# Patient Record
Sex: MALE | Race: WHITE | NOT HISPANIC OR LATINO | Employment: OTHER | URBAN - METROPOLITAN AREA
[De-identification: names, ages, dates, MRNs, and addresses within clinical notes are randomized per-mention and may not be internally consistent; named-entity substitution may affect disease eponyms.]

---

## 2017-01-04 ENCOUNTER — LAB CONVERSION - ENCOUNTER (OUTPATIENT)
Dept: OTHER | Facility: OTHER | Age: 57
End: 2017-01-04

## 2017-01-04 LAB
INR PPP: 1.7
PROTHROMBIN TIME: 17.3 SEC (ref 9–11.5)

## 2017-01-05 ENCOUNTER — GENERIC CONVERSION - ENCOUNTER (OUTPATIENT)
Dept: OTHER | Facility: OTHER | Age: 57
End: 2017-01-05

## 2017-01-10 ENCOUNTER — GENERIC CONVERSION - ENCOUNTER (OUTPATIENT)
Dept: OTHER | Facility: OTHER | Age: 57
End: 2017-01-10

## 2017-01-12 ENCOUNTER — LAB CONVERSION - ENCOUNTER (OUTPATIENT)
Dept: OTHER | Facility: OTHER | Age: 57
End: 2017-01-12

## 2017-01-12 LAB
INR PPP: 1.7
PROTHROMBIN TIME: 16.8 SEC (ref 9–11.5)

## 2017-01-25 ENCOUNTER — GENERIC CONVERSION - ENCOUNTER (OUTPATIENT)
Dept: OTHER | Facility: OTHER | Age: 57
End: 2017-01-25

## 2017-01-26 ENCOUNTER — LAB CONVERSION - ENCOUNTER (OUTPATIENT)
Dept: OTHER | Facility: OTHER | Age: 57
End: 2017-01-26

## 2017-01-26 LAB
INR PPP: 2.2
PROTHROMBIN TIME: 22.2 SEC (ref 9–11.5)

## 2017-01-27 ENCOUNTER — GENERIC CONVERSION - ENCOUNTER (OUTPATIENT)
Dept: OTHER | Facility: OTHER | Age: 57
End: 2017-01-27

## 2017-02-13 ENCOUNTER — ALLSCRIPTS OFFICE VISIT (OUTPATIENT)
Dept: OTHER | Facility: OTHER | Age: 57
End: 2017-02-13

## 2017-02-13 DIAGNOSIS — I48.91 ATRIAL FIBRILLATION (HCC): ICD-10-CM

## 2017-02-13 DIAGNOSIS — R60.0 LOCALIZED EDEMA: ICD-10-CM

## 2017-02-15 ENCOUNTER — LAB CONVERSION - ENCOUNTER (OUTPATIENT)
Dept: OTHER | Facility: OTHER | Age: 57
End: 2017-02-15

## 2017-02-15 LAB
BUN SERPL-MCNC: 17 MG/DL (ref 7–25)
BUN/CREA RATIO (HISTORICAL): NORMAL (CALC) (ref 6–22)
CALCIUM SERPL-MCNC: 8.8 MG/DL (ref 8.6–10.3)
CHLORIDE SERPL-SCNC: 107 MMOL/L (ref 98–110)
CO2 SERPL-SCNC: 23 MMOL/L (ref 20–31)
CREAT SERPL-MCNC: 0.72 MG/DL (ref 0.7–1.33)
EGFR AFRICAN AMERICAN (HISTORICAL): 123 ML/MIN/1.73M2
EGFR-AMERICAN CALC (HISTORICAL): 106 ML/MIN/1.73M2
GLUCOSE (HISTORICAL): 79 MG/DL (ref 65–99)
POTASSIUM SERPL-SCNC: 4.6 MMOL/L (ref 3.5–5.3)
SODIUM SERPL-SCNC: 138 MMOL/L (ref 135–146)

## 2017-02-16 ENCOUNTER — GENERIC CONVERSION - ENCOUNTER (OUTPATIENT)
Dept: OTHER | Facility: OTHER | Age: 57
End: 2017-02-16

## 2017-02-16 ENCOUNTER — LAB CONVERSION - ENCOUNTER (OUTPATIENT)
Dept: OTHER | Facility: OTHER | Age: 57
End: 2017-02-16

## 2017-02-16 LAB
INR PPP: 1.4
PROTHROMBIN TIME: 14.7 SEC (ref 9–11.5)

## 2017-02-22 ENCOUNTER — GENERIC CONVERSION - ENCOUNTER (OUTPATIENT)
Dept: OTHER | Facility: OTHER | Age: 57
End: 2017-02-22

## 2017-03-30 ENCOUNTER — APPOINTMENT (EMERGENCY)
Dept: CT IMAGING | Facility: HOSPITAL | Age: 57
DRG: 201 | End: 2017-03-30
Payer: COMMERCIAL

## 2017-03-30 ENCOUNTER — HOSPITAL ENCOUNTER (INPATIENT)
Facility: HOSPITAL | Age: 57
LOS: 4 days | Discharge: HOME | DRG: 201 | End: 2017-04-03
Attending: EMERGENCY MEDICINE | Admitting: INTERNAL MEDICINE
Payer: COMMERCIAL

## 2017-03-30 DIAGNOSIS — I48.91 ATRIAL FIBRILLATION (HCC): ICD-10-CM

## 2017-03-30 DIAGNOSIS — R19.7 ACUTE DIARRHEA: ICD-10-CM

## 2017-03-30 DIAGNOSIS — Z91.14 H/O MEDICATION NONCOMPLIANCE: ICD-10-CM

## 2017-03-30 DIAGNOSIS — E05.90 HYPERTHYROIDISM: ICD-10-CM

## 2017-03-30 DIAGNOSIS — I26.99 ACUTE PULMONARY EMBOLISM (HCC): Primary | ICD-10-CM

## 2017-03-30 LAB
ANION GAP SERPL CALCULATED.3IONS-SCNC: 8 MMOL/L (ref 4–13)
APTT PPP: 32 SECONDS (ref 24–36)
BASOPHILS # BLD AUTO: 0.03 THOUSANDS/ΜL (ref 0–0.1)
BASOPHILS NFR BLD AUTO: 0 % (ref 0–1)
BUN SERPL-MCNC: 29 MG/DL (ref 5–25)
CALCIUM SERPL-MCNC: 8.9 MG/DL (ref 8.3–10.1)
CHLORIDE SERPL-SCNC: 103 MMOL/L (ref 100–108)
CO2 SERPL-SCNC: 27 MMOL/L (ref 21–32)
CREAT SERPL-MCNC: 0.71 MG/DL (ref 0.6–1.3)
EOSINOPHIL # BLD AUTO: 0.13 THOUSAND/ΜL (ref 0–0.61)
EOSINOPHIL NFR BLD AUTO: 2 % (ref 0–6)
ERYTHROCYTE [DISTWIDTH] IN BLOOD BY AUTOMATED COUNT: 12.7 % (ref 11.6–15.1)
GFR SERPL CREATININE-BSD FRML MDRD: >60 ML/MIN/1.73SQ M
GLUCOSE SERPL-MCNC: 99 MG/DL (ref 65–140)
HCT VFR BLD AUTO: 44.1 % (ref 36.5–49.3)
HGB BLD-MCNC: 14.9 G/DL (ref 12–17)
INR PPP: 1.32 (ref 0.86–1.16)
LYMPHOCYTES # BLD AUTO: 1.4 THOUSANDS/ΜL (ref 0.6–4.47)
LYMPHOCYTES NFR BLD AUTO: 17 % (ref 14–44)
MAGNESIUM SERPL-MCNC: 1.6 MG/DL (ref 1.6–2.6)
MCH RBC QN AUTO: 32.5 PG (ref 26.8–34.3)
MCHC RBC AUTO-ENTMCNC: 33.8 G/DL (ref 31.4–37.4)
MCV RBC AUTO: 96 FL (ref 82–98)
MONOCYTES # BLD AUTO: 1.15 THOUSAND/ΜL (ref 0.17–1.22)
MONOCYTES NFR BLD AUTO: 14 % (ref 4–12)
NEUTROPHILS # BLD AUTO: 5.46 THOUSANDS/ΜL (ref 1.85–7.62)
NEUTS SEG NFR BLD AUTO: 67 % (ref 43–75)
NT-PROBNP SERPL-MCNC: 1225 PG/ML
PLATELET # BLD AUTO: 238 THOUSANDS/UL (ref 149–390)
PMV BLD AUTO: 10.3 FL (ref 8.9–12.7)
POTASSIUM SERPL-SCNC: 3.9 MMOL/L (ref 3.5–5.3)
PROTHROMBIN TIME: 16.2 SECONDS (ref 12–14.3)
RBC # BLD AUTO: 4.59 MILLION/UL (ref 3.88–5.62)
SODIUM SERPL-SCNC: 138 MMOL/L (ref 136–145)
TROPONIN I SERPL-MCNC: <0.02 NG/ML
TSH SERPL DL<=0.05 MIU/L-ACNC: <0.007 UIU/ML (ref 0.36–3.74)
WBC # BLD AUTO: 8.17 THOUSAND/UL (ref 4.31–10.16)

## 2017-03-30 PROCEDURE — 84443 ASSAY THYROID STIM HORMONE: CPT | Performed by: PHYSICIAN ASSISTANT

## 2017-03-30 PROCEDURE — 74177 CT ABD & PELVIS W/CONTRAST: CPT

## 2017-03-30 PROCEDURE — 85610 PROTHROMBIN TIME: CPT | Performed by: PHYSICIAN ASSISTANT

## 2017-03-30 PROCEDURE — 85730 THROMBOPLASTIN TIME PARTIAL: CPT | Performed by: PHYSICIAN ASSISTANT

## 2017-03-30 PROCEDURE — 85025 COMPLETE CBC W/AUTO DIFF WBC: CPT | Performed by: PHYSICIAN ASSISTANT

## 2017-03-30 PROCEDURE — 93005 ELECTROCARDIOGRAM TRACING: CPT | Performed by: PHYSICIAN ASSISTANT

## 2017-03-30 PROCEDURE — 83880 ASSAY OF NATRIURETIC PEPTIDE: CPT | Performed by: PHYSICIAN ASSISTANT

## 2017-03-30 PROCEDURE — 83735 ASSAY OF MAGNESIUM: CPT | Performed by: PHYSICIAN ASSISTANT

## 2017-03-30 PROCEDURE — 71275 CT ANGIOGRAPHY CHEST: CPT

## 2017-03-30 PROCEDURE — 80048 BASIC METABOLIC PNL TOTAL CA: CPT | Performed by: PHYSICIAN ASSISTANT

## 2017-03-30 PROCEDURE — 84484 ASSAY OF TROPONIN QUANT: CPT | Performed by: PHYSICIAN ASSISTANT

## 2017-03-30 PROCEDURE — 99285 EMERGENCY DEPT VISIT HI MDM: CPT

## 2017-03-30 PROCEDURE — 36415 COLL VENOUS BLD VENIPUNCTURE: CPT | Performed by: PHYSICIAN ASSISTANT

## 2017-03-30 PROCEDURE — 96360 HYDRATION IV INFUSION INIT: CPT

## 2017-03-30 RX ORDER — ONDANSETRON 2 MG/ML
4 INJECTION INTRAMUSCULAR; INTRAVENOUS EVERY 6 HOURS PRN
Status: DISCONTINUED | OUTPATIENT
Start: 2017-03-30 | End: 2017-04-03 | Stop reason: HOSPADM

## 2017-03-30 RX ORDER — HEPARIN SODIUM 10000 [USP'U]/100ML
3-30 INJECTION, SOLUTION INTRAVENOUS
Status: DISCONTINUED | OUTPATIENT
Start: 2017-03-30 | End: 2017-03-31

## 2017-03-30 RX ORDER — METOPROLOL TARTRATE 50 MG/1
100 TABLET, FILM COATED ORAL 2 TIMES DAILY
Status: DISCONTINUED | OUTPATIENT
Start: 2017-03-31 | End: 2017-04-02

## 2017-03-30 RX ORDER — HEPARIN SODIUM 1000 [USP'U]/ML
10000 INJECTION, SOLUTION INTRAVENOUS; SUBCUTANEOUS ONCE
Status: COMPLETED | OUTPATIENT
Start: 2017-03-30 | End: 2017-03-30

## 2017-03-30 RX ORDER — TORSEMIDE 20 MG/1
40 TABLET ORAL 2 TIMES DAILY
Status: DISCONTINUED | OUTPATIENT
Start: 2017-03-31 | End: 2017-04-01

## 2017-03-30 RX ORDER — ATORVASTATIN CALCIUM 40 MG/1
40 TABLET, FILM COATED ORAL DAILY
Status: DISCONTINUED | OUTPATIENT
Start: 2017-03-31 | End: 2017-04-01

## 2017-03-30 RX ORDER — DILTIAZEM HYDROCHLORIDE 5 MG/ML
10 INJECTION INTRAVENOUS ONCE
Status: COMPLETED | OUTPATIENT
Start: 2017-03-30 | End: 2017-03-30

## 2017-03-30 RX ORDER — DILTIAZEM HYDROCHLORIDE 5 MG/ML
INJECTION INTRAVENOUS
Status: DISPENSED
Start: 2017-03-30 | End: 2017-03-31

## 2017-03-30 RX ORDER — METHIMAZOLE 5 MG/1
10 TABLET ORAL EVERY 8 HOURS SCHEDULED
Status: DISCONTINUED | OUTPATIENT
Start: 2017-03-30 | End: 2017-04-03 | Stop reason: HOSPADM

## 2017-03-30 RX ORDER — ACETAMINOPHEN 325 MG/1
650 TABLET ORAL EVERY 6 HOURS PRN
Status: DISCONTINUED | OUTPATIENT
Start: 2017-03-30 | End: 2017-04-03 | Stop reason: HOSPADM

## 2017-03-30 RX ORDER — HEPARIN SODIUM 1000 [USP'U]/ML
5000 INJECTION, SOLUTION INTRAVENOUS; SUBCUTANEOUS AS NEEDED
Status: DISCONTINUED | OUTPATIENT
Start: 2017-03-30 | End: 2017-03-31

## 2017-03-30 RX ORDER — HEPARIN SODIUM 1000 [USP'U]/ML
10000 INJECTION, SOLUTION INTRAVENOUS; SUBCUTANEOUS ONCE
Status: DISCONTINUED | OUTPATIENT
Start: 2017-03-30 | End: 2017-03-30

## 2017-03-30 RX ORDER — ALBUTEROL SULFATE 2.5 MG/3ML
2.5 SOLUTION RESPIRATORY (INHALATION) EVERY 6 HOURS PRN
Status: DISCONTINUED | OUTPATIENT
Start: 2017-03-30 | End: 2017-04-03 | Stop reason: HOSPADM

## 2017-03-30 RX ORDER — HEPARIN SODIUM 1000 [USP'U]/ML
10000 INJECTION, SOLUTION INTRAVENOUS; SUBCUTANEOUS AS NEEDED
Status: DISCONTINUED | OUTPATIENT
Start: 2017-03-30 | End: 2017-03-31

## 2017-03-30 RX ADMIN — IOHEXOL 100 ML: 350 INJECTION, SOLUTION INTRAVENOUS at 20:38

## 2017-03-30 RX ADMIN — METHIMAZOLE 10 MG: 5 TABLET ORAL at 23:57

## 2017-03-30 RX ADMIN — HEPARIN SODIUM 10000 UNITS: 1000 INJECTION, SOLUTION INTRAVENOUS; SUBCUTANEOUS at 22:03

## 2017-03-30 RX ADMIN — DILTIAZEM HYDROCHLORIDE 15 MG/HR: 5 INJECTION INTRAVENOUS at 23:11

## 2017-03-30 RX ADMIN — DILTIAZEM HYDROCHLORIDE 10 MG: 5 INJECTION INTRAVENOUS at 21:56

## 2017-03-30 RX ADMIN — SODIUM CHLORIDE 500 ML: 0.9 INJECTION, SOLUTION INTRAVENOUS at 19:37

## 2017-03-30 RX ADMIN — HEPARIN SODIUM AND DEXTROSE 18 UNITS/KG/HR: 10000; 5 INJECTION INTRAVENOUS at 22:06

## 2017-03-31 ENCOUNTER — APPOINTMENT (INPATIENT)
Dept: PHYSICAL THERAPY | Facility: HOSPITAL | Age: 57
DRG: 201 | End: 2017-03-31
Payer: COMMERCIAL

## 2017-03-31 ENCOUNTER — APPOINTMENT (INPATIENT)
Dept: NON INVASIVE DIAGNOSTICS | Facility: HOSPITAL | Age: 57
DRG: 201 | End: 2017-03-31
Payer: COMMERCIAL

## 2017-03-31 PROBLEM — I48.20 CHRONIC ATRIAL FIBRILLATION (HCC): Status: ACTIVE | Noted: 2017-03-31

## 2017-03-31 LAB
ALBUMIN SERPL BCP-MCNC: 2.5 G/DL (ref 3.5–5)
ALP SERPL-CCNC: 83 U/L (ref 46–116)
ALT SERPL W P-5'-P-CCNC: 33 U/L (ref 12–78)
ANION GAP SERPL CALCULATED.3IONS-SCNC: 8 MMOL/L (ref 4–13)
APTT PPP: 136 SECONDS (ref 24–36)
AST SERPL W P-5'-P-CCNC: 26 U/L (ref 5–45)
ATRIAL RATE: 110 BPM
ATRIAL RATE: 80 BPM
BILIRUB SERPL-MCNC: 0.9 MG/DL (ref 0.2–1)
BUN SERPL-MCNC: 22 MG/DL (ref 5–25)
CALCIUM SERPL-MCNC: 8.1 MG/DL (ref 8.3–10.1)
CHLORIDE SERPL-SCNC: 104 MMOL/L (ref 100–108)
CO2 SERPL-SCNC: 24 MMOL/L (ref 21–32)
CREAT SERPL-MCNC: 0.52 MG/DL (ref 0.6–1.3)
ERYTHROCYTE [DISTWIDTH] IN BLOOD BY AUTOMATED COUNT: 12.5 % (ref 11.6–15.1)
GFR SERPL CREATININE-BSD FRML MDRD: >60 ML/MIN/1.73SQ M
GLUCOSE SERPL-MCNC: 93 MG/DL (ref 65–140)
HCT VFR BLD AUTO: 39 % (ref 36.5–49.3)
HGB BLD-MCNC: 13.1 G/DL (ref 12–17)
INR PPP: 1.43 (ref 0.86–1.16)
MAGNESIUM SERPL-MCNC: 1.6 MG/DL (ref 1.6–2.6)
MCH RBC QN AUTO: 32.4 PG (ref 26.8–34.3)
MCHC RBC AUTO-ENTMCNC: 33.6 G/DL (ref 31.4–37.4)
MCV RBC AUTO: 97 FL (ref 82–98)
PLATELET # BLD AUTO: 229 THOUSANDS/UL (ref 149–390)
PMV BLD AUTO: 10.7 FL (ref 8.9–12.7)
POTASSIUM SERPL-SCNC: 3.5 MMOL/L (ref 3.5–5.3)
PROT SERPL-MCNC: 6.8 G/DL (ref 6.4–8.2)
PROTHROMBIN TIME: 17.2 SECONDS (ref 12–14.3)
QRS AXIS: 13 DEGREES
QRS AXIS: 24 DEGREES
QRSD INTERVAL: 74 MS
QRSD INTERVAL: 80 MS
QT INTERVAL: 254 MS
QT INTERVAL: 360 MS
QTC INTERVAL: 357 MS
QTC INTERVAL: 420 MS
RBC # BLD AUTO: 4.04 MILLION/UL (ref 3.88–5.62)
SODIUM SERPL-SCNC: 136 MMOL/L (ref 136–145)
T WAVE AXIS: 32 DEGREES
T WAVE AXIS: 56 DEGREES
TROPONIN I SERPL-MCNC: <0.02 NG/ML
TROPONIN I SERPL-MCNC: <0.02 NG/ML
VENTRICULAR RATE: 119 BPM
VENTRICULAR RATE: 82 BPM
WBC # BLD AUTO: 7.67 THOUSAND/UL (ref 4.31–10.16)

## 2017-03-31 PROCEDURE — 83735 ASSAY OF MAGNESIUM: CPT | Performed by: NURSE PRACTITIONER

## 2017-03-31 PROCEDURE — 93970 EXTREMITY STUDY: CPT

## 2017-03-31 PROCEDURE — 94760 N-INVAS EAR/PLS OXIMETRY 1: CPT

## 2017-03-31 PROCEDURE — 85027 COMPLETE CBC AUTOMATED: CPT | Performed by: NURSE PRACTITIONER

## 2017-03-31 PROCEDURE — 93005 ELECTROCARDIOGRAM TRACING: CPT

## 2017-03-31 PROCEDURE — 84484 ASSAY OF TROPONIN QUANT: CPT | Performed by: NURSE PRACTITIONER

## 2017-03-31 PROCEDURE — G8978 MOBILITY CURRENT STATUS: HCPCS

## 2017-03-31 PROCEDURE — 97162 PT EVAL MOD COMPLEX 30 MIN: CPT

## 2017-03-31 PROCEDURE — 85730 THROMBOPLASTIN TIME PARTIAL: CPT | Performed by: NURSE PRACTITIONER

## 2017-03-31 PROCEDURE — G8980 MOBILITY D/C STATUS: HCPCS

## 2017-03-31 PROCEDURE — 85610 PROTHROMBIN TIME: CPT | Performed by: NURSE PRACTITIONER

## 2017-03-31 PROCEDURE — G8979 MOBILITY GOAL STATUS: HCPCS

## 2017-03-31 PROCEDURE — 93306 TTE W/DOPPLER COMPLETE: CPT

## 2017-03-31 PROCEDURE — 80053 COMPREHEN METABOLIC PANEL: CPT | Performed by: NURSE PRACTITIONER

## 2017-03-31 RX ORDER — WARFARIN SODIUM 7.5 MG/1
15 TABLET ORAL
Status: COMPLETED | OUTPATIENT
Start: 2017-03-31 | End: 2017-03-31

## 2017-03-31 RX ORDER — MAGNESIUM SULFATE HEPTAHYDRATE 40 MG/ML
2 INJECTION, SOLUTION INTRAVENOUS ONCE
Status: COMPLETED | OUTPATIENT
Start: 2017-03-31 | End: 2017-03-31

## 2017-03-31 RX ORDER — POTASSIUM CHLORIDE 20 MEQ/1
20 TABLET, EXTENDED RELEASE ORAL 2 TIMES DAILY
Status: DISCONTINUED | OUTPATIENT
Start: 2017-03-31 | End: 2017-04-02

## 2017-03-31 RX ADMIN — ENOXAPARIN SODIUM 135 MG: 150 INJECTION SUBCUTANEOUS at 21:43

## 2017-03-31 RX ADMIN — METHIMAZOLE 10 MG: 5 TABLET ORAL at 21:44

## 2017-03-31 RX ADMIN — ENOXAPARIN SODIUM 135 MG: 150 INJECTION SUBCUTANEOUS at 09:30

## 2017-03-31 RX ADMIN — METOPROLOL TARTRATE 100 MG: 50 TABLET ORAL at 17:04

## 2017-03-31 RX ADMIN — MAGNESIUM SULFATE HEPTAHYDRATE 2 G: 40 INJECTION, SOLUTION INTRAVENOUS at 09:30

## 2017-03-31 RX ADMIN — POTASSIUM CHLORIDE 20 MEQ: 1500 TABLET, EXTENDED RELEASE ORAL at 09:31

## 2017-03-31 RX ADMIN — ATORVASTATIN CALCIUM 40 MG: 40 TABLET, FILM COATED ORAL at 09:29

## 2017-03-31 RX ADMIN — METHIMAZOLE 10 MG: 5 TABLET ORAL at 06:28

## 2017-03-31 RX ADMIN — TORSEMIDE 40 MG: 20 TABLET ORAL at 17:04

## 2017-03-31 RX ADMIN — PERFLUTREN 3 ML/MIN: 6.52 INJECTION, SUSPENSION INTRAVENOUS at 12:12

## 2017-03-31 RX ADMIN — WARFARIN SODIUM 15 MG: 7.5 TABLET ORAL at 17:04

## 2017-03-31 RX ADMIN — TORSEMIDE 40 MG: 20 TABLET ORAL at 09:29

## 2017-03-31 RX ADMIN — METOPROLOL TARTRATE 100 MG: 50 TABLET ORAL at 09:29

## 2017-03-31 RX ADMIN — POTASSIUM CHLORIDE 20 MEQ: 1500 TABLET, EXTENDED RELEASE ORAL at 17:04

## 2017-03-31 RX ADMIN — METHIMAZOLE 10 MG: 5 TABLET ORAL at 14:14

## 2017-04-01 PROBLEM — R19.7 DIARRHEA: Status: RESOLVED | Noted: 2017-03-30 | Resolved: 2017-04-01

## 2017-04-01 LAB
ANION GAP SERPL CALCULATED.3IONS-SCNC: 9 MMOL/L (ref 4–13)
BUN SERPL-MCNC: 28 MG/DL (ref 5–25)
CALCIUM SERPL-MCNC: 9 MG/DL (ref 8.3–10.1)
CHLORIDE SERPL-SCNC: 100 MMOL/L (ref 100–108)
CO2 SERPL-SCNC: 29 MMOL/L (ref 21–32)
CREAT SERPL-MCNC: 0.8 MG/DL (ref 0.6–1.3)
ERYTHROCYTE [DISTWIDTH] IN BLOOD BY AUTOMATED COUNT: 12.4 % (ref 11.6–15.1)
GFR SERPL CREATININE-BSD FRML MDRD: >60 ML/MIN/1.73SQ M
GLUCOSE SERPL-MCNC: 88 MG/DL (ref 65–140)
HCT VFR BLD AUTO: 42 % (ref 36.5–49.3)
HGB BLD-MCNC: 14.1 G/DL (ref 12–17)
INR PPP: 1.42 (ref 0.86–1.16)
MCH RBC QN AUTO: 31.9 PG (ref 26.8–34.3)
MCHC RBC AUTO-ENTMCNC: 33.6 G/DL (ref 31.4–37.4)
MCV RBC AUTO: 95 FL (ref 82–98)
PLATELET # BLD AUTO: 289 THOUSANDS/UL (ref 149–390)
PMV BLD AUTO: 10.5 FL (ref 8.9–12.7)
POTASSIUM SERPL-SCNC: 3.4 MMOL/L (ref 3.5–5.3)
PROTHROMBIN TIME: 17.1 SECONDS (ref 12–14.3)
RBC # BLD AUTO: 4.42 MILLION/UL (ref 3.88–5.62)
SODIUM SERPL-SCNC: 138 MMOL/L (ref 136–145)
WBC # BLD AUTO: 5.81 THOUSAND/UL (ref 4.31–10.16)

## 2017-04-01 PROCEDURE — 85610 PROTHROMBIN TIME: CPT | Performed by: INTERNAL MEDICINE

## 2017-04-01 PROCEDURE — 94760 N-INVAS EAR/PLS OXIMETRY 1: CPT

## 2017-04-01 PROCEDURE — 85027 COMPLETE CBC AUTOMATED: CPT | Performed by: INTERNAL MEDICINE

## 2017-04-01 PROCEDURE — 80048 BASIC METABOLIC PNL TOTAL CA: CPT | Performed by: INTERNAL MEDICINE

## 2017-04-01 RX ORDER — WARFARIN SODIUM 10 MG/1
20 TABLET ORAL
Status: COMPLETED | OUTPATIENT
Start: 2017-04-01 | End: 2017-04-01

## 2017-04-01 RX ORDER — TORSEMIDE 20 MG/1
40 TABLET ORAL DAILY
Status: DISCONTINUED | OUTPATIENT
Start: 2017-04-01 | End: 2017-04-03

## 2017-04-01 RX ORDER — ATORVASTATIN CALCIUM 40 MG/1
40 TABLET, FILM COATED ORAL EVERY EVENING
Status: DISCONTINUED | OUTPATIENT
Start: 2017-04-01 | End: 2017-04-03 | Stop reason: HOSPADM

## 2017-04-01 RX ADMIN — TORSEMIDE 40 MG: 20 TABLET ORAL at 09:23

## 2017-04-01 RX ADMIN — POTASSIUM CHLORIDE 20 MEQ: 1500 TABLET, EXTENDED RELEASE ORAL at 17:27

## 2017-04-01 RX ADMIN — ENOXAPARIN SODIUM 135 MG: 150 INJECTION SUBCUTANEOUS at 09:23

## 2017-04-01 RX ADMIN — METHIMAZOLE 10 MG: 5 TABLET ORAL at 21:00

## 2017-04-01 RX ADMIN — METHIMAZOLE 10 MG: 5 TABLET ORAL at 06:18

## 2017-04-01 RX ADMIN — METHIMAZOLE 10 MG: 5 TABLET ORAL at 13:41

## 2017-04-01 RX ADMIN — METOPROLOL TARTRATE 100 MG: 50 TABLET ORAL at 17:26

## 2017-04-01 RX ADMIN — ATORVASTATIN CALCIUM 40 MG: 40 TABLET, FILM COATED ORAL at 18:43

## 2017-04-01 RX ADMIN — POTASSIUM CHLORIDE 20 MEQ: 1500 TABLET, EXTENDED RELEASE ORAL at 09:23

## 2017-04-01 RX ADMIN — ENOXAPARIN SODIUM 135 MG: 150 INJECTION SUBCUTANEOUS at 20:54

## 2017-04-01 RX ADMIN — WARFARIN SODIUM 20 MG: 10 TABLET ORAL at 17:29

## 2017-04-01 RX ADMIN — METOPROLOL TARTRATE 100 MG: 50 TABLET ORAL at 09:23

## 2017-04-02 LAB
ANION GAP SERPL CALCULATED.3IONS-SCNC: 7 MMOL/L (ref 4–13)
APTT PPP: 44 SECONDS (ref 24–36)
BUN SERPL-MCNC: 26 MG/DL (ref 5–25)
CALCIUM SERPL-MCNC: 9.1 MG/DL (ref 8.3–10.1)
CHLORIDE SERPL-SCNC: 100 MMOL/L (ref 100–108)
CO2 SERPL-SCNC: 32 MMOL/L (ref 21–32)
CREAT SERPL-MCNC: 0.77 MG/DL (ref 0.6–1.3)
GFR SERPL CREATININE-BSD FRML MDRD: >60 ML/MIN/1.73SQ M
GLUCOSE SERPL-MCNC: 86 MG/DL (ref 65–140)
INR PPP: 2.61 (ref 0.86–1.16)
POTASSIUM SERPL-SCNC: 3.3 MMOL/L (ref 3.5–5.3)
PROTHROMBIN TIME: 27.1 SECONDS (ref 12–14.3)
SODIUM SERPL-SCNC: 139 MMOL/L (ref 136–145)

## 2017-04-02 PROCEDURE — 85730 THROMBOPLASTIN TIME PARTIAL: CPT | Performed by: NURSE PRACTITIONER

## 2017-04-02 PROCEDURE — 85610 PROTHROMBIN TIME: CPT | Performed by: INTERNAL MEDICINE

## 2017-04-02 PROCEDURE — 80048 BASIC METABOLIC PNL TOTAL CA: CPT | Performed by: INTERNAL MEDICINE

## 2017-04-02 PROCEDURE — 94760 N-INVAS EAR/PLS OXIMETRY 1: CPT

## 2017-04-02 RX ORDER — DIGOXIN 250 MCG
250 TABLET ORAL DAILY
Status: DISCONTINUED | OUTPATIENT
Start: 2017-04-02 | End: 2017-04-03

## 2017-04-02 RX ORDER — POTASSIUM CHLORIDE 20 MEQ/1
20 TABLET, EXTENDED RELEASE ORAL
Status: DISCONTINUED | OUTPATIENT
Start: 2017-04-02 | End: 2017-04-03 | Stop reason: HOSPADM

## 2017-04-02 RX ORDER — DIGOXIN 250 MCG
500 TABLET ORAL ONCE
Status: COMPLETED | OUTPATIENT
Start: 2017-04-02 | End: 2017-04-02

## 2017-04-02 RX ORDER — METOPROLOL TARTRATE 50 MG/1
100 TABLET, FILM COATED ORAL EVERY 12 HOURS
Status: DISCONTINUED | OUTPATIENT
Start: 2017-04-02 | End: 2017-04-03 | Stop reason: HOSPADM

## 2017-04-02 RX ADMIN — METOPROLOL TARTRATE 100 MG: 50 TABLET ORAL at 09:23

## 2017-04-02 RX ADMIN — METHIMAZOLE 10 MG: 5 TABLET ORAL at 14:05

## 2017-04-02 RX ADMIN — DIGOXIN 250 MCG: 250 TABLET ORAL at 16:23

## 2017-04-02 RX ADMIN — TORSEMIDE 40 MG: 20 TABLET ORAL at 09:23

## 2017-04-02 RX ADMIN — POTASSIUM CHLORIDE 20 MEQ: 1500 TABLET, EXTENDED RELEASE ORAL at 14:05

## 2017-04-02 RX ADMIN — ATORVASTATIN CALCIUM 40 MG: 40 TABLET, FILM COATED ORAL at 16:23

## 2017-04-02 RX ADMIN — DIGOXIN 500 MCG: 250 TABLET ORAL at 10:22

## 2017-04-02 RX ADMIN — POTASSIUM CHLORIDE 20 MEQ: 1500 TABLET, EXTENDED RELEASE ORAL at 09:23

## 2017-04-02 RX ADMIN — METHIMAZOLE 10 MG: 5 TABLET ORAL at 21:10

## 2017-04-02 RX ADMIN — METHIMAZOLE 10 MG: 5 TABLET ORAL at 05:46

## 2017-04-02 RX ADMIN — METOPROLOL TARTRATE 100 MG: 50 TABLET ORAL at 21:10

## 2017-04-02 RX ADMIN — POTASSIUM CHLORIDE 20 MEQ: 1500 TABLET, EXTENDED RELEASE ORAL at 16:23

## 2017-04-03 VITALS
TEMPERATURE: 98.9 F | RESPIRATION RATE: 19 BRPM | HEIGHT: 71 IN | OXYGEN SATURATION: 99 % | WEIGHT: 307.1 LBS | DIASTOLIC BLOOD PRESSURE: 81 MMHG | HEART RATE: 69 BPM | BODY MASS INDEX: 42.99 KG/M2 | SYSTOLIC BLOOD PRESSURE: 125 MMHG

## 2017-04-03 LAB
ANION GAP SERPL CALCULATED.3IONS-SCNC: 4 MMOL/L (ref 4–13)
BUN SERPL-MCNC: 23 MG/DL (ref 5–25)
CALCIUM SERPL-MCNC: 8.9 MG/DL (ref 8.3–10.1)
CHLORIDE SERPL-SCNC: 101 MMOL/L (ref 100–108)
CO2 SERPL-SCNC: 32 MMOL/L (ref 21–32)
CREAT SERPL-MCNC: 0.77 MG/DL (ref 0.6–1.3)
GFR SERPL CREATININE-BSD FRML MDRD: >60 ML/MIN/1.73SQ M
GLUCOSE SERPL-MCNC: 93 MG/DL (ref 65–140)
INR PPP: 2.67 (ref 0.86–1.16)
POTASSIUM SERPL-SCNC: 3.8 MMOL/L (ref 3.5–5.3)
PROTHROMBIN TIME: 27.6 SECONDS (ref 12–14.3)
SODIUM SERPL-SCNC: 137 MMOL/L (ref 136–145)

## 2017-04-03 PROCEDURE — 85610 PROTHROMBIN TIME: CPT | Performed by: INTERNAL MEDICINE

## 2017-04-03 PROCEDURE — 80048 BASIC METABOLIC PNL TOTAL CA: CPT | Performed by: INTERNAL MEDICINE

## 2017-04-03 PROCEDURE — 94760 N-INVAS EAR/PLS OXIMETRY 1: CPT

## 2017-04-03 RX ORDER — WARFARIN SODIUM 5 MG/1
5 TABLET ORAL
Qty: 30 TABLET | Refills: 0 | Status: SHIPPED | OUTPATIENT
Start: 2017-04-03 | End: 2017-07-04 | Stop reason: HOSPADM

## 2017-04-03 RX ORDER — TORSEMIDE 20 MG/1
10 TABLET ORAL DAILY
Status: DISCONTINUED | OUTPATIENT
Start: 2017-04-04 | End: 2017-04-03 | Stop reason: HOSPADM

## 2017-04-03 RX ORDER — NICOTINE 21 MG/24HR
1 PATCH, TRANSDERMAL 24 HOURS TRANSDERMAL DAILY
Qty: 30 PATCH | Refills: 0 | Status: SHIPPED | OUTPATIENT
Start: 2017-04-03 | End: 2017-05-03

## 2017-04-03 RX ORDER — NICOTINE 21 MG/24HR
14 PATCH, TRANSDERMAL 24 HOURS TRANSDERMAL DAILY
Status: DISCONTINUED | OUTPATIENT
Start: 2017-04-03 | End: 2017-04-03 | Stop reason: HOSPADM

## 2017-04-03 RX ORDER — DIGOXIN 125 MCG
125 TABLET ORAL DAILY
Status: DISCONTINUED | OUTPATIENT
Start: 2017-04-04 | End: 2017-04-03 | Stop reason: HOSPADM

## 2017-04-03 RX ORDER — METHIMAZOLE 10 MG/1
10 TABLET ORAL EVERY 8 HOURS SCHEDULED
Qty: 90 TABLET | Refills: 0 | Status: ON HOLD | OUTPATIENT
Start: 2017-04-03 | End: 2017-07-31

## 2017-04-03 RX ORDER — TORSEMIDE 10 MG/1
10 TABLET ORAL DAILY
Qty: 30 TABLET | Refills: 0 | Status: SHIPPED | OUTPATIENT
Start: 2017-04-04 | End: 2017-04-03

## 2017-04-03 RX ORDER — WARFARIN SODIUM 5 MG/1
5 TABLET ORAL
Status: DISCONTINUED | OUTPATIENT
Start: 2017-04-03 | End: 2017-04-03 | Stop reason: HOSPADM

## 2017-04-03 RX ORDER — TORSEMIDE 10 MG/1
20 TABLET ORAL DAILY
Qty: 60 TABLET | Refills: 0 | Status: SHIPPED | OUTPATIENT
Start: 2017-04-04 | End: 2017-07-04 | Stop reason: HOSPADM

## 2017-04-03 RX ADMIN — METHIMAZOLE 10 MG: 5 TABLET ORAL at 05:19

## 2017-04-03 RX ADMIN — POTASSIUM CHLORIDE 20 MEQ: 1500 TABLET, EXTENDED RELEASE ORAL at 08:24

## 2017-04-03 RX ADMIN — METHIMAZOLE 10 MG: 5 TABLET ORAL at 13:50

## 2017-04-03 RX ADMIN — DIGOXIN 250 MCG: 250 TABLET ORAL at 08:25

## 2017-04-03 RX ADMIN — TORSEMIDE 40 MG: 20 TABLET ORAL at 08:24

## 2017-04-03 RX ADMIN — METOPROLOL TARTRATE 100 MG: 50 TABLET ORAL at 08:24

## 2017-04-03 RX ADMIN — POTASSIUM CHLORIDE 20 MEQ: 1500 TABLET, EXTENDED RELEASE ORAL at 11:52

## 2017-04-10 ENCOUNTER — GENERIC CONVERSION - ENCOUNTER (OUTPATIENT)
Dept: OTHER | Facility: OTHER | Age: 57
End: 2017-04-10

## 2017-04-11 ENCOUNTER — GENERIC CONVERSION - ENCOUNTER (OUTPATIENT)
Dept: OTHER | Facility: OTHER | Age: 57
End: 2017-04-11

## 2017-04-11 ENCOUNTER — LAB CONVERSION - ENCOUNTER (OUTPATIENT)
Dept: OTHER | Facility: OTHER | Age: 57
End: 2017-04-11

## 2017-04-11 LAB
INR PPP: 2
PROTHROMBIN TIME: 20.9 SEC (ref 9–11.5)

## 2017-06-12 ENCOUNTER — HOSPITAL ENCOUNTER (EMERGENCY)
Facility: HOSPITAL | Age: 57
Discharge: HOME | End: 2017-06-13
Attending: EMERGENCY MEDICINE | Admitting: EMERGENCY MEDICINE
Payer: COMMERCIAL

## 2017-06-12 DIAGNOSIS — R07.89 NON-CARDIAC CHEST PAIN: Primary | ICD-10-CM

## 2017-06-12 DIAGNOSIS — R06.00 DYSPNEA: ICD-10-CM

## 2017-06-12 LAB
ALBUMIN SERPL BCP-MCNC: 3 G/DL (ref 3.5–5)
ALP SERPL-CCNC: 90 U/L (ref 46–116)
ALT SERPL W P-5'-P-CCNC: 60 U/L (ref 12–78)
ANION GAP SERPL CALCULATED.3IONS-SCNC: 4 MMOL/L (ref 4–13)
AST SERPL W P-5'-P-CCNC: 46 U/L (ref 5–45)
BASOPHILS # BLD AUTO: 0.04 THOUSANDS/ΜL (ref 0–0.1)
BASOPHILS NFR BLD AUTO: 1 % (ref 0–1)
BILIRUB SERPL-MCNC: 0.4 MG/DL (ref 0.2–1)
BUN SERPL-MCNC: 20 MG/DL (ref 5–25)
CALCIUM SERPL-MCNC: 8.6 MG/DL (ref 8.3–10.1)
CHLORIDE SERPL-SCNC: 104 MMOL/L (ref 100–108)
CO2 SERPL-SCNC: 28 MMOL/L (ref 21–32)
CREAT SERPL-MCNC: 0.84 MG/DL (ref 0.6–1.3)
EOSINOPHIL # BLD AUTO: 0.5 THOUSAND/ΜL (ref 0–0.61)
EOSINOPHIL NFR BLD AUTO: 7 % (ref 0–6)
ERYTHROCYTE [DISTWIDTH] IN BLOOD BY AUTOMATED COUNT: 14.5 % (ref 11.6–15.1)
GFR SERPL CREATININE-BSD FRML MDRD: >60 ML/MIN/1.73SQ M
GLUCOSE SERPL-MCNC: 107 MG/DL (ref 65–140)
HCT VFR BLD AUTO: 44.1 % (ref 36.5–49.3)
HGB BLD-MCNC: 14.8 G/DL (ref 12–17)
LYMPHOCYTES # BLD AUTO: 1.8 THOUSANDS/ΜL (ref 0.6–4.47)
LYMPHOCYTES NFR BLD AUTO: 23 % (ref 14–44)
MCH RBC QN AUTO: 32.1 PG (ref 26.8–34.3)
MCHC RBC AUTO-ENTMCNC: 33.6 G/DL (ref 31.4–37.4)
MCV RBC AUTO: 96 FL (ref 82–98)
MONOCYTES # BLD AUTO: 0.94 THOUSAND/ΜL (ref 0.17–1.22)
MONOCYTES NFR BLD AUTO: 12 % (ref 4–12)
NEUTROPHILS # BLD AUTO: 4.41 THOUSANDS/ΜL (ref 1.85–7.62)
NEUTS SEG NFR BLD AUTO: 57 % (ref 43–75)
PLATELET # BLD AUTO: 207 THOUSANDS/UL (ref 149–390)
PMV BLD AUTO: 9.9 FL (ref 8.9–12.7)
POTASSIUM SERPL-SCNC: 3.7 MMOL/L (ref 3.5–5.3)
PROT SERPL-MCNC: 8.3 G/DL (ref 6.4–8.2)
RBC # BLD AUTO: 4.61 MILLION/UL (ref 3.88–5.62)
SODIUM SERPL-SCNC: 136 MMOL/L (ref 136–145)
SPECIMEN SOURCE: NORMAL
TROPONIN I BLD-MCNC: 0 NG/ML (ref 0–0.08)
WBC # BLD AUTO: 7.69 THOUSAND/UL (ref 4.31–10.16)

## 2017-06-12 PROCEDURE — 85025 COMPLETE CBC W/AUTO DIFF WBC: CPT | Performed by: EMERGENCY MEDICINE

## 2017-06-12 PROCEDURE — 80053 COMPREHEN METABOLIC PANEL: CPT | Performed by: EMERGENCY MEDICINE

## 2017-06-12 PROCEDURE — 93005 ELECTROCARDIOGRAM TRACING: CPT | Performed by: EMERGENCY MEDICINE

## 2017-06-12 PROCEDURE — 96360 HYDRATION IV INFUSION INIT: CPT

## 2017-06-12 PROCEDURE — 84484 ASSAY OF TROPONIN QUANT: CPT

## 2017-06-12 PROCEDURE — 36415 COLL VENOUS BLD VENIPUNCTURE: CPT | Performed by: EMERGENCY MEDICINE

## 2017-06-12 RX ORDER — WARFARIN SODIUM 4 MG/1
1 TABLET ORAL DAILY
COMMUNITY
End: 2017-07-04 | Stop reason: HOSPADM

## 2017-06-12 RX ORDER — METHIMAZOLE 10 MG/1
1 TABLET ORAL 3 TIMES DAILY
COMMUNITY
End: 2017-07-04 | Stop reason: HOSPADM

## 2017-06-12 RX ORDER — POTASSIUM CHLORIDE 20 MEQ/1
1 TABLET, EXTENDED RELEASE ORAL DAILY
COMMUNITY
Start: 2016-09-15 | End: 2017-07-04 | Stop reason: HOSPADM

## 2017-06-12 RX ORDER — TORSEMIDE 20 MG/1
1 TABLET ORAL DAILY
COMMUNITY
End: 2017-07-04 | Stop reason: HOSPADM

## 2017-06-12 RX ADMIN — SODIUM CHLORIDE 1000 ML: 0.9 INJECTION, SOLUTION INTRAVENOUS at 22:47

## 2017-06-13 ENCOUNTER — APPOINTMENT (EMERGENCY)
Dept: CT IMAGING | Facility: HOSPITAL | Age: 57
End: 2017-06-13
Payer: COMMERCIAL

## 2017-06-13 ENCOUNTER — APPOINTMENT (EMERGENCY)
Dept: RADIOLOGY | Facility: HOSPITAL | Age: 57
End: 2017-06-13
Payer: COMMERCIAL

## 2017-06-13 VITALS
SYSTOLIC BLOOD PRESSURE: 126 MMHG | HEART RATE: 74 BPM | DIASTOLIC BLOOD PRESSURE: 59 MMHG | HEIGHT: 71 IN | BODY MASS INDEX: 44.1 KG/M2 | TEMPERATURE: 99 F | WEIGHT: 315 LBS | RESPIRATION RATE: 25 BRPM | OXYGEN SATURATION: 96 %

## 2017-06-13 LAB
APTT PPP: 32 SECONDS (ref 23–35)
INR PPP: 1.14 (ref 0.86–1.16)
PROTHROMBIN TIME: 14.4 SECONDS (ref 12.1–14.4)
SPECIMEN SOURCE: NORMAL
TROPONIN I BLD-MCNC: 0 NG/ML (ref 0–0.08)

## 2017-06-13 PROCEDURE — 85730 THROMBOPLASTIN TIME PARTIAL: CPT | Performed by: EMERGENCY MEDICINE

## 2017-06-13 PROCEDURE — 85610 PROTHROMBIN TIME: CPT | Performed by: EMERGENCY MEDICINE

## 2017-06-13 PROCEDURE — 71275 CT ANGIOGRAPHY CHEST: CPT

## 2017-06-13 PROCEDURE — 84484 ASSAY OF TROPONIN QUANT: CPT

## 2017-06-13 PROCEDURE — 71020 HB CHEST X-RAY 2VW FRONTAL&LATL: CPT

## 2017-06-13 PROCEDURE — 99285 EMERGENCY DEPT VISIT HI MDM: CPT

## 2017-06-13 PROCEDURE — 36415 COLL VENOUS BLD VENIPUNCTURE: CPT | Performed by: EMERGENCY MEDICINE

## 2017-06-13 RX ADMIN — IOHEXOL 85 ML: 350 INJECTION, SOLUTION INTRAVENOUS at 03:05

## 2017-06-14 LAB
ATRIAL RATE: 208 BPM
QRS AXIS: 29 DEGREES
QRSD INTERVAL: 82 MS
QT INTERVAL: 358 MS
QTC INTERVAL: 415 MS
T WAVE AXIS: 51 DEGREES
VENTRICULAR RATE: 81 BPM

## 2017-07-02 ENCOUNTER — APPOINTMENT (EMERGENCY)
Dept: CT IMAGING | Facility: HOSPITAL | Age: 57
End: 2017-07-02
Payer: COMMERCIAL

## 2017-07-02 ENCOUNTER — HOSPITAL ENCOUNTER (OUTPATIENT)
Facility: HOSPITAL | Age: 57
Setting detail: OBSERVATION
Discharge: HOME | End: 2017-07-04
Attending: EMERGENCY MEDICINE | Admitting: INTERNAL MEDICINE
Payer: COMMERCIAL

## 2017-07-02 DIAGNOSIS — R06.02 SOB (SHORTNESS OF BREATH): ICD-10-CM

## 2017-07-02 DIAGNOSIS — E86.0 DEHYDRATION: ICD-10-CM

## 2017-07-02 DIAGNOSIS — Z59.00 HOMELESS: ICD-10-CM

## 2017-07-02 DIAGNOSIS — I48.91 ATRIAL FIBRILLATION WITH RAPID VENTRICULAR RESPONSE (HCC): Primary | ICD-10-CM

## 2017-07-02 DIAGNOSIS — Z91.19 NONCOMPLIANCE: ICD-10-CM

## 2017-07-02 LAB
ALBUMIN SERPL BCP-MCNC: 2.7 G/DL (ref 3.5–5)
ALP SERPL-CCNC: 62 U/L (ref 46–116)
ALT SERPL W P-5'-P-CCNC: 67 U/L (ref 12–78)
ANION GAP SERPL CALCULATED.3IONS-SCNC: 8 MMOL/L (ref 4–13)
APTT PPP: 33 SECONDS (ref 23–35)
AST SERPL W P-5'-P-CCNC: 77 U/L (ref 5–45)
BASOPHILS # BLD AUTO: 0.03 THOUSANDS/ΜL (ref 0–0.1)
BASOPHILS NFR BLD AUTO: 0 % (ref 0–1)
BILIRUB SERPL-MCNC: 0.6 MG/DL (ref 0.2–1)
BUN SERPL-MCNC: 15 MG/DL (ref 5–25)
CALCIUM SERPL-MCNC: 8.8 MG/DL (ref 8.3–10.1)
CHLORIDE SERPL-SCNC: 102 MMOL/L (ref 100–108)
CO2 SERPL-SCNC: 25 MMOL/L (ref 21–32)
CREAT SERPL-MCNC: 0.82 MG/DL (ref 0.6–1.3)
EOSINOPHIL # BLD AUTO: 0.32 THOUSAND/ΜL (ref 0–0.61)
EOSINOPHIL NFR BLD AUTO: 4 % (ref 0–6)
ERYTHROCYTE [DISTWIDTH] IN BLOOD BY AUTOMATED COUNT: 13.7 % (ref 11.6–15.1)
GFR SERPL CREATININE-BSD FRML MDRD: >60 ML/MIN/1.73SQ M
GLUCOSE SERPL-MCNC: 94 MG/DL (ref 65–140)
HCT VFR BLD AUTO: 40.8 % (ref 36.5–49.3)
HGB BLD-MCNC: 13.8 G/DL (ref 12–17)
INR PPP: 1.36 (ref 0.86–1.16)
LACTATE SERPL-SCNC: 2 MMOL/L (ref 0.5–2)
LYMPHOCYTES # BLD AUTO: 1.17 THOUSANDS/ΜL (ref 0.6–4.47)
LYMPHOCYTES NFR BLD AUTO: 15 % (ref 14–44)
MCH RBC QN AUTO: 31.7 PG (ref 26.8–34.3)
MCHC RBC AUTO-ENTMCNC: 33.8 G/DL (ref 31.4–37.4)
MCV RBC AUTO: 94 FL (ref 82–98)
MONOCYTES # BLD AUTO: 1.42 THOUSAND/ΜL (ref 0.17–1.22)
MONOCYTES NFR BLD AUTO: 18 % (ref 4–12)
NEUTROPHILS # BLD AUTO: 4.87 THOUSANDS/ΜL (ref 1.85–7.62)
NEUTS SEG NFR BLD AUTO: 63 % (ref 43–75)
NT-PROBNP SERPL-MCNC: 1272 PG/ML
PLATELET # BLD AUTO: 244 THOUSANDS/UL (ref 149–390)
PMV BLD AUTO: 9.8 FL (ref 8.9–12.7)
POTASSIUM SERPL-SCNC: 3.7 MMOL/L (ref 3.5–5.3)
PROT SERPL-MCNC: 9 G/DL (ref 6.4–8.2)
PROTHROMBIN TIME: 16.6 SECONDS (ref 12.1–14.4)
RBC # BLD AUTO: 4.36 MILLION/UL (ref 3.88–5.62)
SODIUM SERPL-SCNC: 135 MMOL/L (ref 136–145)
TROPONIN I SERPL-MCNC: <0.02 NG/ML
WBC # BLD AUTO: 7.81 THOUSAND/UL (ref 4.31–10.16)

## 2017-07-02 PROCEDURE — 80053 COMPREHEN METABOLIC PANEL: CPT | Performed by: EMERGENCY MEDICINE

## 2017-07-02 PROCEDURE — 71275 CT ANGIOGRAPHY CHEST: CPT

## 2017-07-02 PROCEDURE — 85610 PROTHROMBIN TIME: CPT | Performed by: EMERGENCY MEDICINE

## 2017-07-02 PROCEDURE — 93005 ELECTROCARDIOGRAM TRACING: CPT | Performed by: EMERGENCY MEDICINE

## 2017-07-02 PROCEDURE — 84484 ASSAY OF TROPONIN QUANT: CPT | Performed by: EMERGENCY MEDICINE

## 2017-07-02 PROCEDURE — 83880 ASSAY OF NATRIURETIC PEPTIDE: CPT | Performed by: EMERGENCY MEDICINE

## 2017-07-02 PROCEDURE — 83605 ASSAY OF LACTIC ACID: CPT | Performed by: EMERGENCY MEDICINE

## 2017-07-02 PROCEDURE — 85730 THROMBOPLASTIN TIME PARTIAL: CPT | Performed by: EMERGENCY MEDICINE

## 2017-07-02 PROCEDURE — 87040 BLOOD CULTURE FOR BACTERIA: CPT | Performed by: EMERGENCY MEDICINE

## 2017-07-02 PROCEDURE — 96374 THER/PROPH/DIAG INJ IV PUSH: CPT

## 2017-07-02 PROCEDURE — 36415 COLL VENOUS BLD VENIPUNCTURE: CPT | Performed by: EMERGENCY MEDICINE

## 2017-07-02 PROCEDURE — 96361 HYDRATE IV INFUSION ADD-ON: CPT

## 2017-07-02 PROCEDURE — 85025 COMPLETE CBC W/AUTO DIFF WBC: CPT | Performed by: EMERGENCY MEDICINE

## 2017-07-02 RX ORDER — ATORVASTATIN CALCIUM 40 MG/1
TABLET, FILM COATED ORAL
COMMUNITY
End: 2017-07-04 | Stop reason: HOSPADM

## 2017-07-02 RX ORDER — METOPROLOL TARTRATE 100 MG/1
TABLET ORAL
COMMUNITY
End: 2017-07-04 | Stop reason: HOSPADM

## 2017-07-02 RX ADMIN — METOPROLOL TARTRATE 5 MG: 5 INJECTION INTRAVENOUS at 22:42

## 2017-07-02 RX ADMIN — SODIUM CHLORIDE 1000 ML: 0.9 INJECTION, SOLUTION INTRAVENOUS at 22:42

## 2017-07-02 SDOH — ECONOMIC STABILITY - HOUSING INSECURITY: HOMELESSNESS UNSPECIFIED: Z59.00

## 2017-07-03 ENCOUNTER — APPOINTMENT (OUTPATIENT)
Dept: RADIOLOGY | Facility: HOSPITAL | Age: 57
End: 2017-07-03
Payer: COMMERCIAL

## 2017-07-03 PROBLEM — I50.31 ACUTE DIASTOLIC CONGESTIVE HEART FAILURE (HCC): Status: ACTIVE | Noted: 2017-07-03

## 2017-07-03 PROBLEM — Z59.00 HOMELESSNESS: Chronic | Status: ACTIVE | Noted: 2017-07-03

## 2017-07-03 LAB
ATRIAL RATE: 90 BPM
BILIRUB UR QL STRIP: NEGATIVE
CLARITY UR: CLEAR
COLOR UR: ABNORMAL
GLUCOSE UR STRIP-MCNC: NEGATIVE MG/DL
HGB UR QL STRIP.AUTO: NEGATIVE
KETONES UR STRIP-MCNC: ABNORMAL MG/DL
LEUKOCYTE ESTERASE UR QL STRIP: NEGATIVE
NITRITE UR QL STRIP: NEGATIVE
PH UR STRIP.AUTO: 5.5 [PH] (ref 4.5–8)
PROT UR STRIP-MCNC: NEGATIVE MG/DL
QRS AXIS: 38 DEGREES
QRSD INTERVAL: 80 MS
QT INTERVAL: 304 MS
QTC INTERVAL: 424 MS
SP GR UR STRIP.AUTO: 1.02 (ref 1–1.03)
T WAVE AXIS: 61 DEGREES
TROPONIN I SERPL-MCNC: <0.02 NG/ML
TROPONIN I SERPL-MCNC: <0.02 NG/ML
UROBILINOGEN UR QL STRIP.AUTO: 4 E.U./DL
VENTRICULAR RATE: 117 BPM

## 2017-07-03 PROCEDURE — 84484 ASSAY OF TROPONIN QUANT: CPT | Performed by: NURSE PRACTITIONER

## 2017-07-03 PROCEDURE — 81003 URINALYSIS AUTO W/O SCOPE: CPT | Performed by: EMERGENCY MEDICINE

## 2017-07-03 PROCEDURE — 99285 EMERGENCY DEPT VISIT HI MDM: CPT

## 2017-07-03 PROCEDURE — 36415 COLL VENOUS BLD VENIPUNCTURE: CPT | Performed by: NURSE PRACTITIONER

## 2017-07-03 PROCEDURE — 71010 HB CHEST X-RAY 1 VIEW FRONTAL (PORTABLE): CPT

## 2017-07-03 RX ORDER — ONDANSETRON 2 MG/ML
4 INJECTION INTRAMUSCULAR; INTRAVENOUS ONCE AS NEEDED
Status: DISCONTINUED | OUTPATIENT
Start: 2017-07-03 | End: 2017-07-04 | Stop reason: HOSPADM

## 2017-07-03 RX ORDER — NICOTINE 21 MG/24HR
1 PATCH, TRANSDERMAL 24 HOURS TRANSDERMAL DAILY
Status: DISCONTINUED | OUTPATIENT
Start: 2017-07-03 | End: 2017-07-04 | Stop reason: HOSPADM

## 2017-07-03 RX ORDER — CALCIUM CARBONATE 200(500)MG
1000 TABLET,CHEWABLE ORAL DAILY PRN
Status: DISCONTINUED | OUTPATIENT
Start: 2017-07-03 | End: 2017-07-04 | Stop reason: HOSPADM

## 2017-07-03 RX ORDER — SODIUM CHLORIDE 9 MG/ML
75 INJECTION, SOLUTION INTRAVENOUS CONTINUOUS
Status: DISCONTINUED | OUTPATIENT
Start: 2017-07-03 | End: 2017-07-03

## 2017-07-03 RX ORDER — LOPERAMIDE HYDROCHLORIDE 2 MG/1
2 CAPSULE ORAL ONCE
Status: COMPLETED | OUTPATIENT
Start: 2017-07-03 | End: 2017-07-03

## 2017-07-03 RX ORDER — FUROSEMIDE 10 MG/ML
40 INJECTION INTRAMUSCULAR; INTRAVENOUS ONCE
Status: COMPLETED | OUTPATIENT
Start: 2017-07-03 | End: 2017-07-03

## 2017-07-03 RX ORDER — METOPROLOL TARTRATE 50 MG/1
50 TABLET, FILM COATED ORAL EVERY 12 HOURS SCHEDULED
Status: DISCONTINUED | OUTPATIENT
Start: 2017-07-03 | End: 2017-07-04 | Stop reason: HOSPADM

## 2017-07-03 RX ORDER — SODIUM CHLORIDE, SODIUM LACTATE, POTASSIUM CHLORIDE, CALCIUM CHLORIDE 600; 310; 30; 20 MG/100ML; MG/100ML; MG/100ML; MG/100ML
75 INJECTION, SOLUTION INTRAVENOUS CONTINUOUS
Status: DISCONTINUED | OUTPATIENT
Start: 2017-07-03 | End: 2017-07-03

## 2017-07-03 RX ORDER — FUROSEMIDE 10 MG/ML
60 INJECTION INTRAMUSCULAR; INTRAVENOUS ONCE
Status: COMPLETED | OUTPATIENT
Start: 2017-07-03 | End: 2017-07-03

## 2017-07-03 RX ORDER — ACETAMINOPHEN 325 MG/1
650 TABLET ORAL EVERY 6 HOURS PRN
Status: DISCONTINUED | OUTPATIENT
Start: 2017-07-03 | End: 2017-07-04 | Stop reason: HOSPADM

## 2017-07-03 RX ORDER — ONDANSETRON 2 MG/ML
4 INJECTION INTRAMUSCULAR; INTRAVENOUS EVERY 6 HOURS PRN
Status: DISCONTINUED | OUTPATIENT
Start: 2017-07-03 | End: 2017-07-04 | Stop reason: HOSPADM

## 2017-07-03 RX ADMIN — LOPERAMIDE HYDROCHLORIDE 2 MG: 2 CAPSULE ORAL at 08:26

## 2017-07-03 RX ADMIN — ENOXAPARIN SODIUM 40 MG: 40 INJECTION SUBCUTANEOUS at 08:26

## 2017-07-03 RX ADMIN — METOPROLOL TARTRATE 5 MG: 5 INJECTION INTRAVENOUS at 06:48

## 2017-07-03 RX ADMIN — FUROSEMIDE 40 MG: 10 INJECTION, SOLUTION INTRAMUSCULAR; INTRAVENOUS at 08:26

## 2017-07-03 RX ADMIN — METOPROLOL TARTRATE 50 MG: 50 TABLET ORAL at 21:44

## 2017-07-03 RX ADMIN — FUROSEMIDE 60 MG: 10 INJECTION, SOLUTION INTRAMUSCULAR; INTRAVENOUS at 03:18

## 2017-07-03 RX ADMIN — METOPROLOL TARTRATE 50 MG: 50 TABLET ORAL at 08:26

## 2017-07-03 RX ADMIN — IOHEXOL 100 ML: 350 INJECTION, SOLUTION INTRAVENOUS at 00:00

## 2017-07-04 VITALS
WEIGHT: 315 LBS | RESPIRATION RATE: 18 BRPM | OXYGEN SATURATION: 96 % | HEART RATE: 87 BPM | SYSTOLIC BLOOD PRESSURE: 132 MMHG | BODY MASS INDEX: 44.1 KG/M2 | DIASTOLIC BLOOD PRESSURE: 69 MMHG | HEIGHT: 71 IN | TEMPERATURE: 97 F

## 2017-07-04 PROBLEM — I50.31 ACUTE DIASTOLIC CONGESTIVE HEART FAILURE (HCC): Status: RESOLVED | Noted: 2017-07-03 | Resolved: 2017-07-04

## 2017-07-04 PROCEDURE — 93005 ELECTROCARDIOGRAM TRACING: CPT | Performed by: NURSE PRACTITIONER

## 2017-07-04 RX ORDER — FUROSEMIDE 40 MG/1
40 TABLET ORAL DAILY
Qty: 30 TABLET | Refills: 0 | Status: SHIPPED | OUTPATIENT
Start: 2017-07-04 | End: 2018-03-16

## 2017-07-04 RX ORDER — METOPROLOL TARTRATE 50 MG/1
50 TABLET, FILM COATED ORAL EVERY 12 HOURS SCHEDULED
Qty: 60 TABLET | Refills: 0 | Status: SHIPPED | OUTPATIENT
Start: 2017-07-04 | End: 2018-09-16 | Stop reason: SDUPTHER

## 2017-07-04 RX ADMIN — METOPROLOL TARTRATE 50 MG: 50 TABLET ORAL at 08:27

## 2017-07-04 RX ADMIN — ENOXAPARIN SODIUM 40 MG: 40 INJECTION SUBCUTANEOUS at 08:26

## 2017-07-06 LAB
ATRIAL RATE: 357 BPM
QRS AXIS: 51 DEGREES
QRSD INTERVAL: 74 MS
QT INTERVAL: 390 MS
QTC INTERVAL: 412 MS
T WAVE AXIS: 67 DEGREES
VENTRICULAR RATE: 67 BPM

## 2017-07-08 LAB
BACTERIA BLD CULT: NORMAL
BACTERIA BLD CULT: NORMAL

## 2017-07-17 ENCOUNTER — APPOINTMENT (EMERGENCY)
Dept: RADIOLOGY | Facility: HOSPITAL | Age: 57
DRG: 201 | End: 2017-07-17
Payer: COMMERCIAL

## 2017-07-17 ENCOUNTER — HOSPITAL ENCOUNTER (INPATIENT)
Facility: HOSPITAL | Age: 57
LOS: 1 days | Discharge: HOME | DRG: 201 | End: 2017-07-19
Attending: EMERGENCY MEDICINE | Admitting: INTERNAL MEDICINE
Payer: COMMERCIAL

## 2017-07-17 DIAGNOSIS — I48.91 ATRIAL FIBRILLATION (HCC): Primary | ICD-10-CM

## 2017-07-17 DIAGNOSIS — I48.20 CHRONIC ATRIAL FIBRILLATION (HCC): ICD-10-CM

## 2017-07-17 DIAGNOSIS — R74.01 TRANSAMINITIS: ICD-10-CM

## 2017-07-17 DIAGNOSIS — F32.A DEPRESSION: ICD-10-CM

## 2017-07-17 LAB
ALBUMIN SERPL BCP-MCNC: 2.9 G/DL (ref 3.5–5)
ALP SERPL-CCNC: 74 U/L (ref 46–116)
ALT SERPL W P-5'-P-CCNC: 104 U/L (ref 12–78)
ANION GAP SERPL CALCULATED.3IONS-SCNC: 8 MMOL/L (ref 4–13)
AST SERPL W P-5'-P-CCNC: 112 U/L (ref 5–45)
BASOPHILS # BLD AUTO: 0.03 THOUSANDS/ΜL (ref 0–0.1)
BASOPHILS NFR BLD AUTO: 0 % (ref 0–1)
BILIRUB SERPL-MCNC: 2.1 MG/DL (ref 0.2–1)
BUN SERPL-MCNC: 25 MG/DL (ref 5–25)
CALCIUM SERPL-MCNC: 9.3 MG/DL (ref 8.3–10.1)
CHLORIDE SERPL-SCNC: 97 MMOL/L (ref 100–108)
CO2 SERPL-SCNC: 28 MMOL/L (ref 21–32)
CREAT SERPL-MCNC: 1.01 MG/DL (ref 0.6–1.3)
EOSINOPHIL # BLD AUTO: 0.18 THOUSAND/ΜL (ref 0–0.61)
EOSINOPHIL NFR BLD AUTO: 2 % (ref 0–6)
ERYTHROCYTE [DISTWIDTH] IN BLOOD BY AUTOMATED COUNT: 13 % (ref 11.6–15.1)
GFR SERPL CREATININE-BSD FRML MDRD: >60 ML/MIN/1.73SQ M
GLUCOSE SERPL-MCNC: 120 MG/DL (ref 65–140)
HCT VFR BLD AUTO: 40.7 % (ref 36.5–49.3)
HGB BLD-MCNC: 13.4 G/DL (ref 12–17)
LYMPHOCYTES # BLD AUTO: 1.03 THOUSANDS/ΜL (ref 0.6–4.47)
LYMPHOCYTES NFR BLD AUTO: 13 % (ref 14–44)
MCH RBC QN AUTO: 31.2 PG (ref 26.8–34.3)
MCHC RBC AUTO-ENTMCNC: 32.9 G/DL (ref 31.4–37.4)
MCV RBC AUTO: 95 FL (ref 82–98)
MONOCYTES # BLD AUTO: 1.16 THOUSAND/ΜL (ref 0.17–1.22)
MONOCYTES NFR BLD AUTO: 15 % (ref 4–12)
NEUTROPHILS # BLD AUTO: 5.44 THOUSANDS/ΜL (ref 1.85–7.62)
NEUTS SEG NFR BLD AUTO: 70 % (ref 43–75)
PLATELET # BLD AUTO: 220 THOUSANDS/UL (ref 149–390)
PMV BLD AUTO: 11.3 FL (ref 8.9–12.7)
POTASSIUM SERPL-SCNC: 3.9 MMOL/L (ref 3.5–5.3)
PROT SERPL-MCNC: 9.6 G/DL (ref 6.4–8.2)
RBC # BLD AUTO: 4.29 MILLION/UL (ref 3.88–5.62)
SODIUM SERPL-SCNC: 133 MMOL/L (ref 136–145)
TROPONIN I SERPL-MCNC: <0.02 NG/ML
WBC # BLD AUTO: 7.84 THOUSAND/UL (ref 4.31–10.16)

## 2017-07-17 PROCEDURE — 93005 ELECTROCARDIOGRAM TRACING: CPT | Performed by: EMERGENCY MEDICINE

## 2017-07-17 PROCEDURE — 83690 ASSAY OF LIPASE: CPT | Performed by: EMERGENCY MEDICINE

## 2017-07-17 PROCEDURE — 96365 THER/PROPH/DIAG IV INF INIT: CPT

## 2017-07-17 PROCEDURE — 80053 COMPREHEN METABOLIC PANEL: CPT | Performed by: EMERGENCY MEDICINE

## 2017-07-17 PROCEDURE — 80074 ACUTE HEPATITIS PANEL: CPT | Performed by: EMERGENCY MEDICINE

## 2017-07-17 PROCEDURE — 96366 THER/PROPH/DIAG IV INF ADDON: CPT

## 2017-07-17 PROCEDURE — 84443 ASSAY THYROID STIM HORMONE: CPT | Performed by: EMERGENCY MEDICINE

## 2017-07-17 PROCEDURE — 96376 TX/PRO/DX INJ SAME DRUG ADON: CPT

## 2017-07-17 PROCEDURE — 83880 ASSAY OF NATRIURETIC PEPTIDE: CPT | Performed by: EMERGENCY MEDICINE

## 2017-07-17 PROCEDURE — 85025 COMPLETE CBC W/AUTO DIFF WBC: CPT | Performed by: EMERGENCY MEDICINE

## 2017-07-17 PROCEDURE — 36415 COLL VENOUS BLD VENIPUNCTURE: CPT | Performed by: EMERGENCY MEDICINE

## 2017-07-17 PROCEDURE — 84484 ASSAY OF TROPONIN QUANT: CPT | Performed by: EMERGENCY MEDICINE

## 2017-07-17 PROCEDURE — 71010 HB CHEST X-RAY 1 VIEW FRONTAL (PORTABLE): CPT

## 2017-07-17 RX ORDER — ATORVASTATIN CALCIUM 40 MG/1
40 TABLET, FILM COATED ORAL DAILY
COMMUNITY
End: 2018-08-17 | Stop reason: SDUPTHER

## 2017-07-17 RX ORDER — DILTIAZEM HYDROCHLORIDE 5 MG/ML
25 INJECTION INTRAVENOUS ONCE
Status: COMPLETED | OUTPATIENT
Start: 2017-07-17 | End: 2017-07-17

## 2017-07-17 RX ORDER — POTASSIUM CHLORIDE 1.5 G/1.77G
20 POWDER, FOR SOLUTION ORAL 2 TIMES DAILY
Status: ON HOLD | COMMUNITY
End: 2017-07-19

## 2017-07-17 RX ADMIN — DILTIAZEM HYDROCHLORIDE 25 MG: 5 INJECTION INTRAVENOUS at 22:21

## 2017-07-17 RX ADMIN — DILTIAZEM HYDROCHLORIDE 5 MG/HR: 5 INJECTION INTRAVENOUS at 22:29

## 2017-07-18 ENCOUNTER — APPOINTMENT (INPATIENT)
Dept: ULTRASOUND IMAGING | Facility: HOSPITAL | Age: 57
DRG: 201 | End: 2017-07-18
Payer: COMMERCIAL

## 2017-07-18 PROBLEM — I50.31 ACUTE DIASTOLIC CONGESTIVE HEART FAILURE (HCC): Status: ACTIVE | Noted: 2017-07-03
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
## 2017-07-18 PROBLEM — R74.01 TRANSAMINITIS: Status: ACTIVE | Noted: 2017-07-18

## 2017-07-18 LAB
ALBUMIN SERPL BCP-MCNC: 2.5 G/DL (ref 3.5–5)
ALP SERPL-CCNC: 68 U/L (ref 46–116)
ALT SERPL W P-5'-P-CCNC: 82 U/L (ref 12–78)
ANION GAP SERPL CALCULATED.3IONS-SCNC: 7 MMOL/L (ref 4–13)
AST SERPL W P-5'-P-CCNC: 87 U/L (ref 5–45)
ATRIAL RATE: 170 BPM
ATRIAL RATE: 340 BPM
BILIRUB SERPL-MCNC: 1.9 MG/DL (ref 0.2–1)
BUN SERPL-MCNC: 18 MG/DL (ref 5–25)
CALCIUM SERPL-MCNC: 8.4 MG/DL (ref 8.3–10.1)
CHLORIDE SERPL-SCNC: 99 MMOL/L (ref 100–108)
CO2 SERPL-SCNC: 27 MMOL/L (ref 21–32)
CREAT SERPL-MCNC: 0.71 MG/DL (ref 0.6–1.3)
ERYTHROCYTE [DISTWIDTH] IN BLOOD BY AUTOMATED COUNT: 13 % (ref 11.6–15.1)
GFR SERPL CREATININE-BSD FRML MDRD: >60 ML/MIN/1.73SQ M
GLUCOSE SERPL-MCNC: 118 MG/DL (ref 65–140)
HAV IGM SER QL: NORMAL
HBV CORE IGM SER QL: NORMAL
HBV SURFACE AG SER QL: NORMAL
HCT VFR BLD AUTO: 36.8 % (ref 36.5–49.3)
HCV AB SER QL: NORMAL
HGB BLD-MCNC: 12.4 G/DL (ref 12–17)
LIPASE SERPL-CCNC: 126 U/L (ref 73–393)
MCH RBC QN AUTO: 31.4 PG (ref 26.8–34.3)
MCHC RBC AUTO-ENTMCNC: 33.7 G/DL (ref 31.4–37.4)
MCV RBC AUTO: 93 FL (ref 82–98)
NT-PROBNP SERPL-MCNC: 832 PG/ML
PLATELET # BLD AUTO: 224 THOUSANDS/UL (ref 149–390)
PMV BLD AUTO: 11.4 FL (ref 8.9–12.7)
POTASSIUM SERPL-SCNC: 3.7 MMOL/L (ref 3.5–5.3)
PROT SERPL-MCNC: 8.4 G/DL (ref 6.4–8.2)
QRS AXIS: 44 DEGREES
QRS AXIS: 7 DEGREES
QRSD INTERVAL: 66 MS
QRSD INTERVAL: 68 MS
QT INTERVAL: 246 MS
QT INTERVAL: 404 MS
QTC INTERVAL: 417 MS
QTC INTERVAL: 423 MS
RBC # BLD AUTO: 3.95 MILLION/UL (ref 3.88–5.62)
SODIUM SERPL-SCNC: 133 MMOL/L (ref 136–145)
T WAVE AXIS: 25 DEGREES
T WAVE AXIS: 57 DEGREES
T4 FREE SERPL-MCNC: >8 NG/DL (ref 0.76–1.46)
TROPONIN I SERPL-MCNC: <0.02 NG/ML
TROPONIN I SERPL-MCNC: <0.02 NG/ML
TSH SERPL DL<=0.05 MIU/L-ACNC: <0.007 UIU/ML (ref 0.36–3.74)
VENTRICULAR RATE: 173 BPM
VENTRICULAR RATE: 66 BPM
WBC # BLD AUTO: 6.98 THOUSAND/UL (ref 4.31–10.16)

## 2017-07-18 PROCEDURE — 76705 ECHO EXAM OF ABDOMEN: CPT

## 2017-07-18 PROCEDURE — 93005 ELECTROCARDIOGRAM TRACING: CPT | Performed by: INTERNAL MEDICINE

## 2017-07-18 PROCEDURE — 85027 COMPLETE CBC AUTOMATED: CPT | Performed by: NURSE PRACTITIONER

## 2017-07-18 PROCEDURE — 99285 EMERGENCY DEPT VISIT HI MDM: CPT

## 2017-07-18 PROCEDURE — 76536 US EXAM OF HEAD AND NECK: CPT

## 2017-07-18 PROCEDURE — 84439 ASSAY OF FREE THYROXINE: CPT | Performed by: EMERGENCY MEDICINE

## 2017-07-18 PROCEDURE — 96366 THER/PROPH/DIAG IV INF ADDON: CPT

## 2017-07-18 PROCEDURE — 36415 COLL VENOUS BLD VENIPUNCTURE: CPT | Performed by: EMERGENCY MEDICINE

## 2017-07-18 PROCEDURE — 84484 ASSAY OF TROPONIN QUANT: CPT | Performed by: NURSE PRACTITIONER

## 2017-07-18 PROCEDURE — 80053 COMPREHEN METABOLIC PANEL: CPT | Performed by: NURSE PRACTITIONER

## 2017-07-18 RX ORDER — METHIMAZOLE 5 MG/1
10 TABLET ORAL EVERY 8 HOURS SCHEDULED
Status: DISCONTINUED | OUTPATIENT
Start: 2017-07-18 | End: 2017-07-19 | Stop reason: HOSPADM

## 2017-07-18 RX ORDER — POTASSIUM CHLORIDE 20MEQ/15ML
20 LIQUID (ML) ORAL 2 TIMES DAILY
Status: DISCONTINUED | OUTPATIENT
Start: 2017-07-18 | End: 2017-07-19 | Stop reason: HOSPADM

## 2017-07-18 RX ORDER — SODIUM CHLORIDE 9 MG/ML
50 INJECTION, SOLUTION INTRAVENOUS CONTINUOUS
Status: CANCELLED | OUTPATIENT
Start: 2017-07-18

## 2017-07-18 RX ORDER — METOPROLOL TARTRATE 50 MG/1
50 TABLET, FILM COATED ORAL EVERY 12 HOURS SCHEDULED
Status: DISCONTINUED | OUTPATIENT
Start: 2017-07-18 | End: 2017-07-19 | Stop reason: HOSPADM

## 2017-07-18 RX ORDER — ATORVASTATIN CALCIUM 40 MG/1
40 TABLET, FILM COATED ORAL
Status: DISCONTINUED | OUTPATIENT
Start: 2017-07-18 | End: 2017-07-19 | Stop reason: HOSPADM

## 2017-07-18 RX ORDER — FUROSEMIDE 40 MG/1
40 TABLET ORAL DAILY
Status: CANCELLED | OUTPATIENT
Start: 2017-07-18

## 2017-07-18 RX ORDER — ONDANSETRON 2 MG/ML
4 INJECTION INTRAMUSCULAR; INTRAVENOUS EVERY 6 HOURS PRN
Status: DISCONTINUED | OUTPATIENT
Start: 2017-07-18 | End: 2017-07-19 | Stop reason: HOSPADM

## 2017-07-18 RX ORDER — FUROSEMIDE 10 MG/ML
40 INJECTION INTRAMUSCULAR; INTRAVENOUS
Status: DISCONTINUED | OUTPATIENT
Start: 2017-07-18 | End: 2017-07-19 | Stop reason: HOSPADM

## 2017-07-18 RX ADMIN — POTASSIUM CHLORIDE 20 MEQ: 20 SOLUTION ORAL at 08:58

## 2017-07-18 RX ADMIN — METHIMAZOLE 10 MG: 5 TABLET ORAL at 13:04

## 2017-07-18 RX ADMIN — METOPROLOL TARTRATE 50 MG: 50 TABLET ORAL at 08:58

## 2017-07-18 RX ADMIN — FUROSEMIDE 40 MG: 10 INJECTION, SOLUTION INTRAMUSCULAR; INTRAVENOUS at 15:21

## 2017-07-18 RX ADMIN — METOPROLOL TARTRATE 50 MG: 50 TABLET ORAL at 21:19

## 2017-07-18 RX ADMIN — METHIMAZOLE 10 MG: 5 TABLET ORAL at 08:59

## 2017-07-18 RX ADMIN — FUROSEMIDE 40 MG: 10 INJECTION, SOLUTION INTRAMUSCULAR; INTRAVENOUS at 08:58

## 2017-07-18 RX ADMIN — METHIMAZOLE 10 MG: 5 TABLET ORAL at 21:19

## 2017-07-18 RX ADMIN — POTASSIUM CHLORIDE 20 MEQ: 20 SOLUTION ORAL at 17:03

## 2017-07-18 RX ADMIN — ENOXAPARIN SODIUM 150 MG: 150 INJECTION SUBCUTANEOUS at 21:19

## 2017-07-18 RX ADMIN — SODIUM CHLORIDE 1000 ML: 0.9 INJECTION, SOLUTION INTRAVENOUS at 00:52

## 2017-07-18 RX ADMIN — ATORVASTATIN CALCIUM 40 MG: 40 TABLET, FILM COATED ORAL at 17:36

## 2017-07-18 RX ADMIN — ENOXAPARIN SODIUM 150 MG: 150 INJECTION SUBCUTANEOUS at 08:58

## 2017-07-19 VITALS
DIASTOLIC BLOOD PRESSURE: 65 MMHG | HEART RATE: 108 BPM | SYSTOLIC BLOOD PRESSURE: 118 MMHG | TEMPERATURE: 98.1 F | HEIGHT: 71 IN | WEIGHT: 293.65 LBS | BODY MASS INDEX: 41.11 KG/M2 | OXYGEN SATURATION: 98 % | RESPIRATION RATE: 20 BRPM

## 2017-07-19 LAB
ANION GAP SERPL CALCULATED.3IONS-SCNC: 7 MMOL/L (ref 4–13)
BUN SERPL-MCNC: 20 MG/DL (ref 5–25)
CALCIUM SERPL-MCNC: 8.4 MG/DL (ref 8.3–10.1)
CHLORIDE SERPL-SCNC: 101 MMOL/L (ref 100–108)
CO2 SERPL-SCNC: 28 MMOL/L (ref 21–32)
CREAT SERPL-MCNC: 0.73 MG/DL (ref 0.6–1.3)
ERYTHROCYTE [DISTWIDTH] IN BLOOD BY AUTOMATED COUNT: 13 % (ref 11.6–15.1)
GFR SERPL CREATININE-BSD FRML MDRD: >60 ML/MIN/1.73SQ M
GLUCOSE SERPL-MCNC: 98 MG/DL (ref 65–140)
HCT VFR BLD AUTO: 36.8 % (ref 36.5–49.3)
HGB BLD-MCNC: 11.9 G/DL (ref 12–17)
MCH RBC QN AUTO: 30.5 PG (ref 26.8–34.3)
MCHC RBC AUTO-ENTMCNC: 32.3 G/DL (ref 31.4–37.4)
MCV RBC AUTO: 94 FL (ref 82–98)
PLATELET # BLD AUTO: 204 THOUSANDS/UL (ref 149–390)
PMV BLD AUTO: 11.2 FL (ref 8.9–12.7)
POTASSIUM SERPL-SCNC: 3.5 MMOL/L (ref 3.5–5.3)
RBC # BLD AUTO: 3.9 MILLION/UL (ref 3.88–5.62)
SODIUM SERPL-SCNC: 136 MMOL/L (ref 136–145)
WBC # BLD AUTO: 5.08 THOUSAND/UL (ref 4.31–10.16)

## 2017-07-19 PROCEDURE — 80048 BASIC METABOLIC PNL TOTAL CA: CPT | Performed by: INTERNAL MEDICINE

## 2017-07-19 PROCEDURE — 85027 COMPLETE CBC AUTOMATED: CPT | Performed by: INTERNAL MEDICINE

## 2017-07-19 RX ORDER — POTASSIUM CHLORIDE 1.5 G/1.77G
20 POWDER, FOR SOLUTION ORAL DAILY
Qty: 30 TABLET | Refills: 4 | COMMUNITY
Start: 2017-07-19 | End: 2017-09-17 | Stop reason: ALTCHOICE

## 2017-07-19 RX ORDER — ASPIRIN 325 MG
325 TABLET ORAL DAILY
Qty: 30 TABLET | Refills: 0 | Status: SHIPPED | OUTPATIENT
Start: 2017-07-19 | End: 2017-07-31 | Stop reason: HOSPADM

## 2017-07-19 RX ADMIN — METOPROLOL TARTRATE 50 MG: 50 TABLET ORAL at 08:14

## 2017-07-19 RX ADMIN — POTASSIUM CHLORIDE 20 MEQ: 20 SOLUTION ORAL at 00:00

## 2017-07-19 RX ADMIN — ENOXAPARIN SODIUM 150 MG: 150 INJECTION SUBCUTANEOUS at 08:14

## 2017-07-19 RX ADMIN — METHIMAZOLE 10 MG: 5 TABLET ORAL at 05:15

## 2017-07-19 RX ADMIN — FUROSEMIDE 40 MG: 10 INJECTION, SOLUTION INTRAMUSCULAR; INTRAVENOUS at 08:14

## 2017-07-23 ENCOUNTER — HOSPITAL ENCOUNTER (EMERGENCY)
Facility: HOSPITAL | Age: 57
Discharge: HOME | End: 2017-07-24
Attending: EMERGENCY MEDICINE | Admitting: EMERGENCY MEDICINE
Payer: COMMERCIAL

## 2017-07-23 DIAGNOSIS — I48.20 CHRONIC ATRIAL FIBRILLATION (HCC): Primary | ICD-10-CM

## 2017-07-23 DIAGNOSIS — I50.32 CHRONIC DIASTOLIC CONGESTIVE HEART FAILURE (HCC): ICD-10-CM

## 2017-07-24 ENCOUNTER — APPOINTMENT (EMERGENCY)
Dept: RADIOLOGY | Facility: HOSPITAL | Age: 57
End: 2017-07-24
Payer: COMMERCIAL

## 2017-07-24 VITALS
OXYGEN SATURATION: 94 % | DIASTOLIC BLOOD PRESSURE: 61 MMHG | WEIGHT: 301.38 LBS | BODY MASS INDEX: 42.03 KG/M2 | HEART RATE: 102 BPM | TEMPERATURE: 98.6 F | SYSTOLIC BLOOD PRESSURE: 138 MMHG | RESPIRATION RATE: 18 BRPM

## 2017-07-24 LAB
ANION GAP SERPL CALCULATED.3IONS-SCNC: 9 MMOL/L (ref 4–13)
APTT PPP: 32 SECONDS (ref 23–35)
ATRIAL RATE: 73 BPM
BASOPHILS # BLD AUTO: 0.02 THOUSANDS/ΜL (ref 0–0.1)
BASOPHILS NFR BLD AUTO: 0 % (ref 0–1)
BUN SERPL-MCNC: 16 MG/DL (ref 5–25)
CALCIUM SERPL-MCNC: 8.7 MG/DL (ref 8.3–10.1)
CHLORIDE SERPL-SCNC: 99 MMOL/L (ref 100–108)
CO2 SERPL-SCNC: 27 MMOL/L (ref 21–32)
CREAT SERPL-MCNC: 0.83 MG/DL (ref 0.6–1.3)
EOSINOPHIL # BLD AUTO: 0.21 THOUSAND/ΜL (ref 0–0.61)
EOSINOPHIL NFR BLD AUTO: 3 % (ref 0–6)
ERYTHROCYTE [DISTWIDTH] IN BLOOD BY AUTOMATED COUNT: 13.4 % (ref 11.6–15.1)
GFR SERPL CREATININE-BSD FRML MDRD: >60 ML/MIN/1.73SQ M
GLUCOSE SERPL-MCNC: 105 MG/DL (ref 65–140)
HCT VFR BLD AUTO: 37.9 % (ref 36.5–49.3)
HGB BLD-MCNC: 12.4 G/DL (ref 12–17)
INR PPP: 1.3 (ref 0.86–1.16)
LYMPHOCYTES # BLD AUTO: 1.19 THOUSANDS/ΜL (ref 0.6–4.47)
LYMPHOCYTES NFR BLD AUTO: 14 % (ref 14–44)
MCH RBC QN AUTO: 31.1 PG (ref 26.8–34.3)
MCHC RBC AUTO-ENTMCNC: 32.7 G/DL (ref 31.4–37.4)
MCV RBC AUTO: 95 FL (ref 82–98)
MONOCYTES # BLD AUTO: 1.06 THOUSAND/ΜL (ref 0.17–1.22)
MONOCYTES NFR BLD AUTO: 12 % (ref 4–12)
NEUTROPHILS # BLD AUTO: 6.07 THOUSANDS/ΜL (ref 1.85–7.62)
NEUTS SEG NFR BLD AUTO: 71 % (ref 43–75)
NT-PROBNP SERPL-MCNC: 1055 PG/ML
PLATELET # BLD AUTO: 252 THOUSANDS/UL (ref 149–390)
PMV BLD AUTO: 10.7 FL (ref 8.9–12.7)
POTASSIUM SERPL-SCNC: 3.7 MMOL/L (ref 3.5–5.3)
PROTHROMBIN TIME: 16 SECONDS (ref 12.1–14.4)
QRS AXIS: 16 DEGREES
QRSD INTERVAL: 82 MS
QT INTERVAL: 292 MS
QTC INTERVAL: 387 MS
RBC # BLD AUTO: 3.99 MILLION/UL (ref 3.88–5.62)
SODIUM SERPL-SCNC: 135 MMOL/L (ref 136–145)
T WAVE AXIS: 35 DEGREES
TROPONIN I SERPL-MCNC: <0.02 NG/ML
VENTRICULAR RATE: 106 BPM
WBC # BLD AUTO: 8.55 THOUSAND/UL (ref 4.31–10.16)

## 2017-07-24 PROCEDURE — 94640 AIRWAY INHALATION TREATMENT: CPT

## 2017-07-24 PROCEDURE — 93005 ELECTROCARDIOGRAM TRACING: CPT

## 2017-07-24 PROCEDURE — 84484 ASSAY OF TROPONIN QUANT: CPT | Performed by: EMERGENCY MEDICINE

## 2017-07-24 PROCEDURE — 71020 HB CHEST X-RAY 2VW FRONTAL&LATL: CPT

## 2017-07-24 PROCEDURE — 99285 EMERGENCY DEPT VISIT HI MDM: CPT

## 2017-07-24 PROCEDURE — 93005 ELECTROCARDIOGRAM TRACING: CPT | Performed by: EMERGENCY MEDICINE

## 2017-07-24 PROCEDURE — 96374 THER/PROPH/DIAG INJ IV PUSH: CPT

## 2017-07-24 PROCEDURE — 85730 THROMBOPLASTIN TIME PARTIAL: CPT | Performed by: EMERGENCY MEDICINE

## 2017-07-24 PROCEDURE — 85025 COMPLETE CBC W/AUTO DIFF WBC: CPT | Performed by: EMERGENCY MEDICINE

## 2017-07-24 PROCEDURE — 36415 COLL VENOUS BLD VENIPUNCTURE: CPT | Performed by: EMERGENCY MEDICINE

## 2017-07-24 PROCEDURE — 83880 ASSAY OF NATRIURETIC PEPTIDE: CPT | Performed by: EMERGENCY MEDICINE

## 2017-07-24 PROCEDURE — 80048 BASIC METABOLIC PNL TOTAL CA: CPT | Performed by: EMERGENCY MEDICINE

## 2017-07-24 PROCEDURE — 85610 PROTHROMBIN TIME: CPT | Performed by: EMERGENCY MEDICINE

## 2017-07-24 RX ORDER — LEVALBUTEROL 1.25 MG/.5ML
1.25 SOLUTION, CONCENTRATE RESPIRATORY (INHALATION) ONCE
Status: COMPLETED | OUTPATIENT
Start: 2017-07-24 | End: 2017-07-24

## 2017-07-24 RX ORDER — ASPIRIN 81 MG/1
81 TABLET, CHEWABLE ORAL ONCE
Status: COMPLETED | OUTPATIENT
Start: 2017-07-24 | End: 2017-07-24

## 2017-07-24 RX ORDER — METOPROLOL TARTRATE 5 MG/5ML
5 INJECTION INTRAVENOUS ONCE
Status: COMPLETED | OUTPATIENT
Start: 2017-07-24 | End: 2017-07-24

## 2017-07-24 RX ADMIN — METOPROLOL TARTRATE 5 MG: 5 INJECTION INTRAVENOUS at 01:04

## 2017-07-24 RX ADMIN — LEVALBUTEROL HYDROCHLORIDE 1.25 MG: 1.25 SOLUTION, CONCENTRATE RESPIRATORY (INHALATION) at 01:05

## 2017-07-24 RX ADMIN — ASPIRIN 81 MG 81 MG: 81 TABLET ORAL at 01:04

## 2017-07-25 ENCOUNTER — APPOINTMENT (EMERGENCY)
Dept: RADIOLOGY | Facility: HOSPITAL | Age: 57
DRG: 134 | End: 2017-07-25
Payer: COMMERCIAL

## 2017-07-25 ENCOUNTER — HOSPITAL ENCOUNTER (INPATIENT)
Facility: HOSPITAL | Age: 57
LOS: 6 days | Discharge: HOME | DRG: 134 | End: 2017-07-31
Attending: EMERGENCY MEDICINE | Admitting: INTERNAL MEDICINE
Payer: COMMERCIAL

## 2017-07-25 ENCOUNTER — APPOINTMENT (EMERGENCY)
Dept: CT IMAGING | Facility: HOSPITAL | Age: 57
DRG: 134 | End: 2017-07-25
Payer: COMMERCIAL

## 2017-07-25 DIAGNOSIS — I48.91 ATRIAL FIBRILLATION WITH RVR (HCC): ICD-10-CM

## 2017-07-25 DIAGNOSIS — J18.9 PNEUMONIA: Primary | ICD-10-CM

## 2017-07-25 DIAGNOSIS — B35.6 TINEA CRURIS: ICD-10-CM

## 2017-07-25 DIAGNOSIS — R04.2 HEMOPTYSIS: ICD-10-CM

## 2017-07-25 DIAGNOSIS — I48.20 CHRONIC ATRIAL FIBRILLATION (HCC): ICD-10-CM

## 2017-07-25 DIAGNOSIS — R06.02 SOB (SHORTNESS OF BREATH): ICD-10-CM

## 2017-07-25 DIAGNOSIS — R60.0 EDEMA OF BOTH LEGS: ICD-10-CM

## 2017-07-25 DIAGNOSIS — I10 HYPERTENSION: ICD-10-CM

## 2017-07-25 DIAGNOSIS — I26.99 PULMONARY EMBOLISM (HCC): ICD-10-CM

## 2017-07-25 DIAGNOSIS — I26.99 PULMONARY EMBOLISM, OTHER: ICD-10-CM

## 2017-07-25 DIAGNOSIS — J90 PLEURAL EFFUSION: ICD-10-CM

## 2017-07-25 DIAGNOSIS — L89.90 DECUBITUS SKIN ULCER: Chronic | ICD-10-CM

## 2017-07-25 PROBLEM — I51.9 DISORDER OF RIGHT VENTRICLE OF HEART: Status: ACTIVE | Noted: 2017-07-25

## 2017-07-25 LAB
ALBUMIN SERPL BCP-MCNC: 2.1 G/DL (ref 3.5–5)
ALP SERPL-CCNC: 102 U/L (ref 46–116)
ALT SERPL W P-5'-P-CCNC: 57 U/L (ref 12–78)
ANION GAP SERPL CALCULATED.3IONS-SCNC: 7 MMOL/L (ref 4–13)
APTT PPP: 37 SECONDS (ref 23–35)
AST SERPL W P-5'-P-CCNC: 62 U/L (ref 5–45)
BASOPHILS # BLD MANUAL: 0.13 THOUSAND/UL (ref 0–0.1)
BASOPHILS NFR MAR MANUAL: 1 % (ref 0–1)
BILIRUB SERPL-MCNC: 1.1 MG/DL (ref 0.2–1)
BUN SERPL-MCNC: 12 MG/DL (ref 5–25)
CALCIUM SERPL-MCNC: 8.3 MG/DL (ref 8.3–10.1)
CHLORIDE SERPL-SCNC: 98 MMOL/L (ref 100–108)
CO2 SERPL-SCNC: 26 MMOL/L (ref 21–32)
CREAT SERPL-MCNC: 0.77 MG/DL (ref 0.6–1.3)
DEPRECATED D DIMER PPP: 2788 NG/ML (FEU) (ref 0–424)
EOSINOPHIL # BLD MANUAL: 0 THOUSAND/UL (ref 0–0.4)
EOSINOPHIL NFR BLD MANUAL: 0 % (ref 0–6)
ERYTHROCYTE [DISTWIDTH] IN BLOOD BY AUTOMATED COUNT: 13.3 % (ref 11.6–15.1)
GFR SERPL CREATININE-BSD FRML MDRD: 102 ML/MIN/1.73SQ M
GLUCOSE SERPL-MCNC: 111 MG/DL (ref 65–140)
HCT VFR BLD AUTO: 35.4 % (ref 36.5–49.3)
HGB BLD-MCNC: 11.9 G/DL (ref 12–17)
INR PPP: 1.45 (ref 0.86–1.16)
LACTATE SERPL-SCNC: 1.4 MMOL/L (ref 0.5–2)
LYMPHOCYTES # BLD AUTO: 1.88 THOUSAND/UL (ref 0.6–4.47)
LYMPHOCYTES # BLD AUTO: 15 % (ref 14–44)
MCH RBC QN AUTO: 31.6 PG (ref 26.8–34.3)
MCHC RBC AUTO-ENTMCNC: 33.6 G/DL (ref 31.4–37.4)
MCV RBC AUTO: 94 FL (ref 82–98)
MONOCYTES # BLD AUTO: 1.13 THOUSAND/UL (ref 0–1.22)
MONOCYTES NFR BLD: 9 % (ref 4–12)
NEUTROPHILS # BLD MANUAL: 9.4 THOUSAND/UL (ref 1.85–7.62)
NEUTS SEG NFR BLD AUTO: 75 % (ref 43–75)
NT-PROBNP SERPL-MCNC: 1691 PG/ML
PLATELET # BLD AUTO: 257 THOUSANDS/UL (ref 149–390)
PLATELET BLD QL SMEAR: ADEQUATE
PMV BLD AUTO: 10.6 FL (ref 8.9–12.7)
POLYCHROMASIA BLD QL SMEAR: PRESENT
POTASSIUM SERPL-SCNC: 3.8 MMOL/L (ref 3.5–5.3)
PROT SERPL-MCNC: 8.9 G/DL (ref 6.4–8.2)
PROTHROMBIN TIME: 17.5 SECONDS (ref 12.1–14.4)
RBC # BLD AUTO: 3.77 MILLION/UL (ref 3.88–5.62)
RBC MORPH BLD: PRESENT
SODIUM SERPL-SCNC: 131 MMOL/L (ref 136–145)
TOTAL CELLS COUNTED SPEC: 100
TROPONIN I SERPL-MCNC: <0.02 NG/ML
WBC # BLD AUTO: 12.53 THOUSAND/UL (ref 4.31–10.16)

## 2017-07-25 PROCEDURE — 87040 BLOOD CULTURE FOR BACTERIA: CPT | Performed by: EMERGENCY MEDICINE

## 2017-07-25 PROCEDURE — 85730 THROMBOPLASTIN TIME PARTIAL: CPT | Performed by: EMERGENCY MEDICINE

## 2017-07-25 PROCEDURE — 36415 COLL VENOUS BLD VENIPUNCTURE: CPT | Performed by: EMERGENCY MEDICINE

## 2017-07-25 PROCEDURE — 71010 HB CHEST X-RAY 1 VIEW FRONTAL (PORTABLE): CPT

## 2017-07-25 PROCEDURE — 80053 COMPREHEN METABOLIC PANEL: CPT | Performed by: EMERGENCY MEDICINE

## 2017-07-25 PROCEDURE — 96365 THER/PROPH/DIAG IV INF INIT: CPT

## 2017-07-25 PROCEDURE — 85027 COMPLETE CBC AUTOMATED: CPT | Performed by: EMERGENCY MEDICINE

## 2017-07-25 PROCEDURE — 99285 EMERGENCY DEPT VISIT HI MDM: CPT

## 2017-07-25 PROCEDURE — 83605 ASSAY OF LACTIC ACID: CPT | Performed by: EMERGENCY MEDICINE

## 2017-07-25 PROCEDURE — 83880 ASSAY OF NATRIURETIC PEPTIDE: CPT | Performed by: EMERGENCY MEDICINE

## 2017-07-25 PROCEDURE — 85007 BL SMEAR W/DIFF WBC COUNT: CPT | Performed by: EMERGENCY MEDICINE

## 2017-07-25 PROCEDURE — 84484 ASSAY OF TROPONIN QUANT: CPT | Performed by: EMERGENCY MEDICINE

## 2017-07-25 PROCEDURE — 85610 PROTHROMBIN TIME: CPT | Performed by: EMERGENCY MEDICINE

## 2017-07-25 PROCEDURE — 85379 FIBRIN DEGRADATION QUANT: CPT | Performed by: EMERGENCY MEDICINE

## 2017-07-25 PROCEDURE — 94640 AIRWAY INHALATION TREATMENT: CPT

## 2017-07-25 PROCEDURE — 71275 CT ANGIOGRAPHY CHEST: CPT

## 2017-07-25 PROCEDURE — 93005 ELECTROCARDIOGRAM TRACING: CPT | Performed by: EMERGENCY MEDICINE

## 2017-07-25 RX ORDER — FUROSEMIDE 10 MG/ML
20 INJECTION INTRAMUSCULAR; INTRAVENOUS ONCE
Status: COMPLETED | OUTPATIENT
Start: 2017-07-25 | End: 2017-07-25

## 2017-07-25 RX ORDER — ACETAMINOPHEN 325 MG/1
975 TABLET ORAL ONCE
Status: COMPLETED | OUTPATIENT
Start: 2017-07-25 | End: 2017-07-25

## 2017-07-25 RX ORDER — IPRATROPIUM BROMIDE AND ALBUTEROL SULFATE 2.5; .5 MG/3ML; MG/3ML
3 SOLUTION RESPIRATORY (INHALATION) EVERY 4 HOURS PRN
Status: DISCONTINUED | OUTPATIENT
Start: 2017-07-25 | End: 2017-07-31 | Stop reason: HOSPADM

## 2017-07-25 RX ORDER — IPRATROPIUM BROMIDE AND ALBUTEROL SULFATE 2.5; .5 MG/3ML; MG/3ML
3 SOLUTION RESPIRATORY (INHALATION) ONCE
Status: COMPLETED | OUTPATIENT
Start: 2017-07-25 | End: 2017-07-25

## 2017-07-25 RX ORDER — ACETAMINOPHEN 325 MG/1
650 TABLET ORAL EVERY 4 HOURS PRN
Status: DISCONTINUED | OUTPATIENT
Start: 2017-07-25 | End: 2017-07-31 | Stop reason: HOSPADM

## 2017-07-25 RX ORDER — ASPIRIN 325 MG
325 TABLET ORAL DAILY
Status: DISCONTINUED | OUTPATIENT
Start: 2017-07-26 | End: 2017-07-31

## 2017-07-25 RX ADMIN — VANCOMYCIN HYDROCHLORIDE 2000 MG: 1 INJECTION, POWDER, LYOPHILIZED, FOR SOLUTION INTRAVENOUS at 21:51

## 2017-07-25 RX ADMIN — IOHEXOL 85 ML: 350 INJECTION, SOLUTION INTRAVENOUS at 20:09

## 2017-07-25 RX ADMIN — CEFEPIME 2000 MG: 2 INJECTION, POWDER, FOR SOLUTION INTRAMUSCULAR; INTRAVENOUS at 20:50

## 2017-07-25 RX ADMIN — IPRATROPIUM BROMIDE AND ALBUTEROL SULFATE 3 ML: 2.5; .5 SOLUTION RESPIRATORY (INHALATION) at 20:10

## 2017-07-25 RX ADMIN — ACETAMINOPHEN 975 MG: 325 TABLET ORAL at 20:10

## 2017-07-25 RX ADMIN — FUROSEMIDE 20 MG: 10 INJECTION, SOLUTION INTRAMUSCULAR; INTRAVENOUS at 23:50

## 2017-07-26 ENCOUNTER — APPOINTMENT (INPATIENT)
Dept: NON INVASIVE DIAGNOSTICS | Facility: HOSPITAL | Age: 57
DRG: 134 | End: 2017-07-26
Payer: COMMERCIAL

## 2017-07-26 PROBLEM — L89.90 DECUBITUS SKIN ULCER: Chronic | Status: ACTIVE | Noted: 2017-07-26

## 2017-07-26 PROBLEM — L03.90 CELLULITIS: Status: ACTIVE | Noted: 2017-07-26

## 2017-07-26 LAB
ALBUMIN SERPL BCP-MCNC: 1.8 G/DL (ref 3.5–5)
ALP SERPL-CCNC: 82 U/L (ref 46–116)
ALT SERPL W P-5'-P-CCNC: 45 U/L (ref 12–78)
ANION GAP SERPL CALCULATED.3IONS-SCNC: 7 MMOL/L (ref 4–13)
AST SERPL W P-5'-P-CCNC: 59 U/L (ref 5–45)
ATRIAL RATE: 97 BPM
BASOPHILS # BLD AUTO: 0.02 THOUSANDS/ΜL (ref 0–0.1)
BASOPHILS NFR BLD AUTO: 0 % (ref 0–1)
BILIRUB SERPL-MCNC: 1.3 MG/DL (ref 0.2–1)
BILIRUB UR QL STRIP: NEGATIVE
BUN SERPL-MCNC: 11 MG/DL (ref 5–25)
CALCIUM SERPL-MCNC: 8.3 MG/DL (ref 8.3–10.1)
CHLORIDE SERPL-SCNC: 101 MMOL/L (ref 100–108)
CLARITY UR: CLEAR
CO2 SERPL-SCNC: 30 MMOL/L (ref 21–32)
COLOR UR: YELLOW
CREAT SERPL-MCNC: 0.55 MG/DL (ref 0.6–1.3)
EOSINOPHIL # BLD AUTO: 0.24 THOUSAND/ΜL (ref 0–0.61)
EOSINOPHIL NFR BLD AUTO: 3 % (ref 0–6)
ERYTHROCYTE [DISTWIDTH] IN BLOOD BY AUTOMATED COUNT: 13.3 % (ref 11.6–15.1)
GFR SERPL CREATININE-BSD FRML MDRD: 117 ML/MIN/1.73SQ M
GLUCOSE SERPL-MCNC: 84 MG/DL (ref 65–140)
GLUCOSE UR STRIP-MCNC: NEGATIVE MG/DL
HCT VFR BLD AUTO: 35 % (ref 36.5–49.3)
HGB BLD-MCNC: 11.2 G/DL (ref 12–17)
HGB UR QL STRIP.AUTO: NEGATIVE
KETONES UR STRIP-MCNC: NEGATIVE MG/DL
LEUKOCYTE ESTERASE UR QL STRIP: NEGATIVE
LYMPHOCYTES # BLD AUTO: 1.05 THOUSANDS/ΜL (ref 0.6–4.47)
LYMPHOCYTES NFR BLD AUTO: 12 % (ref 14–44)
MAGNESIUM SERPL-MCNC: 1.7 MG/DL (ref 1.6–2.6)
MCH RBC QN AUTO: 30.4 PG (ref 26.8–34.3)
MCHC RBC AUTO-ENTMCNC: 32 G/DL (ref 31.4–37.4)
MCV RBC AUTO: 95 FL (ref 82–98)
MONOCYTES # BLD AUTO: 1.27 THOUSAND/ΜL (ref 0.17–1.22)
MONOCYTES NFR BLD AUTO: 15 % (ref 4–12)
NEUTROPHILS # BLD AUTO: 6.15 THOUSANDS/ΜL (ref 1.85–7.62)
NEUTS SEG NFR BLD AUTO: 70 % (ref 43–75)
NITRITE UR QL STRIP: NEGATIVE
PH UR STRIP.AUTO: 6 [PH] (ref 4.5–8)
PLATELET # BLD AUTO: 210 THOUSANDS/UL (ref 149–390)
PMV BLD AUTO: 10.7 FL (ref 8.9–12.7)
POTASSIUM SERPL-SCNC: 3.2 MMOL/L (ref 3.5–5.3)
PROT SERPL-MCNC: 7.9 G/DL (ref 6.4–8.2)
PROT UR STRIP-MCNC: NEGATIVE MG/DL
QRS AXIS: 39 DEGREES
QRSD INTERVAL: 70 MS
QT INTERVAL: 308 MS
QTC INTERVAL: 418 MS
RBC # BLD AUTO: 3.69 MILLION/UL (ref 3.88–5.62)
SODIUM SERPL-SCNC: 138 MMOL/L (ref 136–145)
SP GR UR STRIP.AUTO: 1.01 (ref 1–1.03)
T WAVE AXIS: 58 DEGREES
UROBILINOGEN UR QL STRIP.AUTO: 4 E.U./DL
VENTRICULAR RATE: 111 BPM
WBC # BLD AUTO: 8.73 THOUSAND/UL (ref 4.31–10.16)

## 2017-07-26 PROCEDURE — 81003 URINALYSIS AUTO W/O SCOPE: CPT | Performed by: EMERGENCY MEDICINE

## 2017-07-26 PROCEDURE — 93005 ELECTROCARDIOGRAM TRACING: CPT | Performed by: NURSE PRACTITIONER

## 2017-07-26 PROCEDURE — 83735 ASSAY OF MAGNESIUM: CPT | Performed by: NURSE PRACTITIONER

## 2017-07-26 PROCEDURE — 80053 COMPREHEN METABOLIC PANEL: CPT | Performed by: NURSE PRACTITIONER

## 2017-07-26 PROCEDURE — 93970 EXTREMITY STUDY: CPT

## 2017-07-26 PROCEDURE — 85025 COMPLETE CBC W/AUTO DIFF WBC: CPT | Performed by: NURSE PRACTITIONER

## 2017-07-26 RX ORDER — NYSTATIN 100000 [USP'U]/G
POWDER TOPICAL 4 TIMES DAILY
Status: DISCONTINUED | OUTPATIENT
Start: 2017-07-26 | End: 2017-07-31

## 2017-07-26 RX ORDER — CEPHALEXIN 250 MG/1
500 CAPSULE ORAL EVERY 6 HOURS SCHEDULED
Status: DISCONTINUED | OUTPATIENT
Start: 2017-07-26 | End: 2017-07-26

## 2017-07-26 RX ORDER — POTASSIUM CHLORIDE 20 MEQ/1
40 TABLET, EXTENDED RELEASE ORAL EVERY 4 HOURS
Status: COMPLETED | OUTPATIENT
Start: 2017-07-26 | End: 2017-07-26

## 2017-07-26 RX ORDER — BACITRACIN, NEOMYCIN, POLYMYXIN B 400; 3.5; 5 [USP'U]/G; MG/G; [USP'U]/G
1 OINTMENT TOPICAL 4 TIMES DAILY
Status: DISCONTINUED | OUTPATIENT
Start: 2017-07-26 | End: 2017-07-31

## 2017-07-26 RX ORDER — METOPROLOL TARTRATE 5 MG/5ML
5 INJECTION INTRAVENOUS ONCE
Status: COMPLETED | OUTPATIENT
Start: 2017-07-26 | End: 2017-07-26

## 2017-07-26 RX ADMIN — POTASSIUM CHLORIDE 40 MEQ: 1500 TABLET, EXTENDED RELEASE ORAL at 16:34

## 2017-07-26 RX ADMIN — ENOXAPARIN SODIUM 135 MG: 150 INJECTION SUBCUTANEOUS at 09:53

## 2017-07-26 RX ADMIN — CEPHALEXIN 500 MG: 250 CAPSULE ORAL at 01:01

## 2017-07-26 RX ADMIN — HYDROCORTISONE 1 APPLICATION: 25 CREAM TOPICAL at 18:08

## 2017-07-26 RX ADMIN — CEFEPIME HYDROCHLORIDE 2000 MG: 2 INJECTION, POWDER, FOR SOLUTION INTRAVENOUS at 10:01

## 2017-07-26 RX ADMIN — BACITRACIN ZINC NEOMYCIN SULFATE POLYMYXIN B SULFATE 1 LARGE APPLICATION: 400; 3.5; 5 OINTMENT TOPICAL at 02:22

## 2017-07-26 RX ADMIN — HYDROCORTISONE: 25 CREAM TOPICAL at 21:39

## 2017-07-26 RX ADMIN — NYSTATIN: 100000 POWDER TOPICAL at 09:57

## 2017-07-26 RX ADMIN — CEPHALEXIN 500 MG: 250 CAPSULE ORAL at 06:41

## 2017-07-26 RX ADMIN — VANCOMYCIN HYDROCHLORIDE 2000 MG: 1 INJECTION, POWDER, LYOPHILIZED, FOR SOLUTION INTRAVENOUS at 09:54

## 2017-07-26 RX ADMIN — ASPIRIN 325 MG ORAL TABLET 325 MG: 325 PILL ORAL at 09:53

## 2017-07-26 RX ADMIN — CEFEPIME HYDROCHLORIDE 2000 MG: 2 INJECTION, POWDER, FOR SOLUTION INTRAVENOUS at 21:29

## 2017-07-26 RX ADMIN — NYSTATIN: 100000 POWDER TOPICAL at 02:22

## 2017-07-26 RX ADMIN — CEPHALEXIN 500 MG: 250 CAPSULE ORAL at 12:14

## 2017-07-26 RX ADMIN — HYDROCORTISONE: 25 CREAM TOPICAL at 09:57

## 2017-07-26 RX ADMIN — POTASSIUM CHLORIDE 40 MEQ: 1500 TABLET, EXTENDED RELEASE ORAL at 13:11

## 2017-07-26 RX ADMIN — METOPROLOL TARTRATE 25 MG: 25 TABLET ORAL at 21:30

## 2017-07-26 RX ADMIN — BACITRACIN ZINC NEOMYCIN SULFATE POLYMYXIN B SULFATE 1 LARGE APPLICATION: 400; 3.5; 5 OINTMENT TOPICAL at 12:15

## 2017-07-26 RX ADMIN — BACITRACIN ZINC NEOMYCIN SULFATE POLYMYXIN B SULFATE: 400; 3.5; 5 OINTMENT TOPICAL at 18:08

## 2017-07-26 RX ADMIN — NYSTATIN: 100000 POWDER TOPICAL at 18:08

## 2017-07-26 RX ADMIN — ENOXAPARIN SODIUM 135 MG: 150 INJECTION SUBCUTANEOUS at 21:29

## 2017-07-26 RX ADMIN — BACITRACIN ZINC NEOMYCIN SULFATE POLYMYXIN B SULFATE 1 LARGE APPLICATION: 400; 3.5; 5 OINTMENT TOPICAL at 21:39

## 2017-07-26 RX ADMIN — NYSTATIN: 100000 POWDER TOPICAL at 21:39

## 2017-07-26 RX ADMIN — METOPROLOL TARTRATE 5 MG: 5 INJECTION INTRAVENOUS at 18:08

## 2017-07-26 RX ADMIN — NYSTATIN: 100000 POWDER TOPICAL at 12:15

## 2017-07-26 RX ADMIN — HYDROCORTISONE: 25 CREAM TOPICAL at 02:21

## 2017-07-26 RX ADMIN — HYDROCORTISONE: 25 CREAM TOPICAL at 12:15

## 2017-07-26 RX ADMIN — BACITRACIN ZINC NEOMYCIN SULFATE POLYMYXIN B SULFATE: 400; 3.5; 5 OINTMENT TOPICAL at 09:57

## 2017-07-26 RX ADMIN — VANCOMYCIN HYDROCHLORIDE 2000 MG: 1 INJECTION, POWDER, LYOPHILIZED, FOR SOLUTION INTRAVENOUS at 21:30

## 2017-07-27 ENCOUNTER — APPOINTMENT (INPATIENT)
Dept: NON INVASIVE DIAGNOSTICS | Facility: HOSPITAL | Age: 57
DRG: 134 | End: 2017-07-27
Payer: COMMERCIAL

## 2017-07-27 ENCOUNTER — APPOINTMENT (INPATIENT)
Dept: RADIOLOGY | Facility: HOSPITAL | Age: 57
DRG: 134 | End: 2017-07-27
Payer: COMMERCIAL

## 2017-07-27 PROBLEM — I26.99 PULMONARY EMBOLISM (HCC): Status: ACTIVE | Noted: 2017-07-25

## 2017-07-27 PROBLEM — J18.9 HCAP (HEALTHCARE-ASSOCIATED PNEUMONIA): Status: ACTIVE | Noted: 2017-07-27

## 2017-07-27 LAB
ALBUMIN SERPL BCP-MCNC: 1.5 G/DL (ref 3.5–5)
ALP SERPL-CCNC: 81 U/L (ref 46–116)
ALT SERPL W P-5'-P-CCNC: 46 U/L (ref 12–78)
ANION GAP SERPL CALCULATED.3IONS-SCNC: 6 MMOL/L (ref 4–13)
AST SERPL W P-5'-P-CCNC: 65 U/L (ref 5–45)
ATRIAL RATE: 98 BPM
BASOPHILS # BLD AUTO: 0.02 THOUSANDS/ΜL (ref 0–0.1)
BASOPHILS NFR BLD AUTO: 0 % (ref 0–1)
BILIRUB SERPL-MCNC: 0.9 MG/DL (ref 0.2–1)
BUN SERPL-MCNC: 10 MG/DL (ref 5–25)
CALCIUM SERPL-MCNC: 8 MG/DL (ref 8.3–10.1)
CHLORIDE SERPL-SCNC: 104 MMOL/L (ref 100–108)
CO2 SERPL-SCNC: 26 MMOL/L (ref 21–32)
CREAT SERPL-MCNC: 0.53 MG/DL (ref 0.6–1.3)
EOSINOPHIL # BLD AUTO: 0.25 THOUSAND/ΜL (ref 0–0.61)
EOSINOPHIL NFR BLD AUTO: 3 % (ref 0–6)
ERYTHROCYTE [DISTWIDTH] IN BLOOD BY AUTOMATED COUNT: 13.3 % (ref 11.6–15.1)
GFR SERPL CREATININE-BSD FRML MDRD: 119 ML/MIN/1.73SQ M
GLUCOSE SERPL-MCNC: 85 MG/DL (ref 65–140)
HCT VFR BLD AUTO: 32 % (ref 36.5–49.3)
HGB BLD-MCNC: 10.3 G/DL (ref 12–17)
LYMPHOCYTES # BLD AUTO: 1.41 THOUSANDS/ΜL (ref 0.6–4.47)
LYMPHOCYTES NFR BLD AUTO: 19 % (ref 14–44)
MAGNESIUM SERPL-MCNC: 1.6 MG/DL (ref 1.6–2.6)
MCH RBC QN AUTO: 30.4 PG (ref 26.8–34.3)
MCHC RBC AUTO-ENTMCNC: 32.2 G/DL (ref 31.4–37.4)
MCV RBC AUTO: 94 FL (ref 82–98)
MONOCYTES # BLD AUTO: 0.94 THOUSAND/ΜL (ref 0.17–1.22)
MONOCYTES NFR BLD AUTO: 13 % (ref 4–12)
NEUTROPHILS # BLD AUTO: 4.8 THOUSANDS/ΜL (ref 1.85–7.62)
NEUTS SEG NFR BLD AUTO: 65 % (ref 43–75)
NT-PROBNP SERPL-MCNC: 3191 PG/ML
PHOSPHATE SERPL-MCNC: 3.8 MG/DL (ref 2.7–4.5)
PLATELET # BLD AUTO: 227 THOUSANDS/UL (ref 149–390)
PMV BLD AUTO: 10.4 FL (ref 8.9–12.7)
POTASSIUM SERPL-SCNC: 3.7 MMOL/L (ref 3.5–5.3)
PROT SERPL-MCNC: 7.1 G/DL (ref 6.4–8.2)
QRS AXIS: 21 DEGREES
QRSD INTERVAL: 74 MS
QT INTERVAL: 366 MS
QTC INTERVAL: 452 MS
RBC # BLD AUTO: 3.39 MILLION/UL (ref 3.88–5.62)
SODIUM SERPL-SCNC: 136 MMOL/L (ref 136–145)
T WAVE AXIS: 27 DEGREES
VANCOMYCIN TROUGH SERPL-MCNC: 9.3 UG/ML (ref 10–20)
VENTRICULAR RATE: 92 BPM
WBC # BLD AUTO: 7.42 THOUSAND/UL (ref 4.31–10.16)

## 2017-07-27 PROCEDURE — 80053 COMPREHEN METABOLIC PANEL: CPT | Performed by: INTERNAL MEDICINE

## 2017-07-27 PROCEDURE — 83880 ASSAY OF NATRIURETIC PEPTIDE: CPT | Performed by: INTERNAL MEDICINE

## 2017-07-27 PROCEDURE — 83735 ASSAY OF MAGNESIUM: CPT | Performed by: INTERNAL MEDICINE

## 2017-07-27 PROCEDURE — 93306 TTE W/DOPPLER COMPLETE: CPT

## 2017-07-27 PROCEDURE — 80202 ASSAY OF VANCOMYCIN: CPT | Performed by: INTERNAL MEDICINE

## 2017-07-27 PROCEDURE — 85025 COMPLETE CBC W/AUTO DIFF WBC: CPT | Performed by: INTERNAL MEDICINE

## 2017-07-27 PROCEDURE — 84100 ASSAY OF PHOSPHORUS: CPT | Performed by: INTERNAL MEDICINE

## 2017-07-27 PROCEDURE — 74220 X-RAY XM ESOPHAGUS 1CNTRST: CPT

## 2017-07-27 PROCEDURE — 94760 N-INVAS EAR/PLS OXIMETRY 1: CPT

## 2017-07-27 PROCEDURE — 71010 HB CHEST X-RAY 1 VIEW FRONTAL (PORTABLE): CPT

## 2017-07-27 RX ADMIN — NYSTATIN: 100000 POWDER TOPICAL at 09:54

## 2017-07-27 RX ADMIN — NYSTATIN: 100000 POWDER TOPICAL at 22:18

## 2017-07-27 RX ADMIN — METOPROLOL TARTRATE 25 MG: 25 TABLET ORAL at 09:53

## 2017-07-27 RX ADMIN — BACITRACIN ZINC NEOMYCIN SULFATE POLYMYXIN B SULFATE 1 LARGE APPLICATION: 400; 3.5; 5 OINTMENT TOPICAL at 09:53

## 2017-07-27 RX ADMIN — VANCOMYCIN HYDROCHLORIDE 2000 MG: 1 INJECTION, POWDER, LYOPHILIZED, FOR SOLUTION INTRAVENOUS at 09:53

## 2017-07-27 RX ADMIN — BACITRACIN ZINC NEOMYCIN SULFATE POLYMYXIN B SULFATE 1 LARGE APPLICATION: 400; 3.5; 5 OINTMENT TOPICAL at 22:18

## 2017-07-27 RX ADMIN — POLYETHYLENE GLYCOL-3350 AND ELECTROLYTES 4000 ML: 236; 6.74; 5.86; 2.97; 22.74 POWDER, FOR SOLUTION ORAL at 12:50

## 2017-07-27 RX ADMIN — NYSTATIN: 100000 POWDER TOPICAL at 20:18

## 2017-07-27 RX ADMIN — METOPROLOL TARTRATE 25 MG: 25 TABLET ORAL at 22:14

## 2017-07-27 RX ADMIN — BACITRACIN ZINC NEOMYCIN SULFATE POLYMYXIN B SULFATE 1 LARGE APPLICATION: 400; 3.5; 5 OINTMENT TOPICAL at 12:50

## 2017-07-27 RX ADMIN — HYDROCORTISONE: 25 CREAM TOPICAL at 22:18

## 2017-07-27 RX ADMIN — CEFEPIME HYDROCHLORIDE 2000 MG: 2 INJECTION, POWDER, FOR SOLUTION INTRAVENOUS at 09:53

## 2017-07-27 RX ADMIN — HYDROCORTISONE: 25 CREAM TOPICAL at 09:53

## 2017-07-27 RX ADMIN — ENOXAPARIN SODIUM 135 MG: 150 INJECTION SUBCUTANEOUS at 09:54

## 2017-07-27 RX ADMIN — ASPIRIN 325 MG ORAL TABLET 325 MG: 325 PILL ORAL at 09:52

## 2017-07-27 RX ADMIN — CEFEPIME HYDROCHLORIDE 2000 MG: 2 INJECTION, POWDER, FOR SOLUTION INTRAVENOUS at 22:06

## 2017-07-27 RX ADMIN — HYDROCORTISONE: 25 CREAM TOPICAL at 12:51

## 2017-07-27 RX ADMIN — NYSTATIN: 100000 POWDER TOPICAL at 12:52

## 2017-07-27 RX ADMIN — VANCOMYCIN HYDROCHLORIDE 2000 MG: 1 INJECTION, POWDER, LYOPHILIZED, FOR SOLUTION INTRAVENOUS at 23:02

## 2017-07-27 RX ADMIN — PERFLUTREN 3 ML/MIN: 6.52 INJECTION, SUSPENSION INTRAVENOUS at 08:45

## 2017-07-28 ENCOUNTER — ANESTHESIA EVENT (INPATIENT)
Dept: PERIOP | Facility: HOSPITAL | Age: 57
DRG: 134 | End: 2017-07-28
Payer: COMMERCIAL

## 2017-07-28 ENCOUNTER — ANESTHESIA (INPATIENT)
Dept: PERIOP | Facility: HOSPITAL | Age: 57
DRG: 134 | End: 2017-07-28
Payer: COMMERCIAL

## 2017-07-28 LAB
ANION GAP SERPL CALCULATED.3IONS-SCNC: 7 MMOL/L (ref 4–13)
BASOPHILS # BLD AUTO: 0.02 THOUSANDS/ΜL (ref 0–0.1)
BASOPHILS NFR BLD AUTO: 0 % (ref 0–1)
BUN SERPL-MCNC: 12 MG/DL (ref 5–25)
CALCIUM SERPL-MCNC: 8.2 MG/DL (ref 8.3–10.1)
CHLORIDE SERPL-SCNC: 105 MMOL/L (ref 100–108)
CO2 SERPL-SCNC: 25 MMOL/L (ref 21–32)
CREAT SERPL-MCNC: 0.48 MG/DL (ref 0.6–1.3)
EOSINOPHIL # BLD AUTO: 0.25 THOUSAND/ΜL (ref 0–0.61)
EOSINOPHIL NFR BLD AUTO: 4 % (ref 0–6)
ERYTHROCYTE [DISTWIDTH] IN BLOOD BY AUTOMATED COUNT: 13.4 % (ref 11.6–15.1)
FERRITIN SERPL-MCNC: 596 NG/ML (ref 8–388)
FOLATE SERPL-MCNC: 10.8 NG/ML (ref 3.1–17.5)
GFR SERPL CREATININE-BSD FRML MDRD: 124 ML/MIN/1.73SQ M
GLUCOSE SERPL-MCNC: 81 MG/DL (ref 65–140)
HCT VFR BLD AUTO: 32.1 % (ref 36.5–49.3)
HGB BLD-MCNC: 10.2 G/DL (ref 12–17)
IRON SATN MFR SERPL: 33 %
IRON SERPL-MCNC: 76 UG/DL (ref 65–175)
LYMPHOCYTES # BLD AUTO: 1.23 THOUSANDS/ΜL (ref 0.6–4.47)
LYMPHOCYTES NFR BLD AUTO: 20 % (ref 14–44)
MCH RBC QN AUTO: 30.1 PG (ref 26.8–34.3)
MCHC RBC AUTO-ENTMCNC: 31.8 G/DL (ref 31.4–37.4)
MCV RBC AUTO: 95 FL (ref 82–98)
MONOCYTES # BLD AUTO: 0.72 THOUSAND/ΜL (ref 0.17–1.22)
MONOCYTES NFR BLD AUTO: 12 % (ref 4–12)
NEUTROPHILS # BLD AUTO: 4 THOUSANDS/ΜL (ref 1.85–7.62)
NEUTS SEG NFR BLD AUTO: 64 % (ref 43–75)
PLATELET # BLD AUTO: 127 THOUSANDS/UL (ref 149–390)
PMV BLD AUTO: 10.8 FL (ref 8.9–12.7)
POTASSIUM SERPL-SCNC: 3.9 MMOL/L (ref 3.5–5.3)
RBC # BLD AUTO: 3.39 MILLION/UL (ref 3.88–5.62)
SODIUM SERPL-SCNC: 137 MMOL/L (ref 136–145)
TIBC SERPL-MCNC: 227 UG/DL (ref 250–450)
VIT B12 SERPL-MCNC: 434 PG/ML (ref 100–900)
WBC # BLD AUTO: 6.22 THOUSAND/UL (ref 4.31–10.16)

## 2017-07-28 PROCEDURE — 82607 VITAMIN B-12: CPT | Performed by: INTERNAL MEDICINE

## 2017-07-28 PROCEDURE — 87070 CULTURE OTHR SPECIMN AEROBIC: CPT | Performed by: INTERNAL MEDICINE

## 2017-07-28 PROCEDURE — 0DJ08ZZ INSPECTION OF UPPER INTESTINAL TRACT, VIA NATURAL OR ARTIFICIAL OPENING ENDOSCOPIC: ICD-10-PCS | Performed by: INTERNAL MEDICINE

## 2017-07-28 PROCEDURE — 83540 ASSAY OF IRON: CPT | Performed by: INTERNAL MEDICINE

## 2017-07-28 PROCEDURE — 87205 SMEAR GRAM STAIN: CPT | Performed by: INTERNAL MEDICINE

## 2017-07-28 PROCEDURE — 82746 ASSAY OF FOLIC ACID SERUM: CPT | Performed by: INTERNAL MEDICINE

## 2017-07-28 PROCEDURE — 85025 COMPLETE CBC W/AUTO DIFF WBC: CPT | Performed by: INTERNAL MEDICINE

## 2017-07-28 PROCEDURE — 83550 IRON BINDING TEST: CPT | Performed by: INTERNAL MEDICINE

## 2017-07-28 PROCEDURE — 82728 ASSAY OF FERRITIN: CPT | Performed by: INTERNAL MEDICINE

## 2017-07-28 PROCEDURE — 80048 BASIC METABOLIC PNL TOTAL CA: CPT | Performed by: INTERNAL MEDICINE

## 2017-07-28 PROCEDURE — 0DJD8ZZ INSPECTION OF LOWER INTESTINAL TRACT, VIA NATURAL OR ARTIFICIAL OPENING ENDOSCOPIC: ICD-10-PCS | Performed by: INTERNAL MEDICINE

## 2017-07-28 RX ORDER — PROPOFOL 10 MG/ML
INJECTION, EMULSION INTRAVENOUS AS NEEDED
Status: DISCONTINUED | OUTPATIENT
Start: 2017-07-28 | End: 2017-07-28 | Stop reason: SURG

## 2017-07-28 RX ORDER — SODIUM CHLORIDE 9 MG/ML
INJECTION, SOLUTION INTRAVENOUS CONTINUOUS PRN
Status: DISCONTINUED | OUTPATIENT
Start: 2017-07-28 | End: 2017-07-28 | Stop reason: SURG

## 2017-07-28 RX ORDER — MIDAZOLAM HYDROCHLORIDE 1 MG/ML
INJECTION INTRAMUSCULAR; INTRAVENOUS AS NEEDED
Status: DISCONTINUED | OUTPATIENT
Start: 2017-07-28 | End: 2017-07-28

## 2017-07-28 RX ADMIN — HYDROCORTISONE: 25 CREAM TOPICAL at 21:53

## 2017-07-28 RX ADMIN — NYSTATIN 1 APPLICATION: 100000 POWDER TOPICAL at 21:53

## 2017-07-28 RX ADMIN — PROPOFOL 50 MG: 10 INJECTION, EMULSION INTRAVENOUS at 12:44

## 2017-07-28 RX ADMIN — METOPROLOL TARTRATE 25 MG: 25 TABLET ORAL at 08:10

## 2017-07-28 RX ADMIN — HYDROCORTISONE: 25 CREAM TOPICAL at 08:10

## 2017-07-28 RX ADMIN — BACITRACIN ZINC NEOMYCIN SULFATE POLYMYXIN B SULFATE 1 LARGE APPLICATION: 400; 3.5; 5 OINTMENT TOPICAL at 11:55

## 2017-07-28 RX ADMIN — PROPOFOL 50 MG: 10 INJECTION, EMULSION INTRAVENOUS at 12:55

## 2017-07-28 RX ADMIN — HYDROCORTISONE: 25 CREAM TOPICAL at 11:51

## 2017-07-28 RX ADMIN — VANCOMYCIN HYDROCHLORIDE 1500 MG: 1 INJECTION, POWDER, LYOPHILIZED, FOR SOLUTION INTRAVENOUS at 08:07

## 2017-07-28 RX ADMIN — CEFEPIME HYDROCHLORIDE 2000 MG: 2 INJECTION, POWDER, FOR SOLUTION INTRAVENOUS at 20:45

## 2017-07-28 RX ADMIN — PROPOFOL 50 MG: 10 INJECTION, EMULSION INTRAVENOUS at 13:09

## 2017-07-28 RX ADMIN — SODIUM CHLORIDE: 0.9 INJECTION, SOLUTION INTRAVENOUS at 12:22

## 2017-07-28 RX ADMIN — PROPOFOL 50 MG: 10 INJECTION, EMULSION INTRAVENOUS at 13:02

## 2017-07-28 RX ADMIN — PROPOFOL 60 MG: 10 INJECTION, EMULSION INTRAVENOUS at 12:53

## 2017-07-28 RX ADMIN — BACITRACIN ZINC NEOMYCIN SULFATE POLYMYXIN B SULFATE 1 LARGE APPLICATION: 400; 3.5; 5 OINTMENT TOPICAL at 08:11

## 2017-07-28 RX ADMIN — PROPOFOL 60 MG: 10 INJECTION, EMULSION INTRAVENOUS at 12:46

## 2017-07-28 RX ADMIN — VANCOMYCIN HYDROCHLORIDE 1500 MG: 1 INJECTION, POWDER, LYOPHILIZED, FOR SOLUTION INTRAVENOUS at 14:41

## 2017-07-28 RX ADMIN — HYDROCORTISONE: 25 CREAM TOPICAL at 17:24

## 2017-07-28 RX ADMIN — BACITRACIN ZINC NEOMYCIN SULFATE POLYMYXIN B SULFATE 1 LARGE APPLICATION: 400; 3.5; 5 OINTMENT TOPICAL at 21:53

## 2017-07-28 RX ADMIN — PROPOFOL 50 MG: 10 INJECTION, EMULSION INTRAVENOUS at 12:57

## 2017-07-28 RX ADMIN — PROPOFOL 50 MG: 10 INJECTION, EMULSION INTRAVENOUS at 12:50

## 2017-07-28 RX ADMIN — PROPOFOL 70 MG: 10 INJECTION, EMULSION INTRAVENOUS at 13:13

## 2017-07-28 RX ADMIN — BACITRACIN ZINC NEOMYCIN SULFATE POLYMYXIN B SULFATE 1 LARGE APPLICATION: 400; 3.5; 5 OINTMENT TOPICAL at 17:24

## 2017-07-28 RX ADMIN — VANCOMYCIN HYDROCHLORIDE 1500 MG: 1 INJECTION, POWDER, LYOPHILIZED, FOR SOLUTION INTRAVENOUS at 21:53

## 2017-07-28 RX ADMIN — NYSTATIN: 100000 POWDER TOPICAL at 11:52

## 2017-07-28 RX ADMIN — NYSTATIN: 100000 POWDER TOPICAL at 08:12

## 2017-07-28 RX ADMIN — PROPOFOL 60 MG: 10 INJECTION, EMULSION INTRAVENOUS at 13:00

## 2017-07-28 RX ADMIN — CEFEPIME HYDROCHLORIDE 2000 MG: 2 INJECTION, POWDER, FOR SOLUTION INTRAVENOUS at 09:54

## 2017-07-28 RX ADMIN — NYSTATIN: 100000 POWDER TOPICAL at 17:25

## 2017-07-28 RX ADMIN — ASPIRIN 325 MG ORAL TABLET 325 MG: 325 PILL ORAL at 14:40

## 2017-07-28 RX ADMIN — METOPROLOL TARTRATE 25 MG: 25 TABLET ORAL at 20:45

## 2017-07-28 RX ADMIN — ENOXAPARIN SODIUM 135 MG: 150 INJECTION SUBCUTANEOUS at 20:45

## 2017-07-29 ENCOUNTER — APPOINTMENT (INPATIENT)
Dept: CT IMAGING | Facility: HOSPITAL | Age: 57
DRG: 134 | End: 2017-07-29
Payer: COMMERCIAL

## 2017-07-29 LAB
ANION GAP SERPL CALCULATED.3IONS-SCNC: 9 MMOL/L (ref 4–13)
BASOPHILS # BLD AUTO: 0.02 THOUSANDS/ΜL (ref 0–0.1)
BASOPHILS NFR BLD AUTO: 0 % (ref 0–1)
BUN SERPL-MCNC: 9 MG/DL (ref 5–25)
CALCIUM SERPL-MCNC: 8.3 MG/DL (ref 8.3–10.1)
CHLORIDE SERPL-SCNC: 107 MMOL/L (ref 100–108)
CO2 SERPL-SCNC: 24 MMOL/L (ref 21–32)
CREAT SERPL-MCNC: 0.56 MG/DL (ref 0.6–1.3)
EOSINOPHIL # BLD AUTO: 0.26 THOUSAND/ΜL (ref 0–0.61)
EOSINOPHIL NFR BLD AUTO: 5 % (ref 0–6)
ERYTHROCYTE [DISTWIDTH] IN BLOOD BY AUTOMATED COUNT: 13.5 % (ref 11.6–15.1)
GFR SERPL CREATININE-BSD FRML MDRD: 116 ML/MIN/1.73SQ M
GLUCOSE SERPL-MCNC: 90 MG/DL (ref 65–140)
HCT VFR BLD AUTO: 32 % (ref 36.5–49.3)
HGB BLD-MCNC: 10.1 G/DL (ref 12–17)
LYMPHOCYTES # BLD AUTO: 1.02 THOUSANDS/ΜL (ref 0.6–4.47)
LYMPHOCYTES NFR BLD AUTO: 19 % (ref 14–44)
MAGNESIUM SERPL-MCNC: 1.9 MG/DL (ref 1.6–2.6)
MCH RBC QN AUTO: 30.3 PG (ref 26.8–34.3)
MCHC RBC AUTO-ENTMCNC: 31.6 G/DL (ref 31.4–37.4)
MCV RBC AUTO: 96 FL (ref 82–98)
MONOCYTES # BLD AUTO: 0.79 THOUSAND/ΜL (ref 0.17–1.22)
MONOCYTES NFR BLD AUTO: 15 % (ref 4–12)
NEUTROPHILS # BLD AUTO: 3.33 THOUSANDS/ΜL (ref 1.85–7.62)
NEUTS SEG NFR BLD AUTO: 61 % (ref 43–75)
PLATELET # BLD AUTO: 281 THOUSANDS/UL (ref 149–390)
PMV BLD AUTO: 10.2 FL (ref 8.9–12.7)
POTASSIUM SERPL-SCNC: 3.7 MMOL/L (ref 3.5–5.3)
RBC # BLD AUTO: 3.33 MILLION/UL (ref 3.88–5.62)
SODIUM SERPL-SCNC: 140 MMOL/L (ref 136–145)
WBC # BLD AUTO: 5.42 THOUSAND/UL (ref 4.31–10.16)

## 2017-07-29 PROCEDURE — 74176 CT ABD & PELVIS W/O CONTRAST: CPT

## 2017-07-29 PROCEDURE — 83735 ASSAY OF MAGNESIUM: CPT | Performed by: INTERNAL MEDICINE

## 2017-07-29 PROCEDURE — 80048 BASIC METABOLIC PNL TOTAL CA: CPT | Performed by: INTERNAL MEDICINE

## 2017-07-29 PROCEDURE — 85025 COMPLETE CBC W/AUTO DIFF WBC: CPT | Performed by: INTERNAL MEDICINE

## 2017-07-29 RX ADMIN — IOHEXOL 50 ML: 240 INJECTION, SOLUTION INTRATHECAL; INTRAVASCULAR; INTRAVENOUS; ORAL at 16:01

## 2017-07-29 RX ADMIN — CEFEPIME HYDROCHLORIDE 2000 MG: 2 INJECTION, POWDER, FOR SOLUTION INTRAVENOUS at 23:25

## 2017-07-29 RX ADMIN — METOPROLOL TARTRATE 25 MG: 25 TABLET ORAL at 23:28

## 2017-07-29 RX ADMIN — HYDROCORTISONE: 25 CREAM TOPICAL at 17:27

## 2017-07-29 RX ADMIN — NYSTATIN: 100000 POWDER TOPICAL at 09:57

## 2017-07-29 RX ADMIN — CEFEPIME HYDROCHLORIDE 2000 MG: 2 INJECTION, POWDER, FOR SOLUTION INTRAVENOUS at 09:56

## 2017-07-29 RX ADMIN — BACITRACIN ZINC NEOMYCIN SULFATE POLYMYXIN B SULFATE 1 LARGE APPLICATION: 400; 3.5; 5 OINTMENT TOPICAL at 09:58

## 2017-07-29 RX ADMIN — VANCOMYCIN HYDROCHLORIDE 1500 MG: 1 INJECTION, POWDER, LYOPHILIZED, FOR SOLUTION INTRAVENOUS at 13:33

## 2017-07-29 RX ADMIN — BACITRACIN ZINC NEOMYCIN SULFATE POLYMYXIN B SULFATE 1 LARGE APPLICATION: 400; 3.5; 5 OINTMENT TOPICAL at 13:33

## 2017-07-29 RX ADMIN — HYDROCORTISONE: 25 CREAM TOPICAL at 09:57

## 2017-07-29 RX ADMIN — HYDROCORTISONE: 25 CREAM TOPICAL at 13:32

## 2017-07-29 RX ADMIN — ASPIRIN 325 MG ORAL TABLET 325 MG: 325 PILL ORAL at 09:56

## 2017-07-29 RX ADMIN — ENOXAPARIN SODIUM 135 MG: 150 INJECTION SUBCUTANEOUS at 09:56

## 2017-07-29 RX ADMIN — NYSTATIN: 100000 POWDER TOPICAL at 17:28

## 2017-07-29 RX ADMIN — ENOXAPARIN SODIUM 135 MG: 150 INJECTION SUBCUTANEOUS at 23:27

## 2017-07-29 RX ADMIN — METOPROLOL TARTRATE 25 MG: 25 TABLET ORAL at 09:56

## 2017-07-29 RX ADMIN — VANCOMYCIN HYDROCHLORIDE 1500 MG: 1 INJECTION, POWDER, LYOPHILIZED, FOR SOLUTION INTRAVENOUS at 05:39

## 2017-07-29 RX ADMIN — BACITRACIN ZINC NEOMYCIN SULFATE POLYMYXIN B SULFATE 1 LARGE APPLICATION: 400; 3.5; 5 OINTMENT TOPICAL at 17:28

## 2017-07-29 RX ADMIN — NYSTATIN: 100000 POWDER TOPICAL at 13:33

## 2017-07-30 LAB
ANION GAP SERPL CALCULATED.3IONS-SCNC: 9 MMOL/L (ref 4–13)
BACTERIA SPT RESP CULT: NORMAL
BACTERIA SPT RESP CULT: NORMAL
BASOPHILS # BLD AUTO: 0.03 THOUSANDS/ΜL (ref 0–0.1)
BASOPHILS NFR BLD AUTO: 1 % (ref 0–1)
BUN SERPL-MCNC: 9 MG/DL (ref 5–25)
CALCIUM SERPL-MCNC: 8.3 MG/DL (ref 8.3–10.1)
CHLORIDE SERPL-SCNC: 105 MMOL/L (ref 100–108)
CO2 SERPL-SCNC: 26 MMOL/L (ref 21–32)
CREAT SERPL-MCNC: 0.66 MG/DL (ref 0.6–1.3)
EOSINOPHIL # BLD AUTO: 0.28 THOUSAND/ΜL (ref 0–0.61)
EOSINOPHIL NFR BLD AUTO: 5 % (ref 0–6)
ERYTHROCYTE [DISTWIDTH] IN BLOOD BY AUTOMATED COUNT: 13.4 % (ref 11.6–15.1)
GFR SERPL CREATININE-BSD FRML MDRD: 109 ML/MIN/1.73SQ M
GLUCOSE SERPL-MCNC: 101 MG/DL (ref 65–140)
GRAM STN SPEC: NORMAL
HCT VFR BLD AUTO: 33.3 % (ref 36.5–49.3)
HGB BLD-MCNC: 10.7 G/DL (ref 12–17)
LYMPHOCYTES # BLD AUTO: 1.13 THOUSANDS/ΜL (ref 0.6–4.47)
LYMPHOCYTES NFR BLD AUTO: 19 % (ref 14–44)
MCH RBC QN AUTO: 30.7 PG (ref 26.8–34.3)
MCHC RBC AUTO-ENTMCNC: 32.1 G/DL (ref 31.4–37.4)
MCV RBC AUTO: 96 FL (ref 82–98)
MONOCYTES # BLD AUTO: 0.47 THOUSAND/ΜL (ref 0.17–1.22)
MONOCYTES NFR BLD AUTO: 8 % (ref 4–12)
NEUTROPHILS # BLD AUTO: 4.15 THOUSANDS/ΜL (ref 1.85–7.62)
NEUTS SEG NFR BLD AUTO: 67 % (ref 43–75)
PLATELET # BLD AUTO: 331 THOUSANDS/UL (ref 149–390)
PMV BLD AUTO: 9.6 FL (ref 8.9–12.7)
POTASSIUM SERPL-SCNC: 3.7 MMOL/L (ref 3.5–5.3)
RBC # BLD AUTO: 3.48 MILLION/UL (ref 3.88–5.62)
SODIUM SERPL-SCNC: 140 MMOL/L (ref 136–145)
WBC # BLD AUTO: 6.06 THOUSAND/UL (ref 4.31–10.16)

## 2017-07-30 PROCEDURE — 80048 BASIC METABOLIC PNL TOTAL CA: CPT | Performed by: INTERNAL MEDICINE

## 2017-07-30 PROCEDURE — 85025 COMPLETE CBC W/AUTO DIFF WBC: CPT | Performed by: INTERNAL MEDICINE

## 2017-07-30 RX ADMIN — NYSTATIN: 100000 POWDER TOPICAL at 14:55

## 2017-07-30 RX ADMIN — HYDROCORTISONE: 25 CREAM TOPICAL at 09:46

## 2017-07-30 RX ADMIN — ASPIRIN 325 MG ORAL TABLET 325 MG: 325 PILL ORAL at 09:40

## 2017-07-30 RX ADMIN — METOPROLOL TARTRATE 25 MG: 25 TABLET ORAL at 21:52

## 2017-07-30 RX ADMIN — HYDROCORTISONE: 25 CREAM TOPICAL at 14:54

## 2017-07-30 RX ADMIN — NYSTATIN: 100000 POWDER TOPICAL at 17:05

## 2017-07-30 RX ADMIN — BACITRACIN ZINC NEOMYCIN SULFATE POLYMYXIN B SULFATE 1 LARGE APPLICATION: 400; 3.5; 5 OINTMENT TOPICAL at 09:47

## 2017-07-30 RX ADMIN — NYSTATIN: 100000 POWDER TOPICAL at 09:47

## 2017-07-30 RX ADMIN — METOPROLOL TARTRATE 25 MG: 25 TABLET ORAL at 09:41

## 2017-07-30 RX ADMIN — BACITRACIN ZINC NEOMYCIN SULFATE POLYMYXIN B SULFATE 1 LARGE APPLICATION: 400; 3.5; 5 OINTMENT TOPICAL at 17:04

## 2017-07-30 RX ADMIN — HYDROCORTISONE: 25 CREAM TOPICAL at 17:04

## 2017-07-30 RX ADMIN — VANCOMYCIN HYDROCHLORIDE 1500 MG: 1 INJECTION, POWDER, LYOPHILIZED, FOR SOLUTION INTRAVENOUS at 06:26

## 2017-07-30 RX ADMIN — VANCOMYCIN HYDROCHLORIDE 1500 MG: 1 INJECTION, POWDER, LYOPHILIZED, FOR SOLUTION INTRAVENOUS at 00:37

## 2017-07-30 RX ADMIN — ENOXAPARIN SODIUM 135 MG: 150 INJECTION SUBCUTANEOUS at 21:52

## 2017-07-30 RX ADMIN — BACITRACIN ZINC NEOMYCIN SULFATE POLYMYXIN B SULFATE 1 LARGE APPLICATION: 400; 3.5; 5 OINTMENT TOPICAL at 14:55

## 2017-07-30 RX ADMIN — CEFEPIME HYDROCHLORIDE 2000 MG: 2 INJECTION, POWDER, FOR SOLUTION INTRAVENOUS at 21:52

## 2017-07-30 RX ADMIN — ENOXAPARIN SODIUM 135 MG: 150 INJECTION SUBCUTANEOUS at 09:41

## 2017-07-30 RX ADMIN — VANCOMYCIN HYDROCHLORIDE 1500 MG: 1 INJECTION, POWDER, LYOPHILIZED, FOR SOLUTION INTRAVENOUS at 14:55

## 2017-07-30 RX ADMIN — CEFEPIME HYDROCHLORIDE 2000 MG: 2 INJECTION, POWDER, FOR SOLUTION INTRAVENOUS at 09:46

## 2017-07-31 ENCOUNTER — APPOINTMENT (INPATIENT)
Dept: RADIOLOGY | Facility: HOSPITAL | Age: 57
DRG: 134 | End: 2017-07-31
Payer: COMMERCIAL

## 2017-07-31 VITALS
BODY MASS INDEX: 44.1 KG/M2 | DIASTOLIC BLOOD PRESSURE: 83 MMHG | SYSTOLIC BLOOD PRESSURE: 159 MMHG | TEMPERATURE: 97.4 F | OXYGEN SATURATION: 93 % | HEIGHT: 71 IN | WEIGHT: 315 LBS | RESPIRATION RATE: 18 BRPM | HEART RATE: 109 BPM

## 2017-07-31 PROBLEM — R74.01 TRANSAMINITIS: Status: RESOLVED | Noted: 2017-07-18 | Resolved: 2017-07-31

## 2017-07-31 PROBLEM — I26.99 PULMONARY EMBOLISM (HCC): Status: ACTIVE | Noted: 2017-07-25

## 2017-07-31 PROBLEM — I50.31 ACUTE DIASTOLIC CONGESTIVE HEART FAILURE (HCC): Status: RESOLVED | Noted: 2017-07-03 | Resolved: 2017-07-31

## 2017-07-31 PROBLEM — I50.32 CHRONIC DIASTOLIC CONGESTIVE HEART FAILURE (HCC): Status: ACTIVE | Noted: 2017-07-31

## 2017-07-31 LAB
ANION GAP SERPL CALCULATED.3IONS-SCNC: 6 MMOL/L (ref 4–13)
BACTERIA BLD CULT: NORMAL
BACTERIA BLD CULT: NORMAL
BASOPHILS # BLD AUTO: 0.01 THOUSANDS/ΜL (ref 0–0.1)
BASOPHILS NFR BLD AUTO: 0 % (ref 0–1)
BUN SERPL-MCNC: 12 MG/DL (ref 5–25)
CALCIUM SERPL-MCNC: 8.2 MG/DL (ref 8.3–10.1)
CHLORIDE SERPL-SCNC: 106 MMOL/L (ref 100–108)
CO2 SERPL-SCNC: 29 MMOL/L (ref 21–32)
CREAT SERPL-MCNC: 0.66 MG/DL (ref 0.6–1.3)
EOSINOPHIL # BLD AUTO: 0.31 THOUSAND/ΜL (ref 0–0.61)
EOSINOPHIL NFR BLD AUTO: 6 % (ref 0–6)
ERYTHROCYTE [DISTWIDTH] IN BLOOD BY AUTOMATED COUNT: 13.5 % (ref 11.6–15.1)
GFR SERPL CREATININE-BSD FRML MDRD: 109 ML/MIN/1.73SQ M
GLUCOSE SERPL-MCNC: 107 MG/DL (ref 65–140)
HCT VFR BLD AUTO: 32.4 % (ref 36.5–49.3)
HGB BLD-MCNC: 10.1 G/DL (ref 12–17)
LYMPHOCYTES # BLD AUTO: 1.02 THOUSANDS/ΜL (ref 0.6–4.47)
LYMPHOCYTES NFR BLD AUTO: 21 % (ref 14–44)
MCH RBC QN AUTO: 30.2 PG (ref 26.8–34.3)
MCHC RBC AUTO-ENTMCNC: 31.2 G/DL (ref 31.4–37.4)
MCV RBC AUTO: 97 FL (ref 82–98)
MONOCYTES # BLD AUTO: 0.6 THOUSAND/ΜL (ref 0.17–1.22)
MONOCYTES NFR BLD AUTO: 12 % (ref 4–12)
NEUTROPHILS # BLD AUTO: 3 THOUSANDS/ΜL (ref 1.85–7.62)
NEUTS SEG NFR BLD AUTO: 61 % (ref 43–75)
PLATELET # BLD AUTO: 274 THOUSANDS/UL (ref 149–390)
PMV BLD AUTO: 10.2 FL (ref 8.9–12.7)
POTASSIUM SERPL-SCNC: 4.2 MMOL/L (ref 3.5–5.3)
RBC # BLD AUTO: 3.34 MILLION/UL (ref 3.88–5.62)
SODIUM SERPL-SCNC: 141 MMOL/L (ref 136–145)
WBC # BLD AUTO: 4.94 THOUSAND/UL (ref 4.31–10.16)

## 2017-07-31 PROCEDURE — 37191 INS ENDOVAS VENA CAVA FILTR: CPT

## 2017-07-31 PROCEDURE — C1880 VENA CAVA FILTER: HCPCS

## 2017-07-31 PROCEDURE — 06H03DZ INSERTION OF INTRALUMINAL DEVICE INTO INFERIOR VENA CAVA, PERCUTANEOUS APPROACH: ICD-10-PCS | Performed by: RADIOLOGY

## 2017-07-31 PROCEDURE — C1769 GUIDE WIRE: HCPCS

## 2017-07-31 PROCEDURE — C1894 INTRO/SHEATH, NON-LASER: HCPCS

## 2017-07-31 PROCEDURE — 85025 COMPLETE CBC W/AUTO DIFF WBC: CPT | Performed by: INTERNAL MEDICINE

## 2017-07-31 PROCEDURE — 80048 BASIC METABOLIC PNL TOTAL CA: CPT | Performed by: INTERNAL MEDICINE

## 2017-07-31 PROCEDURE — 94760 N-INVAS EAR/PLS OXIMETRY 1: CPT

## 2017-07-31 RX ORDER — METHIMAZOLE 10 MG/1
10 TABLET ORAL DAILY
Qty: 30 TABLET | Refills: 0 | Status: SHIPPED | OUTPATIENT
Start: 2017-07-31 | End: 2018-01-29 | Stop reason: ALTCHOICE

## 2017-07-31 RX ORDER — FENTANYL CITRATE 50 UG/ML
INJECTION, SOLUTION INTRAMUSCULAR; INTRAVENOUS CODE/TRAUMA/SEDATION MEDICATION
Status: COMPLETED | OUTPATIENT
Start: 2017-07-31 | End: 2017-07-31

## 2017-07-31 RX ADMIN — VANCOMYCIN HYDROCHLORIDE 1500 MG: 1 INJECTION, POWDER, LYOPHILIZED, FOR SOLUTION INTRAVENOUS at 02:03

## 2017-07-31 RX ADMIN — VANCOMYCIN HYDROCHLORIDE 1500 MG: 1 INJECTION, POWDER, LYOPHILIZED, FOR SOLUTION INTRAVENOUS at 09:23

## 2017-07-31 RX ADMIN — NYSTATIN: 100000 POWDER TOPICAL at 09:23

## 2017-07-31 RX ADMIN — RIVAROXABAN 15 MG: 15 TABLET, FILM COATED ORAL at 16:03

## 2017-07-31 RX ADMIN — FENTANYL CITRATE 50 MCG: 50 INJECTION, SOLUTION INTRAMUSCULAR; INTRAVENOUS at 14:00

## 2017-07-31 RX ADMIN — METOPROLOL TARTRATE 25 MG: 25 TABLET ORAL at 09:22

## 2017-07-31 RX ADMIN — IOHEXOL 15 ML: 300 INJECTION, SOLUTION INTRAVENOUS at 14:21

## 2017-09-16 ENCOUNTER — HOSPITAL ENCOUNTER (EMERGENCY)
Facility: HOSPITAL | Age: 57
Discharge: HOME | End: 2017-09-17
Attending: EMERGENCY MEDICINE | Admitting: EMERGENCY MEDICINE
Payer: COMMERCIAL

## 2017-09-16 DIAGNOSIS — N39.0 UTI (URINARY TRACT INFECTION): Primary | ICD-10-CM

## 2017-09-16 DIAGNOSIS — B35.6 TINEA CRURIS: ICD-10-CM

## 2017-09-17 VITALS
HEART RATE: 104 BPM | TEMPERATURE: 97.8 F | RESPIRATION RATE: 20 BRPM | WEIGHT: 300 LBS | OXYGEN SATURATION: 95 % | SYSTOLIC BLOOD PRESSURE: 123 MMHG | BODY MASS INDEX: 42 KG/M2 | DIASTOLIC BLOOD PRESSURE: 57 MMHG | HEIGHT: 71 IN

## 2017-09-17 LAB
ALBUMIN SERPL BCP-MCNC: 2.5 G/DL (ref 3.5–5)
ALP SERPL-CCNC: 80 U/L (ref 46–116)
ALT SERPL W P-5'-P-CCNC: 18 U/L (ref 12–78)
ANION GAP SERPL CALCULATED.3IONS-SCNC: 8 MMOL/L (ref 4–13)
ANISOCYTOSIS BLD QL SMEAR: PRESENT
APTT PPP: 64 SECONDS (ref 23–35)
AST SERPL W P-5'-P-CCNC: 35 U/L (ref 5–45)
BACTERIA UR QL AUTO: ABNORMAL /HPF
BASOPHILS # BLD MANUAL: 0 THOUSAND/UL (ref 0–0.1)
BASOPHILS NFR MAR MANUAL: 0 % (ref 0–1)
BILIRUB SERPL-MCNC: 0.9 MG/DL (ref 0.2–1)
BILIRUB UR QL STRIP: NEGATIVE
BUN SERPL-MCNC: 21 MG/DL (ref 5–25)
CALCIUM SERPL-MCNC: 8.9 MG/DL (ref 8.3–10.1)
CHLORIDE SERPL-SCNC: 99 MMOL/L (ref 100–108)
CLARITY UR: ABNORMAL
CO2 SERPL-SCNC: 27 MMOL/L (ref 21–32)
COLOR UR: YELLOW
CREAT SERPL-MCNC: 0.75 MG/DL (ref 0.6–1.3)
EOSINOPHIL # BLD MANUAL: 0 THOUSAND/UL (ref 0–0.4)
EOSINOPHIL NFR BLD MANUAL: 0 % (ref 0–6)
ERYTHROCYTE [DISTWIDTH] IN BLOOD BY AUTOMATED COUNT: 14.4 % (ref 11.6–15.1)
GFR SERPL CREATININE-BSD FRML MDRD: 103 ML/MIN/1.73SQ M
GLUCOSE SERPL-MCNC: 106 MG/DL (ref 65–140)
GLUCOSE UR STRIP-MCNC: NEGATIVE MG/DL
HCT VFR BLD AUTO: 40.6 % (ref 36.5–49.3)
HGB BLD-MCNC: 13.6 G/DL (ref 12–17)
HGB UR QL STRIP.AUTO: ABNORMAL
INR PPP: 4.04 (ref 0.86–1.16)
KETONES UR STRIP-MCNC: NEGATIVE MG/DL
LACTATE SERPL-SCNC: 1.6 MMOL/L (ref 0.5–2)
LEUKOCYTE ESTERASE UR QL STRIP: ABNORMAL
LG PLATELETS BLD QL SMEAR: PRESENT
LYMPHOCYTES # BLD AUTO: 15 % (ref 14–44)
LYMPHOCYTES # BLD AUTO: 2.26 THOUSAND/UL (ref 0.6–4.47)
MCH RBC QN AUTO: 30.6 PG (ref 26.8–34.3)
MCHC RBC AUTO-ENTMCNC: 33.5 G/DL (ref 31.4–37.4)
MCV RBC AUTO: 91 FL (ref 82–98)
MONOCYTES # BLD AUTO: 1.05 THOUSAND/UL (ref 0–1.22)
MONOCYTES NFR BLD: 7 % (ref 4–12)
NEUTROPHILS # BLD MANUAL: 11.75 THOUSAND/UL (ref 1.85–7.62)
NEUTS SEG NFR BLD AUTO: 78 % (ref 43–75)
NITRITE UR QL STRIP: NEGATIVE
NON-SQ EPI CELLS URNS QL MICRO: ABNORMAL /HPF
OVALOCYTES BLD QL SMEAR: PRESENT
PH UR STRIP.AUTO: 5.5 [PH] (ref 4.5–8)
PLATELET # BLD AUTO: 303 THOUSANDS/UL (ref 149–390)
PLATELET BLD QL SMEAR: ABNORMAL
PMV BLD AUTO: 10.5 FL (ref 8.9–12.7)
POIKILOCYTOSIS BLD QL SMEAR: PRESENT
POLYCHROMASIA BLD QL SMEAR: PRESENT
POTASSIUM SERPL-SCNC: 4.3 MMOL/L (ref 3.5–5.3)
PROT SERPL-MCNC: 8.9 G/DL (ref 6.4–8.2)
PROT UR STRIP-MCNC: ABNORMAL MG/DL
PROTHROMBIN TIME: 39.4 SECONDS (ref 12.1–14.4)
RBC # BLD AUTO: 4.44 MILLION/UL (ref 3.88–5.62)
RBC #/AREA URNS AUTO: ABNORMAL /HPF
RBC MORPH BLD: PRESENT
SODIUM SERPL-SCNC: 134 MMOL/L (ref 136–145)
SP GR UR STRIP.AUTO: 1.02 (ref 1–1.03)
TOTAL CELLS COUNTED SPEC: 100
UROBILINOGEN UR QL STRIP.AUTO: 2 E.U./DL
WBC # BLD AUTO: 15.07 THOUSAND/UL (ref 4.31–10.16)
WBC #/AREA URNS AUTO: ABNORMAL /HPF

## 2017-09-17 PROCEDURE — 87077 CULTURE AEROBIC IDENTIFY: CPT | Performed by: EMERGENCY MEDICINE

## 2017-09-17 PROCEDURE — 80053 COMPREHEN METABOLIC PANEL: CPT | Performed by: EMERGENCY MEDICINE

## 2017-09-17 PROCEDURE — 96365 THER/PROPH/DIAG IV INF INIT: CPT

## 2017-09-17 PROCEDURE — 87186 SC STD MICRODIL/AGAR DIL: CPT | Performed by: EMERGENCY MEDICINE

## 2017-09-17 PROCEDURE — 81001 URINALYSIS AUTO W/SCOPE: CPT | Performed by: EMERGENCY MEDICINE

## 2017-09-17 PROCEDURE — 96361 HYDRATE IV INFUSION ADD-ON: CPT

## 2017-09-17 PROCEDURE — 93005 ELECTROCARDIOGRAM TRACING: CPT | Performed by: EMERGENCY MEDICINE

## 2017-09-17 PROCEDURE — 36415 COLL VENOUS BLD VENIPUNCTURE: CPT | Performed by: EMERGENCY MEDICINE

## 2017-09-17 PROCEDURE — 87086 URINE CULTURE/COLONY COUNT: CPT | Performed by: EMERGENCY MEDICINE

## 2017-09-17 PROCEDURE — 83605 ASSAY OF LACTIC ACID: CPT | Performed by: EMERGENCY MEDICINE

## 2017-09-17 PROCEDURE — 87040 BLOOD CULTURE FOR BACTERIA: CPT | Performed by: EMERGENCY MEDICINE

## 2017-09-17 PROCEDURE — 85027 COMPLETE CBC AUTOMATED: CPT | Performed by: EMERGENCY MEDICINE

## 2017-09-17 PROCEDURE — 85610 PROTHROMBIN TIME: CPT | Performed by: EMERGENCY MEDICINE

## 2017-09-17 PROCEDURE — 85007 BL SMEAR W/DIFF WBC COUNT: CPT | Performed by: EMERGENCY MEDICINE

## 2017-09-17 PROCEDURE — 99283 EMERGENCY DEPT VISIT LOW MDM: CPT

## 2017-09-17 PROCEDURE — 85730 THROMBOPLASTIN TIME PARTIAL: CPT | Performed by: EMERGENCY MEDICINE

## 2017-09-17 RX ORDER — NYSTATIN 100000 [USP'U]/G
POWDER TOPICAL 3 TIMES DAILY
Qty: 15 G | Refills: 0 | Status: SHIPPED | OUTPATIENT
Start: 2017-09-17 | End: 2018-03-16

## 2017-09-17 RX ORDER — LEVOFLOXACIN 5 MG/ML
500 INJECTION, SOLUTION INTRAVENOUS ONCE
Status: COMPLETED | OUTPATIENT
Start: 2017-09-17 | End: 2017-09-17

## 2017-09-17 RX ORDER — CIPROFLOXACIN 500 MG/1
500 TABLET, FILM COATED ORAL EVERY 12 HOURS
Qty: 14 TABLET | Refills: 0 | Status: SHIPPED | OUTPATIENT
Start: 2017-09-17 | End: 2017-10-01

## 2017-09-17 RX ORDER — 0.9 % SODIUM CHLORIDE 0.9 %
3 VIAL (ML) INJECTION AS NEEDED
Status: DISCONTINUED | OUTPATIENT
Start: 2017-09-17 | End: 2017-09-17 | Stop reason: HOSPADM

## 2017-09-17 RX ORDER — METHIMAZOLE 10 MG/1
1 TABLET ORAL 3 TIMES DAILY
COMMUNITY
End: 2018-08-29 | Stop reason: SDUPTHER

## 2017-09-17 RX ADMIN — SODIUM CHLORIDE 1000 ML: 0.9 INJECTION, SOLUTION INTRAVENOUS at 01:59

## 2017-09-17 RX ADMIN — SODIUM CHLORIDE 1000 ML: 0.9 INJECTION, SOLUTION INTRAVENOUS at 03:20

## 2017-09-17 RX ADMIN — LEVOFLOXACIN 500 MG: 5 INJECTION, SOLUTION INTRAVENOUS at 02:12

## 2017-09-19 LAB
ATRIAL RATE: 111 BPM
BACTERIA UR CULT: NORMAL
QRS AXIS: 43 DEGREES
QRSD INTERVAL: 72 MS
QT INTERVAL: 316 MS
QTC INTERVAL: 423 MS
T WAVE AXIS: 56 DEGREES
VENTRICULAR RATE: 108 BPM

## 2017-09-22 LAB
BACTERIA BLD CULT: NORMAL
BACTERIA BLD CULT: NORMAL

## 2017-10-16 ENCOUNTER — ALLSCRIPTS OFFICE VISIT (OUTPATIENT)
Dept: OTHER | Facility: OTHER | Age: 57
End: 2017-10-16

## 2017-11-01 NOTE — PROGRESS NOTES
Assessment  Assessed    1  Atrial fibrillation (427 31) (I48 91)   2  Bilateral leg edema (782 3) (R60 0)   3  Chronic diastolic congestive heart failure (428 32,428 0) (I50 32)   4  Pulmonary hypertension (416 8) (I27 20)   5  Pulmonary embolism (415 19) (I26 99)    Plan  Atrial fibrillation    · EKG/ECG- POC; Status:Complete;   Done: 44MZC6245 10:41AM   Perform: In Office; Last Updated Michael Velázquez; 10/16/2017 10:41:04 AM;Ordered; For:Atrial fibrillation; Ordered By:Betito Vasquez;  Atrial fibrillation, Bilateral leg edema    · Continue with our present treatment plan ; Status:Complete;   Done: 43ZGJ2741   Ordered;fibrillation, Bilateral leg edema; Ordered By:Betito Vasquez;   · Restrict your sodium (salt) intake to 2 grams per day ; Status:Complete;   Done:00Snl8952   Ordered; For:Atrial fibrillation, Bilateral leg edema; Ordered By:Betito Vasquez;   · Weigh yourself every day ; Status:Complete;   Done: 11JMW9775   Ordered;fibrillation, Bilateral leg edema; Ordered By:Betito Vasquez;   · Follow-up visit in 3 months Evaluation and Treatment  Follow-up  Status: Hold For -Scheduling  Requested for: 83XDM7003   Ordered; For: Atrial fibrillation, Bilateral leg edema; Ordered By: Amy Multani Performed:  Due: 38VNY6365  Bilateral leg edema    · Torsemide 20 MG Oral Tablet; TAKE 1 TABLET DAILY AS DIRECTED   Rx By: Amy Multani; Dispense: 90 Days ; #:90 Tablet; Refill: 3;Bilateral leg edema; RUPAL = N; Verified Transmission to [facility]; Last Updated By: System, SureScript[employer]; 10/16/2017 11:25:31 AM    Discussion/Summary  Cardiology Discussion Summary Free Text Note Form [facility]:   - Americo's weight and edema have improved  He ended up taking himself off of diuretic therapy, but still has edema  I would suggest that he at least takes a low dose of this  I've asked him to start taking 20 mg daily  He should continue to watch his sodium and weight closely  We will see him back in 6 months   We will also follow blood work closely as well  fibrillation - HIs heart rates are improved  No changes were made from this perspective  He is now on Xarelto and we will continue this for stroke prevention  embolism -   He will remain on Xarelto indefinitely  He did have findings of RV enlargement with pulmonary hypertension  After our next visit we will repeat an echocardiogram  We will see him back in 6 months  Counseling Documentation With Imm: The patient was counseled regarding diagnostic results,-- instructions for management,-- impressions  total time of encounter was 25 minutes  Chief Complaint  Chief Complaint Free Text Note Form: f/u  denies any cardiac issues      History of Present Illness  Cardiology HPI Free Text Note Form [facility]: Stefanie Villarreal comes in for follow-up given his persistent atrial fibrillation and right-sided CHF/edema  Stefanie Villarreal was in the hospital with acute pulmonary embolism last summer  He was also found to be in atrial fibrillation and rates were rapid during his acute state  However he carried his history of atrial fibrillation for many years  When he was admitted he was not on any medications, as he was homeless living in his car  With treating his PE and adjusting his heart rate medications, his AF became under good control  Echocardiogram demonstrated normal LV systolic function  There was a dilated RV and biatrial enlargement  There was also mild to moderate pulmonary hypertension  He was also very volume overloaded, and torsemide was started  our last visit Stefanie Villarreal looked grossly volume overloaded and his weight is significantly up  He has significant lower extremity edema  He his not taking his torsemide as directed  He was homeless, usually staying in his car or shelter and because of the problems with having to use the bathroom he only takes this torsemide 3 times a week   He also has diffuse rash on his skin is peeling and he was evaluated in the hospital for this, and was treated for scabies/bug bites  However, since last visit someone from his [facility] to come in and he is now living in a house  He has become more active and has lost weight  He was taking his diuretics, but slowly removed this as he wasn't worsening from an edema standpoint  He currently is not taking torsemide  He also got Xarelto approved and now takes this for stroke prevention  overall feels well  He is active, and can walk farther than he has in the past  He denies any limitations  No chest pain or shortness of breath  He denies any palpitations or any symptoms of his atrial fibrillation  He denies any anginal equivalents  Other than his lower extremity edema he denies any other symptoms of congestive heart failure  His edema and weight have improved since last visit  Atrial Fibrillation (Follow-Up): The patient presents with paroxysmal atrial fibrillation  The treatment strategy for this patient is rate control  He states his atrial fibrillation has been well controlled since the last visit  He has no comorbid illnesses  He has no significant interval events  Symptoms: The patient is currently asymptomatic  Associated symptoms include no syncope,-- no focal neurologic deficit,-- no tendency for easy bleeding-- and-- no tendency for easy bruising  Monitoring: anticoagulation control has been good  Medications: the patient is adherent with his medication regimen  -- He denies medication side effects  Review of Systems  Cardiology Male ROS:    Cardiac: has swelling in the , but-- as noted in HPI  Skin: No complaints of nonhealing sores or skin rash  Genitourinary: No complaints of recurrent urinary tract infections, frequent urination at night, difficult urination, blood in urine, kidney stones, loss of bladder control, no kidney or prostate problems, no erectile dysfunction  Psychological: No complaints of feeling depressed, anxiety, panic attacks, or difficulty concentrating    General: No complaints of trouble sleeping, lack of energy, fatigue, appetite changes, weight changes, fever, frequent infections, or night sweats  Respiratory: No complaints of shortness of breath, cough with sputum, or wheezing  HEENT: No complaints of serious problems, hearing problems, nose problems, throat problems, or snoring  Gastrointestinal: No complaints of liver problems, nausea, vomiting, heartburn, constipation, bloody stools, diarrhea, problems swallowing, adbominal pain, or rectal bleeding  Hematologic: No complaints of bleeding disorders, anemia, blood clots, or excessive brusing  Neurological: No complaints of numbness, tingling, dizziness, weakness, seizures, headaches, syncope or fainting, AM fatigue, daytime sleepiness, no witnessed apnea episodes  Musculoskeletal: No complaints of arthritis, back pain, or painfull swelling  ROS Reviewed:   ROS reviewed  Active Problems  Problems    1  Atrial fibrillation (427 31) (I48 91)   2  Bilateral leg edema (782 3) (R60 0)   3  Chronic diastolic congestive heart failure (428 32,428 0) (I50 32)   4  Pulmonary embolism (415 19) (I26 99)   5  Pulmonary hypertension (416 8) (I27 20)    Past Medical History  Active Problems And Past Medical History Reviewed: The active problems and past medical history were reviewed and updated today  Surgical History  Surgical History Reviewed: The surgical history was reviewed and updated today  Family History  Family History Reviewed: The family history was reviewed and updated today  Social History  Problems    · Current every day smoker (305 1) (F17 200)  Social History Reviewed: The social history was reviewed and updated today  The social history was reviewed and is unchanged  Current Meds   1  Atorvastatin Calcium 40 MG Oral Tablet; TAKE 1 TABLET AT BEDTIME  Requested for: 15Sep2017; Last Rx:36Fxm9691 Ordered   2  Klor-Con M20 20 MEQ Oral Tablet Extended Release; Take 1 tablet daily;  Therapy: 15Sep2016 to (Evaluate:06Iem6453)  Requested for: 08Qhi7069; Last Rx:51Sxx2994 Ordered   3  MethIMAzole 10 MG Oral Tablet; TAKE 1 TABLET 3 TIMES DAILY; Therapy: (Recorded:27Lgf4000) to Recorded   4  Metoprolol Tartrate 50 MG Oral Tablet; TAKE 1 TABLET TWICE DAILY  Requested for: 20Lfs6228; Last Rx:93Lbi0416; Status: ACTIVE - Transmit to Pharmacy - Awaiting Verification Ordered   5  Torsemide 20 MG Oral Tablet; TAKE 2 TABLETS TWICE DAILY  Requested for: 97VCO0630; Last Rx:32Qmw9207 Ordered   6  Xarelto 20 MG Oral Tablet; Take 1 tablet daily; Therapy: 18CEB6367 to 729 114 594)  Requested for: 20YCF2660; Last ERIC:67HXB9802 Ordered  Medication List Reviewed: The medication list was reviewed and updated today  Vitals  Vital Signs    Recorded: 19HUF1027 10:52AM   Heart Rate 73   Systolic 393, LUE, Sitting   Diastolic 70, LUE, Sitting   Height 5 ft 11 in   Weight 306 lb    BMI Calculated 42 68   BSA Calculated 2 53       Physical Exam   Constitutional  General appearance: No acute distress, well appearing and well nourished  Eyes  Conjunctiva and Sclera examination: Conjunctiva pink, sclera anicteric  Ears, Nose, Mouth, and Throat - Oropharynx: Clear, nares are clear, mucous membranes are moist   Neck  Neck and thyroid: Normal, supple, trachea midline, no thyromegaly  Pulmonary  Respiratory effort: No increased work of breathing or signs of respiratory distress  Auscultation of lungs: Clear to auscultation, no rales, no rhonchi, no wheezing, good air movement  Cardiovascular  Auscultation of heart: Abnormal   The heart rate was normal  The rhythm was irregularly irregular  Heart sounds: normal S1,-- normal S2-- and-- no gallop heard  no murmurs were heard  Carotid pulses: Normal, 2+ bilaterally  Peripheral vascular exam: Normal pulses throughout, no tenderness, erythema or swelling  Pedal pulses: Normal, 2+ bilaterally     Examination of extremities for edema and/or varicosities: Abnormal   bilateral ankle 2+ pitting edema,-- bilateral pretibial 2+ pitting edema-- and-- bilateral knee pitting edema  Abdomen  Abdomen: Non-tender and no distention  Liver and spleen: No hepatomegaly or splenomegaly  Musculoskeletal Gait and station: Normal gait  -- Digits and nails: Normal without clubbing or cyanosis  -- Inspection/palpation of joints, bones, and muscles: Normal, ROM normal    Skin - Skin and subcutaneous tissue: Abnormal  Skin Inspection: dry skin  Clinical impression: eczema  Neurologic - Cranial nerves: II - XII intact  -- Speech: Normal    Psychiatric - Orientation to person, place, and time: Normal -- Mood and affect: Normal       Results/Data  ECG Report:  Rhythm and rate: atrial fibrillation--   ventricular rate is 73 beats per minute        Signatures   Electronically signed by : REAL Jones ; Oct 16 2017 11:29AM EST                       (Author)

## 2018-01-09 NOTE — RESULT NOTES
Verified Results  (1) PT WITH INR [id number] 07:20AM Phyllistine Modest   REPORT COMMENT:  FASTING:UNKNOWN     Test Name Result Flag Reference   INR 1 5 H    Reference Range                     0 9-1 1  Moderate-intensity Warfarin Therapy 2 0-3 0  Higher-intensity Warfarin Therapy   3 0-4 0   PT 15 1 sec H 9 0-11 5   For more information on this test, go to:  http://PacketSled/faq/CCT835

## 2018-01-10 NOTE — PROGRESS NOTES
REPORT NAME: Progress Notes Report  VISIT DATE: 2/16/2017  VISIT TIME: 11:32 AM EST  PATIENT NAME: Jackie Kong  MEDICAL RECORD NUMBER: [id number]  YOB: 1960  AGE: 49  REFERRING PHYSICIAN: Titus Ruvalcaba  SUPERVISING CLINICIAN: Dorian Perry CARE PROVIDER: Janna Meigs [address]  PATIENT HOME ADDRESS: [place] [address] [address] [place] [address] PHONE: [phone]  SOCIAL SECURITY NUMBER:   DIAGNOSIS 1: Unspecified atrial fibrillation / I48 91  DIAGNOSIS 2: Pulmonary Embolism / 415 1  INR RANGE: 2 - 3  INR GOAL: 2 5  TREATMENT START DATE: 8/26/2016  TREATMENT END DATE:   NEXT VISIT: 2/21/2017  [facility]  VISIT RESULTS  ENCOUNTER NUMBER:   TEST LOCATION: [facility]  TEST TYPE: Outside Lab (Venipuncture)  VISIT TYPE: Phone Consult  CURRENT INR: 1 4 PROTIME: 14 7  SPECIMEN COL AND RPT DATE: 2/16/2017 11:32  AM EST  VITAL SIGNS  PULSE:  BP: / WEIGHT:  HEIGHT:  TEMP:   CURRENT ANTICOAGULANT DOSING SCHEDULE  DOSE SIZE: 4mg    ANTICOAGULANT TYPE: WARFARIN  DOSING REGIMEN  Sun       Mon       Tues      Wed       Thurs     Fri       Sat  Total/Wk  8         6         0         0         0         8         8         30  PATIENT MEDICATION INSTRUCTION: Yes  PATIENT NUTRITIONAL COUNSELING: No  PATIENT BRUISING INSTRUCTION: No  LAST EDUCATION DATE:   PREVIOUS VISIT INFORMATION  VISITDATE  INRGoal INR   Sun    Mon    Tues   Wed    Thurs  Fri    Sat  Total/wk  2/16/2017   2 5     1 4   8      6      0      0      0      8      8  30  1/25/2017   2 5     2 2   8      6      6      8      6      8      6  48  1/10/2017   2 5     1 7   8      6      6      8      6      8      6  48  1/3/2017    2 5     1 7   8      6      6      8      6      6      6  46  ADDITIONAL PREVIOUS VISIT INFORMATION  VISITDATE   PRIMARY RX               DOSE      CrCl  2/16/2017   WARFARIN                 4mg  1/25/2017   WARFARIN                 4mg  1/10/2017   WARFARIN                 4mg  1/3/2017 WARFARIN                 4mg  OTHER CURRENT MEDICATIONS:  WARFARIN, WARFARIN  PROGRESS NOTES:   PATIENT INSTRUCTIONS: 2/17/17,TC FROM PATIENT, REPORTS HE HAS NOT MISSED  ANY DOSES   WILL TAKE 8 MG X 3 DAYS, 6 MG MON, RECH TUES   ---- Additional Instructions entered on 4/3/2017 3:02 PM EDT by Marisabel Denny :  4/3/17, nick pt, reports he was d/c'd today from [facility], taking 5  mg warfarin daily, rech 4/5 at [facility]  faxed inr to [facility]  has 4 mg and  1 mg tablets  TEST LOCATION: [facility], , ,   INBOUND LAB DATA:  Lab       Lab Value Col Date                 Rpt Date                 Lab  Reference Range  Electronically signed by:  Marisabel Denny on 4/3/2017 3:02 PM EDT

## 2018-01-10 NOTE — RESULT NOTES
Verified Results  (1) PT WITH INR [id number] 07:42AM Regino Ann   REPORT COMMENT:  FASTING:NO     Test Name Result Flag Reference   INR 1 4 H    Reference Range                     0 9-1 1  Moderate-intensity Warfarin Therapy 2 0-3 0  Higher-intensity Warfarin Therapy   3 0-4 0   PT 14 1 sec H 9 0-11 5   For more information on this test, go to:  http://[employer]/faq/ORK379

## 2018-01-11 NOTE — RESULT NOTES
Verified Results  (1) PT WITH INR [id number] 11:26AM Patricia Corrales   REPORT COMMENT:  FASTING:YES     Test Name Result Flag Reference   INR 1 3 H    Reference Range                     0 9-1 1  Moderate-intensity Warfarin Therapy 2 0-3 0  Higher-intensity Warfarin Therapy   3 0-4 0   PT 13 2 sec H 9 0-11 5   For more information on this test, go to:  http://MyTime/faq/XLA142

## 2018-01-12 VITALS
DIASTOLIC BLOOD PRESSURE: 70 MMHG | WEIGHT: 306 LBS | HEART RATE: 73 BPM | HEIGHT: 71 IN | BODY MASS INDEX: 42.84 KG/M2 | SYSTOLIC BLOOD PRESSURE: 110 MMHG

## 2018-01-12 NOTE — PROGRESS NOTES
REPORT NAME: Progress Notes Report  VISIT DATE: 4/10/2017  VISIT TIME: 10:57 AM EDT  PATIENT NAME: Jeanie Valdivia  MEDICAL RECORD NUMBER: [id number]  YOB: 1960  AGE: 56  REFERRING PHYSICIAN: José Luis Aguilar  SUPERVISING CLINICIAN: Dorian Perry CARE PROVIDER: Keily Conner  PATIENT HOME ADDRESS: [address] PHONE: [phone]  SOCIAL SECURITY NUMBER:   DIAGNOSIS 1: Unspecified atrial fibrillation / I48 91  DIAGNOSIS 2: Pulmonary Embolism / 415 1  INR RANGE: 2 - 3  INR GOAL: 2 5  TREATMENT START DATE: 8/26/2016  TREATMENT END DATE:   NEXT VISIT: 7/26/2017  [facility]  VISIT RESULTS  ENCOUNTER NUMBER:   TEST LOCATION: [facility]  TEST TYPE: Outside Lab (Venipuncture)  VISIT TYPE: Phone Consult  CURRENT INR: 2 PROTIME: 20 9  SPECIMEN COL AND RPT DATE: 4/10/2017 10:57 AM  EDT  VITAL SIGNS  PULSE:  BP: / WEIGHT:  HEIGHT:  TEMP:   CURRENT ANTICOAGULANT DOSING SCHEDULE  DOSE SIZE: 4mg    ANTICOAGULANT TYPE: WARFARIN  DOSING REGIMEN  Sun       Mon       Tues      Wed       Thurs     Fri       Sat  Total/Wk  5         5         5         5         5         5         5         35  PATIENT MEDICATION INSTRUCTION: Yes  PATIENT NUTRITIONAL COUNSELING: No  PATIENT BRUISING INSTRUCTION: No  LAST EDUCATION DATE:   PREVIOUS VISIT INFORMATION  VISITDATE  INRGoal INR   Sun    Mon    Tues   Wed    Thurs  Fri    Sat  Total/wk  4/10/2017   2 5     2     5      5      5      5      5      5      5  35  2/16/2017   2 5     1 4   8      6      0      0      0      8      8  30  1/25/2017   2 5     2 2   8      6      6      8      6      8      6  48  1/10/2017   2 5     1 7   8      6      6      8      6      8      6  48  ADDITIONAL PREVIOUS VISIT INFORMATION  VISITDATE   PRIMARY RX               DOSE      CrCl  4/10/2017   WARFARIN                 4mg  2/16/2017   WARFARIN                 4mg  1/25/2017   WARFARIN                 4mg  1/10/2017 WARFARIN                 4mg  OTHER CURRENT MEDICATIONS:  WARFARIN, WARFARIN  PROGRESS NOTES: ---- Additional Notes entered on 7/19/2017 3:01 PM EDT by  Martin Denny :  7/19/17, reviewed pt chart, noted d/c summary from  7/19 today  warfarin was not listed on med list  lilia  ---- Additional Notes entered on 8/22/2017 2:57 PM EDT by Martin Denny :  8/22/17,[facility] chart from july 2017 [facility]  pt was  switched to xarelto  lilia  PATIENT INSTRUCTIONS: 4/11/17, tc pt, lm am, cont  5 mg dailt, rech 2  weeks, 4/25  lilia   ---- Additional Instructions entered on 5/23/2017 3:26 PM EDT by Martin Arellano 7 :  5/23/17,, tc pt, lm am, reminded to go for inr asap  lilia   ---- Additional Instructions entered on 6/14/2017 2:28 PM EDT by Martin Denny :  6/14/17,NOTED PT WAS SEEN IN [facility] ER ON 6/12/17  NOTED HE  STOPPED WARFARIN 1 MONTH AGO ON HIS OWN  TC PT, LM AM TO CALL REGARDING  WARFARIN  LILIA   ---- Additional Instructions entered on 6/15/2017 1:26 PM EDT by Martin Denny :  6/15/17  see dr Nguyen Grier response in allscripts taks  lilia  TEST LOCATION: [facility], , ,   INBOUND LAB DATA:  Lab       Lab Value Col Date                 Rpt Date                 Lab  Reference Range  Electronically signed by:  Martin Denny on 8/22/2017 2:57 PM EDT

## 2018-01-12 NOTE — RESULT NOTES
Verified Results  (1) PT WITH INR [id number] 09:45AM Cole Cristina     Test Name Result Flag Reference   INR 2 0 H    Reference Range                     0 9-1 1  Moderate-intensity Warfarin Therapy 2 0-3 0  Higher-intensity Warfarin Therapy   3 0-4 0   PT 20 9 sec H 9 0-11 5   For more information on this test, go to:  http://[employer]/faq/GLG741

## 2018-01-12 NOTE — RESULT NOTES
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

## 2018-01-12 NOTE — RESULT NOTES
Verified Results  (1) PT WITH INR [id number] 08:23AM Nadia Cortez   REPORT COMMENT:  FASTING:YES     Test Name Result Flag Reference   INR 1 2 H    Reference Range                     0 9-1 1  Moderate-intensity Warfarin Therapy 2 0-3 0  Higher-intensity Warfarin Therapy   3 0-4 0   PT 12 6 sec H 9 0-11 5   For more information on this test, go to:  http://xLander.[email or web address]/[email or web address]

## 2018-01-13 NOTE — RESULT NOTES
Verified Results  (1) PT WITH INR [id number] 07:48AM Phyllistine Modest     Test Name Result Flag Reference   INR 1 6 H    Reference Range                     0 9-1 1  Moderate-intensity Warfarin Therapy 2 0-3 0  Higher-intensity Warfarin Therapy   3 0-4 0   PT 16 1 sec H 9 0-11 5   For more information on this test, go to:  http://what3words/faq/QVL702

## 2018-01-13 NOTE — RESULT NOTES
Verified Results  (1) PT WITH INR [id number] 11:49AM Fredi Neumann     Test Name Result Flag Reference   INR 1 7 H    Reference Range                     0 9-1 1  Moderate-intensity Warfarin Therapy 2 0-3 0  Higher-intensity Warfarin Therapy   3 0-4 0   PT 17 3 sec H 9 0-11 5   For more information on this test, go to:  http://[employer]/faq/MMG964

## 2018-01-13 NOTE — RESULT NOTES
Verified Results  (1) PT WITH INR [id number] 08:31AM Fredi Led   REPORT COMMENT:  FASTING:NO     Test Name Result Flag Reference   INR 1 6 H    Reference Range                     0 9-1 1  Moderate-intensity Warfarin Therapy 2 0-3 0  Higher-intensity Warfarin Therapy   3 0-4 0   PT 16 1 sec H 9 0-11 5   For more information on this test, go to:  http://SayNow/faq/YVW911

## 2018-01-14 NOTE — RESULT NOTES
Verified Results  (1) PT WITH INR 10Oct2016 07:28AM Regino Ann     Test Name Result Flag Reference   INR 1 7 H    Reference Range                     0 9-1 1  Moderate-intensity Warfarin Therapy 2 0-3 0  Higher-intensity Warfarin Therapy   3 0-4 0   PT 16 8 sec H 9 0-11 5   For more information on this test, go to:  http://[employer]/faq/GDH545

## 2018-01-15 VITALS
WEIGHT: 315 LBS | DIASTOLIC BLOOD PRESSURE: 60 MMHG | HEART RATE: 72 BPM | SYSTOLIC BLOOD PRESSURE: 130 MMHG | BODY MASS INDEX: 46.03 KG/M2

## 2018-01-15 NOTE — RESULT NOTES
Verified Results  (1) PT WITH INR [id number] 07:30AM Valery Swan   REPORT COMMENT:  FASTING:UNKNOWN     Test Name Result Flag Reference   INR 2 2 H    Reference Range                     0 9-1 1  Moderate-intensity Warfarin Therapy 2 0-3 0  Higher-intensity Warfarin Therapy   3 0-4 0   PT 22 2 sec H 9 0-11 5   For more information on this test, go to:  http://[employer]/faq/TKK783

## 2018-01-15 NOTE — PROGRESS NOTES
REPORT NAME: Patient Visit Summary Report   VISIT DATE: 1/10/2017  VISIT TIME: 11:53 AM EST  PATIENT NAME: José Luis Durand RECORD NUMBER: [id number]  SOCIAL SECURITY NUMBER:   YOB: 1960  AGE: 49  REFERRING PHYSICIAN: NISSA Tripp  SUPERVISING CLINICIAN: Dorian Trinh [place] CARE PROFESSIONAL: Keily Conner  PATIENT HOME ADDRESS: [place] [id number] [facility], [address] [place] [address]  PATIENT HOME PHONE: [phone]  DIAGNOSIS 1: Unspecified atrial fibrillation / I48 91  DIAGNOSIS 2: Pulmonary Embolism / 415 1  DIAGNOSIS 3:   DIAGNOSIS 4:   INR RANGE: 2 - 3  INR GOAL: 2 5  TREATMENT START DATE: 8/26/2016  TREATMENT END DATE:   NEXT VISIT: 2/17/2017  [facility]    VISIT RESULTS   ENCOUNTER NUMBER:   TEST LOCATION: [facility]  TEST TYPE: Outside Lab (Venipuncture)  VISIT TYPE: Phone Consult  CURRENT INR: 1 7 PROTIME: 16 8  SPECIMEN COL AND RPT DATE: 1/10/2017 11:53  AM EST    VITAL SIGNS  PULSE:  B/P:  WEIGHT:  HEIGHT:  TEMP:     CURRENT ANTICOAGULANT DOSING SCHEDULE  DOSE SIZE: 4mg    ANTICOAGULANT TYPE: WARFARIN  DOSING REGIMEN  Sun       Mon       Tues      Wed       Thurs     Fri       Sat  Total/Wk  8         6         6         8         6         8         6         48    PATIENT MEDICATION INSTRUCTION: Yes  PATIENT NUTRITIONAL COUNSELING: No  PATIENT BRUISING INSTRUCTION: No      LAST EDUCATION DATE:       PREVIOUS VISIT INFORMATION  VISITDATE   INR Goal  INR   Sun     Mon     Tues    Wed     Thurs   Fri  Sat     Total/wk  1/10/2017   2 5       1 7   8       6       6       8       6       8  6       48  1/3/2017    2 5       1 7   8       6       6       8       6       6  6       46  12/29/2016  2 5       1 3   8       6       0       0       0       8  8       30  12/20/2016  2 5       2 1   6       6       4       6       6       6  6       40    ADDITIONAL PREVIOUS VISIT INFORMATION  VISITDATE   PRIMARY RX               DOSE      CrCl  1/10/2017   WARFARIN 4mg                 1/3/2017    WARFARIN                 4mg                 12/29/2016  WARFARIN                 4mg                 12/20/2016  WARFARIN                 4mg                       PROGRESS NOTES:     PATIENT INSTRUCTIONS: 1/11//17, tc pt, lm am, increase dose, 8 mg sun/w/f,  6 mg others, rech 2 weeks, 1/25  [facility]  TEST LOCATION: [facility]    Electronically signed by:  Lexie Denny on 1/11/2017 at 11:53 AM EST

## 2018-01-15 NOTE — RESULT NOTES
Verified Results  (1) PT WITH INR [id number] 08:24AM Fredi Neumann     Test Name Result Flag Reference   INR 2 1 H    Reference Range                     0 9-1 1  Moderate-intensity Warfarin Therapy 2 0-3 0  Higher-intensity Warfarin Therapy   3 0-4 0   PT 21 3 sec H 9 0-11 5   For more information on this test, go to:  http://[employer]P/faq/HBW928

## 2018-01-15 NOTE — RESULT NOTES
Verified Results  (1) PT WITH INR [id number] 11:44AM Redd Lopez     Test Name Result Flag Reference   INR 1 4 H    The above test was performed; however,  the specimen was lipemic  Reference Range                     0 9-1 1  Moderate-intensity Warfarin Therapy 2 0-3 0  Higher-intensity Warfarin Therapy   3 0-4 0   PT 14 7 sec H 9 0-11 5   For more information on this test, go to:  [email or web address]       Plan  Atrial fibrillation, Pulmonary embolism    · Warfarin Sodium 4 MG Oral Tablet (Coumadin); TAKE ONE TO TWO TABLETS  BY MOUTH ONCE DAILY OR AS DIRECTED

## 2018-01-15 NOTE — RESULT NOTES
Verified Results  (1) PT WITH INR [id number] 07:24AM Radha Modi   REPORT COMMENT:  FASTING:NO     Test Name Result Flag Reference   INR 1 2 H    Reference Range                     0 9-1 1  Moderate-intensity Warfarin Therapy 2 0-3 0  Higher-intensity Warfarin Therapy   3 0-4 0   PT 12 7 sec H 9 0-11 5   For more information on this test, go to:  [email or web address]

## 2018-01-16 NOTE — RESULT NOTES
Verified Results  (1) PT WITH INR [id number] 07:47AM Ирина Crandall   REPORT COMMENT:  FASTING:NO     Test Name Result Flag Reference   INR 1 8 H    Reference Range                     0 9-1 1  Moderate-intensity Warfarin Therapy 2 0-3 0  Higher-intensity Warfarin Therapy   3 0-4 0   PT 18 5 sec H 9 0-11 5   For more information on this test, go to:  [email or web address]

## 2018-01-16 NOTE — MISCELLANEOUS
Message  Return to work or school:   Lavenia Boas is under my professional care  He was seen in my office on 9/15/16   He is able to return to work on  9/15/16    He is able to perform activities of daily living without limitations  Weight Bearing Status: Full Weight-Bearing  NO restrictions and can do physical work          Signatures   Electronically signed by : REAL Stone ; Nov 7 2016 11:24AM EST                       (Author)

## 2018-01-17 NOTE — RESULT NOTES
Message  Abnormal hypercoagulation test result  Patient has appointment with Dr William Levine on 10/5/16        Signatures   Electronically signed by : Melba Hollis MD; Sep 22 2016 11:28PM EST                       (Author)

## 2018-01-17 NOTE — PROGRESS NOTES
REPORT NAME: Patient Visit Summary Report   VISIT DATE: 9/26/2016  VISIT TIME: 11:16 AM EDT  PATIENT NAME: José Luis Durand RECORD NUMBER: [id number]  SOCIAL SECURITY NUMBER:   YOB: 1960  AGE: 49  REFERRING PHYSICIAN: NISSA Balderrama  SUPERVISING CLINICIAN: Dorian Trinh [place] CARE PROFESSIONAL: Davida Phalen [address]  PATIENT HOME ADDRESS: [place] [id number] [facility], [address] [place] [address]  PATIENT HOME PHONE: [phone]  DIAGNOSIS 1: Unspecified atrial fibrillation / I48 91  DIAGNOSIS 2: Pulmonary Embolism / 415 1  DIAGNOSIS 3:   DIAGNOSIS 4:   INR RANGE: 2 - 3  INR GOAL: 2 5  TREATMENT START DATE: 8/26/2016  TREATMENT END DATE:   NEXT VISIT: 10/3/2016  [facility]    VISIT RESULTS   ENCOUNTER NUMBER:   TEST LOCATION: [facility]  TEST TYPE: Outside Lab (Venipuncture)  VISIT TYPE: Phone Consult  CURRENT INR: 1 2 PROTIME: 12 6  SPECIMEN COL AND RPT DATE: 9/26/2016 11:16  AM EDT    VITAL SIGNS  PULSE:  B/P:  WEIGHT:  HEIGHT:  TEMP:     CURRENT ANTICOAGULANT DOSING SCHEDULE  DOSE SIZE: 4mg    ANTICOAGULANT TYPE: WARFARIN  DOSING REGIMEN  Sun       Mon       Tues      Wed       Thurs     Fri       Sat  Total/Wk  4         4         6         6         4         4         4         32    PATIENT MEDICATION INSTRUCTION: Yes  PATIENT NUTRITIONAL COUNSELING: No  PATIENT BRUISING INSTRUCTION: No      LAST EDUCATION DATE:       PREVIOUS VISIT INFORMATION  VISITDATE   INR Goal  INR   Sun     Mon     Tues    Wed     Thurs   Fri  Sat     Total/wk  9/26/2016   2 5       1 2   4       4       6       6       4       4  4       32  9/19/2016   2 5       1 4   2       2       2       2       2       2  2       14  9/14/2016   2 5       1 2   2       0       0       4       4       4  2       16    ADDITIONAL PREVIOUS VISIT INFORMATION  VISITDATE   PRIMARY RX               DOSE      CrCl  9/26/2016   WARFARIN                 4mg                 9/19/2016   WARFARIN                 2mg 9/14/2016   WARFARIN                 2mg                     OTHER CURRENT MEDICATIONS: WARFARIN, WARFARIN      PROGRESS NOTES:     PATIENT INSTRUCTIONS: 9/27/16, tc pt, lm am, per GEP, 6 mg x 2 days, then 4  mg daily, rech 1 week, 10/3  chau  TEST LOCATION: [facility]    Electronically signed by:  Mary Jane Denny on 9/27/2016 at 3:24 PM EDT

## 2018-01-18 NOTE — RESULT NOTES
Verified Results  (1) PT WITH INR [id number] 12:32PM [facility]     Test Name Result Flag Reference   INR 1 2 H    Reference Range                     0 9-1 1  Moderate-intensity Warfarin Therapy 2 0-3 0  Higher-intensity Warfarin Therapy   3 0-4 0   PT 11 9 sec H 9 0-11 5   For more information on this test, go to:  [email or web address]

## 2018-01-29 RX ORDER — TORSEMIDE 20 MG/1
1 TABLET ORAL DAILY
COMMUNITY
End: 2020-10-08 | Stop reason: SDUPTHER

## 2018-01-29 RX ORDER — POTASSIUM CHLORIDE 20 MEQ/1
1 TABLET, EXTENDED RELEASE ORAL DAILY
COMMUNITY
Start: 2016-09-15 | End: 2018-03-16

## 2018-02-01 ENCOUNTER — OFFICE VISIT (OUTPATIENT)
Dept: CARDIOLOGY CLINIC | Facility: CLINIC | Age: 58
End: 2018-02-01
Payer: COMMERCIAL

## 2018-02-01 VITALS
BODY MASS INDEX: 45.89 KG/M2 | HEART RATE: 81 BPM | SYSTOLIC BLOOD PRESSURE: 120 MMHG | DIASTOLIC BLOOD PRESSURE: 60 MMHG | WEIGHT: 315 LBS

## 2018-02-01 DIAGNOSIS — I26.99 PE (PULMONARY THROMBOEMBOLISM) (HCC): ICD-10-CM

## 2018-02-01 DIAGNOSIS — I10 ESSENTIAL HYPERTENSION: ICD-10-CM

## 2018-02-01 DIAGNOSIS — I48.20 CHRONIC ATRIAL FIBRILLATION (HCC): Primary | ICD-10-CM

## 2018-02-01 DIAGNOSIS — I27.20 PULMONARY HYPERTENSION (HCC): ICD-10-CM

## 2018-02-01 DIAGNOSIS — I50.32 CHRONIC DIASTOLIC CONGESTIVE HEART FAILURE (HCC): ICD-10-CM

## 2018-02-01 PROCEDURE — 93000 ELECTROCARDIOGRAM COMPLETE: CPT | Performed by: INTERNAL MEDICINE

## 2018-02-01 PROCEDURE — 99214 OFFICE O/P EST MOD 30 MIN: CPT | Performed by: INTERNAL MEDICINE

## 2018-02-01 NOTE — PROGRESS NOTES
Cardiology Follow Up    Gregory Cortez  1960  [id number]  HEART & VASCULAR  [facility] Anibal Uriarte  [address]  [address] [address] [address]    1  Chronic atrial fibrillation (HCC)  POCT ECG   2  Chronic diastolic congestive heart failure ([address] )     3  Essential hypertension     4  Pulmonary hypertension     5  PE (pulmonary thromboembolism) ([facility] [address] )         Interval History:    Rebeka Jeong comes in for follow-up given his persistent atrial fibrillation and right-sided CHF/edema  Rebeka Jeong was in the hospital with acute pulmonary embolism last summer  He was also found to be in atrial fibrillation and rates were rapid during his acute state  However he carried his history of atrial fibrillation for many years  When he was admitted he was not on any medications, as he was homeless living in his car  With treating his PE and adjusting his heart rate medications, his AF became under good control  Echocardiogram demonstrated normal LV systolic function  There was a dilated RV and biatrial enlargement  There was also mild to moderate pulmonary hypertension  He was also very volume overloaded, and torsemide was started  In follow-upDavid looked grossly volume overloaded and his weight was significantly up  He has significant lower extremity edema  He was not taking his torsemide as directed  He was homeless, usually staying in his car or shelter and because of the problems with having to use the bathroom he only was mamie his torsemide 3 times a week  However he has since moved in with a friend, and has been able to take his torsemide on a regular basis  His weight remains up, but from a volume standpoint he does look better  Chio Anderson to get short of breath with exertion  This has not changed  He feels it particularly in the morning  He denies any symptoms of angina  No orthopnea or PND    He does not feels atrial fibrillation, as he has no palpitations, lightheadedness or any recent syncope  He does have ongoing issues with lower extremity edema  Patient Active Problem List   Diagnosis    Hypertension    Hyperthyroidism    PE (pulmonary thromboembolism) (HCC)    CAD (coronary artery disease)    Chronic atrial fibrillation (HCC)    Homelessness    Bedbug bite    RV strain    Decubitus skin ulcer    Pulmonary embolism ([address] )    HCAP (healthcare-associated pneumonia)    Chronic diastolic congestive heart failure (HCC)    Pulmonary hypertension     Past Medical History:   Diagnosis Date    Acute diastolic congestive heart failure (HCC) 7/3/2017    Atrial fibrillation (HCC)     Bilateral edema of lower extremity 8/22/2016    CAD (coronary artery disease) 10/28/2016    Cardiac disease     Chest pain 8/22/2016    Hyperlipidemia     Hypertension     Hyperthyroidism 8/22/2016    Kidney stone     Kidney stone     Pneumothorax     Presence of IVC filter     Pulmonary embolism (HCC)     Rash 10/28/2016    SOB (shortness of breath) 8/22/2016    Tachycardia 8/22/2016     Social History     Social History    Marital status: Single     Spouse name: N/A    Number of children: N/A    Years of education: N/A     Occupational History    Not on file       Social History Main Topics    Smoking status: Current Every Day Smoker     Packs/day: 0 20     Types: Cigarettes    Smokeless tobacco: Never Used      Comment: Pt states he smokes when he can afford to do so     Alcohol use No    Drug use: No    Sexual activity: Not on file     Other Topics Concern    Not on file     Social History Narrative    No narrative on file      Family History   Problem Relation Age of Onset    Cancer Mother     Heart disease Father      Past Surgical History:   Procedure Laterality Date    EGD AND COLONOSCOPY N/A 7/28/2017    Procedure: EGD AND COLONOSCOPY;  Surgeon: Irwin Franks MD;  Location:  MAIN OR;  Service: Gastroenterology    LITHOTRIPSY Current Outpatient Prescriptions:     atorvastatin (LIPITOR) 40 mg tablet, Take 40 mg by mouth daily, Disp: , Rfl:     methimazole (TAPAZOLE) 10 mg tablet, Take 1 tablet by mouth 3 (three) times a day, Disp: , Rfl:     metoprolol tartrate (LOPRESSOR) 50 mg tablet, Take 1 tablet by mouth every 12 (twelve) hours, Disp: 60 tablet, Rfl: 0    nystatin (MYCOSTATIN) powder, Apply topically 3 (three) times a day, Disp: 15 g, Rfl: 0    potassium chloride (KLOR-CON M20) 20 mEq tablet, Take 1 tablet by mouth daily, Disp: , Rfl:     rivaroxaban (XARELTO) 15 mg tablet, Take 1 tablet by mouth 2 (two) times a day with meals, Disp: 21 tablet, Rfl: 0    torsemide (DEMADEX) 20 mg tablet, Take 1 tablet by mouth daily, Disp: , Rfl:     furosemide (LASIX) 40 mg tablet, Take 1 tablet by mouth daily for 30 days, Disp: 30 tablet, Rfl: 0  No Known Allergies    Labs:  Lab Results   Component Value Date     (L) 09/17/2017     02/15/2017    K 4 3 09/17/2017    K 4 6 02/15/2017    CL 99 (L) 09/17/2017     02/15/2017    CO2 27 09/17/2017    CO2 23 02/15/2017    BUN 21 09/17/2017    BUN 17 02/15/2017    CREATININE 0 75 09/17/2017    CREATININE 0 72 02/15/2017    GLUCOSE 106 09/17/2017    GLUCOSE 113 11/30/2015    CALCIUM 8 9 09/17/2017    CALCIUM 8 8 02/15/2017     Imaging:  ECG obtained in the office demonstrated atrial fibrillation with a heart rate of 78  Review of Systems:  Review of Systems   Constitutional: Positive for fatigue  HENT: Negative  Eyes: Negative  Respiratory: Negative  Cardiovascular: Positive for leg swelling  Gastrointestinal: Negative  Musculoskeletal: Negative  Skin: Negative  Allergic/Immunologic: Negative  Neurological: Negative  Hematological: Negative  Psychiatric/Behavioral: Negative  All other systems reviewed and are negative  Physical Exam:  Physical Exam   Constitutional: He is oriented to person, place, and time   He appears well-developed and well-nourished  HENT:   Head: Normocephalic and atraumatic  Eyes: EOM are normal  Pupils are equal, round, and reactive to light  Right eye exhibits no discharge  Left eye exhibits no discharge  No scleral icterus  Neck: Normal range of motion  Neck supple  No JVD present  No thyromegaly present  Cardiovascular: Normal rate, S1 normal, S2 normal, normal heart sounds and intact distal pulses  An irregularly irregular rhythm present  Exam reveals decreased pulses  Exam reveals no gallop and no friction rub  No murmur heard  Pulses:       Carotid pulses are 1+ on the right side, and 1+ on the left side  Radial pulses are 1+ on the right side, and 1+ on the left side  Femoral pulses are 1+ on the right side, and 1+ on the left side  Popliteal pulses are 1+ on the right side, and 1+ on the left side  Dorsalis pedis pulses are 1+ on the right side, and 1+ on the left side  Posterior tibial pulses are 1+ on the right side, and 1+ on the left side  Pulmonary/Chest: Effort normal  No respiratory distress  He has decreased breath sounds  He has no wheezes  He has no rales  Abdominal: Soft  Bowel sounds are normal  He exhibits no distension  There is no tenderness  Musculoskeletal: Normal range of motion  He exhibits no edema, tenderness or deformity  Neurological: He is alert and oriented to person, place, and time  No cranial nerve deficit  Skin: Skin is warm and dry  No rash noted  Psychiatric: He has a normal mood and affect  Judgment and thought content normal    Nursing note and vitals reviewed  Discussion/Summary:    1  Persistent atrial fibrillation -  Americ[place] heart rates are well controlled  He is essentially asymptomatic from this standpoint  No changes were made  He will remain on the same rate controlling medications and Xarelto for stroke prevention      2   Chronic diastolic CHF -  He remains chronically  Volume overloaded, but a little better than last visit  His weight remains up, but a lot of this is likely caloric related and associated with being relatively inactive  I have encouraged him to remain on the same medical therapy, watch his sodium intake and weight closely  We will see him back in 6 months  I ordered updated blood work

## 2018-02-01 NOTE — PATIENT INSTRUCTIONS
Low-Sodium Diet   AMBULATORY CARE:   A low-sodium diet  limits foods that are high in sodium (salt)  You will need to follow a low-sodium diet if you have high blood pressure, kidney disease, or heart failure  You may also need to follow this diet if you have a condition that is causing your body to retain (hold) extra fluid  You may need to limit the amount of sodium you eat to 1,500 mg  Ask your healthcare provider how much sodium you can have each day  How to use food labels to choose foods that are low in sodium:  Read food labels to find the amount of sodium they contain  The amount of sodium is listed in milligrams (mg)  The % Daily Value (DV) column tells you how much of your daily needs are met by 1 serving of the food for each nutrient listed  Choose foods that have less than 5% of the DV of sodium  These foods are considered low in sodium  Foods that have 20% or more of the DV of sodium are considered high in sodium  Some food labels may also list any of the following terms that tell you about the sodium content in the food:  · Sodium-free:  Less than 5 mg in each serving  ·     · Very low sodium:  35 mg of sodium or less in each serving    · Low sodium:  140 mg of sodium or less in each serving    · Reduced sodium: At least 25% less sodium in each serving than the regular type    · Light in sodium:  50% less sodium in each serving    · Unsalted or no added salt:  No extra salt is added during processing (the food may still contain sodium)  Foods to avoid:  Salty foods are high in sodium   You should avoid the following:  · Processed foods:      ¨ Mixes for cornbread, biscuits, cake, and pudding     ¨ Instant foods, such as potatoes, cereals, noodles, and rice     ¨ Packaged foods, such as bread stuffing, rice and pasta mixes, snack dip mixes, and macaroni and cheese     ¨ Canned foods, such as canned vegetables, soups, broths, sauces, and vegetable or tomato juice    ¨ Snack foods, such as salted chips, popcorn, pretzels, pork rinds, salted crackers, and salted nuts    ¨ Frozen foods, such as dinners, entrees, vegetables with sauces, and breaded meats    ¨ Sauerkraut, pickled vegetables, and other foods prepared in brine    · Meats and cheeses:      ¨ Smoked or cured meat, such as corned beef, dunaway, ham, hot dogs, and sausage    ¨ Canned meats or spreads, such as potted meats, sardines, anchovies, and imitation seafood    ¨ Deli or lunch meats, such as bologna, ham, turkey, and roast beef    ¨ Processed cheese, such as American cheese and cheese spreads    · Condiments, sauces, and seasonings:      ¨ Salt (¼ teaspoon of salt contains 575 mg of sodium)    ¨ Seasonings made with salt, such as garlic salt, celery salt, onion salt, and seasoned salt    ¨ Regular soy sauce, barbecue sauce, teriyaki sauce, steak sauce, Worcestershire sauce, and most flavored vinegars    ¨ Canned gravy and mixes     ¨ Regular condiments, such as mustard, ketchup, and salad dressings    ¨ Pickles and olives    ¨ Meat tenderizers and monosodium glutamate (MSG)  Foods to include:  Read the food label to find the exact amount of sodium in each serving  · Bread and cereal:  Try to choose breads with less than 80 mg of sodium per serving  ¨ Bread, roll, valeriano, tortilla, or unsalted crackers  ¨ Ready-to-eat cereals with less than 5% DV of sodium (examples include shredded wheat and puffed rice)    ¨ Pasta    · Vegetables and fruits:      ¨ Unsalted fresh, frozen, or canned vegetables    ¨ Fresh, frozen, or canned fruits    ¨ Fruit juice    · Dairy:  One serving has about 150 mg of sodium  ¨ Milk, all types    ¨ Yogurt    ¨ Hard cheese, such as cheddar, Swiss, [employer], or mozzarella    · Meat and other protein foods:  Some raw meats may have added sodium       ¨ Plain meats, fish, and poultry     ¨ Eggs    · Other foods:      ¨ Homemade pudding    ¨ Unsalted nuts, popcorn, or pretzels    ¨ Unsalted butter or margarine  Ways to decrease sodium:   · Add spices and herbs to foods instead of salt during cooking  Use salt-free seasonings to add flavor to foods  Examples include onion powder, garlic powder, basil, padilla powder, paprika, and parsley  Try lemon or lime juice or vinegar to give foods a tart flavor  Use hot peppers, pepper, or cayenne pepper to add a spicy flavor to foods  · Do not keep a salt shaker at your kitchen table  This may help keep you from adding salt to food at the table  It may take time to get used to enjoying the natural flavor of food instead of adding salt  Talk to your healthcare provider before you use salt substitutes  Some salt substitutes have a high amount of potassium and need to be avoided if you have kidney disease  · Choose low-sodium foods at restaurants  Meals from restaurants are often high in sodium  Some restaurants have nutrition information on the menu that tells you the amount of sodium in their foods  If possible, ask for your food to be prepared with less, or no salt  · Shop for unsalted or low-sodium foods and snacks at the grocery store  Examples include unsalted or low-sodium broths, soups, and canned vegetables  Choose fresh or frozen vegetables instead  Choose unsalted nuts or seeds or fresh fruits or vegetables as snacks  Read food labels and choose salt-free, very low-sodium, or low-sodium foods  © 2017 [employer] Information is for End User's use only and may not be sold, redistributed or otherwise used for commercial purposes  All illustrations and images included in CareNotes® are the copyrighted property of [employer]  or Devin Sood  The above information is an  only  It is not intended as medical advice for individual conditions or treatments  Talk to your doctor, nurse or pharmacist before following any medical regimen to see if it is safe and effective for you

## 2018-03-16 ENCOUNTER — APPOINTMENT (EMERGENCY)
Dept: RADIOLOGY | Facility: HOSPITAL | Age: 58
End: 2018-03-16
Payer: COMMERCIAL

## 2018-03-16 ENCOUNTER — HOSPITAL ENCOUNTER (EMERGENCY)
Facility: HOSPITAL | Age: 58
Discharge: HOME | End: 2018-03-16
Payer: COMMERCIAL

## 2018-03-16 VITALS
WEIGHT: 315 LBS | DIASTOLIC BLOOD PRESSURE: 75 MMHG | HEART RATE: 91 BPM | RESPIRATION RATE: 18 BRPM | TEMPERATURE: 99.8 F | OXYGEN SATURATION: 95 % | BODY MASS INDEX: 44.1 KG/M2 | HEIGHT: 71 IN | SYSTOLIC BLOOD PRESSURE: 147 MMHG

## 2018-03-16 DIAGNOSIS — J20.9 ACUTE BRONCHITIS: Primary | ICD-10-CM

## 2018-03-16 LAB
ALBUMIN SERPL BCP-MCNC: 2.6 G/DL (ref 3.5–5)
ALP SERPL-CCNC: 82 U/L (ref 46–116)
ALT SERPL W P-5'-P-CCNC: 41 U/L (ref 12–78)
ANION GAP SERPL CALCULATED.3IONS-SCNC: 9 MMOL/L (ref 4–13)
APTT PPP: 45 SECONDS (ref 23–35)
AST SERPL W P-5'-P-CCNC: 39 U/L (ref 5–45)
BASOPHILS # BLD AUTO: 0.03 THOUSANDS/ΜL (ref 0–0.1)
BASOPHILS NFR BLD AUTO: 1 % (ref 0–1)
BILIRUB SERPL-MCNC: 0.4 MG/DL (ref 0.2–1)
BUN SERPL-MCNC: 20 MG/DL (ref 5–25)
CALCIUM SERPL-MCNC: 7.9 MG/DL (ref 8.3–10.1)
CHLORIDE SERPL-SCNC: 104 MMOL/L (ref 100–108)
CO2 SERPL-SCNC: 25 MMOL/L (ref 21–32)
CREAT SERPL-MCNC: 0.65 MG/DL (ref 0.6–1.3)
EOSINOPHIL # BLD AUTO: 0.08 THOUSAND/ΜL (ref 0–0.61)
EOSINOPHIL NFR BLD AUTO: 2 % (ref 0–6)
ERYTHROCYTE [DISTWIDTH] IN BLOOD BY AUTOMATED COUNT: 12.9 % (ref 11.6–15.1)
GFR SERPL CREATININE-BSD FRML MDRD: 110 ML/MIN/1.73SQ M
GLUCOSE SERPL-MCNC: 104 MG/DL (ref 65–140)
HCT VFR BLD AUTO: 46.3 % (ref 36.5–49.3)
HGB BLD-MCNC: 15.6 G/DL (ref 12–17)
INR PPP: 2.06 (ref 0.86–1.16)
LACTATE SERPL-SCNC: 1.7 MMOL/L (ref 0.5–2)
LYMPHOCYTES # BLD AUTO: 1.35 THOUSANDS/ΜL (ref 0.6–4.47)
LYMPHOCYTES NFR BLD AUTO: 28 % (ref 14–44)
MCH RBC QN AUTO: 32.6 PG (ref 26.8–34.3)
MCHC RBC AUTO-ENTMCNC: 33.7 G/DL (ref 31.4–37.4)
MCV RBC AUTO: 97 FL (ref 82–98)
MONOCYTES # BLD AUTO: 1.23 THOUSAND/ΜL (ref 0.17–1.22)
MONOCYTES NFR BLD AUTO: 25 % (ref 4–12)
NEUTROPHILS # BLD AUTO: 2.22 THOUSANDS/ΜL (ref 1.85–7.62)
NEUTS SEG NFR BLD AUTO: 44 % (ref 43–75)
NT-PROBNP SERPL-MCNC: 611 PG/ML
PLATELET # BLD AUTO: 209 THOUSANDS/UL (ref 149–390)
PMV BLD AUTO: 11.5 FL (ref 8.9–12.7)
POTASSIUM SERPL-SCNC: 4 MMOL/L (ref 3.5–5.3)
PROT SERPL-MCNC: 7.3 G/DL (ref 6.4–8.2)
PROTHROMBIN TIME: 23.1 SECONDS (ref 12.1–14.4)
RBC # BLD AUTO: 4.78 MILLION/UL (ref 3.88–5.62)
SODIUM SERPL-SCNC: 138 MMOL/L (ref 136–145)
WBC # BLD AUTO: 4.91 THOUSAND/UL (ref 4.31–10.16)

## 2018-03-16 PROCEDURE — 87040 BLOOD CULTURE FOR BACTERIA: CPT | Performed by: PHYSICIAN ASSISTANT

## 2018-03-16 PROCEDURE — 85025 COMPLETE CBC W/AUTO DIFF WBC: CPT | Performed by: PHYSICIAN ASSISTANT

## 2018-03-16 PROCEDURE — 80053 COMPREHEN METABOLIC PANEL: CPT | Performed by: PHYSICIAN ASSISTANT

## 2018-03-16 PROCEDURE — 85610 PROTHROMBIN TIME: CPT | Performed by: PHYSICIAN ASSISTANT

## 2018-03-16 PROCEDURE — 99284 EMERGENCY DEPT VISIT MOD MDM: CPT

## 2018-03-16 PROCEDURE — 93005 ELECTROCARDIOGRAM TRACING: CPT

## 2018-03-16 PROCEDURE — 71046 X-RAY EXAM CHEST 2 VIEWS: CPT

## 2018-03-16 PROCEDURE — 87798 DETECT AGENT NOS DNA AMP: CPT | Performed by: PHYSICIAN ASSISTANT

## 2018-03-16 PROCEDURE — 85730 THROMBOPLASTIN TIME PARTIAL: CPT | Performed by: PHYSICIAN ASSISTANT

## 2018-03-16 PROCEDURE — 83880 ASSAY OF NATRIURETIC PEPTIDE: CPT | Performed by: PHYSICIAN ASSISTANT

## 2018-03-16 PROCEDURE — 83605 ASSAY OF LACTIC ACID: CPT | Performed by: PHYSICIAN ASSISTANT

## 2018-03-16 PROCEDURE — 36415 COLL VENOUS BLD VENIPUNCTURE: CPT | Performed by: PHYSICIAN ASSISTANT

## 2018-03-16 RX ORDER — AZITHROMYCIN 250 MG/1
TABLET, FILM COATED ORAL
Qty: 6 TABLET | Refills: 0 | Status: SHIPPED | OUTPATIENT
Start: 2018-03-16 | End: 2018-03-20

## 2018-03-16 NOTE — ED PROVIDER NOTES
History  Chief Complaint   Patient presents with    Chills     pt stated that he believes that he has been febrile at home, no thermometer    Cough     pt stated it started Tuesday, coughing up clear mucus  Patient presents to the ED with cough, chills and fatigue for 3 days  Patient states he has SOB on exertion  He denies chest pain  Patient denies runny nose or sore throat  PMH: atrial fibrillation, h/o PE, hyperlipidemia, CHF, HTN  Patient states he has not taken torsemide for 2 months  He states it makes him urinate too often  Patient is on Xarelto for blood clots and states he has been taking it as prescribed  He also has an IVC filter  Patient denies any sick contacts  He did not receive the flu vaccine this year  History provided by:  Patient  Cough   Cough characteristics:  Productive  Sputum characteristics:  Clear  Onset quality:  Gradual  Duration:  3 days  Timing:  Constant  Progression:  Worsening  Chronicity:  New  Smoker: yes    Context: not sick contacts and not upper respiratory infection    Relieved by:  Nothing  Worsened by: Activity  Ineffective treatments:  None tried  Associated symptoms: chills, fever and shortness of breath    Associated symptoms: no chest pain, no diaphoresis, no ear pain, no headaches, no myalgias, no rash, no rhinorrhea, no sore throat and no wheezing        Prior to Admission Medications   Prescriptions Last Dose Informant Patient Reported?  Taking?   atorvastatin (LIPITOR) 40 mg tablet 3/16/2018 at 0800  Yes Yes   Sig: Take 40 mg by mouth daily   methimazole (TAPAZOLE) 10 mg tablet 3/16/2018 at 1200  Yes Yes   Sig: Take 1 tablet by mouth 3 (three) times a day   metoprolol tartrate (LOPRESSOR) 50 mg tablet 3/16/2018 at Unknown time  No Yes   Sig: Take 1 tablet by mouth every 12 (twelve) hours   rivaroxaban (XARELTO) 15 mg tablet 3/16/2018 at 0800  No Yes   Sig: Take 1 tablet by mouth 2 (two) times a day with meals   torsemide (DEMADEX) 20 mg tablet 3/16/2018 at 0800  Yes Yes   Sig: Take 1 tablet by mouth daily      Facility-Administered Medications: None       Past Medical History:   Diagnosis Date    Acute diastolic congestive heart failure ([facility] [address] ) 7/3/2017    Atrial fibrillation (HCC)     Bilateral edema of lower extremity 8/22/2016    CAD (coronary artery disease) 10/28/2016    Cardiac disease     Chest pain 8/22/2016    Hyperlipidemia     Hypertension     Hyperthyroidism 8/22/2016    Kidney stone     Kidney stone     Pneumothorax     Presence of IVC filter     Pulmonary embolism (HCC)     Rash 10/28/2016    SOB (shortness of breath) 8/22/2016    Tachycardia 8/22/2016       Past Surgical History:   Procedure Laterality Date    EGD AND COLONOSCOPY N/A 7/28/2017    Procedure: EGD AND COLONOSCOPY;  Surgeon: Linn Olson MD;  Location: [facility] OR;  Service: Gastroenterology    LITHOTRIPSY         Family History   Problem Relation Age of Onset    Cancer Mother     Heart disease Father      I have reviewed and agree with the history as documented  Social History   Substance Use Topics    Smoking status: Current Every Day Smoker     Packs/day: 0 20     Types: Cigarettes    Smokeless tobacco: Never Used      Comment: Pt states he smokes when he can afford to do so     Alcohol use No        Review of Systems   Constitutional: Positive for chills and fever  Negative for diaphoresis  HENT: Negative for ear pain, facial swelling, rhinorrhea, sore throat and trouble swallowing  Eyes: Negative for visual disturbance  Respiratory: Positive for cough and shortness of breath  Negative for wheezing  Cardiovascular: Positive for leg swelling  Negative for chest pain  Gastrointestinal: Negative for abdominal pain, diarrhea, nausea and vomiting  Musculoskeletal: Negative for myalgias  Skin: Negative for color change and rash  Neurological: Negative for dizziness, weakness, light-headedness and headaches  Psychiatric/Behavioral: Negative for confusion  All other systems reviewed and are negative  Physical Exam  ED Triage Vitals [03/16/18 1829]   Temperature Pulse Respirations Blood Pressure SpO2   99 8 °F (37 7 °C) 104 18 146/77 97 %      Temp Source Heart Rate Source Patient Position - Orthostatic VS BP Location FiO2 (%)   Tympanic Monitor Lying Right arm --      Pain Score       No Pain           Orthostatic Vital Signs  Vitals:    03/16/18 1829 03/16/18 1930 03/16/18 2000   BP: 146/77 136/78 147/75   Pulse: 104 92 91   Patient Position - Orthostatic VS: Lying Lying Lying       Physical Exam   Constitutional: He is oriented to person, place, and time  He appears well-developed and well-nourished  He is active and cooperative  He does not appear ill  No distress  Patient morbidly obese with a BMI of 46 1   HENT:   Head: Normocephalic and atraumatic  Right Ear: Hearing normal    Left Ear: Hearing normal    Nose: Nose normal    Mouth/Throat: Oropharynx is clear and moist and mucous membranes are normal    Eyes: Conjunctivae and lids are normal    Neck: Normal range of motion  Cardiovascular: Normal heart sounds  An irregularly irregular rhythm present  Tachycardia present  No murmur heard  1+ pitting edema B/L LE  Pulmonary/Chest: Effort normal  He has wheezes in the right lower field, the left upper field and the left lower field  He has no rhonchi  He has no rales  Abdominal: Soft  Bowel sounds are normal  There is no tenderness  Obese abdomen   Musculoskeletal: Normal range of motion  He exhibits edema  He exhibits no tenderness  Neurological: He is alert and oriented to person, place, and time  He has normal strength  No sensory deficit  Gait normal    Skin: Skin is warm and dry  No rash noted  He is not diaphoretic  No erythema  No pallor  Psychiatric: He has a normal mood and affect  Nursing note and vitals reviewed        ED Medications  Medications - No data to display    Diagnostic Studies  Results Reviewed     Procedure Component Value Units Date/Time    Comprehensive metabolic panel [[id number]]  (Abnormal) Collected:  03/16/18 1940    Lab Status:  Final result Specimen:  Blood from Arm, Right Updated:  03/16/18 2016     Sodium 138 mmol/L      Potassium 4 0 mmol/L      Chloride 104 mmol/L      CO2 25 mmol/L      Anion Gap 9 mmol/L      BUN 20 mg/dL      Creatinine 0 65 mg/dL      Glucose 104 mg/dL      Calcium 7 9 (L) mg/dL      AST 39 U/L      ALT 41 U/L      Alkaline Phosphatase 82 U/L      Total Protein 7 3 g/dL      Albumin 2 6 (L) g/dL      Total Bilirubin 0 40 mg/dL      eGFR 110 ml/min/1 73sq m     Narrative:         National Kidney Disease Education Program recommendations are as follows:  GFR calculation is accurate only with a steady state creatinine  Chronic Kidney disease less than 60 ml/min/1 73 sq  meters  Kidney failure less than 15 ml/min/1 73 sq  meters  B-type natriuretic peptide [[id number]]  (Abnormal) Collected:  03/16/18 1845    Lab Status:  Final result Specimen:  Blood from Arm, Right Updated:  03/16/18 1933     NT-proBNP 611 (H) pg/mL     Lactic acid, plasma [[id number]]  (Normal) Collected:  03/16/18 1845    Lab Status:  Final result Specimen:  Blood from Arm, Right Updated:  03/16/18 1928     LACTIC ACID 1 7 mmol/L     Narrative:         Result may be elevated if tourniquet was used during collection  Protime-INR [[id number]]  (Abnormal) Collected:  03/16/18 1845    Lab Status:  Final result Specimen:  Blood from Arm, Right Updated:  03/16/18 1924     Protime 23 1 (H) seconds      INR 2 06 (H)    APTT [[id number]]  (Abnormal) Collected:  03/16/18 1845    Lab Status:  Final result Specimen:  Blood from Arm, Right Updated:  03/16/18 1924     PTT 45 (H) seconds     Narrative:          Therapeutic Heparin Range = 60-90 seconds    CBC and differential [[id number]]  (Abnormal) Collected:  03/16/18 1845    Lab Status:  Final result Specimen:  Blood from Arm, Right Updated:  03/16/18 1908     WBC 4 91 Thousand/uL      RBC 4 78 Million/uL      Hemoglobin 15 6 g/dL      Hematocrit 46 3 %      MCV 97 fL      MCH 32 6 pg      MCHC 33 7 g/dL      RDW 12 9 %      MPV 11 5 fL      Platelets 524 Thousands/uL      Neutrophils Relative 44 %      Lymphocytes Relative 28 %      Monocytes Relative 25 (H) %      Eosinophils Relative 2 %      Basophils Relative 1 %      Neutrophils Absolute 2 22 Thousands/µL      Lymphocytes Absolute 1 35 Thousands/µL      Monocytes Absolute 1 23 (H) Thousand/µL      Eosinophils Absolute 0 08 Thousand/µL      Basophils Absolute 0 03 Thousands/µL     Blood culture #2 [[id number]] Collected:  03/16/18 1845    Lab Status: In process Specimen:  Blood from Hand, Left Updated:  03/16/18 1905    Blood culture #1 [[id number]] Collected:  03/16/18 1851    Lab Status: In process Specimen:  Blood from Arm, Right Updated:  03/16/18 1905    Influenza A/B and RSV by PCR (indicated for patients >2 mo of age) [[id number]] Collected:  03/16/18 1845    Lab Status: In process Specimen:  Nasopharyngeal from Nasopharyngeal Swab Updated:  03/16/18 1905                 XR chest 2 views   ED Interpretation by Jair Menchaca PA-C (03/16 2017)   Small right pleural effusion, no infiltrate  Final Result by Pao Beckett MD (03/16 2017)      No acute cardiopulmonary disease              Workstation performed: [id number]                    Procedures  ECG 12 Lead Documentation  Date/Time: 3/16/2018 6:42 PM  Performed by: Royden Lefort by: Che      Indications / Diagnosis:  Tachycardia  Patient location:  ED  Previous ECG:     Previous ECG:  Compared to current    Similarity:  No change  Rate:     ECG rate:  94  Rhythm:     Rhythm: atrial fibrillation    QRS:     QRS axis:  Normal  Conduction:     Conduction: normal    ST segments:     ST segments:  Normal           Phone Contacts  ED Phone Contact    ED Course  ED Course Initial Sepsis Screening     Row Name 03/16/18 2023 03/16/18 2017             Is the patient's history suggestive of a new or worsening infection?  (!)  Yes (Proceed)  -CD       Suspected source of infection   pneumonia  -CD       Are two or more of the following signs & symptoms of infection both present and new to the patient?  No  -CD       Indicate SIRS criteria Tachycardia > 90 bpm  -CD         If the answer is yes to both questions, suspicion of sepsis is present  [de-identified]         If severe sepsis is present AND tissue hypoperfusion perists in the hour after fluid resuscitation or lactate > 4, the patient meets criteria for SEPTIC SHOCK           Are any of the following organ dysfunction criteria present within 6 hours of suspected infection and SIRS criteria that are NOT considered to be chronic conditions?          Organ dysfunction           Date of presentation of severe sepsis           Time of presentation of severe sepsis           Tissue hypoperfusion persists in the hour after crystalloid fluid administration, evidenced, by either:           Was hypotension present within one hour of the conclusion of crystalloid fluid administration? Suhas Cabrera       Date of presentation of septic shock           Time of presentation of septic shock             User Key  (r) = Recorded By, (t) = Taken By, (c) = Cosigned By    [address] Name Provider Type    CD Nanda Bennett PA-C Physician Assistant                  MDM  Number of Diagnoses or Management Options  Acute bronchitis: new and requires workup  Diagnosis management comments: Patient with cough, fevers, chills, will order labs, cxr to r/o pneumonia          Amount and/or Complexity of Data Reviewed  Clinical lab tests: ordered and reviewed  Tests in the radiology section of CPT®: ordered and reviewed    Patient Progress  Patient progress: stable    CritCare Time    Disposition  Final diagnoses:   Acute bronchitis Time reflects when diagnosis was documented in both MDM as applicable and the Disposition within this note     Time User Action Codes Description Comment    3/16/2018  8:19 PM Ana Luisaileana Song Add [J20 9] Acute bronchitis       ED Disposition     ED Disposition Condition Comment    Discharge  Gregory Cortez discharge to home/self care  Condition at discharge: Stable        Follow-up Information     Follow up With Specialties Details Why [id number] [facility], DO  In 3 days As needed, For recheck [address] [place] [address]  [phone]          Patient's Medications   Discharge Prescriptions    AZITHROMYCIN (ZITHROMAX) 250 MG TABLET    Take first 2 tablets together, then 1 every day until finished  Start Date: 3/16/2018 End Date: 3/20/2018       Order Dose: --       Quantity: 6 tablet    Refills: 0     No discharge procedures on file      ED Provider  Electronically Signed by           Carlos Esquivel PA-C  03/16/18 2024

## 2018-03-17 LAB
FLUAV AG SPEC QL: DETECTED
FLUBV AG SPEC QL: ABNORMAL
RSV B RNA SPEC QL NAA+PROBE: ABNORMAL

## 2018-03-17 NOTE — DISCHARGE INSTRUCTIONS
Rest, increase fluids  Tylenol(acetaminophen)  for fevers/pain  Over the counter Delsym as needed for cough  Take zpak as directed  Acute Bronchitis   WHAT YOU NEED TO KNOW:   Acute bronchitis is swelling and irritation in the air passages of your lungs  This irritation may cause you to cough or have other breathing problems  Acute bronchitis often starts because of another illness, such as a cold or the flu  The illness spreads from your nose and throat to your windpipe and airways  Bronchitis is often called a chest cold  Acute bronchitis lasts about 3 to 6 weeks and is usually not a serious illness  Your cough can last for several weeks  DISCHARGE INSTRUCTIONS:   Return to the emergency department if:   · You cough up blood  · Your lips or fingernails turn blue  · You feel like you are not getting enough air when you breathe  Contact your healthcare provider if:   · You have a fever  · Your breathing problems do not go away or get worse  · Your cough does not get better within 4 weeks  · You have questions or concerns about your condition or care  Self-care:   · Get more rest   Rest helps your body to heal  Slowly start to do more each day  Rest when you feel it is needed  · Avoid irritants in the air  Avoid chemicals, fumes, and dust  Wear a face mask if you must work around dust or fumes  Stay inside on days when air pollution levels are high  If you have allergies, stay inside when pollen counts are high  Do not use aerosol products, such as spray-on deodorant, bug spray, and hair spray  · Do not smoke or be around others who smoke  Nicotine and other chemicals in cigarettes and cigars damages the cilia that move mucus out of your lungs  Ask your healthcare provider for information if you currently smoke and need help to quit  E-cigarettes or smokeless tobacco still contain nicotine  Talk to your healthcare provider before you use these products       · Drink liquids as directed  Liquids help keep your air passages moist and help you cough up mucus  You may need to drink more liquids when you have acute bronchitis  Ask how much liquid to drink each day and which liquids are best for you  · Use a humidifier or vaporizer  Use a cool mist humidifier or a vaporizer to increase air moisture in your home  This may make it easier for you to breathe and help decrease your cough  Decrease risk for acute bronchitis:   · Get the vaccinations you need  Ask your healthcare provider if you should get vaccinated against the flu or pneumonia  · Prevent the spread of germs  You can decrease your risk of acute bronchitis and other illnesses by doing the following:     [facility] your hands often with soap and water  Carry germ-killing hand lotion or gel with you  You can use the lotion or gel to clean your hands when soap and water are not available  ¨ Do not touch your eyes, nose, or mouth unless you have washed your hands first     ¨ Always cover your mouth when you cough to prevent the spread of germs  It is best to cough into a tissue or your shirt sleeve instead of into your hand  Ask those around you cover their mouths when they cough  ¨ Try to avoid people who have a cold or the flu  If you are sick, stay away from others as much as possible  Medicines: Your healthcare provider may  give you any of the following:  · Ibuprofen or acetaminophen  are medicines that help lower your fever  They are available without a doctor's order  Ask your healthcare provider which medicine is right for you  Ask how much to take and how often to take it  Follow directions  These medicines can cause stomach bleeding if not taken correctly  Ibuprofen can cause kidney damage  Do not take ibuprofen if you have kidney disease, an ulcer, or allergies to aspirin  Acetaminophen can cause liver damage  Do not take more than 4,000 milligrams in 24 hours       · Decongestants  help loosen mucus in your lungs and make it easier to cough up  This can help you breathe easier  · Cough suppressants  decrease your urge to cough  If your cough produces mucus, do not take a cough suppressant unless your healthcare provider tells you to  Your healthcare provider may suggest that you take a cough suppressant at night so you can rest     · Inhalers  may be given  Your healthcare provider may give you one or more inhalers to help you breathe easier and cough less  An inhaler gives your medicine to open your airways  Ask your healthcare provider to show you how to use your inhaler correctly  · Take your medicine as directed  Contact your healthcare provider if you think your medicine is not helping or if you have side effects  Tell him of her if you are allergic to any medicine  Keep a list of the medicines, vitamins, and herbs you take  Include the amounts, and when and why you take them  Bring the list or the pill bottles to follow-up visits  Carry your medicine list with you in case of an emergency  Follow up with your healthcare provider as directed:  Write down questions you have so you will remember to ask them during your follow-up visits  © 2017 [employer] [employer]  Information is for End User's use only and may not be sold, redistributed or otherwise used for commercial purposes  All illustrations and images included in CareNotes® are the copyrighted property of [employer]  or Devin Sood  The above information is an  only  It is not intended as medical advice for individual conditions or treatments  Talk to your doctor, nurse or pharmacist before following any medical regimen to see if it is safe and effective for you

## 2018-03-17 NOTE — PROGRESS NOTES
Patient returned call, informed of +flu, advised rest, increase fluids, tylenol/motrin for fevers and f/u with PCP for recheck

## 2018-03-18 LAB
ATRIAL RATE: 96 BPM
QRS AXIS: 38 DEGREES
QRSD INTERVAL: 72 MS
QT INTERVAL: 324 MS
QTC INTERVAL: 405 MS
T WAVE AXIS: 66 DEGREES
VENTRICULAR RATE: 94 BPM

## 2018-03-18 PROCEDURE — 93010 ELECTROCARDIOGRAM REPORT: CPT | Performed by: INTERNAL MEDICINE

## 2018-03-21 LAB
BACTERIA BLD CULT: NORMAL
BACTERIA BLD CULT: NORMAL

## 2018-04-05 DIAGNOSIS — I48.0 PAROXYSMAL ATRIAL FIBRILLATION (HCC): Primary | ICD-10-CM

## 2018-04-05 NOTE — TELEPHONE ENCOUNTER
Pt  Needs Rx refill on Xarelto but he says he is on 20mg QD but his med list says 15mg BID  What dose should he be on? Please advise

## 2018-04-06 DIAGNOSIS — I48.19 PERSISTENT ATRIAL FIBRILLATION (HCC): Primary | ICD-10-CM

## 2018-04-06 RX ORDER — RIVAROXABAN 20 MG/1
TABLET, FILM COATED ORAL
Qty: 30 TABLET | Refills: 5 | Status: SHIPPED | OUTPATIENT
Start: 2018-04-06 | End: 2018-08-17 | Stop reason: SDUPTHER

## 2018-08-17 ENCOUNTER — OFFICE VISIT (OUTPATIENT)
Dept: CARDIOLOGY CLINIC | Facility: CLINIC | Age: 58
End: 2018-08-17
Payer: COMMERCIAL

## 2018-08-17 VITALS
WEIGHT: 315 LBS | DIASTOLIC BLOOD PRESSURE: 88 MMHG | SYSTOLIC BLOOD PRESSURE: 138 MMHG | HEIGHT: 71 IN | BODY MASS INDEX: 44.1 KG/M2 | HEART RATE: 100 BPM

## 2018-08-17 DIAGNOSIS — I27.20 PULMONARY HYPERTENSION (HCC): ICD-10-CM

## 2018-08-17 DIAGNOSIS — I10 ESSENTIAL HYPERTENSION: ICD-10-CM

## 2018-08-17 DIAGNOSIS — I50.32 CHRONIC DIASTOLIC CONGESTIVE HEART FAILURE (HCC): ICD-10-CM

## 2018-08-17 DIAGNOSIS — Z86.711 HISTORY OF PULMONARY EMBOLISM: ICD-10-CM

## 2018-08-17 DIAGNOSIS — I48.20 CHRONIC ATRIAL FIBRILLATION (HCC): Primary | ICD-10-CM

## 2018-08-17 DIAGNOSIS — E78.5 DYSLIPIDEMIA: ICD-10-CM

## 2018-08-17 PROCEDURE — 99214 OFFICE O/P EST MOD 30 MIN: CPT | Performed by: INTERNAL MEDICINE

## 2018-08-17 RX ORDER — ATORVASTATIN CALCIUM 40 MG/1
40 TABLET, FILM COATED ORAL DAILY
Qty: 30 TABLET | Refills: 6 | Status: SHIPPED | OUTPATIENT
Start: 2018-08-17 | End: 2020-03-11 | Stop reason: SDUPTHER

## 2018-08-17 NOTE — PROGRESS NOTES
Cardiology Follow Up    Reza Morris  1960  [id number]  HEART & VASCULAR  [facility] Katty Cortes  [address]  [address] [address] [address]    1  Chronic atrial fibrillation ([address] )     2  Chronic diastolic congestive heart failure ([address] )     3  History of pulmonary embolism     4  Pulmonary hypertension ([facility] [address] [facility] )     5  Essential hypertension         Interval History:    Marielena Calloway comes in for follow-up given his persistent atrial fibrillation and right-sided CHF/edema  Marielena Calloway was in the hospital with acute pulmonary embolism last summer  He was also found to be in atrial fibrillation and rates were rapid during his acute state  However he carried his history of atrial fibrillation for many years  When he was admitted he was not on any medications, as he was homeless living in his car  With treating his PE and adjusting his heart rate medications, his AF became under good control  Echocardiogram demonstrated normal LV systolic function  There was a dilated RV and biatrial enlargement  There was also mild to moderate pulmonary hypertension  He was also very volume overloaded, and torsemide was started  In follow-up Marielena Calloway looked grossly volume overloaded and his weight was significantly up  He has significant lower extremity edema  He was not taking his torsemide as directed  He was homeless, usually staying in his car or shelter and because of the problems with having to use the bathroom he only was mamie his torsemide 3 times a week  However he has since moved in with a friend, and has been able to take his torsemide on a regular basis  With taking his torsemide regularly this has improved his volume status  Marielena Calloway continues to get short of breath with exertion  This has not changed  He denies any symptoms of angina  No orthopnea or PND  He does not feels atrial fibrillation, as he has no palpitations, lightheadedness or any recent syncope    He does have ongoing issues with lower extremity edema,  But this has improved  His weight is down slightly since last visit  Patient Active Problem List   Diagnosis    Hypertension    Hyperthyroidism    PE (pulmonary thromboembolism) (HCC)    CAD (coronary artery disease)    Chronic atrial fibrillation (HCC)    Homelessness    Bedbug bite    RV strain    Decubitus skin ulcer    Pulmonary embolism ([facility] )    HCAP (healthcare-associated pneumonia)    Chronic diastolic congestive heart failure (HCC)    Pulmonary hypertension (HCC)    History of pulmonary embolism     Past Medical History:   Diagnosis Date    Acute diastolic congestive heart failure ([facility] ) 7/3/2017    Atrial fibrillation (HCC)     Bilateral edema of lower extremity 8/22/2016    CAD (coronary artery disease) 10/28/2016    Cardiac disease     Chest pain 8/22/2016    Hyperlipidemia     Hypertension     Hyperthyroidism 8/22/2016    Kidney stone     Kidney stone     Pneumothorax     Presence of IVC filter     Pulmonary embolism (HCC)     Rash 10/28/2016    SOB (shortness of breath) 8/22/2016    Tachycardia 8/22/2016     Social History     Social History    Marital status: Single     Spouse name: N/A    Number of children: N/A    Years of education: N/A     Occupational History    Not on file       Social History Main Topics    Smoking status: Current Every Day Smoker     Packs/day: 0 20     Types: Cigarettes    Smokeless tobacco: Never Used      Comment: Pt states he smokes when he can afford to do so     Alcohol use No    Drug use: No    Sexual activity: Not on file     Other Topics Concern    Not on file     Social History Narrative    No narrative on file      Family History   Problem Relation Age of Onset    Cancer Mother     Heart disease Father      Past Surgical History:   Procedure Laterality Date    EGD AND COLONOSCOPY N/A 7/28/2017    Procedure: EGD AND COLONOSCOPY;  Surgeon: Pancho Oviedo MD;  Location:  MAIN OR;  Service: Gastroenterology    LITHOTRIPSY         Current Outpatient Prescriptions:     atorvastatin (LIPITOR) 40 mg tablet, Take 40 mg by mouth daily, Disp: , Rfl:     methimazole (TAPAZOLE) 10 mg tablet, Take 1 tablet by mouth 3 (three) times a day, Disp: , Rfl:     metoprolol tartrate (LOPRESSOR) 50 mg tablet, Take 1 tablet by mouth every 12 (twelve) hours, Disp: 60 tablet, Rfl: 0    rivaroxaban (XARELTO) 20 mg tablet, Take 1 tablet (20 mg total) by mouth daily with breakfast, Disp: 30 tablet, Rfl: 5    torsemide (DEMADEX) 20 mg tablet, Take 1 tablet by mouth daily, Disp: , Rfl:   No Known Allergies    Labs:  Lab Results   Component Value Date     03/16/2018     02/15/2017    K 4 0 03/16/2018    K 4 6 02/15/2017     03/16/2018     02/15/2017    CO2 25 03/16/2018    CO2 23 02/15/2017    BUN 20 03/16/2018    BUN 17 02/15/2017    CREATININE 0 65 03/16/2018    CREATININE 0 72 02/15/2017    GLUCOSE 104 03/16/2018    GLUCOSE 113 11/30/2015    CALCIUM 7 9 (L) 03/16/2018    CALCIUM 8 8 02/15/2017     Imaging:  ECG obtained in the office demonstrated atrial fibrillation with a heart rate of 78  Review of Systems:  Review of Systems   Constitutional: Positive for fatigue  HENT: Negative  Eyes: Negative  Respiratory: Negative  Cardiovascular: Positive for leg swelling  Gastrointestinal: Negative  Musculoskeletal: Negative  Skin: Negative  Allergic/Immunologic: Negative  Neurological: Negative  Hematological: Negative  Psychiatric/Behavioral: Negative  All other systems reviewed and are negative  Physical Exam:  Physical Exam   Constitutional: He is oriented to person, place, and time  He appears well-developed and well-nourished  HENT:   Head: Normocephalic and atraumatic  Eyes: EOM are normal  Pupils are equal, round, and reactive to light  Right eye exhibits no discharge  Left eye exhibits no discharge  No scleral icterus     Neck: Normal range of motion  Neck supple  No JVD present  No thyromegaly present  Cardiovascular: Normal rate, S1 normal, S2 normal, normal heart sounds and intact distal pulses  An irregularly irregular rhythm present  Exam reveals decreased pulses  Exam reveals no gallop and no friction rub  No murmur heard  Pulmonary/Chest: Effort normal  No respiratory distress  He has decreased breath sounds  He has no wheezes  He has no rales  Abdominal: Soft  Bowel sounds are normal  He exhibits no distension  There is no tenderness  Musculoskeletal: Normal range of motion  He exhibits no edema, tenderness or deformity  Neurological: He is alert and oriented to person, place, and time  No cranial nerve deficit  Skin: Skin is warm and dry  No rash noted  Psychiatric: He has a normal mood and affect  Judgment and thought content normal    Nursing note and vitals reviewed  Discussion/Summary:    1  Persistent atrial fibrillation - [place] heart rates are well controlled  He is essentially asymptomatic from this standpoint  No changes were made  He will remain on the same rate controlling medications and Xarelto for stroke prevention  2   Chronic diastolic CHF - He remains chronically volume overloaded, but a little better than last visit  His weight is slightly down and has been stable, although a lot of his weight gain has been caloric related  I have encouraged him to remain on the same medical therapy, watch his sodium intake and weight closely  We will see him back in 6 months  I ordered updated blood work  Counseling / Coordination of Care  Total floor / unit time spent today 25 minutes  Greater than 50% of total time was spent with the patient and / or family counseling and / or coordination of care

## 2018-08-17 NOTE — PATIENT INSTRUCTIONS
Low-Sodium Diet   AMBULATORY CARE:   A low-sodium diet  limits foods that are high in sodium (salt)  You will need to follow a low-sodium diet if you have high blood pressure, kidney disease, or heart failure  You may also need to follow this diet if you have a condition that is causing your body to retain (hold) extra fluid  You may need to limit the amount of sodium you eat to 1,500 mg  Ask your healthcare provider how much sodium you can have each day  How to use food labels to choose foods that are low in sodium:  Read food labels to find the amount of sodium they contain  The amount of sodium is listed in milligrams (mg)  The % Daily Value (DV) column tells you how much of your daily needs are met by 1 serving of the food for each nutrient listed  Choose foods that have less than 5% of the DV of sodium  These foods are considered low in sodium  Foods that have 20% or more of the DV of sodium are considered high in sodium  Some food labels may also list any of the following terms that tell you about the sodium content in the food:  · Sodium-free:  Less than 5 mg in each serving    · Very low sodium:  35 mg of sodium or less in each serving    · Low sodium:  140 mg of sodium or less in each serving    · Reduced sodium: At least 25% less sodium in each serving than the regular type    · Light in sodium:  50% less sodium in each serving    · Unsalted or no added salt:  No extra salt is added during processing (the food may still contain sodium)  Foods to avoid:  Salty foods are high in sodium   You should avoid the following:  · Processed foods:      ¨ Mixes for cornbread, biscuits, cake, and pudding     ¨ Instant foods, such as potatoes, cereals, noodles, and rice     ¨ Packaged foods, such as bread stuffing, rice and pasta mixes, snack dip mixes, and macaroni and cheese     ¨ Canned foods, such as canned vegetables, soups, broths, sauces, and vegetable or tomato juice    ¨ Snack foods, such as salted chips, popcorn, pretzels, pork rinds, salted crackers, and salted nuts    ¨ Frozen foods, such as dinners, entrees, vegetables with sauces, and breaded meats    ¨ Sauerkraut, pickled vegetables, and other foods prepared in brine    · Meats and cheeses:      ¨ Smoked or cured meat, such as corned beef, dunaway, ham, hot dogs, and sausage    ¨ Canned meats or spreads, such as potted meats, sardines, anchovies, and imitation seafood    ¨ Deli or lunch meats, such as bologna, ham, turkey, and roast beef    ¨ Processed cheese, such as American cheese and cheese spreads    · Condiments, sauces, and seasonings:      ¨ Salt (¼ teaspoon of salt contains 575 mg of sodium)    ¨ Seasonings made with salt, such as garlic salt, celery salt, onion salt, and seasoned salt    ¨ Regular soy sauce, barbecue sauce, teriyaki sauce, steak sauce, Worcestershire sauce, and most flavored vinegars    ¨ Canned gravy and mixes     ¨ Regular condiments, such as mustard, ketchup, and salad dressings    ¨ Pickles and olives    ¨ Meat tenderizers and monosodium glutamate (MSG)  Foods to include:  Read the food label to find the exact amount of sodium in each serving  · Bread and cereal:  Try to choose breads with less than 80 mg of sodium per serving  ¨ Bread, roll, valeriano, tortilla, or unsalted crackers  ¨ Ready-to-eat cereals with less than 5% DV of sodium (examples include shredded wheat and puffed rice)    ¨ Pasta    · Vegetables and fruits:      ¨ Unsalted fresh, frozen, or canned vegetables    ¨ Fresh, frozen, or canned fruits    ¨ Fruit juice    · Dairy:  One serving has about 150 mg of sodium  ¨ Milk, all types    ¨ Yogurt    ¨ Hard cheese, such as cheddar, Swiss, [employer], or mozzarella    · Meat and other protein foods:  Some raw meats may have added sodium       ¨ Plain meats, fish, and poultry     ¨ Eggs    · Other foods:      ¨ Homemade pudding    ¨ Unsalted nuts, popcorn, or pretzels    ¨ Unsalted butter or margarine  Ways to decrease sodium:   · Add spices and herbs to foods instead of salt during cooking  Use salt-free seasonings to add flavor to foods  Examples include onion powder, garlic powder, basil, padilla powder, paprika, and parsley  Try lemon or lime juice or vinegar to give foods a tart flavor  Use hot peppers, pepper, or cayenne pepper to add a spicy flavor to foods  · Do not keep a salt shaker at your kitchen table  This may help keep you from adding salt to food at the table  It may take time to get used to enjoying the natural flavor of food instead of adding salt  Talk to your healthcare provider before you use salt substitutes  Some salt substitutes have a high amount of potassium and need to be avoided if you have kidney disease  · Choose low-sodium foods at restaurants  Meals from restaurants are often high in sodium  Some restaurants have nutrition information on the menu that tells you the amount of sodium in their foods  If possible, ask for your food to be prepared with less, or no salt  · Shop for unsalted or low-sodium foods and snacks at the grocery store  Examples include unsalted or low-sodium broths, soups, and canned vegetables  Choose fresh or frozen vegetables instead  Choose unsalted nuts or seeds or fresh fruits or vegetables as snacks  Read food labels and choose salt-free, very low-sodium, or low-sodium foods  © 2017 [address] [employer] Information is for End User's use only and may not be sold, redistributed or otherwise used for commercial purposes  All illustrations and images included in CareNotes® are the copyrighted property of A D A M [employer]  or Devin Sood  The above information is an  only  It is not intended as medical advice for individual conditions or treatments  Talk to your doctor, nurse or pharmacist before following any medical regimen to see if it is safe and effective for you

## 2018-08-29 DIAGNOSIS — E05.90 HYPERTHYROIDISM: Primary | ICD-10-CM

## 2018-08-29 RX ORDER — METHIMAZOLE 10 MG/1
TABLET ORAL
Qty: 90 TABLET | Refills: 5 | Status: ON HOLD | OUTPATIENT
Start: 2018-08-29 | End: 2019-08-17 | Stop reason: SDUPTHER

## 2018-08-31 LAB
ALBUMIN SERPL-MCNC: 3.6 G/DL (ref 3.6–5.1)
ALBUMIN/GLOB SERPL: 0.9 (CALC) (ref 1–2.5)
ALP SERPL-CCNC: 79 U/L (ref 40–115)
ALT SERPL-CCNC: 89 U/L (ref 9–46)
AST SERPL-CCNC: 81 U/L (ref 10–35)
BILIRUB SERPL-MCNC: 1.4 MG/DL (ref 0.2–1.2)
BUN SERPL-MCNC: 19 MG/DL (ref 7–25)
BUN/CREAT SERPL: 34 (CALC) (ref 6–22)
CALCIUM SERPL-MCNC: 9.6 MG/DL (ref 8.6–10.3)
CHLORIDE SERPL-SCNC: 105 MMOL/L (ref 98–110)
CHOLEST SERPL-MCNC: 76 MG/DL
CHOLEST/HDLC SERPL: 2.7 (CALC)
CO2 SERPL-SCNC: 23 MMOL/L (ref 20–32)
CREAT SERPL-MCNC: 0.56 MG/DL (ref 0.7–1.33)
GLOBULIN SER CALC-MCNC: 3.9 G/DL (CALC) (ref 1.9–3.7)
GLUCOSE SERPL-MCNC: 97 MG/DL (ref 65–99)
HDLC SERPL-MCNC: 28 MG/DL
LDLC SERPL CALC-MCNC: 31 MG/DL (CALC)
NONHDLC SERPL-MCNC: 48 MG/DL (CALC)
POTASSIUM SERPL-SCNC: 4.7 MMOL/L (ref 3.5–5.3)
PROT SERPL-MCNC: 7.5 G/DL (ref 6.1–8.1)
SL AMB EGFR AFRICAN AMERICAN: 134 ML/MIN/1.73M2
SL AMB EGFR NON AFRICAN AMERICAN: 116 ML/MIN/1.73M2
SODIUM SERPL-SCNC: 141 MMOL/L (ref 135–146)
TRIGL SERPL-MCNC: 90 MG/DL

## 2018-09-16 DIAGNOSIS — I48.91 AF (ATRIAL FIBRILLATION) (HCC): Primary | ICD-10-CM

## 2018-09-17 RX ORDER — METOPROLOL TARTRATE 50 MG/1
TABLET, FILM COATED ORAL
Qty: 60 TABLET | Refills: 11 | Status: SHIPPED | OUTPATIENT
Start: 2018-09-17 | End: 2019-10-15 | Stop reason: SDUPTHER

## 2018-10-25 DIAGNOSIS — I48.0 PAROXYSMAL ATRIAL FIBRILLATION (HCC): ICD-10-CM

## 2019-01-21 ENCOUNTER — TRANSCRIBE ORDERS (OUTPATIENT)
Dept: ADMINISTRATIVE | Facility: HOSPITAL | Age: 59
End: 2019-01-21

## 2019-01-21 DIAGNOSIS — R74.8 ELEVATED LIVER ENZYMES: Primary | ICD-10-CM

## 2019-02-05 ENCOUNTER — OFFICE VISIT (OUTPATIENT)
Dept: CARDIOLOGY CLINIC | Facility: CLINIC | Age: 59
End: 2019-02-05
Payer: COMMERCIAL

## 2019-02-05 VITALS
BODY MASS INDEX: 44.1 KG/M2 | SYSTOLIC BLOOD PRESSURE: 132 MMHG | WEIGHT: 315 LBS | HEIGHT: 71 IN | DIASTOLIC BLOOD PRESSURE: 78 MMHG | HEART RATE: 59 BPM

## 2019-02-05 DIAGNOSIS — Z86.711 HISTORY OF PULMONARY EMBOLISM: ICD-10-CM

## 2019-02-05 DIAGNOSIS — E78.5 DYSLIPIDEMIA: ICD-10-CM

## 2019-02-05 DIAGNOSIS — I27.20 PULMONARY HYPERTENSION (HCC): ICD-10-CM

## 2019-02-05 DIAGNOSIS — I48.20 CHRONIC ATRIAL FIBRILLATION (HCC): Primary | ICD-10-CM

## 2019-02-05 DIAGNOSIS — I50.32 CHRONIC DIASTOLIC CONGESTIVE HEART FAILURE (HCC): ICD-10-CM

## 2019-02-05 PROCEDURE — 93000 ELECTROCARDIOGRAM COMPLETE: CPT | Performed by: INTERNAL MEDICINE

## 2019-02-05 PROCEDURE — 99214 OFFICE O/P EST MOD 30 MIN: CPT | Performed by: INTERNAL MEDICINE

## 2019-02-05 RX ORDER — POTASSIUM CHLORIDE 20 MEQ/1
20 TABLET, EXTENDED RELEASE ORAL 2 TIMES DAILY
COMMUNITY
End: 2021-02-03 | Stop reason: SDUPTHER

## 2019-02-05 NOTE — PROGRESS NOTES
Cardiology Follow Up    Andrae Contreras  1960  [id number]  HEART & VASCULAR  [facility] Stefanie Harley  [address]  [address] [address] [address]    1  Chronic atrial fibrillation (HCC)  POCT ECG   2  Chronic diastolic congestive heart failure ([address] )     3  History of pulmonary embolism     4  Dyslipidemia     5  Pulmonary hypertension ([facility] )         Interval History:    Yasmeen Salazar comes in for follow-up given his persistent atrial fibrillation and right-sided CHF/edema  Yasmeen Salazar was in the hospital with acute pulmonary embolism last summer  He was also found to be in atrial fibrillation and rates were rapid during his acute state  He carried this history of atrial fibrillation for many years  When he was admitted he was not on any medications, as he was homeless living in his car  With treating his PE and adjusting his heart rate medications, his AF became under good control  Echocardiogram demonstrated normal LV systolic function  There was a dilated RV and biatrial enlargement  There was also mild to moderate pulmonary hypertension  He was also very volume overloaded, and torsemide was started  In follow-up Yasmeen Salazar looked grossly volume overloaded and his weight was significantly up  He has significant lower extremity edema  He was not taking his torsemide as directed  He was homeless, usually staying in his car or shelter and because of the problems with having to use the bathroom he only was mamie his torsemide 3 times a week  Over the last couple visits he has been trying to take this is often as possible, but there are days he does not take it  Yasmeen Salazar continues to get short of breath with exertion but it is improved  He denies any symptoms of angina  No orthopnea or PND  He does not feels atrial fibrillation, as he has no palpitations, lightheadedness or any recent syncope    He does have ongoing issues with lower extremity edema, but this has also improved  Patient Active Problem List   Diagnosis    Hypertension    Hyperthyroidism    PE (pulmonary thromboembolism) (HCC)    CAD (coronary artery disease)    Chronic atrial fibrillation (HCC)    Homelessness    Bedbug bite    RV strain    Decubitus skin ulcer    Pulmonary embolism ([address] )    HCAP (healthcare-associated pneumonia)    Chronic diastolic congestive heart failure (HCC)    Pulmonary hypertension (HCC)    History of pulmonary embolism    Dyslipidemia     Past Medical History:   Diagnosis Date    Acute diastolic congestive heart failure (HCC) 7/3/2017    Atrial fibrillation (HCC)     Bilateral edema of lower extremity 8/22/2016    CAD (coronary artery disease) 10/28/2016    Cardiac disease     Chest pain 8/22/2016    Hyperlipidemia     Hypertension     Hyperthyroidism 8/22/2016    Kidney stone     Kidney stone     Pneumothorax     Presence of IVC filter     Pulmonary embolism (HCC)     Rash 10/28/2016    SOB (shortness of breath) 8/22/2016    Tachycardia 8/22/2016     Social History     Social History    Marital status: Single     Spouse name: N/A    Number of children: N/A    Years of education: N/A     Occupational History    Not on file       Social History Main Topics    Smoking status: Current Every Day Smoker     Packs/day: 0 20     Types: Cigarettes    Smokeless tobacco: Never Used      Comment: Pt states he smokes when he can afford to do so     Alcohol use No    Drug use: No    Sexual activity: Not on file     Other Topics Concern    Not on file     Social History Narrative    No narrative on file      Family History   Problem Relation Age of Onset    Cancer Mother     Heart disease Father      Past Surgical History:   Procedure Laterality Date    EGD AND COLONOSCOPY N/A 7/28/2017    Procedure: EGD AND COLONOSCOPY;  Surgeon: Ayana Wills MD;  Location:  MAIN OR;  Service: Gastroenterology    LITHOTRIPSY         Current Outpatient Prescriptions:     atorvastatin (LIPITOR) 40 mg tablet, Take 1 tablet (40 mg total) by mouth daily, Disp: 30 tablet, Rfl: 6    methimazole (TAPAZOLE) 10 mg tablet, TAKE ONE TABLET BY MOUTH EVERY 8 HOURS, Disp: 90 tablet, Rfl: 5    metoprolol tartrate (LOPRESSOR) 50 mg tablet, TAKE ONE TABLET BY MOUTH TWICE DAILY, Disp: 60 tablet, Rfl: 11    potassium chloride (K-DUR,KLOR-CON) 20 mEq tablet, Take 20 mEq by mouth 2 (two) times a day, Disp: , Rfl:     rivaroxaban (XARELTO) 20 mg tablet, Take 1 tablet (20 mg total) by mouth daily with breakfast, Disp: 30 tablet, Rfl: 11    torsemide (DEMADEX) 20 mg tablet, Take 1 tablet by mouth daily, Disp: , Rfl:   No Known Allergies    Labs:  Lab Results   Component Value Date     02/15/2017    K 4 7 08/31/2018     08/31/2018    CO2 23 08/31/2018    BUN 19 08/31/2018    CREATININE 0 65 03/16/2018    CREATININE 0 72 02/15/2017    GLUCOSE 113 11/30/2015    CALCIUM 9 6 08/31/2018     Imaging:  ECG obtained in the office demonstrated atrial fibrillation with a heart rate of 59  Review of Systems:  Review of Systems   Constitutional: Positive for fatigue  HENT: Negative  Eyes: Negative  Respiratory: Positive for shortness of breath  Cardiovascular: Positive for leg swelling  Gastrointestinal: Negative  Musculoskeletal: Negative  Skin: Negative  Allergic/Immunologic: Negative  Neurological: Negative  Hematological: Negative  Psychiatric/Behavioral: Negative  All other systems reviewed and are negative  Physical Exam:  Physical Exam   Constitutional: He is oriented to person, place, and time  He appears well-developed and well-nourished  HENT:   Head: Normocephalic and atraumatic  Eyes: Pupils are equal, round, and reactive to light  EOM are normal  Right eye exhibits no discharge  Left eye exhibits no discharge  No scleral icterus  Neck: Normal range of motion  Neck supple  No JVD present  No thyromegaly present  Cardiovascular: Normal rate, S1 normal, S2 normal, normal heart sounds and intact distal pulses  An irregularly irregular rhythm present  Exam reveals decreased pulses  Exam reveals no gallop and no friction rub  No murmur heard  Pulmonary/Chest: Effort normal  No respiratory distress  He has decreased breath sounds  He has no wheezes  He has no rales  Abdominal: Soft  Bowel sounds are normal  He exhibits no distension  There is no tenderness  Musculoskeletal: Normal range of motion  He exhibits edema  He exhibits no tenderness or deformity  Neurological: He is alert and oriented to person, place, and time  No cranial nerve deficit  Skin: Skin is warm and dry  No rash noted  Psychiatric: He has a normal mood and affect  Judgment and thought content normal    Nursing note and vitals reviewed  Discussion/Summary:    1  Persistent atrial fibrillation - [place] heart rates are well controlled  He is essentially asymptomatic from this standpoint  No changes were made  He will remain on the same rate controlling medications and Xarelto for stroke prevention  2   Chronic diastolic CHF - He remains chronically volume overloaded, but this has improved over the last few visits  His weight has been stable  I have encouraged him to remain on the same medical therapy, watch his sodium intake and weight closely  We will see him back in 6 months  Counseling / Coordination of Care  Total floor / unit time spent today 25 minutes  Greater than 50% of total time was spent with the patient and / or family counseling and / or coordination of care

## 2019-02-05 NOTE — PATIENT INSTRUCTIONS
Low-Sodium Diet   AMBULATORY CARE:   A low-sodium diet  limits foods that are high in sodium (salt)  You will need to follow a low-sodium diet if you have high blood pressure, kidney disease, or heart failure  You may also need to follow this diet if you have a condition that is causing your body to retain (hold) extra fluid  You may need to limit the amount of sodium you eat to 1,500 mg  Ask your healthcare provider how much sodium you can have each day  How to use food labels to choose foods that are low in sodium:  Read food labels to find the amount of sodium they contain  The amount of sodium is listed in milligrams (mg)  The % Daily Value (DV) column tells you how much of your daily needs are met by 1 serving of the food for each nutrient listed  Choose foods that have less than 5% of the DV of sodium  These foods are considered low in sodium  Foods that have 20% or more of the DV of sodium are considered high in sodium  Some food labels may also list any of the following terms that tell you about the sodium content in the food:  · Sodium-free:  Less than 5 mg in each serving    · Very low sodium:  35 mg of sodium or less in each serving    · Low sodium:  140 mg of sodium or less in each serving    · Reduced sodium: At least 25% less sodium in each serving than the regular type    · Light in sodium:  50% less sodium in each serving    · Unsalted or no added salt:  No extra salt is added during processing (the food may still contain sodium)  Foods to avoid:  Salty foods are high in sodium   You should avoid the following:  · Processed foods:      ¨ Mixes for cornbread, biscuits, cake, and pudding     ¨ Instant foods, such as potatoes, cereals, noodles, and rice     ¨ Packaged foods, such as bread stuffing, rice and pasta mixes, snack dip mixes, and macaroni and cheese     ¨ Canned foods, such as canned vegetables, soups, broths, sauces, and vegetable or tomato juice    ¨ Snack foods, such as salted chips, popcorn, pretzels, pork rinds, salted crackers, and salted nuts    ¨ Frozen foods, such as dinners, entrees, vegetables with sauces, and breaded meats    ¨ Sauerkraut, pickled vegetables, and other foods prepared in brine    · Meats and cheeses:      ¨ Smoked or cured meat, such as corned beef, dunaway, ham, hot dogs, and sausage    ¨ Canned meats or spreads, such as potted meats, sardines, anchovies, and imitation seafood    ¨ Deli or lunch meats, such as bologna, ham, turkey, and roast beef    ¨ Processed cheese, such as American cheese and cheese spreads    · Condiments, sauces, and seasonings:      ¨ Salt (¼ teaspoon of salt contains 575 mg of sodium)    ¨ Seasonings made with salt, such as garlic salt, celery salt, onion salt, and seasoned salt    ¨ Regular soy sauce, barbecue sauce, teriyaki sauce, steak sauce, Worcestershire sauce, and most flavored vinegars    ¨ Canned gravy and mixes     ¨ Regular condiments, such as mustard, ketchup, and salad dressings    ¨ Pickles and olives    ¨ Meat tenderizers and monosodium glutamate (MSG)  Foods to include:  Read the food label to find the exact amount of sodium in each serving  · Bread and cereal:  Try to choose breads with less than 80 mg of sodium per serving  ¨ Bread, roll, valeriano, tortilla, or unsalted crackers  ¨ Ready-to-eat cereals with less than 5% DV of sodium (examples include shredded wheat and puffed rice)    ¨ Pasta    · Vegetables and fruits:      ¨ Unsalted fresh, frozen, or canned vegetables    ¨ Fresh, frozen, or canned fruits    ¨ Fruit juice    · Dairy:  One serving has about 150 mg of sodium  ¨ Milk, all types    ¨ Yogurt    ¨ Hard cheese, such as cheddar, Swiss, [employer], or mozzarella    · Meat and other protein foods:  Some raw meats may have added sodium       ¨ Plain meats, fish, and poultry     ¨ Eggs    · Other foods:      ¨ Homemade pudding    ¨ Unsalted nuts, popcorn, or pretzels    ¨ Unsalted butter or margarine  Ways to decrease sodium:   · Add spices and herbs to foods instead of salt during cooking  Use salt-free seasonings to add flavor to foods  Examples include onion powder, garlic powder, basil, padilla powder, paprika, and parsley  Try lemon or lime juice or vinegar to give foods a tart flavor  Use hot peppers, pepper, or cayenne pepper to add a spicy flavor to foods  · Do not keep a salt shaker at your kitchen table  This may help keep you from adding salt to food at the table  It may take time to get used to enjoying the natural flavor of food instead of adding salt  Talk to your healthcare provider before you use salt substitutes  Some salt substitutes have a high amount of potassium and need to be avoided if you have kidney disease  · Choose low-sodium foods at restaurants  Meals from restaurants are often high in sodium  Some restaurants have nutrition information on the menu that tells you the amount of sodium in their foods  If possible, ask for your food to be prepared with less, or no salt  · Shop for unsalted or low-sodium foods and snacks at the grocery store  Examples include unsalted or low-sodium broths, soups, and canned vegetables  Choose fresh or frozen vegetables instead  Choose unsalted nuts or seeds or fresh fruits or vegetables as snacks  Read food labels and choose salt-free, very low-sodium, or low-sodium foods  © 2017 [employer] Information is for End User's use only and may not be sold, redistributed or otherwise used for commercial purposes  All illustrations and images included in CareNotes® are the copyrighted property of A D A M , Inc  or Devin Sood  The above information is an  only  It is not intended as medical advice for individual conditions or treatments  Talk to your doctor, nurse or pharmacist before following any medical regimen to see if it is safe and effective for you

## 2019-03-16 ENCOUNTER — APPOINTMENT (EMERGENCY)
Dept: RADIOLOGY | Facility: HOSPITAL | Age: 59
End: 2019-03-16
Payer: COMMERCIAL

## 2019-03-16 ENCOUNTER — HOSPITAL ENCOUNTER (EMERGENCY)
Facility: HOSPITAL | Age: 59
Discharge: HOME | End: 2019-03-17
Attending: EMERGENCY MEDICINE | Admitting: EMERGENCY MEDICINE
Payer: COMMERCIAL

## 2019-03-16 DIAGNOSIS — J11.1 INFLUENZA-LIKE ILLNESS: Primary | ICD-10-CM

## 2019-03-16 DIAGNOSIS — M54.50 LUMBAR BACK PAIN: ICD-10-CM

## 2019-03-16 LAB
BASOPHILS # BLD AUTO: 0.07 THOUSANDS/ΜL (ref 0–0.1)
BASOPHILS NFR BLD AUTO: 1 % (ref 0–1)
EOSINOPHIL # BLD AUTO: 0.06 THOUSAND/ΜL (ref 0–0.61)
EOSINOPHIL NFR BLD AUTO: 0 % (ref 0–6)
ERYTHROCYTE [DISTWIDTH] IN BLOOD BY AUTOMATED COUNT: 14.1 % (ref 11.6–15.1)
HCT VFR BLD AUTO: 48.4 % (ref 36.5–49.3)
HGB BLD-MCNC: 16.3 G/DL (ref 12–17)
IMM GRANULOCYTES # BLD AUTO: 0.06 THOUSAND/UL (ref 0–0.2)
IMM GRANULOCYTES NFR BLD AUTO: 0 % (ref 0–2)
LYMPHOCYTES # BLD AUTO: 1.13 THOUSANDS/ΜL (ref 0.6–4.47)
LYMPHOCYTES NFR BLD AUTO: 8 % (ref 14–44)
MCH RBC QN AUTO: 34 PG (ref 26.8–34.3)
MCHC RBC AUTO-ENTMCNC: 33.7 G/DL (ref 31.4–37.4)
MCV RBC AUTO: 101 FL (ref 82–98)
MONOCYTES # BLD AUTO: 1.18 THOUSAND/ΜL (ref 0.17–1.22)
MONOCYTES NFR BLD AUTO: 8 % (ref 4–12)
NEUTROPHILS # BLD AUTO: 11.64 THOUSANDS/ΜL (ref 1.85–7.62)
NEUTS SEG NFR BLD AUTO: 83 % (ref 43–75)
NRBC BLD AUTO-RTO: 0 /100 WBCS
PLATELET # BLD AUTO: 141 THOUSANDS/UL (ref 149–390)
PMV BLD AUTO: 12.7 FL (ref 8.9–12.7)
RBC # BLD AUTO: 4.8 MILLION/UL (ref 3.88–5.62)
TROPONIN I SERPL-MCNC: <0.02 NG/ML
WBC # BLD AUTO: 14.14 THOUSAND/UL (ref 4.31–10.16)

## 2019-03-16 PROCEDURE — 71046 X-RAY EXAM CHEST 2 VIEWS: CPT

## 2019-03-16 PROCEDURE — 93005 ELECTROCARDIOGRAM TRACING: CPT

## 2019-03-16 PROCEDURE — 84484 ASSAY OF TROPONIN QUANT: CPT | Performed by: EMERGENCY MEDICINE

## 2019-03-16 PROCEDURE — 36415 COLL VENOUS BLD VENIPUNCTURE: CPT | Performed by: EMERGENCY MEDICINE

## 2019-03-16 PROCEDURE — 99285 EMERGENCY DEPT VISIT HI MDM: CPT

## 2019-03-16 PROCEDURE — 85025 COMPLETE CBC W/AUTO DIFF WBC: CPT | Performed by: EMERGENCY MEDICINE

## 2019-03-16 RX ORDER — LIDOCAINE 50 MG/G
1 PATCH TOPICAL ONCE
Status: DISCONTINUED | OUTPATIENT
Start: 2019-03-16 | End: 2019-03-17 | Stop reason: HOSPADM

## 2019-03-16 RX ORDER — ACETAMINOPHEN 325 MG/1
975 TABLET ORAL ONCE
Status: COMPLETED | OUTPATIENT
Start: 2019-03-16 | End: 2019-03-16

## 2019-03-16 RX ADMIN — LIDOCAINE 1 PATCH: 50 PATCH CUTANEOUS at 23:13

## 2019-03-16 RX ADMIN — ACETAMINOPHEN 975 MG: 325 TABLET ORAL at 23:12

## 2019-03-17 VITALS
BODY MASS INDEX: 51.3 KG/M2 | DIASTOLIC BLOOD PRESSURE: 66 MMHG | OXYGEN SATURATION: 97 % | HEART RATE: 94 BPM | TEMPERATURE: 99.5 F | RESPIRATION RATE: 22 BRPM | WEIGHT: 315 LBS | SYSTOLIC BLOOD PRESSURE: 125 MMHG

## 2019-03-17 LAB
ALBUMIN SERPL BCP-MCNC: 3.6 G/DL (ref 3.5–5)
ALP SERPL-CCNC: 181 U/L (ref 46–116)
ALT SERPL W P-5'-P-CCNC: 119 U/L (ref 12–78)
ANION GAP SERPL CALCULATED.3IONS-SCNC: 8 MMOL/L (ref 4–13)
AST SERPL W P-5'-P-CCNC: 133 U/L (ref 5–45)
ATRIAL RATE: 97 BPM
BILIRUB SERPL-MCNC: 1.2 MG/DL (ref 0.2–1)
BUN SERPL-MCNC: 21 MG/DL (ref 5–25)
CALCIUM SERPL-MCNC: 9.9 MG/DL (ref 8.3–10.1)
CHLORIDE SERPL-SCNC: 101 MMOL/L (ref 100–108)
CO2 SERPL-SCNC: 30 MMOL/L (ref 21–32)
CREAT SERPL-MCNC: 1.1 MG/DL (ref 0.6–1.3)
FLUAV AG SPEC QL: NOT DETECTED
FLUBV AG SPEC QL: NOT DETECTED
GFR SERPL CREATININE-BSD FRML MDRD: 75 ML/MIN/1.73SQ M
GLUCOSE SERPL-MCNC: 91 MG/DL (ref 65–140)
LACTATE SERPL-SCNC: 1.9 MMOL/L (ref 0.5–2)
NT-PROBNP SERPL-MCNC: 366 PG/ML
POTASSIUM SERPL-SCNC: 4.6 MMOL/L (ref 3.5–5.3)
PROT SERPL-MCNC: 8.6 G/DL (ref 6.4–8.2)
QRS AXIS: -19 DEGREES
QRSD INTERVAL: 86 MS
QT INTERVAL: 350 MS
QTC INTERVAL: 421 MS
RSV B RNA SPEC QL NAA+PROBE: NOT DETECTED
SODIUM SERPL-SCNC: 139 MMOL/L (ref 136–145)
T WAVE AXIS: 54 DEGREES
VENTRICULAR RATE: 87 BPM

## 2019-03-17 PROCEDURE — 87040 BLOOD CULTURE FOR BACTERIA: CPT | Performed by: EMERGENCY MEDICINE

## 2019-03-17 PROCEDURE — 83880 ASSAY OF NATRIURETIC PEPTIDE: CPT | Performed by: EMERGENCY MEDICINE

## 2019-03-17 PROCEDURE — 93010 ELECTROCARDIOGRAM REPORT: CPT | Performed by: INTERNAL MEDICINE

## 2019-03-17 PROCEDURE — 83605 ASSAY OF LACTIC ACID: CPT | Performed by: EMERGENCY MEDICINE

## 2019-03-17 PROCEDURE — 36415 COLL VENOUS BLD VENIPUNCTURE: CPT | Performed by: EMERGENCY MEDICINE

## 2019-03-17 PROCEDURE — 80053 COMPREHEN METABOLIC PANEL: CPT | Performed by: EMERGENCY MEDICINE

## 2019-03-17 PROCEDURE — 87631 RESP VIRUS 3-5 TARGETS: CPT | Performed by: EMERGENCY MEDICINE

## 2019-03-17 NOTE — ED NOTES
Unable to obtain IV access x 2 attempts  No labs drawn  Another staff RN attempting       Dina Velazquez RN  03/16/19 4875

## 2019-03-17 NOTE — ED NOTES
Other staff RN unable to get IV access, but labs were obtained  Discussed with ED attending  Pt to radiology       Cisco Asher RN  03/16/19 6743

## 2019-03-17 NOTE — ED PROVIDER NOTES
History  Chief Complaint   Patient presents with    Shortness of Breath     pt reprots low backl pain going to shoulders and sob increasing over last 2 days     66year old male presents for evaluation of 2 months of shortness of breath which has been worsening over the past 2 days associated with nonproductive cough, chills and back pain  Patient did not receive the influenza vaccination this season  Patient denies chest pain, abdominal pain, nausea, vomiting, constipation or diarrhea  Patient has history of CHF and states he has been complaint with his diuretic; however, he has noted slight increase in his lower extremity edema  Patient has history of PE and Afib for which he takes Xarelto  History provided by:  Patient  Shortness of Breath   Severity:  Moderate  Onset quality:  Gradual  Duration:  2 months  Timing:  Constant  Progression:  Worsening  Chronicity:  Chronic  Relieved by:  None tried  Worsened by:  Coughing  Ineffective treatments:  None tried  Associated symptoms: cough    Associated symptoms: no abdominal pain, no chest pain, no diaphoresis, no fever, no headaches, no neck pain, no sore throat and no vomiting    Risk factors: hx of PE/DVT, obesity and tobacco use        Prior to Admission Medications   Prescriptions Last Dose Informant Patient Reported?  Taking?   atorvastatin (LIPITOR) 40 mg tablet  Self No No   Sig: Take 1 tablet (40 mg total) by mouth daily   methimazole (TAPAZOLE) 10 mg tablet  Self No No   Sig: TAKE ONE TABLET BY MOUTH EVERY 8 HOURS   metoprolol tartrate (LOPRESSOR) 50 mg tablet  Self No No   Sig: TAKE ONE TABLET BY MOUTH TWICE DAILY   potassium chloride (K-DUR,KLOR-CON) 20 mEq tablet  Self Yes No   Sig: Take 20 mEq by mouth 2 (two) times a day   rivaroxaban (XARELTO) 20 mg tablet  Self No No   Sig: Take 1 tablet (20 mg total) by mouth daily with breakfast   torsemide (DEMADEX) 20 mg tablet  Self Yes No   Sig: Take 1 tablet by mouth daily      Facility-Administered Medications: None       Past Medical History:   Diagnosis Date    Acute diastolic congestive heart failure ([facility] [address] ) 7/3/2017    Atrial fibrillation (HCC)     Bilateral edema of lower extremity 8/22/2016    CAD (coronary artery disease) 10/28/2016    Cardiac disease     Chest pain 8/22/2016    Hyperlipidemia     Hypertension     Hyperthyroidism 8/22/2016    Kidney stone     Kidney stone     Pneumothorax     Presence of IVC filter     Pulmonary embolism (HCC)     Rash 10/28/2016    SOB (shortness of breath) 8/22/2016    Tachycardia 8/22/2016       Past Surgical History:   Procedure Laterality Date    EGD AND COLONOSCOPY N/A 7/28/2017    Procedure: EGD AND COLONOSCOPY;  Surgeon: Alda Gonzales MD;  Location: [facility];  Service: Gastroenterology    LITHOTRIPSY         Family History   Problem Relation Age of Onset    Cancer Mother     Heart disease Father      I have reviewed and agree with the history as documented  Social History     Tobacco Use    Smoking status: Current Every Day Smoker     Packs/day: 0 20     Types: Cigarettes    Smokeless tobacco: Never Used    Tobacco comment: Pt states he smokes when he can afford to do so    Substance Use Topics    Alcohol use: No    Drug use: No        Review of Systems   Constitutional: Negative for appetite change, diaphoresis, fatigue and fever  HENT: Negative for congestion, rhinorrhea and sore throat  Respiratory: Positive for cough and shortness of breath  Negative for chest tightness  Cardiovascular: Positive for leg swelling  Negative for chest pain and palpitations  Gastrointestinal: Negative for abdominal pain, constipation, diarrhea, nausea and vomiting  Genitourinary: Negative for difficulty urinating, dysuria, frequency and hematuria  Musculoskeletal: Positive for back pain  Negative for myalgias, neck pain and neck stiffness  Skin: Negative for pallor  Neurological: Negative for syncope, weakness and headaches     All other systems reviewed and are negative  Physical Exam  Physical Exam   Constitutional: He is oriented to person, place, and time  He appears well-developed and well-nourished  Non-toxic appearance  No distress  HENT:   Head: Normocephalic and atraumatic  Eyes: Pupils are equal, round, and reactive to light  EOM are normal    Neck: Normal range of motion  No tracheal deviation present  No thyromegaly present  Cardiovascular: Normal rate, regular rhythm, normal heart sounds and intact distal pulses  Pulmonary/Chest: Effort normal and breath sounds normal    Abdominal: Soft  Bowel sounds are normal  He exhibits no distension  There is no tenderness  Musculoskeletal: He exhibits edema (2+ bilateral lower extremity edema)  Lymphadenopathy:     He has no cervical adenopathy  Neurological: He is alert and oriented to person, place, and time  Skin: Skin is warm and dry  He is not diaphoretic  Nursing note and vitals reviewed        Vital Signs  ED Triage Vitals   Temperature Pulse Respirations Blood Pressure SpO2   03/16/19 2217 03/16/19 2217 03/16/19 2217 03/16/19 2217 03/16/19 2217   99 5 °F (37 5 °C) (!) 108 (!) 24 112/60 98 %      Temp Source Heart Rate Source Patient Position - Orthostatic VS BP Location FiO2 (%)   03/16/19 2217 03/16/19 2217 03/16/19 2217 03/16/19 2217 --   Tympanic Monitor Sitting Right arm       Pain Score       03/16/19 2312       Worst Possible Pain           Vitals:    03/16/19 2217 03/16/19 2230 03/17/19 0231   BP: 112/60 125/66    Pulse: (!) 108 97 94   Patient Position - Orthostatic VS: Sitting         qSOFA     Row Name 03/17/19 0231 03/16/19 2230 03/16/19 2217          Altered mental status GCS < 15            Respiratory Rate > / =22  1  0  1      Systolic BP < / =128    0  0      Q Sofa Score    0  1            Visual Acuity      ED Medications  Medications   lidocaine (LIDODERM) 5 % patch 1 patch (1 patch Topical Medication Applied 3/16/19 2313)   sodium chloride 0 9 % bolus 1,000 mL (has no administration in time range)   acetaminophen (TYLENOL) tablet 975 mg (975 mg Oral Given 3/16/19 2312)       Diagnostic Studies  Results Reviewed     Procedure Component Value Units Date/Time    Influenza A/B and RSV by PCR [[id number]] Collected:  03/17/19 0027    Lab Status: In process Specimen:  Nasopharyngeal Swab Updated:  03/17/19 0132    Lactic acid, plasma [[id number]]  (Normal) Collected:  03/17/19 0009    Lab Status:  Final result Specimen:  Blood from Arm, Left Updated:  03/17/19 0121     LACTIC ACID 1 9 mmol/L     Narrative:       Result may be elevated if tourniquet was used during collection  B-type natriuretic peptide [[id number]]  (Abnormal) Collected:  03/17/19 0007    Lab Status:  Final result Specimen:  Blood from Arm, Left Updated:  03/17/19 0104     NT-proBNP 366 pg/mL     Comprehensive metabolic panel [[id number]]  (Abnormal) Collected:  03/17/19 0007    Lab Status:  Final result Specimen:  Blood from Arm, Left Updated:  03/17/19 0104     Sodium 139 mmol/L      Potassium 4 6 mmol/L      Chloride 101 mmol/L      CO2 30 mmol/L      ANION GAP 8 mmol/L      BUN 21 mg/dL      Creatinine 1 10 mg/dL      Glucose 91 mg/dL      Calcium 9 9 mg/dL       U/L       U/L      Alkaline Phosphatase 181 U/L      Total Protein 8 6 g/dL      Albumin 3 6 g/dL      Total Bilirubin 1 20 mg/dL      eGFR 75 ml/min/1 73sq m     Narrative:       National Kidney Disease Education Program recommendations are as follows:  GFR calculation is accurate only with a steady state creatinine  Chronic Kidney disease less than 60 ml/min/1 73 sq  meters  Kidney failure less than 15 ml/min/1 73 sq  meters  Blood culture #1 [[id number]] Collected:  03/17/19 0009    Lab Status: In process Specimen:  Blood from Arm, Left Updated:  03/17/19 0038    Blood culture #2 [[id number]] Collected:  03/17/19 0027    Lab Status:   In process Specimen:  Blood from Arm, Right Updated:  03/17/19 0038 Troponin I [[id number]]  (Normal) Collected:  03/16/19 2258    Lab Status:  Final result Specimen:  Blood from Arm, Right Updated:  03/16/19 2356     Troponin I <0 02 ng/mL     CBC and differential [[id number]]  (Abnormal) Collected:  03/16/19 2258    Lab Status:  Final result Specimen:  Blood from Arm, Right Updated:  03/16/19 2336     WBC 14 14 Thousand/uL      RBC 4 80 Million/uL      Hemoglobin 16 3 g/dL      Hematocrit 48 4 %       fL      MCH 34 0 pg      MCHC 33 7 g/dL      RDW 14 1 %      MPV 12 7 fL      Platelets 080 Thousands/uL      nRBC 0 /100 WBCs      Neutrophils Relative 83 %      Immat GRANS % 0 %      Lymphocytes Relative 8 %      Monocytes Relative 8 %      Eosinophils Relative 0 %      Basophils Relative 1 %      Neutrophils Absolute 11 64 Thousands/µL      Immature Grans Absolute 0 06 Thousand/uL      Lymphocytes Absolute 1 13 Thousands/µL      Monocytes Absolute 1 18 Thousand/µL      Eosinophils Absolute 0 06 Thousand/µL      Basophils Absolute 0 07 Thousands/µL                  XR chest 2 views   ED Interpretation by Lyn Thompson MD (03/16 2332)   No acute pulmonary pathology                 Procedures  ECG 12 Lead Documentation  Date/Time: 3/16/2019 10:31 PM  Performed by: Lyn Thompson MD  Authorized by: Lyn Thompson MD     Indications / Diagnosis:  Shortness of breath  ECG reviewed by me, the ED Provider: yes    Patient location:  ED  Previous ECG:     Previous ECG:  Compared to current    Comparison ECG info:  3/16/2018 afib    Similarity:  No change  Interpretation:     Interpretation: abnormal    Rate:     ECG rate:  87    ECG rate assessment: normal    Rhythm:     Rhythm: atrial fibrillation    Ectopy:     Ectopy: none    QRS:     QRS axis:  Normal    QRS intervals:  Normal  Conduction:     Conduction: normal    ST segments:     ST segments:  Normal  T waves:     T waves: flattening      Flattening:  AVL           Phone Contacts  ED Phone Contact    ED Course Initial Sepsis Screening     Row Name 03/17/19 0231                Is the patient's history suggestive of a new or worsening infection? Yes (Proceed)  (Abnormal)   -EE        Suspected source of infection   upper respiratory infection  -EE        Are two or more of the following signs & symptoms of infection both present and new to the patient? Yes (Proceed)  (Abnormal)   -EE        Indicate SIRS criteria  Leukocytosis (WBC > 09552 IJL); Tachypnea > 20 resp per min; Tachycardia > 90 bpm  -EE        If the answer is yes to both questions, suspicion of sepsis is present          If severe sepsis is present AND tissue hypoperfusion perists in the hour after fluid resuscitation or lactate > 4, the patient meets criteria for SEPTIC SHOCK          Are any of the following organ dysfunction criteria present within 6 hours of suspected infection and SIRS criteria that are NOT considered to be chronic conditions? No  -EE        Organ dysfunction          Date of presentation of severe sepsis          Time of presentation of severe sepsis          Tissue hypoperfusion persists in the hour after crystalloid fluid administration, evidenced, by either:          Was hypotension present within one hour of the conclusion of crystalloid fluid administration?         Date of presentation of septic shock          Time of presentation of septic shock            User Key  (r) = Recorded By, (t) = Taken By, (c) = Cosigned By    [address] Name Provider Type    EE Neftali Thibodeaux MD Physician                  MDM  Number of Diagnoses or Management Options  Influenza-like illness: new and requires workup  Lumbar back pain: new and requires workup  Diagnosis management comments: 66year old male presents with shortness of breath which has been acutely worsening over the past 2 days as well as cough and back pain  Back pain improved with lidoderm patch  Labs significant for leukocytosis  No infiltrate on CXR   EKG unchanged  Lactate normal  Patient appears comfortable on re-examination with no signs of respiratory distress  Patient advised to follow up with his PCP  Possible influenza; however, given duration of symptoms, tamiflu unlikely to be beneficial  Discussed return precautions with patient  Amount and/or Complexity of Data Reviewed  Clinical lab tests: ordered and reviewed  Tests in the radiology section of CPT®: ordered  Independent visualization of images, tracings, or specimens: yes    Patient Progress  Patient progress: stable      Disposition  Final diagnoses:   Lumbar back pain   Influenza-like illness     Time reflects when diagnosis was documented in both MDM as applicable and the Disposition within this note     Time User Action Codes Description Comment    3/17/2019  2:18 AM Yaima Fothergill Add [M54 5] Lumbar back pain     3/17/2019  2:18 AM Napoleon MONROE Add [R69] Influenza-like illness     3/17/2019  2:18 AM Yaima Fothergill Modify [M54 5] Lumbar back pain     3/17/2019  2:18 AM Yaima Fothergill Modify [R69] Influenza-like illness       ED Disposition     ED Disposition Condition Date/Time Comment    Discharge Stable [place] Mar 17, 2019  2:19 AM Tomeka Smith discharge to home/self care  Follow-up Information     Follow up With Specialties Details Why Contact Info Additional Information    Noemi Merino, DO Family Medicine Schedule an appointment as soon as possible for a visit in 3 days for re-evaluation [address]  [facility] [address]  [facility] Emergency Medicine Go to  If symptoms worsen [address] [facility]  [address] [facility] [address], [facility] [address], [facility], [address] [facility], [address]          Discharge Medication List as of 3/17/2019  2:20 AM      CONTINUE these medications which have NOT CHANGED    Details   atorvastatin (LIPITOR) 40 mg tablet Take 1 tablet (40 mg total) by mouth daily, Starting Fri 8/17/2018, Normal      methimazole (TAPAZOLE) 10 mg tablet TAKE ONE TABLET BY MOUTH EVERY 8 HOURS, Normal      metoprolol tartrate (LOPRESSOR) 50 mg tablet TAKE ONE TABLET BY MOUTH TWICE DAILY, Normal      potassium chloride (K-DUR,KLOR-CON) 20 mEq tablet Take 20 mEq by mouth 2 (two) times a day, Historical Med      rivaroxaban (XARELTO) 20 mg tablet Take 1 tablet (20 mg total) by mouth daily with breakfast, Starting Thu 10/25/2018, Normal      torsemide (DEMADEX) 20 mg tablet Take 1 tablet by mouth daily, Historical Med           No discharge procedures on file      ED Provider  Electronically Signed by           Rui Taylor MD  03/17/19 9779

## 2019-03-17 NOTE — ED NOTES
Pt resting in stretcher    States he has recently been unable to ambulate for ADLs, even [employer] shopping   "I almost had to do one of those carts to get around "     Herbert [place], JAIME  03/16/19 2375

## 2019-03-17 NOTE — DISCHARGE INSTRUCTIONS
Acute Low Back Pain   WHAT YOU NEED TO KNOW:   Acute low back pain is sudden discomfort in your lower back area that lasts for up to 6 weeks  The discomfort makes it difficult to tolerate activity  DISCHARGE INSTRUCTIONS:   Return to the emergency department if:   · You have severe pain  · You have sudden stiffness and heaviness on both buttocks down to both legs  · You have numbness or weakness in one leg, or pain in both legs  · You have numbness in your genital area or across your lower back  · You cannot control your urine or bowel movements  Contact your healthcare provider if:   · You have a fever  · You have pain at night or when you rest     · Your pain does not get better with treatment  · You have pain that worsens when you cough or sneeze  · You suddenly feel something pop or snap in your back  · You have questions or concerns about your condition or care  Medicines: The following medicines may be ordered by your healthcare provider:  · Acetaminophen  decreases pain  It is available without a doctor's order  Ask how much to take and how often to take it  Follow directions  Acetaminophen can cause liver damage if not taken correctly  · NSAIDs  help decrease swelling and pain  This medicine is available with or without a doctor's order  NSAIDs can cause stomach bleeding or kidney problems in certain people  If you take blood thinner medicine, always ask your healthcare provider if NSAIDs are safe for you  Always read the medicine label and follow directions  · Prescription pain medicine  may be given  Ask your healthcare provider how to take this medicine safely  · Muscle relaxers  decrease pain by relaxing the muscles in your lower spine  · Take your medicine as directed  Contact your healthcare provider if you think your medicine is not helping or if you have side effects  Tell him of her if you are allergic to any medicine   Keep a list of the medicines, vitamins, and herbs you take  Include the amounts, and when and why you take them  Bring the list or the pill bottles to follow-up visits  Carry your medicine list with you in case of an emergency  Self-care:   · Stay active  as much as you can without causing more pain  Bed rest could make your back pain worse  Start with some light exercises such as walking  Avoid heavy lifting until your pain is gone  Ask for more information about the activities or exercises that are right for you  · Ice  helps decrease swelling, pain, and muscle spams  Put crushed ice in a plastic bag  Cover it with a towel  Place it on your lower back for 20 to 30 minutes every 2 hours  Do this for about 2 to 3 days after your pain starts, or as directed  · Heat  helps decrease pain and muscle spasms  Start to use heat after treatment with ice has stopped  Use a small towel dampened with warm water or a heating pad, or sit in a warm bath  Apply heat on the area for 20 to 30 minutes every 2 hours for as many days as directed  Alternate heat and ice  Prevent acute low back pain:   · Use proper body mechanics  ¨ Bend at the hips and knees when you  objects  Do not bend from the waist  Use your leg muscles as you lift the load  Do not use your back  Keep the object close to your chest as you lift it  Try not to twist or lift anything above your waist     ¨ Change your position often when you stand for long periods of time  Rest one foot on a small box or footrest, and then switch to the other foot often  ¨ Try not to sit for long periods of time  When you do, sit in a straight-backed chair with your feet flat on the floor  Never reach, pull, or push while you are sitting  · Do exercises that strengthen your back muscles  Warm up before you exercise  Ask your healthcare provider the best exercises for you  · Maintain a healthy weight  Ask your healthcare provider how much you should weigh   Ask him to help you create a weight loss plan if you are overweight  Follow up with your healthcare provider as directed:  Return for a follow-up visit if you still have pain after 1 to 3 weeks of treatment  You may need to visit an orthopedist if your back pain lasts more than 12 weeks  Write down your questions so you remember to ask them during your visits  © 2017 [employer] Information is for End User's use only and may not be sold, redistributed or otherwise used for commercial purposes  All illustrations and images included in CareNotes® are the copyrighted property of [employer]  or Devin Sood  The above information is an  only  It is not intended as medical advice for individual conditions or treatments  Talk to your doctor, nurse or pharmacist before following any medical regimen to see if it is safe and effective for you  Upper Respiratory Infection   WHAT YOU NEED TO KNOW:   An upper respiratory infection is also called the common cold  It is an infection that can affect your nose, throat, ears, and sinuses  For healthy people, the common cold is usually not serious and does not need special treatment  Cold symptoms are usually worst for the first 3 to 5 days  Most people get better in 7 to 14 days  You may continue to cough for 2 to 3 weeks  Colds are caused by viruses and do not get better with antibiotics  DISCHARGE INSTRUCTIONS:   Return to the emergency department if:   · You have chest pain or trouble breathing  Contact your healthcare provider if:   · You have a fever over 102ºF (39°C)  · Your sore throat gets worse or you see white or yellow spots in your throat  · Your symptoms get worse after 3 to 5 days or your cold is not better in 14 days  · You have a rash anywhere on your skin  · You have large, tender lumps in your neck  · You have thick, green or yellow drainage from your nose      · You cough up thick yellow, green, or bloody mucus     · You have vomiting for more than 24 hours and cannot keep fluids down  · You have a bad earache  · You have questions or concerns about your condition or care  Medicines: You may need any of the following:  · Decongestants  help reduce nasal congestion and help you breathe more easily  If you take decongestant pills, they may make you feel restless or cause problems with your sleep  Do not use decongestant sprays for more than a few days  · Cough suppressants  help reduce coughing  Ask your healthcare provider which type of cough medicine is best for you  · NSAIDs , such as ibuprofen, help decrease swelling, pain, and fever  NSAIDs can cause stomach bleeding or kidney problems in certain people  If you take blood thinner medicine, always ask your healthcare provider if NSAIDs are safe for you  Always read the medicine label and follow directions  · Acetaminophen  decreases pain and fever  It is available without a doctor's order  Ask how much to take and how often to take it  Follow directions  Read the labels of all other medicines you are using to see if they also contain acetaminophen, or ask your doctor or pharmacist  Acetaminophen can cause liver damage if not taken correctly  Do not use more than 4 grams (4,000 milligrams) total of acetaminophen in one day  · Take your medicine as directed  Contact your healthcare provider if you think your medicine is not helping or if you have side effects  Tell him or her if you are allergic to any medicine  Keep a list of the medicines, vitamins, and herbs you take  Include the amounts, and when and why you take them  Bring the list or the pill bottles to follow-up visits  Carry your medicine list with you in case of an emergency  Follow up with your healthcare provider as directed:  Write down your questions so you remember to ask them during your visits  Self-care:   · Rest as much as possible  Slowly start to do more each day  · Drink more liquids as directed  Liquids will help thin and loosen mucus so you can cough it up  Liquids will also help prevent dehydration  Liquids that help prevent dehydration include water, fruit juice, and broth  Do not drink liquids that contain caffeine  Caffeine can increase your risk for dehydration  Ask your healthcare provider how much liquid to drink each day  · Soothe a sore throat  Gargle with warm salt water  This helps your sore throat feel better  Make salt water by dissolving ¼ teaspoon salt in 1 cup warm water  You may also suck on hard candy or throat lozenges  You may use a sore throat spray  · Use a humidifier or vaporizer  Use a cool mist humidifier or a vaporizer to increase air moisture in your home  This may make it easier for you to breathe and help decrease your cough  · Use saline nasal drops as directed  These help relieve congestion  · Apply petroleum-based jelly around the outside of your nostrils  This can decrease irritation from blowing your nose  · Do not smoke  Nicotine and other chemicals in cigarettes and cigars can make your symptoms worse  They can also cause infections such as bronchitis or pneumonia  Ask your healthcare provider for information if you currently smoke and need help to quit  E-cigarettes or smokeless tobacco still contain nicotine  Talk to your healthcare provider before you use these products  Prevent spreading your cold to others:   · Try to stay away from other people during the first 2 to 3 days of your cold when it is more easily spread  · Do not share food or drinks  · Do not share hand towels with household members  · Wash your hands often, especially after you blow your nose  Turn away from other people and cover your mouth and nose with a tissue when you sneeze or cough         © 2017 [employer] Information is for End User's use only and may not be sold, redistributed or otherwise used for commercial purposes  All illustrations and images included in CareNotes® are the copyrighted property of [employer]  or Devin Sood  The above information is an  only  It is not intended as medical advice for individual conditions or treatments  Talk to your doctor, nurse or pharmacist before following any medical regimen to see if it is safe and effective for you

## 2019-03-22 LAB
BACTERIA BLD CULT: NORMAL
BACTERIA BLD CULT: NORMAL

## 2019-08-13 ENCOUNTER — APPOINTMENT (INPATIENT)
Dept: RADIOLOGY | Facility: HOSPITAL | Age: 59
DRG: 463 | End: 2019-08-13
Payer: COMMERCIAL

## 2019-08-13 ENCOUNTER — ANESTHESIA (INPATIENT)
Dept: PERIOP | Facility: HOSPITAL | Age: 59
DRG: 463 | End: 2019-08-13
Payer: COMMERCIAL

## 2019-08-13 ENCOUNTER — APPOINTMENT (EMERGENCY)
Dept: CT IMAGING | Facility: HOSPITAL | Age: 59
DRG: 463 | End: 2019-08-13
Payer: COMMERCIAL

## 2019-08-13 ENCOUNTER — ANESTHESIA EVENT (INPATIENT)
Dept: PERIOP | Facility: HOSPITAL | Age: 59
DRG: 463 | End: 2019-08-13
Payer: COMMERCIAL

## 2019-08-13 ENCOUNTER — HOSPITAL ENCOUNTER (INPATIENT)
Facility: HOSPITAL | Age: 59
LOS: 4 days | Discharge: HOME | DRG: 463 | End: 2019-08-17
Attending: EMERGENCY MEDICINE | Admitting: INTERNAL MEDICINE
Payer: COMMERCIAL

## 2019-08-13 DIAGNOSIS — N20.0 KIDNEY STONE: ICD-10-CM

## 2019-08-13 DIAGNOSIS — E05.90 HYPERTHYROIDISM: ICD-10-CM

## 2019-08-13 DIAGNOSIS — I50.32 CHRONIC DIASTOLIC CONGESTIVE HEART FAILURE (HCC): ICD-10-CM

## 2019-08-13 DIAGNOSIS — I48.0 PAROXYSMAL ATRIAL FIBRILLATION WITH RVR (HCC): ICD-10-CM

## 2019-08-13 DIAGNOSIS — N39.0 UTI (URINARY TRACT INFECTION): ICD-10-CM

## 2019-08-13 DIAGNOSIS — R50.9 FEVER: ICD-10-CM

## 2019-08-13 DIAGNOSIS — N10 PYELONEPHRITIS, ACUTE: Primary | ICD-10-CM

## 2019-08-13 PROBLEM — R06.89 ACUTE RESPIRATORY INSUFFICIENCY: Status: ACTIVE | Noted: 2019-08-13

## 2019-08-13 PROBLEM — N30.01 ACUTE CYSTITIS WITH HEMATURIA: Status: ACTIVE | Noted: 2019-08-13

## 2019-08-13 LAB
ALBUMIN SERPL BCP-MCNC: 2.9 G/DL (ref 3.5–5)
ALP SERPL-CCNC: 106 U/L (ref 46–116)
ALT SERPL W P-5'-P-CCNC: 34 U/L (ref 12–78)
ANION GAP SERPL CALCULATED.3IONS-SCNC: 10 MMOL/L (ref 4–13)
APTT PPP: 36 SECONDS (ref 23–37)
AST SERPL W P-5'-P-CCNC: 34 U/L (ref 5–45)
BACTERIA UR QL AUTO: ABNORMAL /HPF
BASOPHILS # BLD AUTO: 0.05 THOUSANDS/ΜL (ref 0–0.1)
BASOPHILS NFR BLD AUTO: 1 % (ref 0–1)
BILIRUB SERPL-MCNC: 0.6 MG/DL (ref 0.2–1)
BILIRUB UR QL STRIP: NEGATIVE
BUN SERPL-MCNC: 18 MG/DL (ref 5–25)
CALCIUM SERPL-MCNC: 8.8 MG/DL (ref 8.3–10.1)
CHLORIDE SERPL-SCNC: 105 MMOL/L (ref 100–108)
CLARITY UR: ABNORMAL
CLARITY, POC: CLEAR
CO2 SERPL-SCNC: 26 MMOL/L (ref 21–32)
COLOR UR: YELLOW
COLOR, POC: NORMAL
CREAT SERPL-MCNC: 0.85 MG/DL (ref 0.6–1.3)
EOSINOPHIL # BLD AUTO: 0.18 THOUSAND/ΜL (ref 0–0.61)
EOSINOPHIL NFR BLD AUTO: 2 % (ref 0–6)
ERYTHROCYTE [DISTWIDTH] IN BLOOD BY AUTOMATED COUNT: 14.3 % (ref 11.6–15.1)
EXT BILIRUBIN, UA: NORMAL
EXT BLOOD URINE: NORMAL
EXT GLUCOSE, UA: NORMAL
EXT KETONES: NORMAL
EXT NITRITE, UA: POSITIVE
EXT PH, UA: 6
EXT PROTEIN, UA: NORMAL
EXT SPECIFIC GRAVITY, UA: 1.03
EXT UROBILINOGEN: 0.2
GFR SERPL CREATININE-BSD FRML MDRD: 97 ML/MIN/1.73SQ M
GLUCOSE SERPL-MCNC: 109 MG/DL (ref 65–140)
GLUCOSE UR STRIP-MCNC: NEGATIVE MG/DL
HCT VFR BLD AUTO: 43.8 % (ref 36.5–49.3)
HGB BLD-MCNC: 14.4 G/DL (ref 12–17)
HGB UR QL STRIP.AUTO: ABNORMAL
IMM GRANULOCYTES # BLD AUTO: 0.04 THOUSAND/UL (ref 0–0.2)
IMM GRANULOCYTES NFR BLD AUTO: 0 % (ref 0–2)
KETONES UR STRIP-MCNC: NEGATIVE MG/DL
LEUKOCYTE ESTERASE UR QL STRIP: ABNORMAL
LYMPHOCYTES # BLD AUTO: 1.84 THOUSANDS/ΜL (ref 0.6–4.47)
LYMPHOCYTES NFR BLD AUTO: 19 % (ref 14–44)
MCH RBC QN AUTO: 31.9 PG (ref 26.8–34.3)
MCHC RBC AUTO-ENTMCNC: 32.9 G/DL (ref 31.4–37.4)
MCV RBC AUTO: 97 FL (ref 82–98)
MONOCYTES # BLD AUTO: 0.91 THOUSAND/ΜL (ref 0.17–1.22)
MONOCYTES NFR BLD AUTO: 9 % (ref 4–12)
NEUTROPHILS # BLD AUTO: 6.63 THOUSANDS/ΜL (ref 1.85–7.62)
NEUTS SEG NFR BLD AUTO: 69 % (ref 43–75)
NITRITE UR QL STRIP: POSITIVE
NON-SQ EPI CELLS URNS QL MICRO: ABNORMAL /HPF
NRBC BLD AUTO-RTO: 0 /100 WBCS
OTHER STN SPEC: ABNORMAL
PH UR STRIP.AUTO: 6 [PH]
PLATELET # BLD AUTO: 198 THOUSANDS/UL (ref 149–390)
PMV BLD AUTO: 10.7 FL (ref 8.9–12.7)
POTASSIUM SERPL-SCNC: 4 MMOL/L (ref 3.5–5.3)
PROT SERPL-MCNC: 8.1 G/DL (ref 6.4–8.2)
PROT UR STRIP-MCNC: ABNORMAL MG/DL
RBC # BLD AUTO: 4.51 MILLION/UL (ref 3.88–5.62)
RBC #/AREA URNS AUTO: ABNORMAL /HPF
SODIUM SERPL-SCNC: 141 MMOL/L (ref 136–145)
SP GR UR STRIP.AUTO: >=1.03 (ref 1–1.03)
UROBILINOGEN UR QL STRIP.AUTO: 2 E.U./DL
WBC # BLD AUTO: 9.65 THOUSAND/UL (ref 4.31–10.16)
WBC # BLD EST: NORMAL 10*3/UL
WBC #/AREA URNS AUTO: ABNORMAL /HPF

## 2019-08-13 PROCEDURE — C1769 GUIDE WIRE: HCPCS | Performed by: UROLOGY

## 2019-08-13 PROCEDURE — 87186 SC STD MICRODIL/AGAR DIL: CPT | Performed by: PHYSICIAN ASSISTANT

## 2019-08-13 PROCEDURE — 0T768DZ DILATION OF RIGHT URETER WITH INTRALUMINAL DEVICE, VIA NATURAL OR ARTIFICIAL OPENING ENDOSCOPIC: ICD-10-PCS | Performed by: INTERNAL MEDICINE

## 2019-08-13 PROCEDURE — 81002 URINALYSIS NONAUTO W/O SCOPE: CPT | Performed by: PHYSICIAN ASSISTANT

## 2019-08-13 PROCEDURE — 81001 URINALYSIS AUTO W/SCOPE: CPT | Performed by: PHYSICIAN ASSISTANT

## 2019-08-13 PROCEDURE — 96376 TX/PRO/DX INJ SAME DRUG ADON: CPT

## 2019-08-13 PROCEDURE — 99252 IP/OBS CONSLTJ NEW/EST SF 35: CPT | Performed by: UROLOGY

## 2019-08-13 PROCEDURE — 80053 COMPREHEN METABOLIC PANEL: CPT | Performed by: PHYSICIAN ASSISTANT

## 2019-08-13 PROCEDURE — C2617 STENT, NON-COR, TEM W/O DEL: HCPCS | Performed by: UROLOGY

## 2019-08-13 PROCEDURE — 74420 UROGRAPHY RTRGR +-KUB: CPT

## 2019-08-13 PROCEDURE — 87040 BLOOD CULTURE FOR BACTERIA: CPT | Performed by: PHYSICIAN ASSISTANT

## 2019-08-13 PROCEDURE — 99285 EMERGENCY DEPT VISIT HI MDM: CPT | Performed by: PHYSICIAN ASSISTANT

## 2019-08-13 PROCEDURE — NC001 PR NO CHARGE: Performed by: UROLOGY

## 2019-08-13 PROCEDURE — 87086 URINE CULTURE/COLONY COUNT: CPT | Performed by: PHYSICIAN ASSISTANT

## 2019-08-13 PROCEDURE — 99285 EMERGENCY DEPT VISIT HI MDM: CPT

## 2019-08-13 PROCEDURE — 71045 X-RAY EXAM CHEST 1 VIEW: CPT

## 2019-08-13 PROCEDURE — 96361 HYDRATE IV INFUSION ADD-ON: CPT

## 2019-08-13 PROCEDURE — 87077 CULTURE AEROBIC IDENTIFY: CPT | Performed by: UROLOGY

## 2019-08-13 PROCEDURE — 87186 SC STD MICRODIL/AGAR DIL: CPT | Performed by: UROLOGY

## 2019-08-13 PROCEDURE — 85025 COMPLETE CBC W/AUTO DIFF WBC: CPT | Performed by: PHYSICIAN ASSISTANT

## 2019-08-13 PROCEDURE — 87086 URINE CULTURE/COLONY COUNT: CPT | Performed by: UROLOGY

## 2019-08-13 PROCEDURE — 93005 ELECTROCARDIOGRAM TRACING: CPT

## 2019-08-13 PROCEDURE — 94760 N-INVAS EAR/PLS OXIMETRY 1: CPT

## 2019-08-13 PROCEDURE — 74176 CT ABD & PELVIS W/O CONTRAST: CPT

## 2019-08-13 PROCEDURE — 85730 THROMBOPLASTIN TIME PARTIAL: CPT | Performed by: PHYSICIAN ASSISTANT

## 2019-08-13 PROCEDURE — 52332 CYSTOSCOPY AND TREATMENT: CPT | Performed by: UROLOGY

## 2019-08-13 PROCEDURE — 87077 CULTURE AEROBIC IDENTIFY: CPT | Performed by: PHYSICIAN ASSISTANT

## 2019-08-13 PROCEDURE — 94660 CPAP INITIATION&MGMT: CPT

## 2019-08-13 PROCEDURE — 36415 COLL VENOUS BLD VENIPUNCTURE: CPT | Performed by: PHYSICIAN ASSISTANT

## 2019-08-13 PROCEDURE — 96374 THER/PROPH/DIAG INJ IV PUSH: CPT

## 2019-08-13 DEVICE — INLAY OPTIMA URETERAL STENT W/O GUIDEWIRE
Type: IMPLANTABLE DEVICE | Site: URETER | Status: NON-FUNCTIONAL
Brand: BARD® INLAY OPTIMA® URETERAL STENT
Removed: 2019-09-27

## 2019-08-13 RX ORDER — METOPROLOL TARTRATE 50 MG/1
50 TABLET, FILM COATED ORAL 2 TIMES DAILY
Status: DISCONTINUED | OUTPATIENT
Start: 2019-08-14 | End: 2019-08-15

## 2019-08-13 RX ORDER — TAMSULOSIN HYDROCHLORIDE 0.4 MG/1
0.4 CAPSULE ORAL ONCE
Status: COMPLETED | OUTPATIENT
Start: 2019-08-13 | End: 2019-08-13

## 2019-08-13 RX ORDER — DIGOXIN 250 MCG
250 TABLET ORAL DAILY
Status: DISCONTINUED | OUTPATIENT
Start: 2019-08-14 | End: 2019-08-17 | Stop reason: HOSPADM

## 2019-08-13 RX ORDER — FENTANYL CITRATE 50 UG/ML
INJECTION, SOLUTION INTRAMUSCULAR; INTRAVENOUS AS NEEDED
Status: DISCONTINUED | OUTPATIENT
Start: 2019-08-13 | End: 2019-08-13 | Stop reason: SURG

## 2019-08-13 RX ORDER — GLYCOPYRROLATE 0.2 MG/ML
INJECTION INTRAMUSCULAR; INTRAVENOUS AS NEEDED
Status: DISCONTINUED | OUTPATIENT
Start: 2019-08-13 | End: 2019-08-13 | Stop reason: SURG

## 2019-08-13 RX ORDER — DILTIAZEM HYDROCHLORIDE 5 MG/ML
10 INJECTION INTRAVENOUS ONCE
Status: COMPLETED | OUTPATIENT
Start: 2019-08-14 | End: 2019-08-14

## 2019-08-13 RX ORDER — ATORVASTATIN CALCIUM 40 MG/1
40 TABLET, FILM COATED ORAL DAILY
Status: DISCONTINUED | OUTPATIENT
Start: 2019-08-14 | End: 2019-08-17 | Stop reason: HOSPADM

## 2019-08-13 RX ORDER — FENTANYL CITRATE 50 UG/ML
INJECTION, SOLUTION INTRAMUSCULAR; INTRAVENOUS
Status: DISPENSED
Start: 2019-08-13 | End: 2019-08-14

## 2019-08-13 RX ORDER — TORSEMIDE 20 MG/1
20 TABLET ORAL DAILY
Status: DISCONTINUED | OUTPATIENT
Start: 2019-08-14 | End: 2019-08-14

## 2019-08-13 RX ORDER — FENTANYL CITRATE 50 UG/ML
25 INJECTION, SOLUTION INTRAMUSCULAR; INTRAVENOUS ONCE
Status: COMPLETED | OUTPATIENT
Start: 2019-08-13 | End: 2019-08-13

## 2019-08-13 RX ORDER — HYDROMORPHONE HCL/PF 1 MG/ML
1 SYRINGE (ML) INJECTION ONCE
Status: COMPLETED | OUTPATIENT
Start: 2019-08-13 | End: 2019-08-13

## 2019-08-13 RX ORDER — ONDANSETRON 2 MG/ML
4 INJECTION INTRAMUSCULAR; INTRAVENOUS ONCE
Status: COMPLETED | OUTPATIENT
Start: 2019-08-13 | End: 2019-08-13

## 2019-08-13 RX ORDER — ONDANSETRON 2 MG/ML
4 INJECTION INTRAMUSCULAR; INTRAVENOUS ONCE AS NEEDED
Status: DISCONTINUED | OUTPATIENT
Start: 2019-08-13 | End: 2019-08-13 | Stop reason: HOSPADM

## 2019-08-13 RX ORDER — DIGOXIN 250 MCG
250 TABLET ORAL DAILY
COMMUNITY
End: 2020-02-17 | Stop reason: SDUPTHER

## 2019-08-13 RX ORDER — FENTANYL CITRATE/PF 50 MCG/ML
50 SYRINGE (ML) INJECTION
Status: DISCONTINUED | OUTPATIENT
Start: 2019-08-13 | End: 2019-08-13 | Stop reason: HOSPADM

## 2019-08-13 RX ORDER — ONDANSETRON 2 MG/ML
INJECTION INTRAMUSCULAR; INTRAVENOUS AS NEEDED
Status: DISCONTINUED | OUTPATIENT
Start: 2019-08-13 | End: 2019-08-13 | Stop reason: SURG

## 2019-08-13 RX ORDER — DILTIAZEM HYDROCHLORIDE 5 MG/ML
INJECTION INTRAVENOUS
Status: DISPENSED
Start: 2019-08-13 | End: 2019-08-14

## 2019-08-13 RX ORDER — METOPROLOL TARTRATE 5 MG/5ML
5 INJECTION INTRAVENOUS ONCE
Status: COMPLETED | OUTPATIENT
Start: 2019-08-13 | End: 2019-08-13

## 2019-08-13 RX ORDER — MORPHINE SULFATE 4 MG/ML
4 INJECTION, SOLUTION INTRAMUSCULAR; INTRAVENOUS ONCE
Status: COMPLETED | OUTPATIENT
Start: 2019-08-13 | End: 2019-08-13

## 2019-08-13 RX ORDER — ACETAMINOPHEN 650 MG/1
975 SUPPOSITORY RECTAL ONCE
Status: COMPLETED | OUTPATIENT
Start: 2019-08-13 | End: 2019-08-13

## 2019-08-13 RX ORDER — MIDAZOLAM HYDROCHLORIDE 1 MG/ML
INJECTION INTRAMUSCULAR; INTRAVENOUS AS NEEDED
Status: DISCONTINUED | OUTPATIENT
Start: 2019-08-13 | End: 2019-08-13 | Stop reason: SURG

## 2019-08-13 RX ORDER — METOCLOPRAMIDE HYDROCHLORIDE 5 MG/ML
INJECTION INTRAMUSCULAR; INTRAVENOUS AS NEEDED
Status: DISCONTINUED | OUTPATIENT
Start: 2019-08-13 | End: 2019-08-13 | Stop reason: SURG

## 2019-08-13 RX ORDER — SODIUM CHLORIDE, SODIUM GLUCONATE, SODIUM ACETATE, POTASSIUM CHLORIDE, MAGNESIUM CHLORIDE, SODIUM PHOSPHATE, DIBASIC, AND POTASSIUM PHOSPHATE .53; .5; .37; .037; .03; .012; .00082 G/100ML; G/100ML; G/100ML; G/100ML; G/100ML; G/100ML; G/100ML
125 INJECTION, SOLUTION INTRAVENOUS CONTINUOUS
Status: DISCONTINUED | OUTPATIENT
Start: 2019-08-13 | End: 2019-08-14

## 2019-08-13 RX ORDER — CEFTRIAXONE 1 G/50ML
1000 INJECTION, SOLUTION INTRAVENOUS ONCE
Status: COMPLETED | OUTPATIENT
Start: 2019-08-13 | End: 2019-08-13

## 2019-08-13 RX ORDER — HYDROMORPHONE HCL/PF 1 MG/ML
0.5 SYRINGE (ML) INJECTION
Status: DISCONTINUED | OUTPATIENT
Start: 2019-08-13 | End: 2019-08-13 | Stop reason: HOSPADM

## 2019-08-13 RX ORDER — SODIUM CHLORIDE, SODIUM LACTATE, POTASSIUM CHLORIDE, CALCIUM CHLORIDE 600; 310; 30; 20 MG/100ML; MG/100ML; MG/100ML; MG/100ML
INJECTION, SOLUTION INTRAVENOUS CONTINUOUS PRN
Status: DISCONTINUED | OUTPATIENT
Start: 2019-08-13 | End: 2019-08-13

## 2019-08-13 RX ORDER — PROPOFOL 10 MG/ML
INJECTION, EMULSION INTRAVENOUS AS NEEDED
Status: DISCONTINUED | OUTPATIENT
Start: 2019-08-13 | End: 2019-08-13 | Stop reason: SURG

## 2019-08-13 RX ORDER — LIDOCAINE HYDROCHLORIDE 10 MG/ML
INJECTION, SOLUTION INFILTRATION; PERINEURAL AS NEEDED
Status: DISCONTINUED | OUTPATIENT
Start: 2019-08-13 | End: 2019-08-13 | Stop reason: SURG

## 2019-08-13 RX ORDER — SODIUM CHLORIDE, SODIUM LACTATE, POTASSIUM CHLORIDE, CALCIUM CHLORIDE 600; 310; 30; 20 MG/100ML; MG/100ML; MG/100ML; MG/100ML
100 INJECTION, SOLUTION INTRAVENOUS CONTINUOUS
Status: ACTIVE | OUTPATIENT
Start: 2019-08-13 | End: 2019-08-13

## 2019-08-13 RX ORDER — ACETAMINOPHEN 325 MG/1
650 TABLET ORAL ONCE
Status: COMPLETED | OUTPATIENT
Start: 2019-08-13 | End: 2019-08-13

## 2019-08-13 RX ORDER — METHIMAZOLE 5 MG/1
10 TABLET ORAL EVERY 8 HOURS
Status: DISCONTINUED | OUTPATIENT
Start: 2019-08-14 | End: 2019-08-17

## 2019-08-13 RX ADMIN — ONDANSETRON 4 MG: 2 INJECTION INTRAMUSCULAR; INTRAVENOUS at 22:35

## 2019-08-13 RX ADMIN — Medication 3000 MG: at 22:29

## 2019-08-13 RX ADMIN — FENTANYL CITRATE 25 MCG: 50 INJECTION, SOLUTION INTRAMUSCULAR; INTRAVENOUS at 23:40

## 2019-08-13 RX ADMIN — PROPOFOL 150 MG: 10 INJECTION, EMULSION INTRAVENOUS at 22:15

## 2019-08-13 RX ADMIN — HYDROMORPHONE HYDROCHLORIDE 1 MG: 1 INJECTION, SOLUTION INTRAMUSCULAR; INTRAVENOUS; SUBCUTANEOUS at 20:35

## 2019-08-13 RX ADMIN — CEFTRIAXONE 1000 MG: 1 INJECTION, SOLUTION INTRAVENOUS at 20:45

## 2019-08-13 RX ADMIN — TAMSULOSIN HYDROCHLORIDE 0.4 MG: 0.4 CAPSULE ORAL at 20:40

## 2019-08-13 RX ADMIN — FENTANYL CITRATE 25 MCG: 50 INJECTION, SOLUTION INTRAMUSCULAR; INTRAVENOUS at 22:31

## 2019-08-13 RX ADMIN — ACETAMINOPHEN 975 MG: 650 SUPPOSITORY RECTAL at 23:35

## 2019-08-13 RX ADMIN — METOCLOPRAMIDE 10 MG: 5 INJECTION, SOLUTION INTRAMUSCULAR; INTRAVENOUS at 22:14

## 2019-08-13 RX ADMIN — ACETAMINOPHEN 650 MG: 325 TABLET, FILM COATED ORAL at 20:35

## 2019-08-13 RX ADMIN — LIDOCAINE HYDROCHLORIDE 50 MG: 10 INJECTION, SOLUTION INFILTRATION; PERINEURAL at 22:15

## 2019-08-13 RX ADMIN — MORPHINE SULFATE 4 MG: 4 INJECTION INTRAVENOUS at 17:50

## 2019-08-13 RX ADMIN — MIDAZOLAM 2 MG: 1 INJECTION INTRAMUSCULAR; INTRAVENOUS at 22:12

## 2019-08-13 RX ADMIN — SODIUM CHLORIDE 1000 ML: 0.9 INJECTION, SOLUTION INTRAVENOUS at 17:15

## 2019-08-13 RX ADMIN — FENTANYL CITRATE 50 MCG: 50 INJECTION, SOLUTION INTRAMUSCULAR; INTRAVENOUS at 22:43

## 2019-08-13 RX ADMIN — METOPROLOL TARTRATE 5 MG: 5 INJECTION, SOLUTION INTRAVENOUS at 23:03

## 2019-08-13 RX ADMIN — MORPHINE SULFATE 4 MG: 4 INJECTION INTRAVENOUS at 18:43

## 2019-08-13 RX ADMIN — SODIUM CHLORIDE, SODIUM LACTATE, POTASSIUM CHLORIDE, AND CALCIUM CHLORIDE: .6; .31; .03; .02 INJECTION, SOLUTION INTRAVENOUS at 21:58

## 2019-08-13 RX ADMIN — ONDANSETRON 4 MG: 2 INJECTION INTRAMUSCULAR; INTRAVENOUS at 20:32

## 2019-08-13 RX ADMIN — GLYCOPYRROLATE 0.1 MG: 0.2 INJECTION, SOLUTION INTRAMUSCULAR; INTRAVENOUS at 22:12

## 2019-08-13 RX ADMIN — FENTANYL CITRATE 25 MCG: 50 INJECTION, SOLUTION INTRAMUSCULAR; INTRAVENOUS at 22:21

## 2019-08-13 NOTE — ED PROVIDER NOTES
History  Chief Complaint   Patient presents with    Flank Pain     Patient states he awoke froma  nap at 14:30 with R flank pain  Alia Champion is darl and he has not taken anything for pain     Patient is a 63 y/o M with h/o CHF, a-fib, PE,  that presents to the ED with right flank pain that started 2 hours ago after a nap  He denies radiation of pain and states his urine has been dark  He does have a h/o kidney stones and this feels similar  No fevers, nausea, vomiting, dysuria  Nothing makes the pain worse, nothing makes it better  History provided by:  Patient  Flank Pain   Pain location:  R flank  Pain quality: sharp and stabbing    Pain radiates to:  Does not radiate  Pain severity:  Moderate  Onset quality:  Sudden  Duration:  2 hours  Timing:  Constant  Progression:  Unchanged  Chronicity:  Recurrent  Context: not sick contacts, not suspicious food intake and not trauma    Relieved by:  Nothing  Worsened by:  Nothing  Ineffective treatments:  None tried  Associated symptoms: no chills, no constipation, no diarrhea, no dysuria, no fever, no nausea and no vomiting    Risk factors: obesity    Risk factors: no recent hospitalization        Prior to Admission Medications   Prescriptions Last Dose Informant Patient Reported?  Taking?   atorvastatin (LIPITOR) 40 mg tablet  Self No Yes   Sig: Take 1 tablet (40 mg total) by mouth daily   digoxin (LANOXIN) 0 25 mg tablet   Yes Yes   Sig: Take 250 mcg by mouth daily   methimazole (TAPAZOLE) 10 mg tablet  Self No Yes   Sig: TAKE ONE TABLET BY MOUTH EVERY 8 HOURS   metoprolol tartrate (LOPRESSOR) 50 mg tablet  Self No Yes   Sig: TAKE ONE TABLET BY MOUTH TWICE DAILY   potassium chloride (K-DUR,KLOR-CON) 20 mEq tablet  Self Yes Yes   Sig: Take 20 mEq by mouth 2 (two) times a day   rivaroxaban (XARELTO) 20 mg tablet  Self No Yes   Sig: Take 1 tablet (20 mg total) by mouth daily with breakfast   torsemide (DEMADEX) 20 mg tablet  Self Yes Yes   Sig: Take 1 tablet by mouth daily      Facility-Administered Medications: None       Past Medical History:   Diagnosis Date    Acute diastolic congestive heart failure ([facility] [address] ) 7/3/2017    Atrial fibrillation (HCC)     Bilateral edema of lower extremity 8/22/2016    CAD (coronary artery disease) 10/28/2016    Cardiac disease     Chest pain 8/22/2016    Hyperlipidemia     Hypertension     Hyperthyroidism 8/22/2016    Kidney stone     Kidney stone     Pneumothorax     Presence of IVC filter     Pulmonary embolism (HCC)     Rash 10/28/2016    SOB (shortness of breath) 8/22/2016    Tachycardia 8/22/2016       Past Surgical History:   Procedure Laterality Date    EGD AND COLONOSCOPY N/A 7/28/2017    Procedure: EGD AND COLONOSCOPY;  Surgeon: Addy Vaughan MD;  Location:  MAIN OR;  Service: Gastroenterology    LITHOTRIPSY         Family History   Problem Relation Age of Onset    Cancer Mother     Heart disease Father      I have reviewed and agree with the history as documented  Social History     Tobacco Use    Smoking status: Current Every Day Smoker     Packs/day: 0 20     Types: Cigarettes    Smokeless tobacco: Never Used    Tobacco comment: Pt states he smokes when he can afford to do so    Substance Use Topics    Alcohol use: No    Drug use: No        Review of Systems   Constitutional: Negative for chills and fever  Gastrointestinal: Negative for constipation, diarrhea, nausea and vomiting  Genitourinary: Positive for flank pain  Negative for dysuria  Musculoskeletal: Positive for back pain  Negative for neck pain  Skin: Negative for color change and rash  Neurological: Negative for dizziness, weakness and numbness  Psychiatric/Behavioral: Negative for confusion  All other systems reviewed and are negative  Physical Exam  Physical Exam   Constitutional: He is oriented to person, place, and time  He appears well-developed and well-nourished  He is cooperative   He does not appear ill  No distress  HENT:   Head: Normocephalic and atraumatic  Nose: Nose normal    Mouth/Throat: Oropharynx is clear and moist    Eyes: Conjunctivae are normal    Neck: Normal range of motion  Cardiovascular: Normal rate  An irregular rhythm present  1+ pitting edema b/l LE  Pulmonary/Chest: Effort normal and breath sounds normal    Abdominal: Bowel sounds are normal  There is no tenderness  Obese abdomen   Musculoskeletal: Normal range of motion  Neurological: He is alert and oriented to person, place, and time  He has normal strength  No sensory deficit  Skin: Skin is warm and dry  No rash noted  He is not diaphoretic  No pallor  Nursing note and vitals reviewed        Vital Signs  ED Triage Vitals   Temperature Pulse Respirations Blood Pressure SpO2   08/13/19 1638 08/13/19 1633 08/13/19 1638 08/13/19 1633 08/13/19 1633   98 8 °F (37 1 °C) 97 18 155/76 93 %      Temp Source Heart Rate Source Patient Position - Orthostatic VS BP Location FiO2 (%)   08/13/19 2019 08/13/19 1700 08/13/19 1730 08/13/19 1700 --   Tympanic Monitor Lying Right arm       Pain Score       08/13/19 1638       7           Vitals:    08/13/19 2019 08/13/19 2030 08/13/19 2050 08/13/19 2053   BP:  (!) 204/135  170/83   Pulse:  (!) 127 (!) 120 (!) 122   Patient Position - Orthostatic VS: Lying   Lying         Visual Acuity      ED Medications  Medications   sodium chloride 0 9 % bolus 1,000 mL (0 mL Intravenous Stopped 8/13/19 1830)   morphine (PF) 4 mg/mL injection 4 mg (4 mg Intravenous Given 8/13/19 1750)   morphine (PF) 4 mg/mL injection 4 mg (4 mg Intravenous Given 8/13/19 1843)   ondansetron (ZOFRAN) injection 4 mg (4 mg Intravenous Given 8/13/19 2032)   HYDROmorphone (DILAUDID) injection 1 mg (1 mg Intravenous Given 8/13/19 2035)   cefTRIAXone (ROCEPHIN) IVPB (premix) 1,000 mg (0 mg Intravenous Stopped 8/13/19 2110)   acetaminophen (TYLENOL) tablet 650 mg (650 mg Oral Given 8/13/19 2035)   tamsulosin (FLOMAX) capsule 0 4 mg (0 4 mg Oral Given 8/13/19 2040)       Diagnostic Studies  Results Reviewed     Procedure Component Value Units Date/Time    APTT [[id number]]  (Normal) Collected:  08/13/19 1720    Lab Status:  Final result Specimen:  Blood from Line, Venous Updated:  08/13/19 2141     PTT 36 seconds     Blood culture #2 [[id number]] Collected:  08/13/19 2041    Lab Status: In process Specimen:  Blood from Arm, Right Updated:  08/13/19 2101    Blood culture #1 [[id number]] Collected:  08/13/19 2035    Lab Status: In process Specimen:  Blood from Line, Venous Updated:  08/13/19 2101    Urine Microscopic [[id number]]  (Abnormal) Collected:  08/13/19 1919    Lab Status:  Final result Specimen:  Urine, Clean Catch Updated:  08/13/19 1953     RBC, UA 2-4 /hpf      WBC, UA Innumerable /hpf      Epithelial Cells Moderate /hpf      Bacteria, UA Innumerable /hpf      OTHER OBSERVATIONS WBCs Clumped    Urine culture [[id number]] Collected:  08/13/19 1919    Lab Status:   In process Specimen:  Urine, Clean Catch Updated:  08/13/19 1953    UA w Reflex to Microscopic w Reflex to Culture [[id number]]  (Abnormal) Collected:  08/13/19 1919    Lab Status:  Final result Specimen:  Urine, Clean Catch Updated:  08/13/19 1934     Color, UA Yellow     Clarity, UA Slightly Cloudy     Specific Gravity, UA >=1 030     pH, UA 6 0     Leukocytes, UA Small     Nitrite, UA Positive     Protein, UA Trace mg/dl      Glucose, UA Negative mg/dl      Ketones, UA Negative mg/dl      Urobilinogen, UA 2 0 E U /dl      Bilirubin, UA Negative     Blood, UA Moderate    POCT urinalysis dipstick [[id number]]  (Normal) Resulted:  08/13/19 1917    Lab Status:  Final result Specimen:  Urine Updated:  08/13/19 1918     Color, UA dark yellow     Clarity, UA clear     Glucose, UA (Ref: Negative) neg     Bilirubin, UA (Ref: Negative) neg     Ketones, UA (Ref: Negative) neg     Spec Grav, UA (Ref:1 003-1 030) 1 030     Blood, UA (Ref: Negative) large     pH, UA (Ref: 4 5-8 0) 6 0 Protein, UA (Ref: Negative) trace     Urobilinogen, UA (Ref: 0 2- 1 0) 0 2      Leukocytes, UA (Ref: Negative) moderate     Nitrite, UA (Ref: Negative) positive    Comprehensive metabolic panel [[id number]]  (Abnormal) Collected:  08/13/19 1718    Lab Status:  Final result Specimen:  Blood from Hand, Right Updated:  08/13/19 1814     Sodium 141 mmol/L      Potassium 4 0 mmol/L      Chloride 105 mmol/L      CO2 26 mmol/L      ANION GAP 10 mmol/L      BUN 18 mg/dL      Creatinine 0 85 mg/dL      Glucose 109 mg/dL      Calcium 8 8 mg/dL      AST 34 U/L      ALT 34 U/L      Alkaline Phosphatase 106 U/L      Total Protein 8 1 g/dL      Albumin 2 9 g/dL      Total Bilirubin 0 60 mg/dL      eGFR 97 ml/min/1 73sq m     Narrative:       National Kidney Disease Foundation guidelines for Chronic Kidney Disease (CKD):     Stage 1 with normal or high GFR (GFR > 90 mL/min/1 73 square meters)    Stage 2 Mild CKD (GFR = 60-89 mL/min/1 73 square meters)    Stage 3A Moderate CKD (GFR = 45-59 mL/min/1 73 square meters)    Stage 3B Moderate CKD (GFR = 30-44 mL/min/1 73 square meters)    Stage 4 Severe CKD (GFR = 15-29 mL/min/1 73 square meters)    Stage 5 End Stage CKD (GFR <15 mL/min/1 73 square meters)  Note: GFR calculation is accurate only with a steady state creatinine    CBC and differential [[id number]] Collected:  08/13/19 1718    Lab Status:  Final result Specimen:  Blood from Hand, Right Updated:  08/13/19 1756     WBC 9 65 Thousand/uL      RBC 4 51 Million/uL      Hemoglobin 14 4 g/dL      Hematocrit 43 8 %      MCV 97 fL      MCH 31 9 pg      MCHC 32 9 g/dL      RDW 14 3 %      MPV 10 7 fL      Platelets 984 Thousands/uL      nRBC 0 /100 WBCs      Neutrophils Relative 69 %      Immat GRANS % 0 %      Lymphocytes Relative 19 %      Monocytes Relative 9 %      Eosinophils Relative 2 %      Basophils Relative 1 %      Neutrophils Absolute 6 63 Thousands/µL      Immature Grans Absolute 0 04 Thousand/uL      Lymphocytes Absolute 1 84 Thousands/µL      Monocytes Absolute 0 91 Thousand/µL      Eosinophils Absolute 0 18 Thousand/µL      Basophils Absolute 0 05 Thousands/µL                  CT renal stone study abdomen pelvis without contrast   Final Result by Shauna Miramontes MD (08/13 1809)         1  Mild right hydronephrosis  Obstructing 5 5 mm calculus in the proximal ureter, just distal to the ureteropelvic junction  2   Nonobstructing 6 5 mm right renal calculus  Workstation performed: [id number]         FL retrograde pyelogram    (Results Pending)              Procedures  Procedures       ED Course  ED Course as of Aug 13 2208   Tue Aug 13, 2019   2000 Patient states pain is worsening and just vomited, will d/w urology  2003 Urologist paged  2024 Discussed with Dr Fortino Dodson  He states if patient admitted he will be able to remove stone tomorrow and to start antibiotic for UTI  When I went into room to discuss with patient he had shaking chills, will admit  MDM  Number of Diagnoses or Management Options  Fever: new and requires workup  Kidney stone: new and requires workup  UTI (urinary tract infection): new and requires workup  Diagnosis management comments: Patient with right flank pain, h/o kidney stones, will order labs, ct scan to r/o stone  Patient was not in distress upon arrival, but his pain worsened while in the ER  Patient unable to get pain relief with morphine  While patient in the ER he developed fever, d/w urologist   He was given rocephin for UTI  Patient will require admission and will be going to the [address] [place] [address] since he is now febrile and pain worsening          Amount and/or Complexity of Data Reviewed  Clinical lab tests: ordered and reviewed  Tests in the radiology section of CPT®: ordered and reviewed  Discuss the patient with other providers: yes (Dr Fortino Dodson)    Patient Progress  Patient progress: stable      Disposition  Final diagnoses:   UTI (urinary tract infection)   Kidney stone   Fever     Time reflects when diagnosis was documented in both MDM as applicable and the Disposition within this note     Time User Action Codes Description Comment    8/13/2019  8:24 PM Kasie Camejo Add [N39 0] UTI (urinary tract infection)     8/13/2019  8:24 PM Kasie Shear Add [N20 0] Kidney stone     8/13/2019  8:24 PM Kasie Shear Add [R50 9] Fever       ED Disposition     ED Disposition Condition Date/Time Comment    Admit Stable [place] Aug 13, 2019  8:24 PM Case was discussed with Ramón Haile PA-C and the patient's admission status was agreed to be Admission Status: inpatient status to the service of Dr Jose Chacon   Follow-up Information    None         Current Discharge Medication List      CONTINUE these medications which have NOT CHANGED    Details   atorvastatin (LIPITOR) 40 mg tablet Take 1 tablet (40 mg total) by mouth daily  Qty: 30 tablet, Refills: 6    Associated Diagnoses: Chronic atrial fibrillation ([address] );  Chronic diastolic congestive heart failure (HCC)      digoxin (LANOXIN) 0 25 mg tablet Take 250 mcg by mouth daily      methimazole (TAPAZOLE) 10 mg tablet TAKE ONE TABLET BY MOUTH EVERY 8 HOURS  Qty: 90 tablet, Refills: 5    Comments: Please consider 90 day supplies to promote better adherence  Associated Diagnoses: Hyperthyroidism      metoprolol tartrate (LOPRESSOR) 50 mg tablet TAKE ONE TABLET BY MOUTH TWICE DAILY  Qty: 60 tablet, Refills: 11    Comments: Please consider 90 day supplies to promote better adherence  Associated Diagnoses: AF (atrial fibrillation) (HCC)      potassium chloride (K-DUR,KLOR-CON) 20 mEq tablet Take 20 mEq by mouth 2 (two) times a day      rivaroxaban (XARELTO) 20 mg tablet Take 1 tablet (20 mg total) by mouth daily with breakfast  Qty: 30 tablet, Refills: 11    Associated Diagnoses: Paroxysmal atrial fibrillation (HCC)      torsemide (DEMADEX) 20 mg tablet Take 1 tablet by mouth daily No discharge procedures on file      ED Provider  Electronically Signed by           Domenic Tomlin PA-C  08/13/19 5052

## 2019-08-14 ENCOUNTER — APPOINTMENT (INPATIENT)
Dept: NON INVASIVE DIAGNOSTICS | Facility: HOSPITAL | Age: 59
DRG: 463 | End: 2019-08-14
Payer: COMMERCIAL

## 2019-08-14 ENCOUNTER — APPOINTMENT (INPATIENT)
Dept: RADIOLOGY | Facility: HOSPITAL | Age: 59
DRG: 463 | End: 2019-08-14
Payer: COMMERCIAL

## 2019-08-14 ENCOUNTER — TELEPHONE (OUTPATIENT)
Dept: OTHER | Facility: HOSPITAL | Age: 59
End: 2019-08-14

## 2019-08-14 PROBLEM — A41.9 SEVERE SEPSIS (HCC): Status: ACTIVE | Noted: 2019-08-14

## 2019-08-14 PROBLEM — R78.81 BACTEREMIA: Status: RESOLVED | Noted: 2019-08-14 | Resolved: 2019-08-14

## 2019-08-14 PROBLEM — I26.99 PULMONARY EMBOLISM (HCC): Status: RESOLVED | Noted: 2017-07-25 | Resolved: 2019-08-14

## 2019-08-14 PROBLEM — L89.90 DECUBITUS SKIN ULCER: Chronic | Status: RESOLVED | Noted: 2017-07-26 | Resolved: 2019-08-14

## 2019-08-14 PROBLEM — J18.9 HCAP (HEALTHCARE-ASSOCIATED PNEUMONIA): Status: RESOLVED | Noted: 2017-07-27 | Resolved: 2019-08-14

## 2019-08-14 PROBLEM — I51.9 DISORDER OF RIGHT VENTRICLE OF HEART: Status: RESOLVED | Noted: 2017-07-25 | Resolved: 2019-08-14

## 2019-08-14 PROBLEM — R65.20 SEVERE SEPSIS (HCC): Status: ACTIVE | Noted: 2019-08-14

## 2019-08-14 PROBLEM — R78.81 BACTEREMIA: Status: ACTIVE | Noted: 2019-08-14

## 2019-08-14 PROBLEM — N10 PYELONEPHRITIS, ACUTE: Status: ACTIVE | Noted: 2019-08-14
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
## 2019-08-14 PROBLEM — E66.01 MORBID OBESITY (HCC): Status: ACTIVE | Noted: 2019-08-14

## 2019-08-14 LAB
ALBUMIN SERPL BCP-MCNC: 3 G/DL (ref 3.5–5)
ALP SERPL-CCNC: 99 U/L (ref 46–116)
ALT SERPL W P-5'-P-CCNC: 33 U/L (ref 12–78)
ANION GAP SERPL CALCULATED.3IONS-SCNC: 11 MMOL/L (ref 4–13)
ANION GAP SERPL CALCULATED.3IONS-SCNC: 9 MMOL/L (ref 4–13)
ANISOCYTOSIS BLD QL SMEAR: PRESENT
ARTERIAL PATENCY WRIST A: ABNORMAL
AST SERPL W P-5'-P-CCNC: 40 U/L (ref 5–45)
ATRIAL RATE: 94 BPM
BASE EXCESS BLDA CALC-SCNC: -4 MMOL/L (ref -2–3)
BASOPHILS # BLD MANUAL: 0 THOUSAND/UL (ref 0–0.1)
BASOPHILS NFR MAR MANUAL: 0 % (ref 0–1)
BILIRUB SERPL-MCNC: 0.7 MG/DL (ref 0.2–1)
BUN SERPL-MCNC: 20 MG/DL (ref 5–25)
BUN SERPL-MCNC: 20 MG/DL (ref 5–25)
CA-I BLD-SCNC: 1.11 MMOL/L (ref 1.12–1.32)
CALCIUM SERPL-MCNC: 8.5 MG/DL (ref 8.3–10.1)
CALCIUM SERPL-MCNC: 8.7 MG/DL (ref 8.3–10.1)
CHLORIDE SERPL-SCNC: 102 MMOL/L (ref 100–108)
CHLORIDE SERPL-SCNC: 104 MMOL/L (ref 100–108)
CO2 SERPL-SCNC: 21 MMOL/L (ref 21–32)
CO2 SERPL-SCNC: 27 MMOL/L (ref 21–32)
CREAT SERPL-MCNC: 1 MG/DL (ref 0.6–1.3)
CREAT SERPL-MCNC: 1.1 MG/DL (ref 0.6–1.3)
DIGOXIN SERPL-MCNC: 0.4 NG/ML (ref 0.8–2)
DS:DELIVERY SYSTEM: ABNORMAL
EOSINOPHIL # BLD MANUAL: 0 THOUSAND/UL (ref 0–0.4)
EOSINOPHIL NFR BLD MANUAL: 0 % (ref 0–6)
ERYTHROCYTE [DISTWIDTH] IN BLOOD BY AUTOMATED COUNT: 14.9 % (ref 11.6–15.1)
FIO2 GAS DIL.REBREATH: 50 L
GFR SERPL CREATININE-BSD FRML MDRD: 74 ML/MIN/1.73SQ M
GFR SERPL CREATININE-BSD FRML MDRD: 83 ML/MIN/1.73SQ M
GLUCOSE SERPL-MCNC: 111 MG/DL (ref 65–140)
GLUCOSE SERPL-MCNC: 116 MG/DL (ref 65–140)
HCO3 BLDA-SCNC: 21.6 MMOL/L (ref 22–28)
HCT VFR BLD AUTO: 46.7 % (ref 36.5–49.3)
HGB BLD-MCNC: 15.4 G/DL (ref 12–17)
INR PPP: 1.44 (ref 0.84–1.19)
LACTATE SERPL-SCNC: 1.4 MMOL/L (ref 0.5–2)
LACTATE SERPL-SCNC: 2 MMOL/L (ref 0.5–2)
LACTATE SERPL-SCNC: 2.2 MMOL/L (ref 0.5–2)
LACTATE SERPL-SCNC: 3.1 MMOL/L (ref 0.5–2)
LACTATE SERPL-SCNC: 3.3 MMOL/L (ref 0.5–2)
LYMPHOCYTES # BLD AUTO: 0.4 THOUSAND/UL (ref 0.6–4.47)
LYMPHOCYTES # BLD AUTO: 6 % (ref 14–44)
MAGNESIUM SERPL-MCNC: 1.3 MG/DL (ref 1.6–2.6)
MCH RBC QN AUTO: 32.2 PG (ref 26.8–34.3)
MCHC RBC AUTO-ENTMCNC: 33 G/DL (ref 31.4–37.4)
MCV RBC AUTO: 98 FL (ref 82–98)
MONOCYTES # BLD AUTO: 0.07 THOUSAND/UL (ref 0–1.22)
MONOCYTES NFR BLD: 1 % (ref 4–12)
NEUTROPHILS # BLD MANUAL: 6.2 THOUSAND/UL (ref 1.85–7.62)
NEUTS BAND NFR BLD MANUAL: 13 % (ref 0–8)
NEUTS SEG NFR BLD AUTO: 80 % (ref 43–75)
PCO2 BLD: 23 MMOL/L (ref 21–32)
PCO2 BLD: 38.9 MM HG (ref 36–44)
PH BLD: 7.35 [PH] (ref 7.35–7.45)
PHOSPHATE SERPL-MCNC: 3 MG/DL (ref 2.7–4.5)
PLATELET # BLD AUTO: 167 THOUSANDS/UL (ref 149–390)
PLATELET BLD QL SMEAR: ADEQUATE
PMV BLD AUTO: 10.5 FL (ref 8.9–12.7)
PO2 BLD: 139 MM HG (ref 75–129)
POIKILOCYTOSIS BLD QL SMEAR: PRESENT
POLYCHROMASIA BLD QL SMEAR: PRESENT
POTASSIUM SERPL-SCNC: 3.7 MMOL/L (ref 3.5–5.3)
POTASSIUM SERPL-SCNC: 4.2 MMOL/L (ref 3.5–5.3)
PROCALCITONIN SERPL-MCNC: 1.05 NG/ML
PROT SERPL-MCNC: 8.3 G/DL (ref 6.4–8.2)
PROTHROMBIN TIME: 17.2 SECONDS (ref 11.6–14.5)
QRS AXIS: 18 DEGREES
QRSD INTERVAL: 70 MS
QT INTERVAL: 294 MS
QTC INTERVAL: 418 MS
RBC # BLD AUTO: 4.78 MILLION/UL (ref 3.88–5.62)
RBC MORPH BLD: PRESENT
SAMPLE SITE: ABNORMAL
SAO2 % BLD FROM PO2: 99 % (ref 95–98)
SODIUM SERPL-SCNC: 136 MMOL/L (ref 136–145)
SODIUM SERPL-SCNC: 138 MMOL/L (ref 136–145)
SPECIMEN SOURCE: ABNORMAL
T WAVE AXIS: 49 DEGREES
TOTAL CELLS COUNTED SPEC: 100
TSH SERPL DL<=0.05 MIU/L-ACNC: 0.04 UIU/ML (ref 0.36–3.74)
VENTRICULAR RATE: 122 BPM
WBC # BLD AUTO: 6.67 THOUSAND/UL (ref 4.31–10.16)

## 2019-08-14 PROCEDURE — 82803 BLOOD GASES ANY COMBINATION: CPT

## 2019-08-14 PROCEDURE — 71045 X-RAY EXAM CHEST 1 VIEW: CPT

## 2019-08-14 PROCEDURE — 94660 CPAP INITIATION&MGMT: CPT

## 2019-08-14 PROCEDURE — 83605 ASSAY OF LACTIC ACID: CPT | Performed by: NURSE PRACTITIONER

## 2019-08-14 PROCEDURE — C1751 CATH, INF, PER/CENT/MIDLINE: HCPCS

## 2019-08-14 PROCEDURE — 94760 N-INVAS EAR/PLS OXIMETRY 1: CPT

## 2019-08-14 PROCEDURE — 36569 INSJ PICC 5 YR+ W/O IMAGING: CPT

## 2019-08-14 PROCEDURE — 82330 ASSAY OF CALCIUM: CPT | Performed by: PHYSICIAN ASSISTANT

## 2019-08-14 PROCEDURE — 94640 AIRWAY INHALATION TREATMENT: CPT

## 2019-08-14 PROCEDURE — 85027 COMPLETE CBC AUTOMATED: CPT | Performed by: PHYSICIAN ASSISTANT

## 2019-08-14 PROCEDURE — 80162 ASSAY OF DIGOXIN TOTAL: CPT | Performed by: INTERNAL MEDICINE

## 2019-08-14 PROCEDURE — 84100 ASSAY OF PHOSPHORUS: CPT | Performed by: PHYSICIAN ASSISTANT

## 2019-08-14 PROCEDURE — 83735 ASSAY OF MAGNESIUM: CPT | Performed by: PHYSICIAN ASSISTANT

## 2019-08-14 PROCEDURE — 80053 COMPREHEN METABOLIC PANEL: CPT | Performed by: PHYSICIAN ASSISTANT

## 2019-08-14 PROCEDURE — 99223 1ST HOSP IP/OBS HIGH 75: CPT | Performed by: INTERNAL MEDICINE

## 2019-08-14 PROCEDURE — 99222 1ST HOSP IP/OBS MODERATE 55: CPT | Performed by: INTERNAL MEDICINE

## 2019-08-14 PROCEDURE — 85007 BL SMEAR W/DIFF WBC COUNT: CPT | Performed by: PHYSICIAN ASSISTANT

## 2019-08-14 PROCEDURE — 80048 BASIC METABOLIC PNL TOTAL CA: CPT | Performed by: NURSE PRACTITIONER

## 2019-08-14 PROCEDURE — NC001 PR NO CHARGE: Performed by: NURSE PRACTITIONER

## 2019-08-14 PROCEDURE — 93306 TTE W/DOPPLER COMPLETE: CPT

## 2019-08-14 PROCEDURE — 02HV33Z INSERTION OF INFUSION DEVICE INTO SUPERIOR VENA CAVA, PERCUTANEOUS APPROACH: ICD-10-PCS | Performed by: INTERNAL MEDICINE

## 2019-08-14 PROCEDURE — 93010 ELECTROCARDIOGRAM REPORT: CPT | Performed by: INTERNAL MEDICINE

## 2019-08-14 PROCEDURE — 36569 INSJ PICC 5 YR+ W/O IMAGING: CPT | Performed by: NURSE PRACTITIONER

## 2019-08-14 PROCEDURE — 84145 PROCALCITONIN (PCT): CPT | Performed by: PHYSICIAN ASSISTANT

## 2019-08-14 PROCEDURE — 99232 SBSQ HOSP IP/OBS MODERATE 35: CPT | Performed by: PHYSICIAN ASSISTANT

## 2019-08-14 PROCEDURE — 84443 ASSAY THYROID STIM HORMONE: CPT | Performed by: INTERNAL MEDICINE

## 2019-08-14 PROCEDURE — 83605 ASSAY OF LACTIC ACID: CPT | Performed by: PHYSICIAN ASSISTANT

## 2019-08-14 PROCEDURE — 85610 PROTHROMBIN TIME: CPT | Performed by: PHYSICIAN ASSISTANT

## 2019-08-14 RX ORDER — LEVALBUTEROL 1.25 MG/.5ML
1.25 SOLUTION, CONCENTRATE RESPIRATORY (INHALATION) EVERY 6 HOURS PRN
Status: DISCONTINUED | OUTPATIENT
Start: 2019-08-14 | End: 2019-08-17 | Stop reason: HOSPADM

## 2019-08-14 RX ORDER — MAGNESIUM SULFATE HEPTAHYDRATE 40 MG/ML
2 INJECTION, SOLUTION INTRAVENOUS ONCE
Status: COMPLETED | OUTPATIENT
Start: 2019-08-14 | End: 2019-08-14

## 2019-08-14 RX ORDER — FUROSEMIDE 10 MG/ML
40 INJECTION INTRAMUSCULAR; INTRAVENOUS
Status: DISCONTINUED | OUTPATIENT
Start: 2019-08-14 | End: 2019-08-15

## 2019-08-14 RX ORDER — ACETAMINOPHEN 325 MG/1
650 TABLET ORAL EVERY 6 HOURS PRN
Status: DISCONTINUED | OUTPATIENT
Start: 2019-08-14 | End: 2019-08-17 | Stop reason: HOSPADM

## 2019-08-14 RX ORDER — HYDROCODONE BITARTRATE AND ACETAMINOPHEN 5; 325 MG/1; MG/1
2 TABLET ORAL EVERY 4 HOURS PRN
Status: DISCONTINUED | OUTPATIENT
Start: 2019-08-14 | End: 2019-08-17

## 2019-08-14 RX ORDER — SODIUM CHLORIDE, SODIUM GLUCONATE, SODIUM ACETATE, POTASSIUM CHLORIDE, MAGNESIUM CHLORIDE, SODIUM PHOSPHATE, DIBASIC, AND POTASSIUM PHOSPHATE .53; .5; .37; .037; .03; .012; .00082 G/100ML; G/100ML; G/100ML; G/100ML; G/100ML; G/100ML; G/100ML
500 INJECTION, SOLUTION INTRAVENOUS ONCE
Status: COMPLETED | OUTPATIENT
Start: 2019-08-14 | End: 2019-08-14

## 2019-08-14 RX ORDER — LEVALBUTEROL 1.25 MG/.5ML
1.25 SOLUTION, CONCENTRATE RESPIRATORY (INHALATION)
Status: DISCONTINUED | OUTPATIENT
Start: 2019-08-14 | End: 2019-08-17

## 2019-08-14 RX ADMIN — DILTIAZEM HYDROCHLORIDE 10 MG: 5 INJECTION INTRAVENOUS at 00:00

## 2019-08-14 RX ADMIN — METHIMAZOLE 10 MG: 5 TABLET ORAL at 17:30

## 2019-08-14 RX ADMIN — Medication 5 MG/HR: at 17:31

## 2019-08-14 RX ADMIN — TORSEMIDE 20 MG: 20 TABLET ORAL at 08:02

## 2019-08-14 RX ADMIN — METHIMAZOLE 10 MG: 5 TABLET ORAL at 08:34

## 2019-08-14 RX ADMIN — LEVALBUTEROL HYDROCHLORIDE 1.25 MG: 1.25 SOLUTION, CONCENTRATE RESPIRATORY (INHALATION) at 08:33

## 2019-08-14 RX ADMIN — SODIUM CHLORIDE, SODIUM GLUCONATE, SODIUM ACETATE, POTASSIUM CHLORIDE, MAGNESIUM CHLORIDE, SODIUM PHOSPHATE, DIBASIC, AND POTASSIUM PHOSPHATE 500 ML: .53; .5; .37; .037; .03; .012; .00082 INJECTION, SOLUTION INTRAVENOUS at 07:35

## 2019-08-14 RX ADMIN — DIGOXIN 250 MCG: 250 TABLET ORAL at 08:02

## 2019-08-14 RX ADMIN — PERFLUTREN 0.8 ML/MIN: 6.52 INJECTION, SUSPENSION INTRAVENOUS at 15:09

## 2019-08-14 RX ADMIN — CEFEPIME HYDROCHLORIDE 2000 MG: 2 INJECTION, SOLUTION INTRAVENOUS at 23:34

## 2019-08-14 RX ADMIN — METHIMAZOLE 10 MG: 5 TABLET ORAL at 22:46

## 2019-08-14 RX ADMIN — FUROSEMIDE 40 MG: 10 INJECTION, SOLUTION INTRAVENOUS at 12:09

## 2019-08-14 RX ADMIN — LEVALBUTEROL HYDROCHLORIDE 1.25 MG: 1.25 SOLUTION, CONCENTRATE RESPIRATORY (INHALATION) at 02:04

## 2019-08-14 RX ADMIN — IPRATROPIUM BROMIDE 0.5 MG: 0.5 SOLUTION RESPIRATORY (INHALATION) at 20:15

## 2019-08-14 RX ADMIN — SODIUM CHLORIDE, SODIUM GLUCONATE, SODIUM ACETATE, POTASSIUM CHLORIDE, MAGNESIUM CHLORIDE, SODIUM PHOSPHATE, DIBASIC, AND POTASSIUM PHOSPHATE 125 ML/HR: .53; .5; .37; .037; .03; .012; .00082 INJECTION, SOLUTION INTRAVENOUS at 00:16

## 2019-08-14 RX ADMIN — SODIUM CHLORIDE, SODIUM GLUCONATE, SODIUM ACETATE, POTASSIUM CHLORIDE, MAGNESIUM CHLORIDE, SODIUM PHOSPHATE, DIBASIC, AND POTASSIUM PHOSPHATE 125 ML/HR: .53; .5; .37; .037; .03; .012; .00082 INJECTION, SOLUTION INTRAVENOUS at 05:08

## 2019-08-14 RX ADMIN — VANCOMYCIN HYDROCHLORIDE 2000 MG: 1 INJECTION, POWDER, LYOPHILIZED, FOR SOLUTION INTRAVENOUS at 00:41

## 2019-08-14 RX ADMIN — CEFEPIME HYDROCHLORIDE 2000 MG: 2 INJECTION, SOLUTION INTRAVENOUS at 12:08

## 2019-08-14 RX ADMIN — FUROSEMIDE 40 MG: 10 INJECTION, SOLUTION INTRAVENOUS at 17:30

## 2019-08-14 RX ADMIN — METOPROLOL TARTRATE 50 MG: 50 TABLET, FILM COATED ORAL at 17:30

## 2019-08-14 RX ADMIN — RIVAROXABAN 20 MG: 20 TABLET, FILM COATED ORAL at 08:02

## 2019-08-14 RX ADMIN — IPRATROPIUM BROMIDE 0.5 MG: 0.5 SOLUTION RESPIRATORY (INHALATION) at 08:31

## 2019-08-14 RX ADMIN — ACETAMINOPHEN 650 MG: 325 TABLET, FILM COATED ORAL at 20:54

## 2019-08-14 RX ADMIN — LEVALBUTEROL HYDROCHLORIDE 1.25 MG: 1.25 SOLUTION, CONCENTRATE RESPIRATORY (INHALATION) at 20:14

## 2019-08-14 RX ADMIN — MAGNESIUM SULFATE HEPTAHYDRATE 2 G: 40 INJECTION, SOLUTION INTRAVENOUS at 01:06

## 2019-08-14 RX ADMIN — ATORVASTATIN CALCIUM 40 MG: 40 TABLET, FILM COATED ORAL at 08:02

## 2019-08-14 RX ADMIN — METOPROLOL TARTRATE 50 MG: 50 TABLET, FILM COATED ORAL at 08:02

## 2019-08-14 RX ADMIN — SODIUM CHLORIDE, SODIUM GLUCONATE, SODIUM ACETATE, POTASSIUM CHLORIDE, MAGNESIUM CHLORIDE, SODIUM PHOSPHATE, DIBASIC, AND POTASSIUM PHOSPHATE 500 ML: .53; .5; .37; .037; .03; .012; .00082 INJECTION, SOLUTION INTRAVENOUS at 01:08

## 2019-08-14 RX ADMIN — VANCOMYCIN HYDROCHLORIDE 2000 MG: 1 INJECTION, POWDER, LYOPHILIZED, FOR SOLUTION INTRAVENOUS at 13:38

## 2019-08-14 RX ADMIN — Medication 5 MG/HR: at 00:00

## 2019-08-14 NOTE — ASSESSMENT & PLAN NOTE
Patient met criteria for severe sepsis at around 00:15 zero  a m  With fever of more than 101, heart rate more than 100, respiratory more than 20, lactic acidosis of more than 3   Due to right-sided pyelonephritis    Blood cultures are pending, will continue IV cefepime and vancomycin

## 2019-08-14 NOTE — ASSESSMENT & PLAN NOTE
Patient's heart rate is uncontrolled in the setting of severe sepsis  Will continue p o  Lopressor and will try to titrate down IV diltiazem  Will continue Xarelto for CVA prophylaxis  Will ask Cardiology to see the patient      Will get echocardiogram    Will check TSH

## 2019-08-14 NOTE — PROGRESS NOTES
Vancomycin Assessment    Zelda Hoffmann is a 64 y o  male who is currently receiving vancomycin [id number] for bacteremia     Relevant clinical data and objective history reviewed:  Creatinine   Date Value Ref Range Status   08/13/2019 0 85 0 60 - 1 30 mg/dL Final     Comment:     Standardized to IDMS reference method   03/17/2019 1 10 0 60 - 1 30 mg/dL Final     Comment:     Standardized to IDMS reference method   08/31/2018 0 56 (L) 0 70 - 1 33 mg/dL Final     Comment:     For patients >52years of age, the reference limit  for Creatinine is approximately 13% higher for people  identified as -American         03/16/2018 0 65 0 60 - 1 30 mg/dL Final     Comment:     Standardized to IDMS reference method   02/15/2017 0 72 0 70 - 1 33 mg/dL Final     Comment:     Result Comment: For patients >52years of age, the reference limit  for Creatinine is approximately 13% higher for people  identified as -American      11/22/2016 0 66 (L) 0 70 - 1 33 mg/dL Final     Comment:     Result Comment: For patients >52years of age, the reference limit  for Creatinine is approximately 13% higher for people  identified as -American      09/15/2016 0 57 (L) 0 70 - 1 33 mg/dL Final     Comment:     Result Comment: For patients >52years of age, the reference limit  for Creatinine is approximately 13% higher for people  identified as -American  /81   Pulse (!) 178   Temp 97 9 °F (36 6 °C)   Resp (!) 43   Ht 5' 11" (1 803 m)   Wt (!) 159 kg (350 lb)   SpO2 98%   BMI 48 82 kg/m²   I/O last 3 completed shifts:   In: 1000 [IV Piggyback:1000]  Out: -   Lab Results   Component Value Date/Time    BUN 18 08/13/2019 05:18 PM    BUN 19 08/31/2018 01:57 AM    WBC 9 65 08/13/2019 05:18 PM    WBC 6 94 11/30/2015 09:47 AM    HGB 14 4 08/13/2019 05:18 PM    HGB 15 0 11/30/2015 09:47 AM    HCT 43 8 08/13/2019 05:18 PM    HCT 44 1 11/30/2015 09:47 AM    MCV 97 08/13/2019 05:18 PM    MCV 90 11/30/2015 09:47 AM     08/13/2019 05:18 PM     11/30/2015 09:47 AM     Temp Readings from Last 3 Encounters:   08/13/19 97 9 °F (36 6 °C)   03/16/19 99 5 °F (37 5 °C) (Tympanic)   03/16/18 99 8 °F (37 7 °C) (Tympanic)     Vancomycin Days of Therapy: 1    Assessment/Plan  The patient is currently on vancomycin utilizing scheduled dosing based on adjusted body weight (due to obesity)  Baseline risks associated with therapy include: advanced age  The patient is currently receiving [id number] and is clinically appropriate and dose will be continued  Pharmacy will also follow closely for s/sx of nephrotoxicity, infusion reactions and appropriateness of therapy  BMP and CBC will be ordered per protocol  Plan for trough as patient approaches steady state, prior to the 4th  dose at approximately 1200 on 8/15  Due to infection severity, will target a trough of 15-20 (appropriate for most indications)   Pharmacy will continue to follow the patients culture results and clinical progress daily      Shasta Turcios, Pharmacist

## 2019-08-14 NOTE — SOCIAL WORK
LOS: 1 Patient is not a 30 day re admission or a Medicare Bundled patient  Met with patient, he reports residing with friends in a trailer with [address] KEVIN  He is  Independent of ADL's and uses no assistive device  He prepares meals and drives  He uses [employer] and reports at times paying for his meds is a problem  He reports receiving rehab at [facility] and no history of VNA,  BHU or drug and alcohol rehab admission  He does not have a POA/Living Will and is not interested in receiving information on POA/AD  Patient hopes to return home at discharge  Will follow and assist as patient's condition improves

## 2019-08-14 NOTE — TELEPHONE ENCOUNTER
Reza Morris is a 61-year-old gentleman seen in consultation at [address] [place] [address] and stented by Dr Jarad Multani emergently for fulminant sepsis secondary to obstructing ureteral calculus  Patient will require definitive ureteroscopy once Fonseca convalesced and infection free  Please contact patient/caregiver with hospital follow-up appointment date and time in approximately 3--4 weeks  Thank you

## 2019-08-14 NOTE — ED NOTES
Pt with chills, increasing temp, Hospitalist PA at bedside  O2 started at 5 LPM nasal cannula        Omer Hubbard RN  08/13/19 2042

## 2019-08-14 NOTE — PROGRESS NOTES
Patient's vancomycin order has been discontinued  Pharmacy will sign off now  Thank you for this consult

## 2019-08-14 NOTE — PROGRESS NOTES
Progress Note - Urology  Dipti Frost 64 y o  male MRN: [id number]  Unit/Bed#: -02 Encounter: [id number]    Assessment/Plan:  Right proximal ureteral stone with Pyelonephritis  POD#1 s/p cystoscopy with retrograde pyelogram and insertion of stent on the right  Fonseca catheter in place, okay to do void trial when medically stable and no need to closely monitor I&O  Continue IV abx  Blood cultures pending  Will need outpatient follow up for lithotriopsy    Severe sepsis  Secondary to above  Continue IV abx, IVF and close monitoring    Persistent Atrial fibrillation with RVR  Cardiology on board  Currently on xarelto, will continue  Currently on cardizem drip - likely taper tomorrow     CHF  Pulmonary Hypertension  COPD  HTN  Hyperthyroidism  Morbid obesity    Subjective/Objective   Chief Complaint: Feeling a bit better    Subjective: Reports some improvement from overnight, feels like he is gaining weight  No abdominal or flank pain, no n/v      Objective:     Blood pressure 130/66, pulse 95, temperature 99 4 °F (37 4 °C), temperature source Oral, resp  rate (!) 36, height 5' 11" (1 803 m), weight (!) 169 kg (373 lb 0 3 oz), SpO2 93 %  ,Body mass index is 52 03 kg/m²        Intake/Output Summary (Last 24 hours) at 8/14/2019 1437  Last data filed at 8/14/2019 1428  Gross per 24 hour   Intake 5496 33 ml   Output 2925 ml   Net 2571 33 ml       Invasive Devices     Peripherally Inserted Central Catheter Line            PICC Line 01/65/71 Right Basilic less than 1 day          Peripheral Intravenous Line            Peripheral IV 08/13/19 Left Antecubital less than 1 day    Peripheral IV 08/14/19 Right Wrist less than 1 day          Drain            Ureteral Drain/Stent Right ureter 6 Fr  less than 1 day    Urethral Catheter Latex 18 Fr  less than 1 day                Physical Exam: /66   Pulse 95   Temp 99 4 °F (37 4 °C) (Oral)   Resp (!) 36   Ht 5' 11" (1 803 m)   Wt (!) 169 kg (373 lb 0 3 oz)   SpO2 93% BMI 52 03 kg/m²    Gen: AAOx3 laying flat in bed  Card: Rebeka Watts, regular rate, S1 S2 without murmur, rub, or gallop  Pulm: Equal breath sounds bilaterally, diminished sounds at bilateral bases, without wheezes, crackles, or rhonchi  Abdomen: Soft, Protuberant, active bowel sounds, without tenderness, or CVA tenderness    Lab, Imaging and other studies:  CBC:   Lab Results   Component Value Date    WBC 6 67 08/14/2019    HGB 15 4 08/14/2019    HCT 46 7 08/14/2019    MCV 98 08/14/2019     08/14/2019    MCH 32 2 08/14/2019    MCHC 33 0 08/14/2019    RDW 14 9 08/14/2019    MPV 10 5 08/14/2019    NRBC 0 08/13/2019   , CMP:   Lab Results   Component Value Date    SODIUM 136 08/14/2019    K 4 2 08/14/2019     08/14/2019    CO2 23 08/14/2019    BUN 20 08/14/2019    CREATININE 1 10 08/14/2019    CALCIUM 8 7 08/14/2019    AST 40 08/14/2019    ALT 33 08/14/2019    ALKPHOS 99 08/14/2019    EGFR 74 08/14/2019   , Coagulation:   Lab Results   Component Value Date    INR 1 44 (H) 08/14/2019     VTE Pharmacologic Prophylaxis: Xarelto  VTE Mechanical Prophylaxis: sequential compression device

## 2019-08-14 NOTE — PROGRESS NOTES
Chest x-ray shows acute CHF  Patient has acute pulmonary insufficiency is due to acute CHF  Will stop IV fluids, will start Lasix 40 mg IV t i d   Will hold p o  Torsemide  Will get echo  Patient's nurse reports that we could not get blood from patient due to being very difficult venous stick    I obtained consent for PICC line placement from patient as he will need continued blood draws and IV antibiotics as well as Lasix

## 2019-08-14 NOTE — PROCEDURES
Insert PICC line  Date/Time: 8/14/2019 11:53 AM  Performed by: ASHLEY Gao  Authorized by: Lexy Carey MD     Patient location:  Bedside  Other Assisting Provider: Yes (comment) (Natividad Camargo RN)    Consent:     Consent obtained:  Written (by physician)    Consent given by:  Patient (by physician)    Procedural risks discussed: by physician  Alternatives discussed: by physician  Universal protocol:     Procedure explained and questions answered to patient or proxy's satisfaction: yes      Relevant documents present and verified: yes      Test results available and properly labeled: yes      Radiology Images displayed and confirmed  If images not available, report reviewed: yes      Required blood products, implants, devices, and special equipment available: yes      Site/side marked: yes      Immediately prior to procedure, a time out was called: yes      Patient identity confirmed:  Verbally with patient, arm band and hospital-assigned identification number (Natividad Camargo RN)  Pre-procedure details:     Hand hygiene: Hand hygiene performed prior to insertion      Sterile barrier technique: All elements of maximal sterile technique followed      Skin preparation:  2% chlorhexidine    Skin preparation agent: Skin preparation agent completely dried prior to procedure    Indications:     PICC line indications: no peripheral vascular access    Anesthesia (see MAR for exact dosages):      Anesthesia method:  Local infiltration    Local anesthetic:  Lidocaine 1% w/o epi (1ml)  Procedure details:     Location:  Basilic    Vessel type: vein      Laterality:  Right    Approach: percutaneous technique used      Patient position:  Flat    Procedural supplies:  Double lumen    Catheter size:  5 Fr    Landmarks identified: yes      Ultrasound guidance: yes      Sterile ultrasound techniques: Sterile gel and sterile probe covers were used      Number of attempts:  1    Successful placement: yes      Vessel of catheter tip end:  Chest Xray needed to confirm placement    Total catheter length (cm):  50cm    Catheter out on skin (cm):  0    Max flow rate:  999 ml/hr    Arm circumference:  40cm  Post-procedure details:     Post-procedure:  Dressing applied and securement device placed    Assessment:  Blood return through all ports and placement verification pending x-ray result    Post-procedure complications: none      Patient tolerance of procedure: Tolerated well, no immediate complications    Observer: Yes      Observer name:  Rosanna Pelayo RN  Comments:      Right sided PICC line placed using [employer] 3CG technology  Awaiting CXR for confirmation of PICC tip  Notified Jeanette Romero RN

## 2019-08-14 NOTE — OP NOTE
OPERATIVE REPORT  PATIENT NAME: Irlanda Ansari    :  1960  MRN: [id number]  Pt Location: [facility] OR ROOM 01    SURGERY DATE: 2019    Surgeon(s) and Role:     Burton Euceda MD - Primary    Preop Diagnosis:  Kidney stone [N20 0]  Right proximal ureteral calculus    Post-Op Diagnosis Codes:     * Kidney stone [N20 0]  Right proximal ureteral calculus    Procedure(s) (LRB):  CYSTOSCOPY URETEROSCOPY WITH LITHOTRIPSY HOLMIUM LASER, RETROGRADE PYELOGRAM AND INSERTION STENT URETERAL (Right)    Specimen(s):  ID Type Source Tests Collected by Time Destination   A :  Urine Urine, Renal, Right URINE CULTURE Angel Becker MD [id number]        Estimated Blood Loss:   Minimal    Drains:  Urethral Catheter Latex 18 Fr  (Active)   Number of days: 0       Ureteral Drain/Stent Right ureter 6 Fr  (Active)   Number of days: 0       Anesthesia Type:   General    Operative Indications:  Kidney stone [N20 0]      Operative Findings:  Mild hydronephrosis secondary to 5-6 mm right UPJ calculus  Cloudy and purulent appearing urine drained from the right kidney upon stent insertion  Culture obtained  Stent placed without a string    Complications:   None    Procedure and Technique:  Irma Parmar is a 53-year-old male who presented to [address] [place] [address] with a 6 mm right proximal ureteral calculus  Risk and benefits of cystoscopy with left ureteral stent insertion were discussed and reviewed  The patient understands that I will not remove his stone at this time and that he will require interval ureteroscopy in the future  Informed consent was obtained  The patient was brought to the operating room on 2019  After the smooth induction of general LMA anesthesia, the patient was placed in the dorsal lithotomy position  His genitalia was prepped and draped in a sterile fashion  Venodyne boots had been applied  Intravenous antibiotics were administered    A timeout was performed with all members of the operative team confirming the patient's identity, procedure to be performed, and laterality of the case  A 24 [place] rigid cystoscope with 30° lens was inserted  The bladder was thoroughly inspected  Attention was focused on the right ureteral orifice  A 5 [place] open-ended catheter was inserted  A right retrograde pyelogram was performed  A filling defect in the right UPJ ureter was identified consistent with the stone  A wire was placed proximal to the stone and cloudy-appearing urine came from the right ureteral orifice  I then advanced the 5 [place] open-ended catheter into the right proximal ureter and injected contrast   There was mild right hydronephrosis  There were no other filling defects identified  A wire was reinserted and placed into the upper pole calyx  A right 28-6  [place] double-J ureteral stent was then placed in the standard fashion  The proximal coil was appreciated in the right renal pelvis and the distal coil was visualized within the bladder  There was no string left in place  The bladder was emptied and the cystoscope was removed  Overall the patient tolerated the procedure well and there were no complications  The patient was extubated in the operating room and transferred to the PACU in stable condition at the conclusion of the case      Patient Disposition:  PACU stable and extubated    SIGNATURE: Phillip López MD  DATE: August 13, 2019  TIME: 10:43 PM

## 2019-08-14 NOTE — UTILIZATION REVIEW
Initial Clinical Review    Admission: Date/Time/Statement: 8/13/19 @ 2026 INPATIENT    Orders Placed This Encounter   Procedures    Inpatient Admission (expected length of stay for this patient Order details is greater than two midnights)     Standing Status:   Standing     Number of Occurrences:   1     Order Specific Question:   Admitting Physician     Answer:   Day Hi     Order Specific Question:   Level of Care     Answer:   Med Surg [16]     Order Specific Question:   Estimated length of stay     Answer:   More than 2 Midnights     Order Specific Question:   Certification     Answer:   I certify that inpatient services are medically necessary for this patient for a duration of greater than two midnights  See H&P and MD Progress Notes for additional information about the patient's course of treatment  ED Arrival Information     Expected Arrival Acuity Means of Arrival Escorted By Service Admission Type    - 8/13/2019 16:17 Urgent Walk-In Self General Medicine Urgent    Arrival Complaint    flank pain        Chief Complaint   Patient presents with    Flank Pain     Patient states he awoke froma  nap at 14:30 with R flank pain  Patietn stateshi surine is darl and he has not taken anything for pain     HPI:  Vannessa Frias is a 64 y o  male with PMH of CHF, A-fib and PE who presented to the ED with left flank pain   Subsequently developed fever and chills in ED  CT revealed mild right hydronephrosis, obstructing 5 5 mm right proximal ureteral calculus  Patient was taken directly to the OR for cysto with lithotripsy and stent placement  Post operatively, patient again developed fever  with rigors, a-fib with RVR, and hypoxia  SpO2 88% on 6L O2 NC, placed on Bipap 10/5 40%  8/13 Operative Note:    Preop Diagnosis: Kidney stone [N20 0] Right proximal ureteral calculus     Post-Op Diagnosis Codes:   * Kidney stone [N20  0]Right proximal ureteral calculus     Procedure(s) (LRB): CYSTOSCOPY URETEROSCOPY WITH LITHOTRIPSY HOLMIUM LASER, RETROGRADE PYELOGRAM AND INSERTION STENT URETERAL (Right)    Operative finding: Mild hydronephrosis secondary to 5-6 mm right UPJ calculus  Cloudy and purulent appearing urine drained from the right kidney upon stent insertion  Culture obtained  Stent placed without a string      Specimen(s):  ID Type Source Tests Collected by Time Destination   A :  Urine Urine, Renal, Right URINE CULTURE Antelmo Rodrigez MD 8/13/2019 8105        8/13 Plan: Inpatient admission to Level 1 Stepdown for evaluation and treatment of Acute cystitis with hematuria, Acute Respiratory insufficiency, PAF with RVR  IV antibiotics, maintain Fonseca catheter, follow urine cultures and lactic acid, BiPap, Cardizem drip  8/14 Hospitalist note: Right pyelonephritis due to obstructive ureteral stone as evidenced by right CVA tenderness and perinephric stranding on CT  Blood and urine cultures pending, continue IV cefepime and vancomycin  Attempt to titrate Cardizem gtt  Consult Cardiology  Remained on BiPap overnight  Chest x-ray  shows acute CHF  Hold IV fluids, start Lasix 40 mg IV TID  Physical exam: Mild respiratory distress, expiratory wheezing is present and scant inspiratory crackles  Mild right CVA tenderness, trace pedal edema  PICC line placed today       ED Triage Vitals   Temperature Pulse Respirations Blood Pressure SpO2   08/13/19 1638 08/13/19 1633 08/13/19 1638 08/13/19 1633 08/13/19 1633   98 8 °F (37 1 °C) 97 18 155/76 93 %      Temp Source Heart Rate Source Patient Position - Orthostatic VS BP Location FiO2 (%)   08/13/19 2019 08/13/19 1700 08/13/19 1730 08/13/19 1700 08/14/19 0500   Tympanic Monitor Lying Right arm 40      Pain Score       08/13/19 1638       7        Wt Readings from Last 1 Encounters:   08/14/19 (!) 169 kg (373 lb 0 3 oz)     Additional Vital Signs:     Date and Time Temp Pulse SpO2  Resp BP   08/14/19 1000 -- 116 96 % 6L O2 NC 35 127/62   08/14/19 0700 97 7 °F 96 97 %  18 121/68   08/14/19 0600 -- 99 95 %  21 128/71   08/14/19 0500 98 4 °F  106 94 %  19 117/68   08/14/19 0400 -- 109 95%  20 129/60   08/14/19 0300 -- 113 95 %  20 116/60   08/14/19 0230 100 1 °F 119 94 %  20 113/63   08/14/19 0215 -- 113 94 %  23 119/58   08/14/19 0207 -- 109 95 %  22 --   08/14/19 0145 -- 113 94 %  21 130/65   08/14/19 0130 -- 125 96 %  24 134/61   08/14/19 0115 -- 119 97 %  23 135/62   08/14/19 0100 -- 124 97 %  27 127/66   08/14/19 0045 -- 120 95 %  23 133/73   08/14/19 0031 102 4 °F  119 96 % BiPap 24 136/80   08/13/19 2325 -- 178 91 %  43 --   08/13/19 2315 -- 128 89 %  20 147/81   08/13/19 2305 -- 157 89 %  27 158/74   08/13/19 2255 -- 151 91 %  12 158/74   08/13/19 2245 97 9 °F 148 90 % 6L O2 NC 20 140/80   08/13/19 2053 -- 122 93 %  22 170/83   08/13/19 2050 -- 120 -- 5L O2 NC 24 --   08/13/19 2045 101 4 °F  -- --  24 --   08/13/19 2030 -- 127 94 % Room Air -- 204/135   08/13/19 2019 100 6 °F -- --  24 --       Pertinent Labs/Diagnostic Test Results:   Results from last 7 days   Lab Units 08/14/19  0015 08/13/19  1718   WBC Thousand/uL 6 67 9 65   HEMOGLOBIN g/dL 15 4 14 4   HEMATOCRIT % 46 7 43 8   PLATELETS Thousands/uL 167 198   NEUTROS ABS Thousands/µL  --  6 63   BANDS PCT % 13*  --          Results from last 7 days   Lab Units 08/14/19  0635 08/14/19  0126 08/14/19  0015 08/14/19 08/13/19  1718   SODIUM mmol/L  --   --   --  136 141   POTASSIUM mmol/L  --   --   --  4 2 4 0   CHLORIDE mmol/L  --   --   --  104 105   CO2 mmol/L  --   --   --  21 26   CO2, I-STAT mmol/L  --  23  --   --   --    ANION GAP mmol/L  --   --   --  11 10   BUN mg/dL  --   --   --  20 18   CREATININE mg/dL  --   --   --  1 10 0 85   EGFR ml/min/1 73sq m  --   --   --  74 97   CALCIUM mg/dL  --   --   --  8 7 8 8   CALCIUM, IONIZED mmol/L  --   --  1 11*  --   --    MAGNESIUM mg/dL  --   --   --  1 3*  --    PHOSPHORUS mg/dL 3 0  --   --   --   --      Results from last 7 days   Lab Units 08/14/19 08/13/19  1718   AST U/L 40 34   ALT U/L 33 34   ALK PHOS U/L 99 106   TOTAL PROTEIN g/dL 8 3* 8 1   ALBUMIN g/dL 3 0* 2 9*   TOTAL BILIRUBIN mg/dL 0 70 0 60         Results from last 7 days   Lab Units 08/14/19 08/13/19  1718   GLUCOSE RANDOM mg/dL 111 109         Results from last 7 days   Lab Units 08/14/19  0126   I STAT BASE EXC mmol/L -4*   I STAT O2 SAT % 99*   ISTAT PH ART  7 353   I STAT ART PCO2 mm HG 38 9   I STAT ART PO2 mm  0*   I STAT ART HCO3 mmol/L 21 6*     Results from last 7 days   Lab Units 08/14/19  0015 08/13/19  1720   PROTIME seconds 17 2*  --    INR  1 44*  --    PTT seconds  --  36     Results from last 7 days   Lab Units 08/14/19  0635   TSH 3RD GENERATON uIU/mL 0 035*     Results from last 7 days   Lab Units 08/14/19  0015   PROCALCITONIN ng/ml 1 05*     Results from last 7 days   Lab Units 08/14/19  0635 08/14/19  0218 08/14/19  0015   LACTIC ACID mmol/L 3 3* 2 0 3 1*         Results from last 7 days   Lab Units 08/14/19  0635   DIGOXIN LVL ng/mL 0 4*       Results from last 7 days   Lab Units 08/13/19  1919 08/13/19  1917   CLARITY UA  Slightly Cloudy clear   COLOR UA  Yellow dark yellow   SPEC GRAV UA  >=1 030  --    SPEC GRAV US   --  1 030   PH UA  6 0 6 0   GLUCOSE UA mg/dl Negative neg   KETONES UA mg/dl Negative neg   BLOOD UA  Moderate* large   PROTEIN UA mg/dl Trace* trace   NITRITE UA  Positive* positive   BILIRUBIN UA  Negative  --    BILIRUBIN, UA   --  neg   UROBILINOGEN UA E U /dl 2 0* 0 2   LEUKOCYTES UA  Small* moderate   WBC UA /hpf Innumerable*  --    RBC UA /hpf 2-4*  --    BACTERIA UA /hpf Innumerable*  --    EPITHELIAL CELLS WET PREP /hpf Moderate*  --           8/14 0839 Chest x-ray: Cardiomegaly and mild pulmonary vascular congestion is stable  8/13 2350 Chest x-ray: Mild pulmonary edema and cardiomegaly  8/13 CT abdomen and pelvis: Mild right hydronephrosis  Obstructing 5 5 mm calculus just distal to the ureteropelvic junction    Mild perinephric stranding   Nonobstructing 6 5 mm calculus within a lower pole calyx      EKG:    Ref Range & Units 8/13/19 2106   Ventricular Rate     Atrial Rate BPM 94    WI Interval ms    QRSD Interval ms 70    QT Interval ms 294    QTC Interval ms 418    P Axis degrees    QRS Axis degrees 18    T Wave Axis degrees 49      Atrial fibrillation with rapid ventricular response, Nonspecific ST abnormality, Abnormal ECG, When compared with ECG of 16-MAR-2019 22:30,No significant change was found        ED Treatment:   Medication Administration from 08/13/2019 1617 to 08/13/2019 2155       Date/Time Order Dose Route Action     08/13/2019 1715 sodium chloride 0 9 % bolus 1,000 mL 1,000 mL Intravenous New Bag     08/13/2019 1750 morphine (PF) 4 mg/mL injection 4 mg 4 mg Intravenous Given     08/13/2019 1843 morphine (PF) 4 mg/mL injection 4 mg 4 mg Intravenous Given     08/13/2019 2032 ondansetron (ZOFRAN) injection 4 mg 4 mg Intravenous Given     08/13/2019 2035 HYDROmorphone (DILAUDID) injection 1 mg 1 mg Intravenous Given     08/13/2019 2045 cefTRIAXone (ROCEPHIN) IVPB (premix) 1,000 mg 1,000 mg Intravenous New Bag     08/13/2019 2035 acetaminophen (TYLENOL) tablet 650 mg 650 mg Oral Given     08/13/2019 2040 tamsulosin (FLOMAX) capsule 0 4 mg 0 4 mg Oral Given        Past Medical History:   Diagnosis Date    Acute diastolic congestive heart failure ([facility] [address] ) 7/3/2017    Atrial fibrillation (HCC)     Bilateral edema of lower extremity 8/22/2016    CAD (coronary artery disease) 10/28/2016    Cardiac disease     Chest pain 8/22/2016    Hyperlipidemia     Hypertension     Hyperthyroidism 8/22/2016    Kidney stone     Kidney stone     Pneumothorax     Presence of IVC filter     Pulmonary embolism (HCC)     Rash 10/28/2016    SOB (shortness of breath) 8/22/2016    Tachycardia 8/22/2016     Present on Admission:   Kidney stone   Acute respiratory insufficiency   Pyelonephritis, acute   [facility]   Chronic diastolic congestive heart failure (HCC)   Pulmonary hypertension (HCC)   Hyperthyroidism   Chronic atrial fibrillation (HCC)      Admitting Diagnosis: Kidney stone [N20 0]  UTI (urinary tract infection) [N39 0]  Right flank pain [R10 9]  Fever [R50 9]  Age/Sex: 64 y o  male     Admission Orders: Cardio-Pulmonary monitoring, BiPap as needed, Nasal cannula oxygen to maintain SaO2 of at least 90%, SCD, Urology consult  Current Facility-Administered Medications:  atorvastatin 40 mg Oral Daily   cefepime 2,000 mg Intravenous Q12H   digoxin 250 mcg Oral Daily   furosemide 40 mg Intravenous TID (diuretic)   HYDROcodone-acetaminophen 2 tablet Oral Q4H PRN   ipratropium 0 5 mg Nebulization TID   levalbuterol 1 25 mg Nebulization TID   levalbuterol 1 25 mg Nebulization Q6H PRN   methimazole 10 mg Oral Q8H   metoprolol tartrate 50 mg Oral BID   rivaroxaban 20 mg Oral Daily With Breakfast   vancomycin 2,000 mg Intravenous Q12H     Continuous infusion:     diltiazem (CARDIZEM) 125 mg in sodium chloride 0 9 % 125 mL infusion   Rate: 1-15 mL/hr Dose: 1-15 mg/hr  Freq: Titrated Route: IV  Last Dose: 5 mg/hr (08/14/19 0000)  Start: 08/14/19 0000    =========================================================    multi-electrolyte (ISOLYTE-S PH 7 4 equivalent) IV solution   Rate: 125 mL/hr Dose: 125 mL/hr  Freq: Continuous Route: IV  Indications of Use: IV Hydration  Last Dose: Stopped (08/14/19 0735)  Start: 08/13/19 Melany Aviles  Utilization Review Department  Phone: [phone]; Fax [phone]  [email or web address]  ATTENTION: Please call with any questions or concerns to [phone]  and carefully listen to the prompts so that you are directed to the right person  Send all requests for admission clinical reviews, approved or denied determinations and any other requests to fax [phone]   All voicemails are confidential

## 2019-08-14 NOTE — CONSULTS
Consultation - Cardiology   Irlanda Ansari 64 y o  male MRN: [id number]  Unit/Bed#: -02 Encounter: [id number]    Assessment/Plan     Assessment:    Chronic Diastolic CHF    Persistent AF      Plan:    Chronic Diastolic CHF: Volume overload with pulmonary edema  Continue to diurese as tolerated  AF rates should improve as he becomes more compensated  Persistent AF: Continue home digoxin and metoprolol  Currently on IV diltiazem that hopefully can be weaned off by tomorrow  Continue Xarelto  History of Present Illness   Physician Requesting Consult: Britney Horan MD  Reason for Consult / Principal Problem: CHF  HPI: Irlanda Ansari is a 64y o  year old male who presents with right flank pain and fever  He underwent right etrograde pyelography and ureteral stent insertion  He was treated with IV Fluids and antibiotics as well  He has a history of PAF and has been on Xarelto  He has Chronic Diastolic CHF and Persistent AF and follows with Dr Baltazar Henriquez  He developed shortness of breath last night and CXR shows pulmonary edema  He is currently on IV lasix and feels better  He is on supplemental oxygen without dyspnea at rest      Inpatient consult to Cardiology  Consult performed by: Iban Chen MD  Consult ordered by: Denny Ty MD          Review of Systems   Constitutional: Negative  HENT: Negative  Eyes: Negative  Respiratory: Positive for shortness of breath  Cardiovascular: Negative  Gastrointestinal: Negative  Endocrine: Negative  Genitourinary: Negative  Musculoskeletal: Negative  Skin: Negative  Allergic/Immunologic: Negative  Neurological: Negative  Hematological: Negative  Psychiatric/Behavioral: Negative          Historical Information   Past Medical History:   Diagnosis Date    Acute diastolic congestive heart failure [facility] [address] ) 7/3/2017    Atrial fibrillation (HCC)     Bilateral edema of lower extremity 8/22/2016    CAD (coronary artery disease) 10/28/2016    Cardiac disease     Chest pain 8/22/2016    Hyperlipidemia     Hypertension     Hyperthyroidism 8/22/2016    Kidney stone     Kidney stone     Pneumothorax     Presence of IVC filter     Pulmonary embolism (HCC)     Rash 10/28/2016    SOB (shortness of breath) 8/22/2016    Tachycardia 8/22/2016     Past Surgical History:   Procedure Laterality Date    EGD AND COLONOSCOPY N/A 7/28/2017    Procedure: EGD AND COLONOSCOPY;  Surgeon: Lambert Beard MD;  Location: [facility];  Service: Gastroenterology    FL RETROGRADE PYELOGRAM  8/13/2019    LITHOTRIPSY      NH CYSTO/URETERO W/LITHOTRIPSY &INDWELL STENT INSRT Right 8/13/2019    Procedure: CYSTOSCOPY URETEROSCOPY WITH LITHOTRIPSY HOLMIUM LASER, RETROGRADE PYELOGRAM AND INSERTION STENT URETERAL;  Surgeon: Winsome Macedo MD;  Location:  MAIN OR;  Service: Urology     Social History     Substance and Sexual Activity   Alcohol Use No     Social History     Substance and Sexual Activity   Drug Use No     Social History     Tobacco Use   Smoking Status Current Every Day Smoker    Packs/day: 0 20    Types: Cigarettes   Smokeless Tobacco Never Used   Tobacco Comment    Pt states he smokes when he can afford to do so      Family History:   Family History   Problem Relation Age of Onset    Cancer Mother     Heart disease Father        Meds/Allergies   current meds:   Current Facility-Administered Medications   Medication Dose Route Frequency    atorvastatin (LIPITOR) tablet 40 mg  40 mg Oral Daily    cefepime (MAXIPIME) 2 g/50 mL dextrose IVPB  2,000 mg Intravenous Q12H    digoxin (LANOXIN) tablet 250 mcg  250 mcg Oral Daily    diltiazem (CARDIZEM) 125 mg in sodium chloride 0 9 % 125 mL infusion  1-15 mg/hr Intravenous Titrated    furosemide (LASIX) injection 40 mg  40 mg Intravenous TID (diuretic)    HYDROcodone-acetaminophen (NORCO) 5-325 mg per tablet 2 tablet  2 tablet Oral Q4H PRN    ipratropium (ATROVENT) 0 02 % inhalation solution 0 5 mg  0 5 mg Nebulization TID    levalbuterol (XOPENEX) inhalation solution 1 25 mg  1 25 mg Nebulization TID    levalbuterol (XOPENEX) inhalation solution 1 25 mg  1 25 mg Nebulization Q6H PRN    methimazole (TAPAZOLE) tablet 10 mg  10 mg Oral Q8H    metoprolol tartrate (LOPRESSOR) tablet 50 mg  50 mg Oral BID    rivaroxaban (XARELTO) tablet 20 mg  20 mg Oral Daily With Breakfast    vancomycin (VANCOCIN) 2,000 mg in sodium chloride 0 9 % 500 mL IVPB  2,000 mg Intravenous Q12H     No Known Allergies    Objective   Vitals: Blood pressure 121/68, pulse 96, temperature 97 7 °F (36 5 °C), temperature source Oral, resp  rate 18, height 5' 11" (1 803 m), weight (!) 169 kg (373 lb 0 3 oz), SpO2 97 %  Orthostatic Blood Pressures      Most Recent Value   Blood Pressure  121/68 filed at 08/14/2019 0700   Patient Position - Orthostatic VS  Lying filed at 08/13/2019 2053            Intake/Output Summary (Last 24 hours) at 8/14/2019 1017  Last data filed at 8/14/2019 8443  Gross per 24 hour   Intake 4878 ml   Output 925 ml   Net 3953 ml       Invasive Devices     Peripheral Intravenous Line            Peripheral IV 03/17/19 Left Antecubital 150 days    Peripheral IV 08/13/19 Left Antecubital less than 1 day    Peripheral IV 08/14/19 Distal;Left;Ventral (anterior) Forearm less than 1 day          Drain            Ureteral Drain/Stent Right ureter 6 Fr  less than 1 day    Urethral Catheter Latex 18 Fr  less than 1 day                Physical Exam   Constitutional: He is oriented to person, place, and time  No distress  HENT:   Mouth/Throat: No oropharyngeal exudate  Eyes: No scleral icterus  Neck: No JVD present  Cardiovascular: Normal rate  An irregularly irregular rhythm present  Pulmonary/Chest: Effort normal  He has rales  Abdominal: Soft  Bowel sounds are normal  He exhibits no distension  There is no tenderness  There is no guarding  Musculoskeletal: He exhibits edema  Neurological: He is alert and oriented to person, place, and time  Skin: Skin is warm and dry  He is not diaphoretic  Psychiatric: He has a normal mood and affect  His behavior is normal        Lab Results:   I have personally reviewed pertinent lab results      CBC with diff:   Results from last 7 days   Lab Units 08/14/19  0015   WBC Thousand/uL 6 67   RBC Million/uL 4 78   HEMOGLOBIN g/dL 15 4   HEMATOCRIT % 46 7   MCV fL 98   MCH pg 32 2   MCHC g/dL 33 0   RDW % 14 9   MPV fL 10 5   PLATELETS Thousands/uL 167     CMP:   Results from last 7 days   Lab Units 08/14/19  0126 08/14/19   SODIUM mmol/L  --  136   POTASSIUM mmol/L  --  4 2   CHLORIDE mmol/L  --  104   CO2 mmol/L  --  21   CO2, I-STAT mmol/L 23  --    BUN mg/dL  --  20   CREATININE mg/dL  --  1 10   CALCIUM mg/dL  --  8 7   AST U/L  --  40   ALT U/L  --  33   ALK PHOS U/L  --  99   EGFR ml/min/1 73sq m  --  74     Troponin:   0   Lab Value Date/Time    TROPONINI <0 02 03/16/2019 2258    TROPONINI <0 02 07/25/2017 1844    TROPONINI <0 02 07/24/2017 0108    TROPONINI <0 02 07/18/2017 1049    TROPONINI <0 02 07/18/2017 0738    TROPONINI <0 02 07/17/2017 2218    TROPONINI <0 02 07/03/2017 0637    TROPONINI <0 02 07/03/2017 0342    TROPONINI <0 02 07/02/2017 2300    TROPONINI <0 02 03/31/2017 0421    TROPONINI <0 02 03/31/2017 0121    TROPONINI <0 02 03/30/2017 1935    TROPONINI <0 02 08/22/2016 0745    TROPONINI <0 02 08/22/2016 0422     BNP:   Results from last 7 days   Lab Units 08/14/19  0126 08/14/19   POTASSIUM mmol/L  --  4 2   CHLORIDE mmol/L  --  104   CO2 mmol/L  --  21   CO2, I-STAT mmol/L 23  --    BUN mg/dL  --  20   CREATININE mg/dL  --  1 10   CALCIUM mg/dL  --  8 7   EGFR ml/min/1 73sq m  --  74     Imaging: I have personally reviewed pertinent films in PACS  EKG: Atrial Fibrillation      Code Status: Level 1 - Full Code  Advance Directive and Living Will:      Power of :    POLST:      Counseling / Coordination of Care  Total floor / unit time spent today 45 minutes  Greater than 50% of total time was spent with the patient and / or family counseling and / or coordination of care  A description of the counseling / coordination of care

## 2019-08-14 NOTE — ASSESSMENT & PLAN NOTE
Patient is status post cystoscopy with right ureteral stent placement for obstructing right ureteral stone  He will need outpatient neurologic follow-up    Will consider discontinuing Fonseca catheter today

## 2019-08-14 NOTE — PROGRESS NOTES
Pt started to shaking, temp elevated  Tylenol supp  Administered as ordered  CXR ordered and done  Fentanyl 25 mcg administered as ordered  Lopressor 5mg IV administered for uncontrolled heart rate  Oxygen saturation 88% on 6L NC  Oxygen saturation improved with Bipap
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
## 2019-08-14 NOTE — ASSESSMENT & PLAN NOTE
After the stent placement patient get acutely ill  Acute rigors  AFib with rapid responses  Acute desaturation  Received Tylenol 975 mg IV P rectally x1  Postop labs show a lactic acidosis  Received isolate 500 mils x1  Recheck lack takes until cleared

## 2019-08-14 NOTE — UTILIZATION REVIEW
Notification of Inpatient Admission/Inpatient Authorization Request  This is a Notification of Inpatient Admission/Request for Inpatient Authorization for our facility [address]  Be advised that this patient was admitted to our facility under Inpatient Status  Please contact the Utilization Review Department where the patient is receiving care services for additional admission information  Place of Service Code: 24   Place of Service Name: Inpatient Hospital  Presentation Date & Time: 8/13/2019  4:21 PM  Inpatient Admission Date & Time: 8/13/19 2026  Discharge Date & Time: No discharge date for patient encounter  Discharge Disposition (if discharged): Home/Self Care  Attending Physician: Britney Horan Md [[id number]]  Admission Orders (From admission, onward)   Ordered     08/13/19 2026 Inpatient Admission (expected length of stay for this patient Order details is greater than two midnights)  Once           Facility: [address] [place] [address] [address] Utilization Review Department  Phone: [phone]; Fax [phone]  [email or web address]  ATTENTION: Please call with any questions or concerns to [phone]  and carefully listen to the prompts so that you are directed to the right person  Send all requests for admission clinical reviews, approved or denied determinations and any other requests to fax [phone]   All voicemails are confidential

## 2019-08-14 NOTE — ASSESSMENT & PLAN NOTE
Patient presented with right pyelonephritis due to obstructive ureteral stone as evidenced by her right CVA tenderness and perinephric stranding on CT scan of the abdomen      Will continue IV cefepime and vancomycin for now    Urine is still cloudy

## 2019-08-14 NOTE — PROGRESS NOTES
Progress Note - Theresa Cuadra 1960, 64 y o  male MRN: [id number]    Unit/Bed#: -02 Encounter: [id number]    Primary Care Provider: Carla Covington DO   Date and time admitted to hospital: 8/13/2019  4:21 PM        * Kidney stone  Assessment & Plan  Patient is status post cystoscopy with right ureteral stent placement for obstructing right ureteral stone  He will need outpatient neurologic follow-up  Will consider discontinuing Fonseca catheter today    Pyelonephritis, acute  Assessment & Plan  Patient presented with right pyelonephritis due to obstructive ureteral stone as evidenced by her right CVA tenderness and perinephric stranding on CT scan of the abdomen  Will continue IV cefepime and vancomycin for now    Urine is still cloudy    Severe sepsis [facility])  Assessment & Plan  Patient met criteria for severe sepsis at around 00:15 zero  a m  With fever of more than 101, heart rate more than 100, respiratory more than 20, lactic acidosis of more than 3  Due to right-sided pyelonephritis    Blood cultures are pending, will continue IV cefepime and vancomycin    Chronic atrial fibrillation ([address] )  Assessment & Plan  Patient's heart rate is uncontrolled in the setting of severe sepsis  Will continue p o  Lopressor and will try to titrate down IV diltiazem  Will continue Xarelto for CVA prophylaxis  Will ask Cardiology to see the patient  Will get echocardiogram    Will check TSH    Chronic diastolic congestive heart failure [facility])  Assessment & Plan  Wt Readings from Last 3 Encounters:   08/14/19 (!) 169 kg (373 lb 0 3 oz)   03/16/19 (!) 167 kg (367 lb 12 8 oz)   02/05/19 (!) 164 kg (362 lb)     Patient claims that he has been gaining weight  He has some pedal edema  On physical examination he has expiratory wheezing which could be due to volume overload from IV fluids versus COPD in the setting of continued smoking  Will get stat chest x-ray  Will hold IV fluids  Will continue p o  Torsemide for now  Will get echocardiogram         Hyperthyroidism  Assessment & Plan  Patient is on methimazole, will check TSH    Morbid obesity ([facility] [address] )  Assessment & Plan  BMI is 52 03, patient has morbid obesity    Pulmonary hypertension ([facility] [address] )  Assessment & Plan  Patient's last echo from  shows RVSP of 40 mm of mercury  Will recheck echo        VTE Prophylaxis: in place    Patient Centered Rounds: I rounded with patient's nurse    Current Length of Stay: 1 day(s)    Current Patient Status: Inpatient    Certification Statement: Pt requires additional inpatient hospital stay due to: see assessment and plan        Subjective:   Patient's nurse says that patient has been on the stable since ureteral stent placement  He is on IV diltiazem drip with heart rates of his atrial fibrillation in the 110s  He had 300 cc of urine output over 2 hours, patient was on BiPAP over night and looks short of breath  Patient complains of mild shortness of breath, expiratory wheezing  He smokes 1 pack a day  He also reports weight gain of unknown amount and some lower extremity swelling as well  Denies any chest pain but he does feel palpitations  Denies any nausea or abdominal pain       Denies history of diabetes  He has history of PE and IVC stent placement  Nurse reports that patient's urine was marion colored after the stent placement but now is clearing up  All other ROS are negative    Objective:     Vitals:   Temp (24hrs), Av 7 °F (37 6 °C), Min:97 7 °F (36 5 °C), Max:102 4 °F (39 1 °C)    Temp:  [97 7 °F (36 5 °C)-102 4 °F (39 1 °C)] 97 7 °F (36 5 °C)  HR:  [] 96  Resp:  [12-43] 18  BP: (107-204)/() 121/68  SpO2:  [89 %-98 %] 97 %  Body mass index is 52 03 kg/m²  Input and Output Summary (last 24 hours):        Intake/Output Summary (Last 24 hours) at 2019 0833  Last data filed at 2019 0818  Gross per 24 hour   Intake 3571 75 ml   Output 925 ml   Net 2646 75 ml Physical Exam:     Physical Exam   Constitutional: He is oriented to person, place, and time  He appears well-developed  No distress  HENT:   Head: Normocephalic  Mouth/Throat: Oropharynx is clear and moist    Eyes: Conjunctivae are normal    Neck: Neck supple  Cardiovascular: Normal rate and regular rhythm  Pulmonary/Chest: He is in respiratory distress (In mild respiratory distress)  He has wheezes ( expiratory wheezing is present and scant inspiratory crackles)  Abdominal: Soft  Bowel sounds are normal  He exhibits no distension  There is tenderness ( mild right CVA tenderness present)  Musculoskeletal: He exhibits no tenderness  Lymphadenopathy:     He has no cervical adenopathy  Neurological: He is alert and oriented to person, place, and time  No cranial nerve deficit   strength is normal and equal, moves lower extremities   Skin: Skin is warm and dry  No rash noted  Has trace pedal edema   Psychiatric: He has a normal mood and affect  Vitals reviewed  I personally reviewed labs and imaging reports for today        Last 24 Hours Medication List:     Current Facility-Administered Medications:  atorvastatin 40 mg Oral Daily Ludivina Gregg PA-C    cefepime 2,000 mg Intravenous Q12H Ludivina Gregg PA-C    digoxin 250 mcg Oral Daily Ludivina Gregg PA-C    diltiazem 1-15 mg/hr Intravenous Titrated Ludivina Gregg PA-C Last Rate: 5 mg/hr (08/14/19 0000)   diltiazem        fentanyl citrate (PF)        HYDROcodone-acetaminophen 2 tablet Oral Q4H PRN Yadiel Mancini MD    ipratropium 0 5 mg Nebulization TID Richi Sánchez MD    levalbuterol 1 25 mg Nebulization TID Walt Rajan MD    levalbuterol 1 25 mg Nebulization Q6H PRN Walt Rajan MD    methimazole 10 mg Oral Q8H Ludivina Gregg PA-C    metoprolol tartrate 50 mg Oral BID Ludivina Gregg PA-C    multi-electrolyte 500 mL Intravenous Once [employer]LEY rivaroxaban 20 mg Oral Daily With Breakfast Ludivina Gregg PA-C    torsemide 20 mg Oral Daily Ludivina Gregg PA-C    vancomycin 2,000 mg Intravenous Q12H Ludivina Gregg PA-C Last Rate: 2,000 mg (08/14/19 0041)          Today, Patient Was Seen By: Jossy Stone MD    ** Please Note: Dictation voice to text software may have been used in the creation of this document   **

## 2019-08-14 NOTE — PROGRESS NOTES
Nimesh Sanchez underwent cystoscopy with right retrograde pyelography and right ureteral stent insertion for an obstructing right proximal ureteral calculus  Recommend admission to the slim service for hydration and IV antibiotics  Maintain Fonseca catheter at this time  Consider void trial next 24-48 hours  Urine culture sent from the [address] [place] [address]  Follow-up for growth and sensitivities  Will require outpatient follow-up with Urology for interval ureteroscopy

## 2019-08-14 NOTE — ASSESSMENT & PLAN NOTE
s/p cystoscopy with right retrograde pyelography and right ureteral stent insertion for an obstructing right proximal ureteral calculus  hydration and IV antibiotics  - changed IV antibiotics to cefepime and Vanco due to recent admission and severity  of illness  Maintain Fonseca catheter at this time  Consider void trial next 24-48 hours  Urine culture sent from the [address] [place] [address]  Follow-up for growth and sensitivities  Will require outpatient follow-up with Urology for interval ureteroscopy

## 2019-08-14 NOTE — ANESTHESIA PREPROCEDURE EVALUATION
Review of Systems/Medical History  Patient summary reviewed  Chart reviewed      Cardiovascular  EKG reviewed, Hyperlipidemia, Hypertension controlled, CAD , Dysrhythmias , atrial fibrillation, CHF , Orthopnea, LOGAN, DVT  PE, Pulmonary hypertension Pulmonary  Smoker cigarette smoker  Cumulative Pack Years: 36, Pneumonia, Shortness of breath, Sleep apnea CPAP,        GI/Hepatic  Negative GI/hepatic ROS          Kidney stones,        Endo/Other  History of thyroid disease , hypothyroidism,   Obesity    GYN       Hematology  Negative hematology ROS   Hypercoagulable state,    Musculoskeletal  Negative musculoskeletal ROS Osteoarthritis,   Arthritis     Neurology  Negative neurology ROS      Psychology   Negative psychology ROS              Physical Exam    Airway    Mallampati score: III  TM Distance: <3 FB  Neck ROM: limited     Dental   No notable dental hx     Cardiovascular  Rhythm: irregular, Rate: normal,     Pulmonary  Pulmonary exam normal     Other Findings        Anesthesia Plan  ASA Score- 4 Emergent    Anesthesia Type- general with ASA Monitors  Additional Monitors:   Airway Plan: LMA  Plan Factors-    Induction- intravenous  Postoperative Plan- Plan for postoperative opioid use  Informed Consent- Anesthetic plan and risks discussed with patient  I personally reviewed this patient with the CRNA  Discussed and agreed on the Anesthesia Plan with the CRNA  Bonnie Nixon

## 2019-08-14 NOTE — CONSULTS
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, possible COPD, history of pneumonia, extensive smoking history, morbid obesity    Social history extensive smoking    Family history noncontributory    Allergies no known drug allergies    Medications please see medical reconciliation form which was reviewed    /83 (BP Location: Right arm)   Pulse (!) 122   Temp (!) 101 4 °F (38 6 °C) (Tympanic) Comment: Simultaneous filing  User may not have seen previous data  Comment (Src): Simultaneous filing  User may not have seen previous data  Resp 22   Ht 5' 11" (1 803 m)   Wt (!) 159 kg (350 lb)   SpO2 93%   BMI 48 82 kg/m²     On examination he is in no acute distress but somewhat ill appearing  HEENT:  Normocephalic, atraumatic, sclerae nonicteric  Cardiac:  Irregular  Abdomen:  Soft obese nontender nondistended  :  No CVA tenderness  Skin:  Warm  Extremities with trace edema  Neurologic grossly intact and nonfocal  Affect:  Normal    White blood cell count 9 65  Creatinine 0 85    CT scan is reviewed above with a 5-6 mm right proximal ureteral calculus with mild right-sided hydronephrosis      My impression is 5-6 mm right proximal ureteral calculus with right-sided hydronephrosis and fever    Based on constellation of symptoms including fevers and chills with a an obstructing kidney stone, I recommend cystoscopy with right retrograde pyelography and right ureteral stent insertion  Risk and benefits of the procedure were discussed and reviewed    Patient clearly understands that I will not be removing his stone tonight and I will simply be placed in a right ureteral stent  He understands that he will require interval ureteroscopy in the future  He will require admission to the medical service for intravenous antibiotics  Urine culture will be obtained in the operating room this evening after stent insertion  Will ultimately need outpatient follow-up to arrange for interval ureteroscopy in the future

## 2019-08-14 NOTE — ASSESSMENT & PLAN NOTE
Patient uses CPAP at home  Due to acute respiratory insufficiency and rigors after postoperatively  Started on BiPAP 10/5 40%  Patient tolerated well  Respiratory protocol  Chest x-ray showed normal chest  Patient had been having some coughing but is a heavy smoker possible bronchitis versus cigarettes induced smoke coughing

## 2019-08-14 NOTE — ASSESSMENT & PLAN NOTE
Patient was in rapid AFib with RVR  Takes Xarelto it is a home medication  Gave 1 dose of Lopressor 5 mg IV x1  Started Cardizem drip with a bolus of 10 mg IV x1 and a drip

## 2019-08-14 NOTE — PLAN OF CARE
Problem: PAIN - ADULT  Goal: Verbalizes/displays adequate comfort level or baseline comfort level  Description  Interventions:  - Encourage patient to monitor pain and request assistance  - Assess pain using appropriate pain scale  - Administer analgesics based on type and severity of pain and evaluate response  - Implement non-pharmacological measures as appropriate and evaluate response  - Consider cultural and social influences on pain and pain management  - Notify physician/advanced practitioner if interventions unsuccessful or patient reports new pain  Outcome: Progressing     Problem: INFECTION - ADULT  Goal: Absence or prevention of progression during hospitalization  Description  INTERVENTIONS:  - Assess and monitor for signs and symptoms of infection  - Monitor lab/diagnostic results  - Monitor all insertion sites, i e  indwelling lines, tubes, and drains  - [place] appropriate cooling/warming therapies per order  - Administer medications as ordered  - Instruct and encourage patient and family to use good hand hygiene technique  - Identify and instruct in appropriate isolation precautions for identified infection/condition   Outcome: Progressing  Goal: Absence of fever/infection during neutropenic period  Description  INTERVENTIONS:  - Monitor WBC    Outcome: Progressing     Problem: SAFETY ADULT  Goal: Patient will remain free of falls  Description  INTERVENTIONS:  - Assess patient frequently for physical needs  -  Identify cognitive and physical deficits and behaviors that affect risk of falls    -  [place] fall precautions as indicated by assessment   - Educate patient/family on patient safety including physical limitations  - Instruct patient to call for assistance with activity based on assessment  - Modify environment to reduce risk of injury  - Consider OT/PT consult to assist with strengthening/mobility  Outcome: Progressing  Goal: Maintain or return to baseline ADL function  Description  INTERVENTIONS:  -  Assess patient's ability to carry out ADLs; assess patient's baseline for ADL function and identify physical deficits which impact ability to perform ADLs (bathing, care of mouth/teeth, toileting, grooming, dressing, etc )  - Assess/evaluate cause of self-care deficits   - Assess range of motion  - Assess patient's mobility; develop plan if impaired  - Assess patient's need for assistive devices and provide as appropriate  - Encourage maximum independence but intervene and supervise when necessary  - Involve family in performance of ADLs  - Assess for home care needs following discharge   - Consider OT consult to assist with ADL evaluation and planning for discharge  - Provide patient education as appropriate  Outcome: Progressing  Goal: Maintain or return mobility status to optimal level  Description  INTERVENTIONS:  - Assess patient's baseline mobility status (ambulation, transfers, stairs, etc )    - Identify cognitive and physical deficits and behaviors that affect mobility  - Identify mobility aids required to assist with transfers and/or ambulation (gait belt, sit-to-stand, lift, walker, cane, etc )  - [place] fall precautions as indicated by assessment  - Record patient progress and toleration of activity level on Mobility SBAR; progress patient to next Phase/Stage  - Instruct patient to call for assistance with activity based on assessment  - Consider rehabilitation consult to assist with strengthening/weightbearing, etc   Outcome: Progressing     Problem: DISCHARGE PLANNING  Goal: Discharge to home or other facility with appropriate resources  Description  INTERVENTIONS:  - Identify barriers to discharge w/patient and caregiver  - Arrange for needed discharge resources and transportation as appropriate  - Identify discharge learning needs (meds, wound care, etc )  - Arrange for interpretive services to assist at discharge as needed  - Refer to Case Management Department for coordinating discharge planning if the patient needs post-hospital services based on physician/advanced practitioner order or complex needs related to functional status, cognitive ability, or social support system  Outcome: Progressing     Problem: Knowledge Deficit  Goal: Patient/family/caregiver demonstrates understanding of disease process, treatment plan, medications, and discharge instructions  Description  Complete learning assessment and assess knowledge base    Interventions:  - Provide teaching at level of understanding  - Provide teaching via preferred learning methods  Outcome: Progressing     Problem: GENITOURINARY - ADULT  Goal: Maintains or returns to baseline urinary function  Description  INTERVENTIONS:  - Assess urinary function  - Encourage oral fluids to ensure adequate hydration if ordered  - Administer IV fluids as ordered to ensure adequate hydration  - Administer ordered medications as needed  - Offer frequent toileting  - Follow urinary retention protocol if ordered  Outcome: Progressing  Goal: Absence of urinary retention  Description  INTERVENTIONS:  - Assess patient's ability to void and empty bladder  - Monitor I/O  - Bladder scan as needed  - Discuss with physician/AP medications to alleviate retention as needed  - Discuss catheterization for long term situations as appropriate   Outcome: Progressing     Problem: Prexisting or High Potential for Compromised Skin Integrity  Goal: Skin integrity is maintained or improved  Description  INTERVENTIONS:  - Identify patients at risk for skin breakdown  - Assess and monitor skin integrity  - Assess and monitor nutrition and hydration status  - Monitor labs   - Assess for incontinence   - Turn and reposition patient  - Assist with mobility/ambulation  - Relieve pressure over bony prominences  - Avoid friction and shearing  - Provide appropriate hygiene as needed including keeping skin clean and dry  - Evaluate need for skin moisturizer/barrier cream  - Collaborate with interdisciplinary team   - Patient/family teaching  - Consider wound care consult   Outcome: Progressing     Problem: Potential for Falls  Goal: Patient will remain free of falls  Description  INTERVENTIONS:  - Assess patient frequently for physical needs  -  Identify cognitive and physical deficits and behaviors that affect risk of falls    -  [place] fall precautions as indicated by assessment   - Educate patient/family on patient safety including physical limitations  - Instruct patient to call for assistance with activity based on assessment  - Modify environment to reduce risk of injury  - Consider OT/PT consult to assist with strengthening/mobility  Outcome: Progressing

## 2019-08-14 NOTE — RESPIRATORY THERAPY NOTE
RT Protocol Note  Irlanda Ansari 64 y o  male MRN: [id number]  Unit/Bed#: -02 Encounter: [id number]    Assessment    Principal Problem:    Kidney stone  Active Problems:    Acute cystitis with hematuria    Paroxysmal atrial fibrillation with RVR ([facility] )    Acute respiratory insufficiency      Home Pulmonary Medications:None    Home Devices/Therapy: (P) BiPAP/CPAP    Past Medical History:   Diagnosis Date    Acute diastolic congestive heart failure ([facility] ) 7/3/2017    Atrial fibrillation (HCC)     Bilateral edema of lower extremity 8/22/2016    CAD (coronary artery disease) 10/28/2016    Cardiac disease     Chest pain 8/22/2016    Hyperlipidemia     Hypertension     Hyperthyroidism 8/22/2016    Kidney stone     Kidney stone     Pneumothorax     Presence of IVC filter     Pulmonary embolism (HCC)     Rash 10/28/2016    SOB (shortness of breath) 8/22/2016    Tachycardia 8/22/2016     Social History     Socioeconomic History    Marital status: Single     Spouse name: None    Number of children: None    Years of education: None    Highest education level: None   Occupational History    None   Social Needs    Financial resource strain: None    Food insecurity:     Worry: None     Inability: None    Transportation needs:     Medical: None     Non-medical: None   Tobacco Use    Smoking status: Current Every Day Smoker     Packs/day: 0 20     Types: Cigarettes    Smokeless tobacco: Never Used    Tobacco comment: Pt states he smokes when he can afford to do so    Substance and Sexual Activity    Alcohol use: No    Drug use: No    Sexual activity: None   Lifestyle    Physical activity:     Days per week: None     Minutes per session: None    Stress: None   Relationships    Social connections:     Talks on phone: None     Gets together: None     Attends Jainism service: None     Active member of club or organization: None     Attends meetings of clubs or organizations: None Relationship status: None    Intimate partner violence:     Fear of current or ex partner: None     Emotionally abused: None     Physically abused: None     Forced sexual activity: None   Other Topics Concern    None   Social History Narrative    None       Subjective         Objective    Physical Exam:   Assessment Type: (P) Assess only  General Appearance: (P) Eyes open/responds to voice  Respiratory Pattern: (P) Spontaneous, Assisted  Chest Assessment: (P) Chest expansion symmetrical  Bilateral Breath Sounds: (P) Rhonchi, Coarse, Expiratory wheezes  Cough: (P) Non-productive    Vitals:  Blood pressure 136/80, pulse (!) 119, temperature (!) 102 4 °F (39 1 °C), temperature source Axillary, resp  rate (!) 24, height 5' 11" (1 803 m), weight (!) 159 kg (350 lb), SpO2 96 %  Imaging and other studies: I have personally reviewed pertinent reports  Plan    Respiratory Plan: (P) Mild Distress pathway  Will order Xopenex nebs

## 2019-08-14 NOTE — SEPSIS NOTE
Sepsis Note   Robby Palma 64 y o  male MRN: [id number]  Unit/Bed#: -02 Encounter: [id number]      qSOFA     [address] Name 08/14/19 0700 08/14/19 0615 08/14/19 0600 08/14/19 0545 08/14/19 0530    Altered mental status GCS < 15              Respiratory Rate > / =22  0  0  0  0  0    Systolic BP < / =194  0  0  0  0  0    Q Sofa Score  0  0  0  0  0    Row Name 08/14/19 0515 08/14/19 0500 08/14/19 0445 08/14/19 0430 08/14/19 0415    Altered mental status GCS < 15    0          Respiratory Rate > / =22  1  0  0  0  0    Systolic BP < / =783  0  0  0  0  0    Q Sofa Score  1  0  0  0  0    Row Name 08/14/19 0400 08/14/19 0345 08/14/19 0330 08/14/19 0315 08/14/19 0300    Altered mental status GCS < 15              Respiratory Rate > / =22  0  0  0  0  0    Systolic BP < / =081  0  0  0  0  0    Q Sofa Score  0  0  0  0  0    Row Name 08/14/19 0245 08/14/19 0230 08/14/19 0215 08/14/19 0207 08/14/19 0145    Altered mental status GCS < 15              Respiratory Rate > / =22  1  0  1  1  0    Systolic BP < / =258  0  0  0    0    Q Sofa Score  1  0  1  1  1    Row Name 08/14/19 0130 08/14/19 0124 08/14/19 0115 08/14/19 0100 08/14/19 0045    Altered mental status GCS < 15    0          Respiratory Rate > / =22  1    1  1  1    Systolic BP < / =277  0    0  0  0    Q Sofa Score  2  1  2  2  2    Row Name 08/14/19 0031 08/14/19 0030 08/13/19 2325 08/13/19 2315 08/13/19 2305    Altered mental status GCS < 15          1    Respiratory Rate > / =22  1  1  1  0  1    Systolic BP < / =818  0      0  0    Q Sofa Score  2  2  2  1  2    Row Name 08/13/19 2255 08/13/19 2245 08/13/19 2053 08/13/19 2050 08/13/19 2045    Altered mental status GCS < 15    1          Respiratory Rate > / =22  0  0  1  1  1    Systolic BP < / =472  0  0  0        Q Sofa Score  1  1  1  1  1    Row Name 08/13/19 2030 08/13/19 2019 08/13/19 2000 08/13/19 1945 08/13/19 1930    Altered mental status GCS < 15             Respiratory Rate > / =22    1          Systolic BP < / =840  0    0  0  0    Q Sofa Score  1  1  1  1  1    Row Name 08/13/19 1900 08/13/19 1845 08/13/19 1830 08/13/19 1815 08/13/19 1800    Altered mental status GCS < 15              Respiratory Rate > / =22    1  1    1    Systolic BP < / =318  0  0  0  0  0    Q Sofa Score  1  1  1  1  1    Row Name 08/13/19 1750 08/13/19 1730 08/13/19 1716 08/13/19 1700 08/13/19 1646    Altered mental status GCS < 15              Respiratory Rate > / =22  1  1    1      Systolic BP < / =407    0  0    0    Q Sofa Score  1  1  0  1  0    Row Name 08/13/19 1638 08/13/19 1633             Altered mental status GCS < 15           Respiratory Rate > / =22  0         Systolic BP < / =834  0  0       Q Sofa Score  0             Initial Sepsis Screening     Row Name 08/14/19 0015                Is the patient's history suggestive of a new or worsening infection? (!) Yes (Proceed)  -CB        Suspected source of infection  urinary tract infection  -CB        Are two or more of the following signs & symptoms of infection both present and new to the patient? (!) Yes (Proceed)  -CB        Indicate SIRS criteria  Hyperthemia > 38 3C (100 9F); Tachycardia > 90 bpm;Tachypnea > 20 resp per min  -CB        If the answer is yes to both questions, suspicion of sepsis is present          If severe sepsis is present AND tissue hypoperfusion perists in the hour after fluid resuscitation or lactate > 4, the patient meets criteria for SEPTIC SHOCK          Are any of the following organ dysfunction criteria present within 6 hours of suspected infection and SIRS criteria that are NOT considered to be chronic conditions?   (!) Yes  -CB        Organ dysfunction  Lactate > 2 0 mmol/L  -CB        Date of presentation of severe sepsis  08/14/19  -CB        Time of presentation of severe sepsis  0015  -CB        Tissue hypoperfusion persists in the hour after crystalloid fluid administration, evidenced, by either:          Was hypotension present within one hour of the conclusion of crystalloid fluid administration?           Date of presentation of septic shock          Time of presentation of septic shock            User Key  (r) = Recorded By, (t) = Taken By, (c) = Cosigned By    [address] Name Provider Kobe Srinivasan MD Physician

## 2019-08-14 NOTE — ASSESSMENT & PLAN NOTE
Urinary tract infection with hydronephrosis and the stone  Status post stent placement with Urology  Cefepime and vancomycin  Probably Gram-negative bacteremia with rigors  Follow up on cultures

## 2019-08-14 NOTE — ASSESSMENT & PLAN NOTE
Wt Readings from Last 3 Encounters:   08/14/19 (!) 169 kg (373 lb 0 3 oz)   03/16/19 (!) 167 kg (367 lb 12 8 oz)   02/05/19 (!) 164 kg (362 lb)     Patient claims that he has been gaining weight  He has some pedal edema  On physical examination he has expiratory wheezing which could be due to volume overload from IV fluids versus COPD in the setting of continued smoking  Will get stat chest x-ray  Will hold IV fluids  Will continue p o  Torsemide for now      Will get echocardiogram

## 2019-08-15 ENCOUNTER — TELEPHONE (OUTPATIENT)
Dept: UROLOGY | Facility: AMBULATORY SURGERY CENTER | Age: 59
End: 2019-08-15

## 2019-08-15 PROBLEM — J96.01 ACUTE RESPIRATORY FAILURE WITH HYPOXIA (HCC): Status: ACTIVE | Noted: 2019-08-15

## 2019-08-15 PROBLEM — I50.33 ACUTE ON CHRONIC DIASTOLIC CONGESTIVE HEART FAILURE (HCC): Status: ACTIVE | Noted: 2017-07-31

## 2019-08-15 LAB
ANION GAP SERPL CALCULATED.3IONS-SCNC: 7 MMOL/L (ref 4–13)
ANION GAP SERPL CALCULATED.3IONS-SCNC: 8 MMOL/L (ref 4–13)
BUN SERPL-MCNC: 25 MG/DL (ref 5–25)
BUN SERPL-MCNC: 27 MG/DL (ref 5–25)
CALCIUM SERPL-MCNC: 7.9 MG/DL (ref 8.3–10.1)
CALCIUM SERPL-MCNC: 8.5 MG/DL (ref 8.3–10.1)
CHLORIDE SERPL-SCNC: 100 MMOL/L (ref 100–108)
CHLORIDE SERPL-SCNC: 95 MMOL/L (ref 100–108)
CO2 SERPL-SCNC: 29 MMOL/L (ref 21–32)
CO2 SERPL-SCNC: 29 MMOL/L (ref 21–32)
CREAT SERPL-MCNC: 1.02 MG/DL (ref 0.6–1.3)
CREAT SERPL-MCNC: 1.04 MG/DL (ref 0.6–1.3)
ERYTHROCYTE [DISTWIDTH] IN BLOOD BY AUTOMATED COUNT: 14.7 % (ref 11.6–15.1)
GFR SERPL CREATININE-BSD FRML MDRD: 79 ML/MIN/1.73SQ M
GFR SERPL CREATININE-BSD FRML MDRD: 81 ML/MIN/1.73SQ M
GLUCOSE SERPL-MCNC: 122 MG/DL (ref 65–140)
GLUCOSE SERPL-MCNC: 141 MG/DL (ref 65–140)
HCT VFR BLD AUTO: 40.2 % (ref 36.5–49.3)
HGB BLD-MCNC: 13.2 G/DL (ref 12–17)
MAGNESIUM SERPL-MCNC: 1.8 MG/DL (ref 1.6–2.6)
MCH RBC QN AUTO: 32 PG (ref 26.8–34.3)
MCHC RBC AUTO-ENTMCNC: 32.8 G/DL (ref 31.4–37.4)
MCV RBC AUTO: 97 FL (ref 82–98)
PLATELET # BLD AUTO: 136 THOUSANDS/UL (ref 149–390)
PMV BLD AUTO: 10.2 FL (ref 8.9–12.7)
POTASSIUM SERPL-SCNC: 3.3 MMOL/L (ref 3.5–5.3)
POTASSIUM SERPL-SCNC: 3.4 MMOL/L (ref 3.5–5.3)
RBC # BLD AUTO: 4.13 MILLION/UL (ref 3.88–5.62)
SODIUM SERPL-SCNC: 132 MMOL/L (ref 136–145)
SODIUM SERPL-SCNC: 136 MMOL/L (ref 136–145)
T4 FREE SERPL-MCNC: 2.04 NG/DL (ref 0.76–1.46)
WBC # BLD AUTO: 12.09 THOUSAND/UL (ref 4.31–10.16)

## 2019-08-15 PROCEDURE — 84439 ASSAY OF FREE THYROXINE: CPT | Performed by: INTERNAL MEDICINE

## 2019-08-15 PROCEDURE — 99232 SBSQ HOSP IP/OBS MODERATE 35: CPT | Performed by: PHYSICIAN ASSISTANT

## 2019-08-15 PROCEDURE — 80048 BASIC METABOLIC PNL TOTAL CA: CPT | Performed by: INTERNAL MEDICINE

## 2019-08-15 PROCEDURE — 85027 COMPLETE CBC AUTOMATED: CPT | Performed by: INTERNAL MEDICINE

## 2019-08-15 PROCEDURE — 94760 N-INVAS EAR/PLS OXIMETRY 1: CPT

## 2019-08-15 PROCEDURE — 99233 SBSQ HOSP IP/OBS HIGH 50: CPT | Performed by: INTERNAL MEDICINE

## 2019-08-15 PROCEDURE — 87040 BLOOD CULTURE FOR BACTERIA: CPT | Performed by: INTERNAL MEDICINE

## 2019-08-15 PROCEDURE — 99232 SBSQ HOSP IP/OBS MODERATE 35: CPT | Performed by: INTERNAL MEDICINE

## 2019-08-15 PROCEDURE — 93306 TTE W/DOPPLER COMPLETE: CPT | Performed by: INTERNAL MEDICINE

## 2019-08-15 PROCEDURE — 80048 BASIC METABOLIC PNL TOTAL CA: CPT | Performed by: NURSE PRACTITIONER

## 2019-08-15 PROCEDURE — 94660 CPAP INITIATION&MGMT: CPT

## 2019-08-15 PROCEDURE — 94640 AIRWAY INHALATION TREATMENT: CPT

## 2019-08-15 PROCEDURE — 83735 ASSAY OF MAGNESIUM: CPT | Performed by: NURSE PRACTITIONER

## 2019-08-15 RX ORDER — METOPROLOL TARTRATE 50 MG/1
50 TABLET, FILM COATED ORAL EVERY 8 HOURS
Status: DISCONTINUED | OUTPATIENT
Start: 2019-08-15 | End: 2019-08-17

## 2019-08-15 RX ORDER — POTASSIUM CHLORIDE 20 MEQ/1
40 TABLET, EXTENDED RELEASE ORAL ONCE
Status: COMPLETED | OUTPATIENT
Start: 2019-08-15 | End: 2019-08-15

## 2019-08-15 RX ORDER — POTASSIUM CHLORIDE 20 MEQ/1
20 TABLET, EXTENDED RELEASE ORAL
Status: DISCONTINUED | OUTPATIENT
Start: 2019-08-15 | End: 2019-08-16

## 2019-08-15 RX ORDER — FUROSEMIDE 10 MG/ML
40 INJECTION INTRAMUSCULAR; INTRAVENOUS
Status: DISCONTINUED | OUTPATIENT
Start: 2019-08-16 | End: 2019-08-16

## 2019-08-15 RX ORDER — MAGNESIUM SULFATE HEPTAHYDRATE 40 MG/ML
2 INJECTION, SOLUTION INTRAVENOUS ONCE
Status: COMPLETED | OUTPATIENT
Start: 2019-08-15 | End: 2019-08-15

## 2019-08-15 RX ADMIN — ACETAMINOPHEN 650 MG: 325 TABLET, FILM COATED ORAL at 16:39

## 2019-08-15 RX ADMIN — ATORVASTATIN CALCIUM 40 MG: 40 TABLET, FILM COATED ORAL at 08:43

## 2019-08-15 RX ADMIN — MAGNESIUM SULFATE HEPTAHYDRATE 2 G: 40 INJECTION, SOLUTION INTRAVENOUS at 21:09

## 2019-08-15 RX ADMIN — CEFEPIME HYDROCHLORIDE 2000 MG: 2 INJECTION, SOLUTION INTRAVENOUS at 11:48

## 2019-08-15 RX ADMIN — POTASSIUM CHLORIDE 40 MEQ: 20 TABLET, EXTENDED RELEASE ORAL at 21:01

## 2019-08-15 RX ADMIN — LEVALBUTEROL HYDROCHLORIDE 1.25 MG: 1.25 SOLUTION, CONCENTRATE RESPIRATORY (INHALATION) at 18:47

## 2019-08-15 RX ADMIN — METOPROLOL TARTRATE 50 MG: 50 TABLET, FILM COATED ORAL at 08:43

## 2019-08-15 RX ADMIN — LEVALBUTEROL HYDROCHLORIDE 1.25 MG: 1.25 SOLUTION, CONCENTRATE RESPIRATORY (INHALATION) at 07:48

## 2019-08-15 RX ADMIN — LEVALBUTEROL HYDROCHLORIDE 1.25 MG: 1.25 SOLUTION, CONCENTRATE RESPIRATORY (INHALATION) at 13:03

## 2019-08-15 RX ADMIN — ACETAMINOPHEN 650 MG: 325 TABLET, FILM COATED ORAL at 08:44

## 2019-08-15 RX ADMIN — FUROSEMIDE 40 MG: 10 INJECTION, SOLUTION INTRAVENOUS at 13:16

## 2019-08-15 RX ADMIN — FUROSEMIDE 40 MG: 10 INJECTION, SOLUTION INTRAVENOUS at 05:26

## 2019-08-15 RX ADMIN — METOPROLOL TARTRATE 50 MG: 50 TABLET, FILM COATED ORAL at 16:40

## 2019-08-15 RX ADMIN — POTASSIUM CHLORIDE 20 MEQ: 20 TABLET, EXTENDED RELEASE ORAL at 08:43

## 2019-08-15 RX ADMIN — METHIMAZOLE 10 MG: 5 TABLET ORAL at 08:45

## 2019-08-15 RX ADMIN — DIGOXIN 250 MCG: 250 TABLET ORAL at 08:43

## 2019-08-15 RX ADMIN — METHIMAZOLE 10 MG: 5 TABLET ORAL at 16:39

## 2019-08-15 RX ADMIN — IPRATROPIUM BROMIDE 0.5 MG: 0.5 SOLUTION RESPIRATORY (INHALATION) at 07:48

## 2019-08-15 RX ADMIN — IPRATROPIUM BROMIDE 0.5 MG: 0.5 SOLUTION RESPIRATORY (INHALATION) at 18:46

## 2019-08-15 RX ADMIN — POTASSIUM CHLORIDE 20 MEQ: 20 TABLET, EXTENDED RELEASE ORAL at 11:48

## 2019-08-15 RX ADMIN — POTASSIUM CHLORIDE 20 MEQ: 20 TABLET, EXTENDED RELEASE ORAL at 16:40

## 2019-08-15 RX ADMIN — RIVAROXABAN 20 MG: 20 TABLET, FILM COATED ORAL at 08:43

## 2019-08-15 RX ADMIN — METOPROLOL TARTRATE 50 MG: 50 TABLET, FILM COATED ORAL at 23:34

## 2019-08-15 RX ADMIN — CEFEPIME HYDROCHLORIDE 2000 MG: 2 INJECTION, SOLUTION INTRAVENOUS at 23:34

## 2019-08-15 RX ADMIN — IPRATROPIUM BROMIDE 0.5 MG: 0.5 SOLUTION RESPIRATORY (INHALATION) at 13:04

## 2019-08-15 NOTE — ASSESSMENT & PLAN NOTE
Patient's heart rates are still more than 110  Will try to increase Lopressor 50 mg q 8 hours and see if IV diltiazem can be tapered off  Continue digoxin      Continue Xarelto for CVA prophylaxis

## 2019-08-15 NOTE — ASSESSMENT & PLAN NOTE
Patient met criteria for severe sepsis at around 00:15 zero  a m  With fever of more than 101, heart rate more than 100, respiratory more than 20, lactic acidosis of more than 3  Due to right-sided pyelonephritis    Blood cultures are showing gram-negative rods  Will continue IV cefepime, I discontinued vancomycin yesterday      Will repeat blood cultures

## 2019-08-15 NOTE — PLAN OF CARE
Problem: PAIN - ADULT  Goal: Verbalizes/displays adequate comfort level or baseline comfort level  Description  Interventions:  - Encourage patient to monitor pain and request assistance  - Assess pain using appropriate pain scale  - Administer analgesics based on type and severity of pain and evaluate response  - Implement non-pharmacological measures as appropriate and evaluate response  - Consider cultural and social influences on pain and pain management  - Notify physician/advanced practitioner if interventions unsuccessful or patient reports new pain  Outcome: Progressing     Problem: INFECTION - ADULT  Goal: Absence or prevention of progression during hospitalization  Description  INTERVENTIONS:  - Assess and monitor for signs and symptoms of infection  - Monitor lab/diagnostic results  - Monitor all insertion sites, i e  indwelling lines, tubes, and drains  - [place] appropriate cooling/warming therapies per order  - Administer medications as ordered  - Instruct and encourage patient and family to use good hand hygiene technique  - Identify and instruct in appropriate isolation precautions for identified infection/condition   Outcome: Progressing  Goal: Absence of fever/infection during neutropenic period  Description  INTERVENTIONS:  - Monitor WBC    Outcome: Progressing     Problem: SAFETY ADULT  Goal: Patient will remain free of falls  Description  INTERVENTIONS:  - Assess patient frequently for physical needs  -  Identify cognitive and physical deficits and behaviors that affect risk of falls    -  [place] fall precautions as indicated by assessment   - Educate patient/family on patient safety including physical limitations  - Instruct patient to call for assistance with activity based on assessment  - Modify environment to reduce risk of injury  - Consider OT/PT consult to assist with strengthening/mobility  Outcome: Progressing  Goal: Maintain or return to baseline ADL function  Description  INTERVENTIONS:  -  Assess patient's ability to carry out ADLs; assess patient's baseline for ADL function and identify physical deficits which impact ability to perform ADLs (bathing, care of mouth/teeth, toileting, grooming, dressing, etc )  - Assess/evaluate cause of self-care deficits   - Assess range of motion  - Assess patient's mobility; develop plan if impaired  - Assess patient's need for assistive devices and provide as appropriate  - Encourage maximum independence but intervene and supervise when necessary  - Involve family in performance of ADLs  - Assess for home care needs following discharge   - Consider OT consult to assist with ADL evaluation and planning for discharge  - Provide patient education as appropriate  Outcome: Progressing  Goal: Maintain or return mobility status to optimal level  Description  INTERVENTIONS:  - Assess patient's baseline mobility status (ambulation, transfers, stairs, etc )    - Identify cognitive and physical deficits and behaviors that affect mobility  - Identify mobility aids required to assist with transfers and/or ambulation (gait belt, sit-to-stand, lift, walker, cane, etc )  - [place] fall precautions as indicated by assessment  - Record patient progress and toleration of activity level on Mobility SBAR; progress patient to next Phase/Stage  - Instruct patient to call for assistance with activity based on assessment  - Consider rehabilitation consult to assist with strengthening/weightbearing, etc   Outcome: Progressing     Problem: DISCHARGE PLANNING  Goal: Discharge to home or other facility with appropriate resources  Description  INTERVENTIONS:  - Identify barriers to discharge w/patient and caregiver  - Arrange for needed discharge resources and transportation as appropriate  - Identify discharge learning needs (meds, wound care, etc )  - Arrange for interpretive services to assist at discharge as needed  - Refer to Case Management Department for coordinating discharge planning if the patient needs post-hospital services based on physician/advanced practitioner order or complex needs related to functional status, cognitive ability, or social support system  Outcome: Progressing     Problem: Knowledge Deficit  Goal: Patient/family/caregiver demonstrates understanding of disease process, treatment plan, medications, and discharge instructions  Description  Complete learning assessment and assess knowledge base    Interventions:  - Provide teaching at level of understanding  - Provide teaching via preferred learning methods  Outcome: Progressing     Problem: GENITOURINARY - ADULT  Goal: Maintains or returns to baseline urinary function  Description  INTERVENTIONS:  - Assess urinary function  - Encourage oral fluids to ensure adequate hydration if ordered  - Administer IV fluids as ordered to ensure adequate hydration  - Administer ordered medications as needed  - Offer frequent toileting  - Follow urinary retention protocol if ordered  Outcome: Progressing  Goal: Absence of urinary retention  Description  INTERVENTIONS:  - Assess patient's ability to void and empty bladder  - Monitor I/O  - Bladder scan as needed  - Discuss with physician/AP medications to alleviate retention as needed  - Discuss catheterization for long term situations as appropriate   Outcome: Progressing     Problem: Prexisting or High Potential for Compromised Skin Integrity  Goal: Skin integrity is maintained or improved  Description  INTERVENTIONS:  - Identify patients at risk for skin breakdown  - Assess and monitor skin integrity  - Assess and monitor nutrition and hydration status  - Monitor labs   - Assess for incontinence   - Turn and reposition patient  - Assist with mobility/ambulation  - Relieve pressure over bony prominences  - Avoid friction and shearing  - Provide appropriate hygiene as needed including keeping skin clean and dry  - Evaluate need for skin moisturizer/barrier cream  - Collaborate with interdisciplinary team   - Patient/family teaching  - Consider wound care consult   Outcome: Progressing     Problem: Potential for Falls  Goal: Patient will remain free of falls  Description  INTERVENTIONS:  - Assess patient frequently for physical needs  -  Identify cognitive and physical deficits and behaviors that affect risk of falls  -  [place] fall precautions as indicated by assessment   - Educate patient/family on patient safety including physical limitations  - Instruct patient to call for assistance with activity based on assessment  - Modify environment to reduce risk of injury  - Consider OT/PT consult to assist with strengthening/mobility  Outcome: Progressing     Problem: Nutrition/Hydration-ADULT  Goal: Nutrient/Hydration intake appropriate for improving, restoring or maintaining nutritional needs  Description  Monitor and assess patient's nutrition/hydration status for malnutrition  Collaborate with interdisciplinary team and initiate plan and interventions as ordered  Monitor patient's weight and dietary intake as ordered or per policy  Utilize nutrition screening tool and intervene as necessary  Determine patient's food preferences and provide high-protein, high-caloric foods as appropriate       INTERVENTIONS:  - Monitor oral intake, urinary output, labs, and treatment plans  - Assess nutrition and hydration status and recommend course of action  - Evaluate amount of meals eaten  - Assist patient with eating if necessary   - Allow adequate time for meals  - Recommend/ encourage appropriate diets, oral nutritional supplements, and vitamin/mineral supplements  - Order, calculate, and assess calorie counts as needed  - Recommend, monitor, and adjust tube feedings and TPN/PPN based on assessed needs  - Assess need for intravenous fluids  - Provide specific nutrition/hydration education as appropriate  - Include patient/family/caregiver in decisions related to nutrition  Outcome: Progressing

## 2019-08-15 NOTE — ASSESSMENT & PLAN NOTE
Patient came in with acute hypoxic respiratory failure as evidenced by respiratory as high as 31 needing oxygen via nasal cannula at 5 liters/minute  This is due to acute diastolic CHF  Continue oxygen via nasal cannula at 5 liters/minute    Oxygen saturation is 95-96%

## 2019-08-15 NOTE — MALNUTRITION/BMI
This medical record reflects one or more clinical indicators suggestive of malnutrition and/or morbid obesity  BMI Findings:  BMI Classifications: Morbid Obesity 50-59 9     Body mass index is 52 03 kg/m²  Treat with diet  See Nutrition note dated 08/15/2019  for additional details  Completed nutrition assessment is viewable in the nutrition documentation

## 2019-08-15 NOTE — PROGRESS NOTES
Progress Note - General Surgery   Liliana Hoskins 64 y o  male MRN: [id number]  Unit/Bed#: -02 Encounter: [id number]    Assessment:  Right proximal ureteral stone with pyelonephritis, status post cystoscopy with retrograde pyelogram and insertion of stent on the right, postop day #2  Severe sepsis  AFib  CHF  Pulmonary hypertension  COPD  Hyperthyroidism    morbid obesity    Plan:  Okay to DC Fonseca today, have a void trial   If fails may require Fonseca catheter placement again  Blood culture is pending  Continue medical management per Medicine  Patient will need outpatient follow-up for lithotripsy  Subjective/Objective   Chief Complaint:  Fatigue and discomfort    Subjective:  Patient is complaining of chills, fever overnight  Feeling tired  Denies any abdominal or flank pain, no nausea, no vomiting  Blood pressure 132/68, pulse (!) 110, temperature 97 6 °F (36 4 °C), temperature source Oral, resp  rate (!) 24, height 5' 11" (1 803 m), weight (!) 169 kg (373 lb 0 3 oz), SpO2 93 %  ,Body mass index is 52 03 kg/m²  Intake/Output Summary (Last 24 hours) at 8/15/2019 1052  Last data filed at 8/15/2019 1001  Gross per 24 hour   Intake 1328 08 ml   Output 7700 ml   Net -6371 92 ml       Invasive Devices     Peripherally Inserted Central Catheter Line            PICC Line 74/90/90 Right Basilic less than 1 day          Peripheral Intravenous Line            Peripheral IV 08/13/19 Left Antecubital 1 day    Peripheral IV 08/14/19 Right Wrist 1 day          Drain            Ureteral Drain/Stent Right ureter 6 Fr  1 day                Physical Exam: General appearance: alert and oriented, in no acute distress  Lungs: clear to auscultation bilaterally  Heart: Irregular rhythm, regular rate, no murmurs  Abdomen: soft, non-tender; bowel sounds normal; no masses,  no organomegaly  Neurologic: Grossly normal    Lab, Imaging and other studies:  I have personally reviewed pertinent lab results    , CBC:   Lab Results   Component Value Date    WBC 12 09 (H) 08/15/2019    HGB 13 2 08/15/2019    HCT 40 2 08/15/2019    MCV 97 08/15/2019     (L) 08/15/2019    MCH 32 0 08/15/2019    MCHC 32 8 08/15/2019    RDW 14 7 08/15/2019    MPV 10 2 08/15/2019   , CMP:   Lab Results   Component Value Date    SODIUM 136 08/15/2019    K 3 3 (L) 08/15/2019     08/15/2019    CO2 29 08/15/2019    BUN 25 08/15/2019    CREATININE 1 02 08/15/2019    CALCIUM 7 9 (L) 08/15/2019    EGFR 81 08/15/2019   , Coagulation: No results found for: PT, INR, APTT, Urinalysis: No results found for: COLORU, CLARITYU, SPECGRAV, PHUR, LEUKOCYTESUR, NITRITE, PROTEINUA, GLUCOSEU, KETONESU, BILIRUBINUR, BLOODU, Amylase: No results found for: AMYLASE, Lipase: No results found for: LIPASE  VTE Pharmacologic Prophylaxis:  Xarelto  VTE Mechanical Prophylaxis: sequential compression device

## 2019-08-15 NOTE — TELEPHONE ENCOUNTER
Patient underwent cystoscopy with with right ureteral stent insertion secondary to 5 mm stone with fever  He needs follow-up in the [facility] office with advanced practitioner to arrange for right-sided ureteroscopy with holmium laser lithotripsy in the near future

## 2019-08-15 NOTE — ASSESSMENT & PLAN NOTE
Potassium is 3 3 this morning, replace this with potassium chloride by mouth and will monitor levels

## 2019-08-15 NOTE — PROGRESS NOTES
Pt setup with breakfast  Pt states that he does not want to get out of bed at this time  Cardizem gtt infusing  Pt denies chest pain/pressure/discomfort  Pt denies home O2 use but states that he does feel frequently SOB at home and easily winded  Currently on 5L O2 nasal cannula  Call bell within reach  Pt able to make needs known  See flowsheet for assessment

## 2019-08-15 NOTE — PROGRESS NOTES
Pt placed back on 2L O2 nasal cannula due to O2 sat dropping to 87% at times while pt was asleep  Pt was fine on room air while awake

## 2019-08-15 NOTE — PROGRESS NOTES
Cardiology Progress Note - Delmon Kocher 64 y o  male MRN: [id number]    Unit/Bed#: -02 Encounter: [id number]      Assessment:  Principal Problem:    Kidney stone  Active Problems:    Hyperthyroidism    Hypokalemia    Chronic atrial fibrillation (HCC)    Acute on chronic diastolic congestive heart failure (HCC)    Pulmonary hypertension (HCC)    Pyelonephritis, acute    Severe sepsis (HCC)    Morbid obesity ([address] )    Acute respiratory failure with hypoxia ([facility] [address] )      Plan:  Patient is comfortable today  He has no chest pain or significant dyspnea  On telemetry he is in atrial fibrillation with variable ventricular response  He is off intravenous diltiazem  He is on digoxin and metoprolol  He continues on antibiotic therapy  His BMP today showed a potassium of 3 3 which is been supplemented  His creatinine is 1 02  I agree with his present medical management  He continues on Xarelto therapy  Will decrease furosemide to twice a day and hopefully be able to transition to oral in the near future  Subjective:   Patient seen and examined  No significant events overnight   negative  Objective:     Vitals: Blood pressure 106/57, pulse 92, temperature 99 1 °F (37 3 °C), temperature source Axillary, resp  rate (!) 26, height 5' 11" (1 803 m), weight (!) 169 kg (373 lb 0 3 oz), SpO2 97 %  , Body mass index is 52 03 kg/m² ,   Orthostatic Blood Pressures      Most Recent Value   Blood Pressure  106/57 filed at 08/15/2019 1158   Patient Position - Orthostatic VS  Lying filed at 08/15/2019 0718      ,      Intake/Output Summary (Last 24 hours) at 8/15/2019 1402  Last data filed at 8/15/2019 1351  Gross per 24 hour   Intake 918 08 ml   Output 6300 ml   Net -5381 92 ml       No significant arrhythmias seen on telemetry review    Atrial fibrillation      Physical Exam:    GEN: Delmon Kocher appears well, alert and oriented x 3, pleasant and cooperative   NECK: supple, no carotid bruits, no JVD or HJR  HEART: normal rate, regular rhythm, normal S1 and S2, no murmurs, clicks, gallops or rubs   LUNGS: clear to auscultation bilaterally; no wheezes, rales, or rhonchi   ABDOMEN: normal bowel sounds, soft, no tenderness, no distention  EXTREMITIES: peripheral pulses normal; no clubbing, cyanosis, or edema  SKIN: warm and well perfused, no suspicious lesions on exposed skin    Labs & Results:    Admission on 08/13/2019   Component Date Value    WBC 08/13/2019 9 65     RBC 08/13/2019 4 51     Hemoglobin 08/13/2019 14 4     Hematocrit 08/13/2019 43 8     MCV 08/13/2019 97     MCH 08/13/2019 31 9     MCHC 08/13/2019 32 9     RDW 08/13/2019 14 3     MPV 08/13/2019 10 7     Platelets 25/02/5667 198     nRBC 08/13/2019 0     Neutrophils Relative 08/13/2019 69     Immat GRANS % 08/13/2019 0     Lymphocytes Relative 08/13/2019 19     Monocytes Relative 08/13/2019 9     Eosinophils Relative 08/13/2019 2     Basophils Relative 08/13/2019 1     Neutrophils Absolute 08/13/2019 6 63     Immature Grans Absolute 08/13/2019 0 04     Lymphocytes Absolute 08/13/2019 1 84     Monocytes Absolute 08/13/2019 0 91     Eosinophils Absolute 08/13/2019 0 18     Basophils Absolute 08/13/2019 0 05     Sodium 08/13/2019 141     Potassium 08/13/2019 4 0     Chloride 08/13/2019 105     CO2 08/13/2019 26     ANION GAP 08/13/2019 10     BUN 08/13/2019 18     Creatinine 08/13/2019 0 85     Glucose 08/13/2019 109     Calcium 08/13/2019 8 8     AST 08/13/2019 34     ALT 08/13/2019 34     Alkaline Phosphatase 08/13/2019 106     Total Protein 08/13/2019 8 1     Albumin 08/13/2019 2 9*    Total Bilirubin 08/13/2019 0 60     eGFR 08/13/2019 97     Color, UA 08/13/2019 dark yellow     Clarity, UA 08/13/2019 clear     Glucose, UA (Ref: Negati* 08/13/2019 neg     Bilirubin, UA (Ref: Nega* 08/13/2019 neg     Ketones, UA (Ref: Negati* 08/13/2019 neg     Spec Grav, UA (Ref:1 003* 08/13/2019 1 030     Blood, UA (Ref: Negative) 08/13/2019 large     pH, UA (Ref: 4 5-8 0) 08/13/2019 6 0     Protein, UA (Ref: Negati* 08/13/2019 trace     Urobilinogen, UA (Ref: 0* 08/13/2019 0 2      Leukocytes, UA (Ref: Ne* 08/13/2019 moderate     Nitrite, UA (Ref: Negati* 08/13/2019 positive     Color, UA 08/13/2019 Yellow     Clarity, UA 08/13/2019 Slightly Cloudy     Specific Gravity, UA 08/13/2019 >=1 030     pH, UA 08/13/2019 6 0     Leukocytes, UA 08/13/2019 Small*    Nitrite, UA 08/13/2019 Positive*    Protein, UA 08/13/2019 Trace*    Glucose, UA 08/13/2019 Negative     Ketones, UA 08/13/2019 Negative     Urobilinogen, UA 08/13/2019 2 0*    Bilirubin, UA 08/13/2019 Negative     Blood, UA 08/13/2019 Moderate*    RBC, UA 08/13/2019 2-4*    WBC, UA 08/13/2019 Innumerable*    Epithelial Cells 08/13/2019 Moderate*    Bacteria, UA 08/13/2019 Innumerable*    OTHER OBSERVATIONS 08/13/2019 WBCs Clumped     Urine Culture 08/13/2019 >100,000 cfu/ml Gram Negative Mark Enteric Like*    Blood Culture 08/13/2019 Escherichia coli*    Gram Stain Result 08/13/2019 Gram negative rods*    Blood Culture 08/13/2019 Escherichia coli*    Gram Stain Result 08/13/2019 Gram negative rods*    PTT 08/13/2019 36     Urine Culture 08/13/2019 20,000-29,000 cfu/ml Gram Negative Mark Enteric Like*    Sodium 08/14/2019 136     Potassium 08/14/2019 4 2     Chloride 08/14/2019 104     CO2 08/14/2019 21     ANION GAP 08/14/2019 11     BUN 08/14/2019 20     Creatinine 08/14/2019 1 10     Glucose 08/14/2019 111     Calcium 08/14/2019 8 7     AST 08/14/2019 40     ALT 08/14/2019 33     Alkaline Phosphatase 08/14/2019 99     Total Protein 08/14/2019 8 3*    Albumin 08/14/2019 3 0*    Total Bilirubin 08/14/2019 0 70     eGFR 08/14/2019 74     LACTIC ACID 08/14/2019 3 1*    Magnesium 08/14/2019 1 3*    Protime 08/14/2019 17 2*    INR 08/14/2019 1 44*    WBC 08/14/2019 6 67     RBC 08/14/2019 4 78     Hemoglobin 08/14/2019 15 4     Hematocrit 08/14/2019 46 7     MCV 08/14/2019 98     MCH 08/14/2019 32 2     MCHC 08/14/2019 33 0     RDW 08/14/2019 14 9     MPV 08/14/2019 10 5     Platelets 67/97/5616 167     Calcium, Ionized 08/14/2019 1 11*    Procalcitonin 08/14/2019 1 05*    Phosphorus 08/14/2019 3 0     Segmented % 08/14/2019 80*    Bands % 08/14/2019 13*    Lymphocytes % 08/14/2019 6*    Monocytes % 08/14/2019 1*    Eosinophils, % 08/14/2019 0     Basophils % 08/14/2019 0     Absolute Neutrophils 08/14/2019 6 20     Lymphocytes Absolute 08/14/2019 0 40*    Monocytes Absolute 08/14/2019 0 07     Eosinophils Absolute 08/14/2019 0 00     Basophils Absolute 08/14/2019 0 00     Total Counted 08/14/2019 100     RBC Morphology 08/14/2019 Present     Anisocytosis 08/14/2019 Present     Poikilocytes 08/14/2019 Present     Polychromasia 08/14/2019 Present     Platelet Estimate 12/65/4872 Adequate     pH, Art i-STAT 08/14/2019 7 353     pCO2, Art i-STAT 08/14/2019 38 9     pO2, ART i-STAT 08/14/2019 139 0*    BE, i-STAT 08/14/2019 -4*    HCO3, Art i-STAT 08/14/2019 21 6*    CO2, i-STAT 08/14/2019 23     O2 Sat, i-STAT 08/14/2019 99*    POC FIO2 08/14/2019 50     Specimen Type 08/14/2019 ARTERIAL     Delivery System 08/14/2019 BIPAP     SITE 08/14/2019 Right Radial     MICKIE TEST 08/14/2019 Postive Mickie Test     LACTIC ACID 08/14/2019 2 0     LACTIC ACID 08/14/2019 3 3*    LACTIC ACID 08/14/2019 2 2*    Digoxin Lvl 08/14/2019 0 4*    TSH 3RD GENERATON 08/14/2019 0 035*    Ventricular Rate 08/13/2019 122     Atrial Rate 08/13/2019 94     QRSD Interval 08/13/2019 70     QT Interval 08/13/2019 294     QTC Interval 08/13/2019 418     QRS Axis 08/13/2019 18     T Wave Axis 08/13/2019 49     LACTIC ACID 08/14/2019 1 4     Sodium 08/14/2019 138     Potassium 08/14/2019 3 7     Chloride 08/14/2019 102     CO2 08/14/2019 27     ANION GAP 08/14/2019 9     BUN 08/14/2019 20     Creatinine 08/14/2019 1 00     Glucose 08/14/2019 116     Calcium 08/14/2019 8 5     eGFR 08/14/2019 83     Sodium 08/15/2019 136     Potassium 08/15/2019 3 3*    Chloride 08/15/2019 100     CO2 08/15/2019 29     ANION GAP 08/15/2019 7     BUN 08/15/2019 25     Creatinine 08/15/2019 1 02     Glucose 08/15/2019 122     Calcium 08/15/2019 7 9*    eGFR 08/15/2019 81     WBC 08/15/2019 12 09*    RBC 08/15/2019 4 13     Hemoglobin 08/15/2019 13 2     Hematocrit 08/15/2019 40 2     MCV 08/15/2019 97     MCH 08/15/2019 32 0     MCHC 08/15/2019 32 8     RDW 08/15/2019 14 7     Platelets 73/76/8590 136*    MPV 08/15/2019 10 2     Free T4 08/15/2019 2 04*       Xr Chest Portable    Result Date: 8/14/2019  Narrative: CHEST INDICATION:   dyspnea  COMPARISON:  Chest x-ray the day before  EXAM PERFORMED/VIEWS:  XR CHEST PORTABLE FINDINGS: The heart is enlarged  There is mild pulmonary vascular congestion, stable  The lungs are clear  No pneumothorax or pleural effusion  Osseous structures appear within normal limits for patient age  Impression: Cardiomegaly and mild pulmonary vascular congestion is stable  Workstation performed: [id number]     Xr Chest Portable    Result Date: 8/14/2019  Narrative: CHEST INDICATION:   SOB  Shortness of breath COMPARISON:  3/16/2019 at 9:02 PM EXAM PERFORMED/VIEWS:  XR CHEST PORTABLE FINDINGS: Mild cardiomegaly appears stable  Mild pulmonary edema is present  There are no focal consolidations, pleural effusions, or pneumothorax  Osseous structures appear within normal limits for patient age  Impression: Mild pulmonary edema and cardiomegaly  Workstation performed: [id number]     Fl Retrograde Pyelogram    Result Date: 8/14/2019  Narrative: C-ARM - INDICATION: stent placement   Procedure guidance  COMPARISON:  None TECHNIQUE: FLUOROSCOPY TIME:   12 seconds 4 FLUOROSCOPIC IMAGES FINDINGS: Fluoroscopic guidance provided for surgical procedure   Osseous and soft tissue detail limited by technique  Impression: Fluoroscopic guidance provided for surgical procedure  Please refer to the separate procedure notes for additional details  Workstation performed: [id number]     Ct Renal Stone Study Abdomen Pelvis Without Contrast    Result Date: 8/13/2019  Narrative: CT ABDOMEN AND PELVIS WITHOUT IV CONTRAST - LOW DOSE RENAL STONE INDICATION:   Right  flank pain  COMPARISON:  7/29/2017  TECHNIQUE:  Low dose thin section CT examination of the abdomen and pelvis was performed without intravenous or oral contrast according to a protocol specifically designed to evaluate for urinary tract calculus  Axial, sagittal, and coronal 2D reformatted images were created from the source data and submitted for interpretation  Evaluation for pathology in the abdomen and pelvis that is unrelated to urinary tract calculi is limited  Radiation dose length product (DLP) for this visit:  9560 mGy-cm   This examination, like all CT scans performed in the [facility], was performed utilizing techniques to minimize radiation dose exposure, including the use of iterative reconstruction and automated exposure control  FINDINGS: RIGHT KIDNEY AND URETER: Mild right hydronephrosis  Obstructing 5 5 mm calculus just distal to the ureteropelvic junction  Mild perinephric stranding  Nonobstructing 6 5 mm calculus within a lower pole calyx  LEFT KIDNEY AND URETER: No urinary tract calculi  No hydronephrosis or hydroureter  URINARY BLADDER: Unremarkable  No prostate enlargement No significant abnormality in the visualized lung bases  Limited low radiation dose noncontrast CT evaluation demonstrates no clinically significant abnormality of liver, spleen, pancreas, or adrenal glands  No calcified gallstones or gallbladder wall thickening noted  No ascites or bulky lymphadenopathy on this limited noncontrast study  Periumbilical 2 cm fat-containing hernia   Mild diverticulosis throughout the colon without evidence of diverticulitis or colitis  No bowel obstruction  Normal appendix  IVC filter in expected location No acute fracture or destructive osseous lesion is identified  Impression: 1  Mild right hydronephrosis  Obstructing 5 5 mm calculus in the proximal ureter, just distal to the ureteropelvic junction  2   Nonobstructing 6 5 mm right renal calculus  Workstation performed: [id number]     Xr Chest Picc Line Portable    Result Date: 8/14/2019  Narrative: CHEST INDICATION:   picc placement  COMPARISON:  August 14, 2019 EXAM PERFORMED/VIEWS:  XR CHEST PICC LINE PORTABLE FINDINGS:  Right-sided PICC line is present with its tip in satisfactory position overlying the cavoatrial junction  Heart shadow is enlarged but unchanged from prior exam  The lungs are clear  No pneumothorax or pleural effusion  Osseous structures appear within normal limits for patient age  Impression: Right-sided PICC line is present with its tip in satisfactory position overlying the cavoatrial junction  Workstation performed: [id number]       EKG personally reviewed by Julieta Caldwell MD      Counseling / Coordination of Care  Total floor / unit time spent today 30 minutes  Greater than 50% of total time was spent with the patient and / or family counseling and / or coordination of care

## 2019-08-15 NOTE — ASSESSMENT & PLAN NOTE
Patient presented with right pyelonephritis due to obstructive ureteral stone as evidenced by perinephric stranding on CT scan of the abdomen      Will continue IV cefepime pending urine cultures and blood cultures

## 2019-08-15 NOTE — PROGRESS NOTES
Progress Note - Irlanda Ansari 1960, 64 y o  male MRN: [id number]    Unit/Bed#: -02 Encounter: [id number]    Primary Care Provider: Guille Razo DO   Date and time admitted to hospital: 8/13/2019  4:21 PM        * Kidney stone  Assessment & Plan  Patient presented with right ureteral stone with obstruction causing pyelonephritis and severe sepsis  He underwent cystoscopy and right ureteral stent placement for obstructing right ureteral stone  He will need outpatient neurologic follow-up  Will give patient a voiding trial today    Acute respiratory failure with hypoxia [facility])  Assessment & Plan  Patient came in with acute hypoxic respiratory failure as evidenced by respiratory as high as 31 needing oxygen via nasal cannula at 5 liters/minute  This is due to acute diastolic CHF  Continue oxygen via nasal cannula at 5 liters/minute  Oxygen saturation is 95-96%      Pyelonephritis, acute  Assessment & Plan  Patient presented with right pyelonephritis due to obstructive ureteral stone as evidenced by perinephric stranding on CT scan of the abdomen  Will continue IV cefepime pending urine cultures and blood cultures    Severe sepsis [facility])  Assessment & Plan  Patient met criteria for severe sepsis at around 00:15 zero  a m  With fever of more than 101, heart rate more than 100, respiratory more than 20, lactic acidosis of more than 3  Due to right-sided pyelonephritis    Blood cultures are showing gram-negative rods  Will continue IV cefepime, I discontinued vancomycin yesterday  Will repeat blood cultures    Acute on chronic diastolic congestive heart failure [facility])  Assessment & Plan  Wt Readings from Last 3 Encounters:   08/14/19 (!) 169 kg (373 lb 0 3 oz)   03/16/19 (!) 167 kg (367 lb 12 8 oz)   02/05/19 (!) 164 kg (362 lb)     Patient has evidence of acute diastolic CHF still with inspiratory crackles and pedal edema    He had good urine output of 6050 yesterday and I/O -3043 yesterday  Will continue Lasix 40 mg IV q 8 hours today blood pressure permitting  Echo was done yesterday, readings are pending    Chronic atrial fibrillation [facility])  Assessment & Plan  Patient's heart rates are still more than 110  Will try to increase Lopressor 50 mg q 8 hours and see if IV diltiazem can be tapered off  Continue digoxin  Continue Xarelto for CVA prophylaxis      Hyperthyroidism  Assessment & Plan  TSH is low, will check free T4 to see if patient was taking methimazole    Morbid obesity ([address] )  Assessment & Plan  BMI is 52 03, patient has morbid obesity    Hypokalemia  Assessment & Plan  Potassium is 3 3 this morning, replace this with potassium chloride by mouth and will monitor levels        VTE Prophylaxis: in place    Patient Centered Rounds: I rounded with patient's nurse    Current Length of Stay: 2 day(s)    Current Patient Status: Inpatient    Certification Statement: Pt requires additional inpatient hospital stay due to: see assessment and plan        Subjective:   Patient complains of generalized achiness  Patient's nurse reports fever of 100 9 last night  Patient denies chest pain, palpitations, lightheaded, dizziness, nausea, abdominal pain, diarrhea  He has no signs of bleeding  All other ROS are negative    Objective:     Vitals:   Temp (24hrs), Av 3 °F (37 4 °C), Min:97 6 °F (36 4 °C), Max:100 9 °F (38 3 °C)    Temp:  [97 6 °F (36 4 °C)-100 9 °F (38 3 °C)] 97 6 °F (36 4 °C)  HR:  [] 102  Resp:  [24-36] 24  BP: (107-136)/(57-77) 126/72  SpO2:  [91 %-99 %] 93 %  Body mass index is 52 03 kg/m²  Input and Output Summary (last 24 hours): Intake/Output Summary (Last 24 hours) at 8/15/2019 0840  Last data filed at 8/15/2019 3251  Gross per 24 hour   Intake 1328 08 ml   Output 7050 ml   Net -5721 92 ml       Physical Exam:     Physical Exam   Constitutional: He is oriented to person, place, and time  He appears well-developed     HENT:   Head: Normocephalic  Mouth/Throat: Oropharynx is clear and moist    Eyes: Conjunctivae are normal    Neck: Neck supple  Cardiovascular:   Irregular irregular, tachycardic, trace pedal edema seen   Pulmonary/Chest: He is in respiratory distress (In mild respiratory distress at rest still)  He has wheezes ( expiratory wheezing is much improved)  He has rales ( inspiratory crackles are heard at the bases)  Abdominal: Soft  Bowel sounds are normal  There is no tenderness  Musculoskeletal: He exhibits no tenderness  Lymphadenopathy:     He has no cervical adenopathy  Neurological: He is alert and oriented to person, place, and time  No cranial nerve deficit   strength is normal, equal, speech is normal   Skin: Skin is warm and dry  No rash noted  Psychiatric: He has a normal mood and affect  Vitals reviewed  I personally reviewed labs and imaging reports for today        Last 24 Hours Medication List:     Current Facility-Administered Medications:  acetaminophen 650 mg Oral Q6H PRN Ludivina Gregg PA-C    atorvastatin 40 mg Oral Daily Ludivina Gregg PA-C    cefepime 2,000 mg Intravenous Q12H Ludivina Gregg PA-C Last Rate: 2,000 mg (08/14/19 2334)   digoxin 250 mcg Oral Daily Ludivina Gregg PA-C    diltiazem 1-15 mg/hr Intravenous Titrated Ludivina Gregg PA-C Last Rate: 5 mg/hr (08/14/19 1731)   furosemide 40 mg Intravenous TID (diuretic) Boston Wang MD    HYDROcodone-acetaminophen 2 tablet Oral Q4H PRN Blanca Willard MD    ipratropium 0 5 mg Nebulization TID Boston Wang MD    levalbuterol 1 25 mg Nebulization TID Louis Covington MD    levalbuterol 1 25 mg Nebulization Q6H PRN Louis Covington MD    methimazole 10 mg Oral Q8H Ludivina Gregg PA-C    metoprolol tartrate 50 mg Oral Q8H Boston Wang MD    potassium chloride 20 mEq Oral TID With Meals Boston Wang MD    rivaroxaban 20 mg Oral Daily With Breakfast Ludivina Gregg PA-C Today, Patient Was Seen By: Kristine Lynch MD    ** Please Note: Dictation voice to text software may have been used in the creation of this document   **

## 2019-08-15 NOTE — ASSESSMENT & PLAN NOTE
Patient presented with right ureteral stone with obstruction causing pyelonephritis and severe sepsis  He underwent cystoscopy and right ureteral stent placement for obstructing right ureteral stone  He will need outpatient neurologic follow-up      Will give patient a voiding trial today

## 2019-08-15 NOTE — ASSESSMENT & PLAN NOTE
Wt Readings from Last 3 Encounters:   08/14/19 (!) 169 kg (373 lb 0 3 oz)   03/16/19 (!) 167 kg (367 lb 12 8 oz)   02/05/19 (!) 164 kg (362 lb)     Patient has evidence of acute diastolic CHF still with inspiratory crackles and pedal edema  He had good urine output of 6050 yesterday and I/O -3115 yesterday  Will continue Lasix 40 mg IV q 8 hours today blood pressure permitting      Echo was done yesterday, readings are pending

## 2019-08-16 LAB
ANION GAP SERPL CALCULATED.3IONS-SCNC: 7 MMOL/L (ref 4–13)
BACTERIA BLD CULT: ABNORMAL
BACTERIA UR CULT: ABNORMAL
BACTERIA UR CULT: ABNORMAL
BUN SERPL-MCNC: 23 MG/DL (ref 5–25)
CALCIUM SERPL-MCNC: 8.5 MG/DL (ref 8.3–10.1)
CHLORIDE SERPL-SCNC: 98 MMOL/L (ref 100–108)
CO2 SERPL-SCNC: 29 MMOL/L (ref 21–32)
CREAT SERPL-MCNC: 0.86 MG/DL (ref 0.6–1.3)
ERYTHROCYTE [DISTWIDTH] IN BLOOD BY AUTOMATED COUNT: 14.3 % (ref 11.6–15.1)
GFR SERPL CREATININE-BSD FRML MDRD: 96 ML/MIN/1.73SQ M
GLUCOSE SERPL-MCNC: 115 MG/DL (ref 65–140)
GRAM STN SPEC: ABNORMAL
HCT VFR BLD AUTO: 41.6 % (ref 36.5–49.3)
HGB BLD-MCNC: 13.8 G/DL (ref 12–17)
MCH RBC QN AUTO: 31.9 PG (ref 26.8–34.3)
MCHC RBC AUTO-ENTMCNC: 33.2 G/DL (ref 31.4–37.4)
MCV RBC AUTO: 96 FL (ref 82–98)
PLATELET # BLD AUTO: 123 THOUSANDS/UL (ref 149–390)
PMV BLD AUTO: 11.2 FL (ref 8.9–12.7)
POTASSIUM SERPL-SCNC: 3.6 MMOL/L (ref 3.5–5.3)
RBC # BLD AUTO: 4.33 MILLION/UL (ref 3.88–5.62)
SODIUM SERPL-SCNC: 134 MMOL/L (ref 136–145)
WBC # BLD AUTO: 8.77 THOUSAND/UL (ref 4.31–10.16)

## 2019-08-16 PROCEDURE — 94660 CPAP INITIATION&MGMT: CPT

## 2019-08-16 PROCEDURE — 85027 COMPLETE CBC AUTOMATED: CPT | Performed by: INTERNAL MEDICINE

## 2019-08-16 PROCEDURE — 94760 N-INVAS EAR/PLS OXIMETRY 1: CPT

## 2019-08-16 PROCEDURE — 99233 SBSQ HOSP IP/OBS HIGH 50: CPT | Performed by: INTERNAL MEDICINE

## 2019-08-16 PROCEDURE — 80048 BASIC METABOLIC PNL TOTAL CA: CPT | Performed by: INTERNAL MEDICINE

## 2019-08-16 PROCEDURE — 94640 AIRWAY INHALATION TREATMENT: CPT

## 2019-08-16 RX ORDER — TORSEMIDE 20 MG/1
20 TABLET ORAL
Status: DISCONTINUED | OUTPATIENT
Start: 2019-08-16 | End: 2019-08-17

## 2019-08-16 RX ORDER — CIPROFLOXACIN 500 MG/1
500 TABLET, FILM COATED ORAL 2 TIMES DAILY
Status: DISCONTINUED | OUTPATIENT
Start: 2019-08-16 | End: 2019-08-17 | Stop reason: HOSPADM

## 2019-08-16 RX ORDER — POTASSIUM CHLORIDE 20 MEQ/1
40 TABLET, EXTENDED RELEASE ORAL 2 TIMES DAILY
Status: DISCONTINUED | OUTPATIENT
Start: 2019-08-16 | End: 2019-08-17

## 2019-08-16 RX ADMIN — METHIMAZOLE 10 MG: 5 TABLET ORAL at 15:34

## 2019-08-16 RX ADMIN — LEVALBUTEROL HYDROCHLORIDE 1.25 MG: 1.25 SOLUTION, CONCENTRATE RESPIRATORY (INHALATION) at 19:34

## 2019-08-16 RX ADMIN — IPRATROPIUM BROMIDE 0.5 MG: 0.5 SOLUTION RESPIRATORY (INHALATION) at 08:08

## 2019-08-16 RX ADMIN — RIVAROXABAN 20 MG: 20 TABLET, FILM COATED ORAL at 07:52

## 2019-08-16 RX ADMIN — TORSEMIDE 20 MG: 20 TABLET ORAL at 07:52

## 2019-08-16 RX ADMIN — METOPROLOL TARTRATE 50 MG: 50 TABLET, FILM COATED ORAL at 07:51

## 2019-08-16 RX ADMIN — IPRATROPIUM BROMIDE 0.5 MG: 0.5 SOLUTION RESPIRATORY (INHALATION) at 19:34

## 2019-08-16 RX ADMIN — TORSEMIDE 20 MG: 20 TABLET ORAL at 15:34

## 2019-08-16 RX ADMIN — METOPROLOL TARTRATE 50 MG: 50 TABLET, FILM COATED ORAL at 15:34

## 2019-08-16 RX ADMIN — LEVALBUTEROL HYDROCHLORIDE 1.25 MG: 1.25 SOLUTION, CONCENTRATE RESPIRATORY (INHALATION) at 08:08

## 2019-08-16 RX ADMIN — CIPROFLOXACIN HYDROCHLORIDE 500 MG: 500 TABLET, FILM COATED ORAL at 17:16

## 2019-08-16 RX ADMIN — DIGOXIN 250 MCG: 250 TABLET ORAL at 08:06

## 2019-08-16 RX ADMIN — POTASSIUM CHLORIDE 40 MEQ: 20 TABLET, EXTENDED RELEASE ORAL at 17:16

## 2019-08-16 RX ADMIN — METHIMAZOLE 10 MG: 5 TABLET ORAL at 09:12

## 2019-08-16 RX ADMIN — IPRATROPIUM BROMIDE 0.5 MG: 0.5 SOLUTION RESPIRATORY (INHALATION) at 13:18

## 2019-08-16 RX ADMIN — CEFEPIME HYDROCHLORIDE 2000 MG: 2 INJECTION, SOLUTION INTRAVENOUS at 10:08

## 2019-08-16 RX ADMIN — ATORVASTATIN CALCIUM 40 MG: 40 TABLET, FILM COATED ORAL at 08:05

## 2019-08-16 RX ADMIN — LEVALBUTEROL HYDROCHLORIDE 1.25 MG: 1.25 SOLUTION, CONCENTRATE RESPIRATORY (INHALATION) at 13:18

## 2019-08-16 RX ADMIN — POTASSIUM CHLORIDE 40 MEQ: 20 TABLET, EXTENDED RELEASE ORAL at 08:05

## 2019-08-16 NOTE — ASSESSMENT & PLAN NOTE
Wt Readings from Last 3 Encounters:   08/16/19 (!) 162 kg (356 lb 0 7 oz)   03/16/19 (!) 167 kg (367 lb 12 8 oz)   02/05/19 (!) 164 kg (362 lb)     Patient had of acute diastolic CHF in the setting of severe sepsis from pyelonephritis on IV fluid administration  Lungs are clear to auscultation today, acute diastolic disease CHF resolved  Echo shows ejection fraction of 60%      Will stop IV Lasix and will switch patient to torsemide 20 mg p o  B i d   Will continue to monitor patient's weight

## 2019-08-16 NOTE — ASSESSMENT & PLAN NOTE
Patient met criteria for severe sepsis at around 00:15 zero  a m  With fever of more than 101, heart rate more than 100, respiratory more than 20, lactic acidosis of more than 3  Due to right-sided pyelonephritis    Blood cultures are showing gram-negative rods    Repeat blood cultures so far showing no growth    Will continue IV cefepime    Will discontinue PICC line    Will mobilize patient with the help of PT and OT

## 2019-08-16 NOTE — PROGRESS NOTES
Patient ambulated 500ft around unit, pulse ox range 92-89% on room air   Tolerated well, reported no dizziness or shortness of breath

## 2019-08-16 NOTE — ASSESSMENT & PLAN NOTE
Patient's heart rates are well controlled on Lopressor 50 mg p o  Q 8 hours  Will continue same dose    Continue digoxin      Continue Xarelto for CVA prophylaxis

## 2019-08-16 NOTE — ASSESSMENT & PLAN NOTE
Potassium is 3 6 this morning, will increase potassium to 40 mg p o  B i d   And will recheck level tomorrow

## 2019-08-16 NOTE — ASSESSMENT & PLAN NOTE
Patient came in with acute hypoxic respiratory failure as evidenced by respiratory as high as 31 needing oxygen via nasal cannula at 5 liters/minute  This is due to acute diastolic CHF  Patient's nurse reports that patient was saturating well on room air yesterday  At night he was on 2 liters/minute nasal cannula and BiPAP  This morning patient is in respiratory distress    Acute respiratory failure resolved

## 2019-08-16 NOTE — SOCIAL WORK
Continuing to follow patient  Met with patient and he is feeling stronger and anticipating discharge soon  He plans to return home to a trailer with friends  He declines VNA  He also report he has no problem with cost of obtaining his meds at [facility]    Remain available

## 2019-08-16 NOTE — UTILIZATION REVIEW
Continued Stay Review    Date: 8/16/19                      Current Patient Class: Inpatient  Current Level of Care: Med Surg    HPI:59 y o  male initially admitted on 8/13 for evaluation and treatment of acute cystitis with hematuria, acute respiratory insufficiency, PAF with RVR  Patient had presented to the ED with flank pain and subsequent fever  CT revealed mild right hydronephrosis, obstructing 5 5 mm right proximal ureteral calculus  Patient was taken directly to the OR from the ED for cysto with lithotripsy and stent placement  Postoperatively, patient again  developed fever with rigors, a-fib with RVR, and hypoxia  SpO2 88% on 6L O2 NC, placed on Bipap 10/5 40%     8/14 Hospitalist note: Right pyelonephritis due to obstructive ureteral stone as evidenced by right CVA tenderness and perinephric stranding on CT  Blood and urine cultures pending, continue IV cefepime and vancomycin  Attempt to titrate Cardizem gtt  Consult Cardiology  Remained on BiPap overnight  Chest x-ray  shows acute CHF  Hold IV fluids, start Lasix 40 mg IV TID  Physical exam: Mild respiratory distress, expiratory wheezing is present and scant inspiratory crackles  Mild right CVA tenderness, trace pedal edema  PICC line placed today  8/14 Cardiology note: Chronic Diastolic CHF: Volume overload with pulmonary edema  Continue to diurese as tolerated  AF rates should improve as he becomes more compensated  Persistent AF: Continue home digoxin and metoprolol  Currently on IV diltiazem that hopefully can be weaned off by tomorrow  Continue Xarelto  8/15 POD #2 cystoscopy with retrograde pyelogram and insertion of stent on the right  Urology noted ok to D/C Fonseca today, attempt voiding trial  On BiPap overnight (uses CPAP at home, but no O2)  Patient initially on 5L O2 NC, weaned to room air today  Cardizem gtt discontinued today   Receiving IV cefepime  Developed fever of 100 9 last night   IV Lasix 40 mg changed from Q8H to BID by Cardiology  Hospitalist physical exam: In mild respiratory distress at rest still, expiratory wheezing is much improved  Inspiratory crackles are heard at the bases  Intake/Output Summary (Last 24 hours) at 8/15/2019 1052  Last data filed at 8/15/2019 1001      Gross per 24 hour   Intake 1328 08 ml   Output 7700 ml   Net -6371 92 ml     8/16 Fonseca catheter dc'd yesterday and patient is voiding  Ambulatory room air sat today at 89-92%  Remains on IV cefepime  Admission blood cultures showing gram-negative rods  Hospitalist noted no expiratory wheezing today, scant inspiratory crackles much improved compared to yesterday  IV Lasix dc'd, switched to PO torsemide  Transferred to [facility] level of care today       Intake/Output Summary (Last 24 hours) at 8/16/2019 0713  Last data filed at 8/16/2019 0630      Gross per 24 hour   Intake 1730 ml   Output 4600 ml   Net -2870 ml       Pertinent Labs/Diagnostic Results:   Results from last 7 days   Lab Units 08/16/19  0553 08/15/19  0525 08/14/19  0015 08/13/19  1718   WBC Thousand/uL 8 77 12 09* 6 67 9 65   HEMOGLOBIN g/dL 13 8 13 2 15 4 14 4   HEMATOCRIT % 41 6 40 2 46 7 43 8   PLATELETS Thousands/uL 123* 136* 167 198   NEUTROS ABS Thousands/µL  --   --   --  6 63   BANDS PCT %  --   --  13*  --          Results from last 7 days   Lab Units 08/16/19  0553 08/15/19  1841 08/15/19  0525 08/14/19  1547 08/14/19  0635 08/14/19  0126 08/14/19  0015 08/14/19   SODIUM mmol/L 134* 132* 136 138  --   --   --  136   POTASSIUM mmol/L 3 6 3 4* 3 3* 3 7  --   --   --  4 2   CHLORIDE mmol/L 98* 95* 100 102  --   --   --  104   CO2 mmol/L 29 29 29 27  --   --   --  21   CO2, I-STAT mmol/L  --   --   --   --   --  23  --   --    ANION GAP mmol/L 7 8 7 9  --   --   --  11   BUN mg/dL 23 27* 25 20  --   --   --  20   CREATININE mg/dL 0 86 1 04 1 02 1 00  --   --   --  1 10   EGFR ml/min/1 73sq m 96 79 81 83  --   --   --  74   CALCIUM mg/dL 8 5 8 5 7 9* 8 5  --   --   --  8 7   CALCIUM, IONIZED mmol/L  --   --   --   --   --   --  1 11*  --    MAGNESIUM mg/dL  --  1 8  --   --   --   --   --  1 3*   PHOSPHORUS mg/dL  --   --   --   --  3 0  --   --   --      Results from last 7 days   Lab Units 08/14/19 08/13/19  1718   AST U/L 40 34   ALT U/L 33 34   ALK PHOS U/L 99 106   TOTAL PROTEIN g/dL 8 3* 8 1   ALBUMIN g/dL 3 0* 2 9*   TOTAL BILIRUBIN mg/dL 0 70 0 60         Results from last 7 days   Lab Units 08/16/19  0553 08/15/19  1841 08/15/19  0525 08/14/19  1547 08/14/19 08/13/19  1718   GLUCOSE RANDOM mg/dL 115 141* 122 116 111 109       Results from last 7 days   Lab Units 08/14/19  0126   I STAT BASE EXC mmol/L -4*   I STAT O2 SAT % 99*   ISTAT PH ART  7 353   I STAT ART PCO2 mm HG 38 9   I STAT ART PO2 mm  0*   I STAT ART HCO3 mmol/L 21 6*       Results from last 7 days   Lab Units 08/14/19  0015 08/13/19  1720   PROTIME seconds 17 2*  --    INR  1 44*  --    PTT seconds  --  36     Results from last 7 days   Lab Units 08/14/19  0635   TSH 3RD GENERATON uIU/mL 0 035*     Results from last 7 days   Lab Units 08/14/19  0015   PROCALCITONIN ng/ml 1 05*     Results from last 7 days   Lab Units 08/14/19  1547 08/14/19  1334 08/14/19  0635 08/14/19  0218 08/14/19  0015   LACTIC ACID mmol/L 1 4 2 2* 3 3* 2 0 3 1*         Results from last 7 days   Lab Units 08/14/19  0635   DIGOXIN LVL ng/mL 0 4*           Results from last 7 days   Lab Units 08/13/19  1919 08/13/19  1917   CLARITY UA  Slightly Cloudy clear   COLOR UA  Yellow dark yellow   SPEC GRAV UA  >=1 030  --    SPEC GRAV US   --  1 030   PH UA  6 0 6 0   GLUCOSE UA mg/dl Negative neg   KETONES UA mg/dl Negative neg   BLOOD UA  Moderate* large   PROTEIN UA mg/dl Trace* trace   NITRITE UA  Positive* positive   BILIRUBIN UA  Negative  --    BILIRUBIN, UA   --  neg   UROBILINOGEN UA E U /dl 2 0* 0 2   LEUKOCYTES UA  Small* moderate   WBC UA /hpf Innumerable*  --    RBC UA /hpf 2-4*  --    BACTERIA UA /hpf Innumerable*  --    EPITHELIAL CELLS WET PREP /hpf Moderate*  --           Blood Culture #1 Escherichia coli          GRAM STAIN RESULT    Gram negative rods               Specimen Collected: 08/13/19 20:35 Last Resulted: 08/16/19 08:43        Blood Culture #2 Escherichia coli         GRAM STAIN RESULT    Gram negative rods               Specimen Collected: 08/13/19 20:41 Last Resulted: 08/15/19 13:55             Urine Culture 20,000-29,000 cfu/ml Escherichia coli            Specimen Collected: 08/13/19 22:35 Last Resulted: 08/15/19 15:19           Vital Signs:   Date and Time Temp Pulse SpO2 Resp BP   08/16/19 0730 98 2 °F (36 8 °C) 96 -- -- --   08/16/19 0600 96 5 °F (35 8 °C) 93 93 % 2L O2 NC 20 124/70   08/16/19 0045 -- -- 95 % -- --   08/15/19 2334 99 8 °F (37 7 °C) 100 95 % 24 124/68   08/15/19 2100 -- 88 96 % 2L O2 NC 24 --   08/15/19 2000 -- 106 96 % 36 --   08/15/19 1911 98 3 °F (36 8 °C) 87 94 % 25 120/64   08/15/19 1850 -- 80 94 % -- --   08/15/19 1640 -- 109 -- -- 134/75   08/15/19 1522 98 4 °F (36 9 °C) 103 93 % Room Air 30 139/80   08/15/19 1330 -- 92 97 % 3L O2 NC 26 --   08/15/19 1158 99 1 °F (37 3 °C) 84 94 % 4L O2 NC 25 106/57   08/15/19 0843 -- 110 -- -- 132/68   08/15/19 0718 97 6 °F (36 4 °C) 102 93 % 5L O2 NC 24 126/72   08/15/19 0500 100 9 °F (38 3 °C) 100 96 % BiPap HS 31 135/68   08/15/19 0400 -- 102 99 % 27 122/77   08/15/19 0300 -- 93 95 % 30 115/62   08/15/19 0200 -- 95 96 % 29 109/59   08/15/19 0100 -- 93 96 % 30 107/59     Medications:   Scheduled Meds:   Current Facility-Administered Medications:  acetaminophen 650 mg Oral Q6H PRN   atorvastatin 40 mg Oral Daily   cefepime 2,000 mg Intravenous Q12H   digoxin 250 mcg Oral Daily   HYDROcodone-acetaminophen 2 tablet Oral Q4H PRN   ipratropium 0 5 mg Nebulization TID   levalbuterol 1 25 mg Nebulization TID   levalbuterol 1 25 mg Nebulization Q6H PRN   methimazole 10 mg Oral Q8H   metoprolol tartrate 50 mg Oral Q8H   potassium chloride 40 mEq Oral BID   rivaroxaban 20 mg Oral Daily With Breakfast   torsemide 20 mg Oral BID (diuretic)         furosemide (LASIX) injection 40 mg   Dose: 40 mg  Freq: 3 times a day (diuretic) Route: IV  Start: 08/14/19 0945 End: 08/15/19 1408      diltiazem (CARDIZEM) 125 mg in sodium chloride 0 9 % 125 mL infusion   Rate: 1-15 mL/hr Dose: 1-15 mg/hr  Freq: Titrated Route: IV  Last Dose: Stopped (08/15/19 1030)  Start: 08/14/19 0000         Discharge Plan: D    Network Utilization Review Department  Phone: [phone]; Fax [phone]  [email or web address]  ATTENTION: Please call with any questions or concerns to [phone]  and carefully listen to the prompts so that you are directed to the right person  Send all requests for admission clinical reviews, approved or denied determinations and any other requests to fax [phone]   All voicemails are confidential

## 2019-08-16 NOTE — ASSESSMENT & PLAN NOTE
Patient presented with right ureteral stone with obstruction causing pyelonephritis and severe sepsis  He underwent cystoscopy and right ureteral stent placement for obstructing right ureteral stone  He will need outpatient neurologic follow-up      Fonseca catheter was discontinued yesterday and patient is voiding without urinary retension

## 2019-08-16 NOTE — PROGRESS NOTES
Progress Note - Terri Perez 1960, 64 y o  male MRN: [id number]    Unit/Bed#: -02 Encounter: [id number]    Primary Care Provider: Clementine Farah DO   Date and time admitted to hospital: 8/13/2019  4:21 PM        * Kidney stone  Assessment & Plan  Patient presented with right ureteral stone with obstruction causing pyelonephritis and severe sepsis  He underwent cystoscopy and right ureteral stent placement for obstructing right ureteral stone  He will need outpatient neurologic follow-up  Fonseca catheter was discontinued yesterday and patient is voiding without urinary retension    Acute respiratory failure with hypoxia [facility])  Assessment & Plan  Patient came in with acute hypoxic respiratory failure as evidenced by respiratory as high as 31 needing oxygen via nasal cannula at 5 liters/minute  This is due to acute diastolic CHF  Patient's nurse reports that patient was saturating well on room air yesterday  At night he was on 2 liters/minute nasal cannula and BiPAP  This morning patient is in respiratory distress  Acute respiratory failure resolved      Pyelonephritis, acute  Assessment & Plan  Patient presented with right pyelonephritis due to obstructive ureteral stone as evidenced by perinephric stranding on CT scan of the abdomen  Will continue IV cefepime pending urine cultures and blood cultures    Severe sepsis [facility])  Assessment & Plan  Patient met criteria for severe sepsis at around 00:15 zero  a m  With fever of more than 101, heart rate more than 100, respiratory more than 20, lactic acidosis of more than 3  Due to right-sided pyelonephritis    Blood cultures are showing gram-negative rods    Repeat blood cultures so far showing no growth    Will continue IV cefepime    Will discontinue PICC line    Will mobilize patient with the help of PT and OT    Acute on chronic diastolic congestive heart failure [facility])  Assessment & Plan  Wt Readings from Last 3 Encounters:   08/16/19 (!) 162 kg (356 lb 0 7 oz)   19 (!) 167 kg (367 lb 12 8 oz)   19 (!) 164 kg (362 lb)     Patient had of acute diastolic CHF in the setting of severe sepsis from pyelonephritis on IV fluid administration  Lungs are clear to auscultation today, acute diastolic disease CHF resolved  Echo shows ejection fraction of 60%  Will stop IV Lasix and will switch patient to torsemide 20 mg p o  B i d   Will continue to monitor patient's weight    Chronic atrial fibrillation (HCC)  Assessment & Plan  Patient's heart rates are well controlled on Lopressor 50 mg p o  Q 8 hours  Will continue same dose    Continue digoxin  Continue Xarelto for CVA prophylaxis      Hyperthyroidism  Assessment & Plan  TSH is low, free T is high, patient was probably not taking methimazole at home  Will continue methimazole at the same dose for now    Morbid obesity ([facility] [address] )  Assessment & Plan  BMI is 52 03, patient has morbid obesity    Pulmonary hypertension ([facility] [address] )  Assessment & Plan  RVSP is 41 on echo    Hypokalemia  Assessment & Plan  Potassium is 3 6 this morning, will increase potassium to 40 mg p o  B i d  And will recheck level tomorrow        VTE Prophylaxis: in place    Patient Centered Rounds: I rounded with patient's nurse    Current Length of Stay: 3 day(s)    Current Patient Status: Inpatient    Certification Statement: Pt requires additional inpatient hospital stay due to: see assessment and plan        Subjective:   Patient's nurse reports that patient was on room air yesterday, had no urine retention after discontinuing Fonseca yesterday, has been stable      Patient denies chest pain, shortness of breath, abdominal pain, dysuria, difficulty urinating, signs of bleeding    All other ROS are negative    Objective:     Vitals:   Temp (24hrs), Av 3 °F (36 8 °C), Min:96 5 °F (35 8 °C), Max:99 8 °F (37 7 °C)    Temp:  [96 5 °F (35 8 °C)-99 8 °F (37 7 °C)] 96 5 °F (35 8 °C)  HR:  [] 93  Resp:  [20-36] 20  BP: (106-139)/(57-80) 124/70  SpO2:  [93 %-97 %] 93 %  Body mass index is 49 66 kg/m²  Input and Output Summary (last 24 hours): Intake/Output Summary (Last 24 hours) at 8/16/2019 0713  Last data filed at 8/16/2019 0630  Gross per 24 hour   Intake 1730 ml   Output 4600 ml   Net -2870 ml       Physical Exam:     Physical Exam   Constitutional: He is oriented to person, place, and time  He appears well-developed  No distress  HENT:   Head: Normocephalic  Mouth/Throat: Oropharynx is clear and moist    Eyes: Conjunctivae are normal    Neck: Neck supple  Cardiovascular: Normal rate  Irregular irregular, there is no pedal edema   Pulmonary/Chest: Effort normal  No respiratory distress  Wheezes:  I heard no expiratory wheezing today  He has rales (Scant inspiratory crackles were heard which are much improved compared to  yesterday)  Abdominal: Soft  Bowel sounds are normal  He exhibits no distension  There is no tenderness  Musculoskeletal: He exhibits no tenderness  Neurological: He is alert and oriented to person, place, and time  No cranial nerve deficit   strength is normal equal, speech is normal   Skin: Skin is warm and dry  No rash noted  Psychiatric: He has a normal mood and affect  Vitals reviewed  I personally reviewed labs and imaging reports for today        Last 24 Hours Medication List:     Current Facility-Administered Medications:  acetaminophen 650 mg Oral Q6H PRN Ludivina Gregg PA-C    atorvastatin 40 mg Oral Daily Ludivina Gregg PA-C    cefepime 2,000 mg Intravenous Q12H Ludivina Gregg PA-C Last Rate: Stopped (08/16/19 0005)   digoxin 250 mcg Oral Daily Ludivina Gregg PA-C    HYDROcodone-acetaminophen 2 tablet Oral Q4H PRN Mendoza Franklin MD    ipratropium 0 5 mg Nebulization TID Ashvin Brown MD    levalbuterol 1 25 mg Nebulization TID Josy Plata MD    levalbuterol 1 25 mg Nebulization Q6H PRN Josy Plata MD methimazole 10 mg Oral Q8H Ludivina Gregg PA-C    metoprolol tartrate 50 mg Oral Q8H Gavin Mack MD    potassium chloride 40 mEq Oral BID Gavin Mack MD    rivaroxaban 20 mg Oral Daily With Breakfast Ludivina Gregg PA-C    torsemide 20 mg Oral BID (diuretic) Gavin Mack MD           Today, Patient Was Seen By: Gavin Mack MD    ** Please Note: Dictation voice to text software may have been used in the creation of this document   **

## 2019-08-16 NOTE — PLAN OF CARE
Problem: PAIN - ADULT  Goal: Verbalizes/displays adequate comfort level or baseline comfort level  Description  Interventions:  - Encourage patient to monitor pain and request assistance  - Assess pain using appropriate pain scale  - Administer analgesics based on type and severity of pain and evaluate response  - Implement non-pharmacological measures as appropriate and evaluate response  - Consider cultural and social influences on pain and pain management  - Notify physician/advanced practitioner if interventions unsuccessful or patient reports new pain  Outcome: Progressing     Problem: INFECTION - ADULT  Goal: Absence or prevention of progression during hospitalization  Description  INTERVENTIONS:  - Assess and monitor for signs and symptoms of infection  - Monitor lab/diagnostic results  - Monitor all insertion sites, i e  indwelling lines, tubes, and drains  - [place] appropriate cooling/warming therapies per order  - Administer medications as ordered  - Instruct and encourage patient and family to use good hand hygiene technique  - Identify and instruct in appropriate isolation precautions for identified infection/condition   Outcome: Progressing     Problem: SAFETY ADULT  Goal: Patient will remain free of falls  Description  INTERVENTIONS:  - Assess patient frequently for physical needs  -  Identify cognitive and physical deficits and behaviors that affect risk of falls    -  [place] fall precautions as indicated by assessment   - Educate patient/family on patient safety including physical limitations  - Instruct patient to call for assistance with activity based on assessment  - Modify environment to reduce risk of injury  - Consider OT/PT consult to assist with strengthening/mobility  Outcome: Progressing     Problem: GENITOURINARY - ADULT  Goal: Absence of urinary retention  Description  INTERVENTIONS:  - Assess patient's ability to void and empty bladder  - Monitor I/O  - Bladder scan as needed  - Discuss with physician/AP medications to alleviate retention as needed  - Discuss catheterization for long term situations as appropriate   Outcome: Progressing

## 2019-08-16 NOTE — PHYSICAL THERAPY NOTE
PT screen  ORder rec'd chart reviewed, observed pt amb in hallway no AD or assistance  No needs noted at this time   Screen only d/c PT

## 2019-08-16 NOTE — OCCUPATIONAL THERAPY NOTE
Occupational Therapy Screen    Patient Name: Theresa MATAMOROS Date: 8/16/2019    OT orders received and chart reviewed  Pt observed ambulating in the hallway with nursing; per note, pt ambulated 500ft with stable O2 saturation  Spoke to [employer]; no therapy needs  Recommend nursing continues to mobilize patient while hospitalized to prevent deconditioning  OT will sign off at this time; please reconsult if OT needs arise      [employer], OT

## 2019-08-16 NOTE — ASSESSMENT & PLAN NOTE
TSH is low, free T is high, patient was probably not taking methimazole at home      Will continue methimazole at the same dose for now

## 2019-08-17 VITALS
SYSTOLIC BLOOD PRESSURE: 117 MMHG | HEIGHT: 71 IN | HEART RATE: 85 BPM | TEMPERATURE: 98.2 F | WEIGHT: 315 LBS | RESPIRATION RATE: 16 BRPM | DIASTOLIC BLOOD PRESSURE: 65 MMHG | BODY MASS INDEX: 44.1 KG/M2 | OXYGEN SATURATION: 91 %

## 2019-08-17 LAB
ANION GAP SERPL CALCULATED.3IONS-SCNC: 6 MMOL/L (ref 4–13)
BACTERIA BLD CULT: ABNORMAL
BUN SERPL-MCNC: 27 MG/DL (ref 5–25)
CALCIUM SERPL-MCNC: 8.3 MG/DL (ref 8.3–10.1)
CHLORIDE SERPL-SCNC: 99 MMOL/L (ref 100–108)
CO2 SERPL-SCNC: 32 MMOL/L (ref 21–32)
CREAT SERPL-MCNC: 0.92 MG/DL (ref 0.6–1.3)
GFR SERPL CREATININE-BSD FRML MDRD: 92 ML/MIN/1.73SQ M
GLUCOSE SERPL-MCNC: 107 MG/DL (ref 65–140)
GRAM STN SPEC: ABNORMAL
POTASSIUM SERPL-SCNC: 3.4 MMOL/L (ref 3.5–5.3)
SODIUM SERPL-SCNC: 137 MMOL/L (ref 136–145)

## 2019-08-17 PROCEDURE — 94660 CPAP INITIATION&MGMT: CPT

## 2019-08-17 PROCEDURE — 80048 BASIC METABOLIC PNL TOTAL CA: CPT | Performed by: INTERNAL MEDICINE

## 2019-08-17 PROCEDURE — 94640 AIRWAY INHALATION TREATMENT: CPT

## 2019-08-17 PROCEDURE — 99239 HOSP IP/OBS DSCHRG MGMT >30: CPT | Performed by: INTERNAL MEDICINE

## 2019-08-17 PROCEDURE — 94760 N-INVAS EAR/PLS OXIMETRY 1: CPT

## 2019-08-17 RX ORDER — METHIMAZOLE 5 MG/1
10 TABLET ORAL EVERY 12 HOURS SCHEDULED
Status: DISCONTINUED | OUTPATIENT
Start: 2019-08-17 | End: 2019-08-17 | Stop reason: HOSPADM

## 2019-08-17 RX ORDER — TORSEMIDE 20 MG/1
20 TABLET ORAL DAILY
Status: DISCONTINUED | OUTPATIENT
Start: 2019-08-18 | End: 2019-08-17 | Stop reason: HOSPADM

## 2019-08-17 RX ORDER — POTASSIUM CHLORIDE 20 MEQ/1
20 TABLET, EXTENDED RELEASE ORAL 2 TIMES DAILY
Status: DISCONTINUED | OUTPATIENT
Start: 2019-08-17 | End: 2019-08-17 | Stop reason: HOSPADM

## 2019-08-17 RX ORDER — METOPROLOL TARTRATE 50 MG/1
50 TABLET, FILM COATED ORAL EVERY 12 HOURS SCHEDULED
Status: DISCONTINUED | OUTPATIENT
Start: 2019-08-17 | End: 2019-08-17 | Stop reason: HOSPADM

## 2019-08-17 RX ORDER — METHIMAZOLE 10 MG/1
10 TABLET ORAL 2 TIMES DAILY
Qty: 90 TABLET | Refills: 0 | Status: SHIPPED | OUTPATIENT
Start: 2019-08-17 | End: 2020-04-29 | Stop reason: SDUPTHER

## 2019-08-17 RX ORDER — CIPROFLOXACIN 500 MG/1
500 TABLET, FILM COATED ORAL 2 TIMES DAILY
Qty: 18 TABLET | Refills: 0 | Status: SHIPPED | OUTPATIENT
Start: 2019-08-17 | End: 2019-08-26

## 2019-08-17 RX ADMIN — METHIMAZOLE 10 MG: 5 TABLET ORAL at 07:26

## 2019-08-17 RX ADMIN — DIGOXIN 250 MCG: 250 TABLET ORAL at 08:37

## 2019-08-17 RX ADMIN — METOPROLOL TARTRATE 50 MG: 50 TABLET, FILM COATED ORAL at 08:37

## 2019-08-17 RX ADMIN — CIPROFLOXACIN HYDROCHLORIDE 500 MG: 500 TABLET, FILM COATED ORAL at 08:37

## 2019-08-17 RX ADMIN — IPRATROPIUM BROMIDE 0.5 MG: 0.5 SOLUTION RESPIRATORY (INHALATION) at 08:49

## 2019-08-17 RX ADMIN — POTASSIUM CHLORIDE 40 MEQ: 20 TABLET, EXTENDED RELEASE ORAL at 08:37

## 2019-08-17 RX ADMIN — METHIMAZOLE 10 MG: 5 TABLET ORAL at 00:12

## 2019-08-17 RX ADMIN — RIVAROXABAN 20 MG: 20 TABLET, FILM COATED ORAL at 07:25

## 2019-08-17 RX ADMIN — ATORVASTATIN CALCIUM 40 MG: 40 TABLET, FILM COATED ORAL at 08:37

## 2019-08-17 RX ADMIN — METOPROLOL TARTRATE 50 MG: 50 TABLET, FILM COATED ORAL at 00:12

## 2019-08-17 RX ADMIN — LEVALBUTEROL HYDROCHLORIDE 1.25 MG: 1.25 SOLUTION, CONCENTRATE RESPIRATORY (INHALATION) at 08:50

## 2019-08-17 RX ADMIN — TORSEMIDE 20 MG: 20 TABLET ORAL at 07:26

## 2019-08-17 NOTE — ASSESSMENT & PLAN NOTE
Wt Readings from Last 3 Encounters:   08/17/19 (!) 158 kg (349 lb 3 3 oz)   03/16/19 (!) 167 kg (367 lb 12 8 oz)   02/05/19 (!) 164 kg (362 lb)     Patient had of acute diastolic CHF in the setting of severe sepsis from pyelonephritis and IV fluid administration  Lungs are clear to auscultation today, acute diastolic disease CHF resolved  Echo showed ejection fraction of 60%      I am discharging patient on patient's usual dose of torsemide of 20 mg daily

## 2019-08-17 NOTE — ASSESSMENT & PLAN NOTE
Patient met criteria for severe sepsis at around 00:15 zero  a m on day of admission  With fever of more than 101, heart rate more than 100, respiratory more than 20, lactic acidosis of more than 3  Due to right-sided pyelonephritis    Blood and urine cultures grew E coli due to pyelonephritis  Patient was treated with IV cefepime and then was switched to p o   Ciprofloxacin

## 2019-08-17 NOTE — ASSESSMENT & PLAN NOTE
Patient's heart rates were uncontrolled on admission in the setting of diastolic CHF and severe sepsis  She he was placed to IV diltiazem drip and then was switched to increase dose metoprolol  On day of discharge his heart rate is well controlled, will continue metoprolol 50 mg p o  B i d   Continue digoxin      Continue Xarelto for CVA prophylaxis

## 2019-08-17 NOTE — ASSESSMENT & PLAN NOTE
Patient came in with acute hypoxic respiratory failure as evidenced by respiratory as high as 31 needing oxygen via nasal cannula at 5 liters/minute  This was due to acute diastolic CHF  Acute CHF resolved with IV Lasix    Oxygen saturation on room air on discharge is 91-98%

## 2019-08-17 NOTE — ASSESSMENT & PLAN NOTE
Patient presented with right pyelonephritis due to obstructive ureteral stone as evidenced by perinephric stranding on CT scan of the abdomen  Patient was placed on IV cefepime on admission that was switched to p  O  Ciprofloxacin once urine and blood cultures became available  Patient will complete 9 more days of p   O  Ciprofloxacin therapy 500mg bid

## 2019-08-17 NOTE — DISCHARGE SUMMARY
Discharge- Awanda Amel 1960, 64 y o  male MRN: [id number]    Unit/Bed#: [address] [place] [address] Encounter: [id number]    Primary Care Provider: Mor Shepherd DO   Date and time admitted to hospital: 8/13/2019  4:21 PM        * Kidney stone  Assessment & Plan  Patient presented with right ureteral stone with obstruction causing pyelonephritis and severe sepsis  He underwent cystoscopy and right ureteral stent placement for obstructing right ureteral stone  Patient will follow up with Urology on August 31st next week    Patient had a Fonseca catheter placed after cystoscopy that was discontinued  Patient denies any obstructive urinary symptoms  Abdomen soft and nontender there is no CVA tenderness on day of discharge  Acute respiratory failure with hypoxia [facility])  Assessment & Plan  Patient came in with acute hypoxic respiratory failure as evidenced by respiratory as high as 31 needing oxygen via nasal cannula at 5 liters/minute  This was due to acute diastolic CHF  Acute CHF resolved with IV Lasix  Oxygen saturation on room air on discharge is 91-98%      Pyelonephritis, acute  Assessment & Plan  Patient presented with right pyelonephritis due to obstructive ureteral stone as evidenced by perinephric stranding on CT scan of the abdomen  Patient was placed on IV cefepime on admission that was switched to p  O  Ciprofloxacin once urine and blood cultures became available  Patient will complete 9 more days of p  O  Ciprofloxacin therapy 500mg bid     Severe sepsis [facility])  Assessment & Plan  Patient met criteria for severe sepsis at around 00:15 zero  a m on day of admission  With fever of more than 101, heart rate more than 100, respiratory more than 20, lactic acidosis of more than 3  Due to right-sided pyelonephritis    Blood and urine cultures grew E coli due to pyelonephritis  Patient was treated with IV cefepime and then was switched to p o   Ciprofloxacin    Acute on chronic diastolic congestive heart failure [facility])  Assessment & Plan  Wt Readings from Last 3 Encounters:   08/17/19 (!) 158 kg (349 lb 3 3 oz)   03/16/19 (!) 167 kg (367 lb 12 8 oz)   02/05/19 (!) 164 kg (362 lb)     Patient had of acute diastolic CHF in the setting of severe sepsis from pyelonephritis and IV fluid administration  Lungs are clear to auscultation today, acute diastolic disease CHF resolved  Echo showed ejection fraction of 60%  I am discharging patient on patient's usual dose of torsemide of 20 mg daily    Chronic atrial fibrillation ([facility] [address] )  Assessment & Plan  Patient's heart rates were uncontrolled on admission in the setting of diastolic CHF and severe sepsis  She he was placed to IV diltiazem drip and then was switched to increase dose metoprolol  On day of discharge his heart rate is well controlled, will continue metoprolol 50 mg p o  B i d   Continue digoxin  Continue Xarelto for CVA prophylaxis      Hyperthyroidism  Assessment & Plan  TSH was low, free T4 was high, patient was taking methimazole only once a day  On discharge I asked patient to increase methimazole to 10 mg p o  B i d  Free T4 will have to be followed by patient's PCP    Morbid obesity ([facility] [address] )  Assessment & Plan  BMI is 52 03, patient has morbid obesity    Pulmonary hypertension ([facility] [address] )  Assessment & Plan  RVSP is 41 on echo this admission              Hospital Course:     Irlanda Ansari is a 64 y o  male patient who originally presented to the hospital on   Admission Orders (From admission, onward)     Ordered        08/13/19 2026  Inpatient Admission (expected length of stay for this patient Order details is greater than two midnights)  Once                  due to severe sepsis from pyelonephritis due to kidney stone    Please see above list of diagnoses and related plan for additional information       Condition at Discharge:  good      Discharge instructions/Information to patient and family:   See after visit summary for information provided to patient and family  Provisions for Follow-Up Care:  See after visit summary for information related to follow-up care and any pertinent home health orders  Disposition:     Home       Discharge Statement:  I spent 32 minutes discharging the patient  This time was spent on the day of discharge  I had direct contact with the patient on the day of discharge  Greater than 50% of the total time was spent examining patient, answering all patient questions, arranging and discussing plan of care with patient as well as directly providing post-discharge instructions  Additional time then spent on discharge activities  Discharge Medications:  See after visit summary for reconciled discharge medications provided to patient and family        ** Please Note: This note has been constructed using a voice recognition system **

## 2019-08-17 NOTE — ASSESSMENT & PLAN NOTE
Patient presented with right ureteral stone with obstruction causing pyelonephritis and severe sepsis  He underwent cystoscopy and right ureteral stent placement for obstructing right ureteral stone  Patient will follow up with Urology on August 31st next week    Patient had a Fonseca catheter placed after cystoscopy that was discontinued  Patient denies any obstructive urinary symptoms  Abdomen soft and nontender there is no CVA tenderness on day of discharge

## 2019-08-17 NOTE — PLAN OF CARE
Problem: PAIN - ADULT  Goal: Verbalizes/displays adequate comfort level or baseline comfort level  Description  Interventions:  - Encourage patient to monitor pain and request assistance  - Assess pain using appropriate pain scale  - Administer analgesics based on type and severity of pain and evaluate response  - Implement non-pharmacological measures as appropriate and evaluate response  - Consider cultural and social influences on pain and pain management  - Notify physician/advanced practitioner if interventions unsuccessful or patient reports new pain  Outcome: Progressing     Problem: INFECTION - ADULT  Goal: Absence or prevention of progression during hospitalization  Description  INTERVENTIONS:  - Assess and monitor for signs and symptoms of infection  - Monitor lab/diagnostic results  - Monitor all insertion sites, i e  indwelling lines, tubes, and drains  - [place] appropriate cooling/warming therapies per order  - Administer medications as ordered  - Instruct and encourage patient and family to use good hand hygiene technique  - Identify and instruct in appropriate isolation precautions for identified infection/condition   Outcome: Progressing  Goal: Absence of fever/infection during neutropenic period  Description  INTERVENTIONS:  - Monitor WBC    Outcome: Progressing     Problem: SAFETY ADULT  Goal: Patient will remain free of falls  Description  INTERVENTIONS:  - Assess patient frequently for physical needs  -  Identify cognitive and physical deficits and behaviors that affect risk of falls    -  [place] fall precautions as indicated by assessment   - Educate patient/family on patient safety including physical limitations  - Instruct patient to call for assistance with activity based on assessment  - Modify environment to reduce risk of injury  - Consider OT/PT consult to assist with strengthening/mobility  Outcome: Progressing  Goal: Maintain or return to baseline ADL function  Description  INTERVENTIONS:  -  Assess patient's ability to carry out ADLs; assess patient's baseline for ADL function and identify physical deficits which impact ability to perform ADLs (bathing, care of mouth/teeth, toileting, grooming, dressing, etc )  - Assess/evaluate cause of self-care deficits   - Assess range of motion  - Assess patient's mobility; develop plan if impaired  - Assess patient's need for assistive devices and provide as appropriate  - Encourage maximum independence but intervene and supervise when necessary  - Involve family in performance of ADLs  - Assess for home care needs following discharge   - Consider OT consult to assist with ADL evaluation and planning for discharge  - Provide patient education as appropriate  Outcome: Progressing  Goal: Maintain or return mobility status to optimal level  Description  INTERVENTIONS:  - Assess patient's baseline mobility status (ambulation, transfers, stairs, etc )    - Identify cognitive and physical deficits and behaviors that affect mobility  - Identify mobility aids required to assist with transfers and/or ambulation (gait belt, sit-to-stand, lift, walker, cane, etc )  - [place] fall precautions as indicated by assessment  - Record patient progress and toleration of activity level on Mobility SBAR; progress patient to next Phase/Stage  - Instruct patient to call for assistance with activity based on assessment  - Consider rehabilitation consult to assist with strengthening/weightbearing, etc   Outcome: Progressing     Problem: DISCHARGE PLANNING  Goal: Discharge to home or other facility with appropriate resources  Description  INTERVENTIONS:  - Identify barriers to discharge w/patient and caregiver  - Arrange for needed discharge resources and transportation as appropriate  - Identify discharge learning needs (meds, wound care, etc )  - Arrange for interpretive services to assist at discharge as needed  - Refer to Case Management Department for coordinating discharge planning if the patient needs post-hospital services based on physician/advanced practitioner order or complex needs related to functional status, cognitive ability, or social support system  Outcome: Progressing     Problem: Knowledge Deficit  Goal: Patient/family/caregiver demonstrates understanding of disease process, treatment plan, medications, and discharge instructions  Description  Complete learning assessment and assess knowledge base    Interventions:  - Provide teaching at level of understanding  - Provide teaching via preferred learning methods  Outcome: Progressing     Problem: GENITOURINARY - ADULT  Goal: Maintains or returns to baseline urinary function  Description  INTERVENTIONS:  - Assess urinary function  - Encourage oral fluids to ensure adequate hydration if ordered  - Administer IV fluids as ordered to ensure adequate hydration  - Administer ordered medications as needed  - Offer frequent toileting  - Follow urinary retention protocol if ordered  Outcome: Progressing  Goal: Absence of urinary retention  Description  INTERVENTIONS:  - Assess patient's ability to void and empty bladder  - Monitor I/O  - Bladder scan as needed  - Discuss with physician/AP medications to alleviate retention as needed  - Discuss catheterization for long term situations as appropriate   Outcome: Progressing     Problem: Prexisting or High Potential for Compromised Skin Integrity  Goal: Skin integrity is maintained or improved  Description  INTERVENTIONS:  - Identify patients at risk for skin breakdown  - Assess and monitor skin integrity  - Assess and monitor nutrition and hydration status  - Monitor labs   - Assess for incontinence   - Turn and reposition patient  - Assist with mobility/ambulation  - Relieve pressure over bony prominences  - Avoid friction and shearing  - Provide appropriate hygiene as needed including keeping skin clean and dry  - Evaluate need for skin moisturizer/barrier cream  - Collaborate with interdisciplinary team   - Patient/family teaching  - Consider wound care consult   Outcome: Progressing     Problem: Potential for Falls  Goal: Patient will remain free of falls  Description  INTERVENTIONS:  - Assess patient frequently for physical needs  -  Identify cognitive and physical deficits and behaviors that affect risk of falls  -  [place] fall precautions as indicated by assessment   - Educate patient/family on patient safety including physical limitations  - Instruct patient to call for assistance with activity based on assessment  - Modify environment to reduce risk of injury  - Consider OT/PT consult to assist with strengthening/mobility  Outcome: Progressing     Problem: Nutrition/Hydration-ADULT  Goal: Nutrient/Hydration intake appropriate for improving, restoring or maintaining nutritional needs  Description  Monitor and assess patient's nutrition/hydration status for malnutrition  Collaborate with interdisciplinary team and initiate plan and interventions as ordered  Monitor patient's weight and dietary intake as ordered or per policy  Utilize nutrition screening tool and intervene as necessary  Determine patient's food preferences and provide high-protein, high-caloric foods as appropriate       INTERVENTIONS:  - Monitor oral intake, urinary output, labs, and treatment plans  - Assess nutrition and hydration status and recommend course of action  - Evaluate amount of meals eaten  - Assist patient with eating if necessary   - Allow adequate time for meals  - Recommend/ encourage appropriate diets, oral nutritional supplements, and vitamin/mineral supplements  - Order, calculate, and assess calorie counts as needed  - Recommend, monitor, and adjust tube feedings and TPN/PPN based on assessed needs  - Assess need for intravenous fluids  - Provide specific nutrition/hydration education as appropriate  - Include patient/family/caregiver in decisions related to nutrition  Outcome: Progressing

## 2019-08-17 NOTE — ASSESSMENT & PLAN NOTE
TSH was low, free T4 was high, patient was taking methimazole only once a day  On discharge I asked patient to increase methimazole to 10 mg p o  B i d      Free T4 will have to be followed by patient's PCP

## 2019-08-19 NOTE — TELEPHONE ENCOUNTER
Pt is scheduled for follow up  Notes: has stent - needs f/u for stone surgery   Made On:  Changed: 8/16/2019 8:55 AM  8/16/2019 8:56 AM By:  By: Casey PADRON [[id number]] (ES)  ORLANDO NGUYEN [[id number]] (ES)

## 2019-08-20 LAB
BACTERIA BLD CULT: NORMAL
BACTERIA BLD CULT: NORMAL

## 2019-08-21 ENCOUNTER — TELEPHONE (OUTPATIENT)
Dept: UROLOGY | Facility: AMBULATORY SURGERY CENTER | Age: 59
End: 2019-08-21

## 2019-08-21 ENCOUNTER — OFFICE VISIT (OUTPATIENT)
Dept: UROLOGY | Facility: HOSPITAL | Age: 59
End: 2019-08-21
Payer: COMMERCIAL

## 2019-08-21 VITALS
SYSTOLIC BLOOD PRESSURE: 110 MMHG | DIASTOLIC BLOOD PRESSURE: 70 MMHG | HEART RATE: 64 BPM | BODY MASS INDEX: 44.1 KG/M2 | HEIGHT: 71 IN | WEIGHT: 315 LBS

## 2019-08-21 DIAGNOSIS — N20.0 KIDNEY STONES: Primary | ICD-10-CM

## 2019-08-21 PROCEDURE — 99213 OFFICE O/P EST LOW 20 MIN: CPT | Performed by: NURSE PRACTITIONER

## 2019-08-21 RX ORDER — CEFAZOLIN SODIUM 2 G/50ML
2000 SOLUTION INTRAVENOUS ONCE
Status: CANCELLED | OUTPATIENT
Start: 2019-08-21 | End: 2019-08-21

## 2019-08-21 NOTE — TELEPHONE ENCOUNTER
I called patient's PCP and they stated that they typically do not do out of network authorizations, they would only do referrals, we would have to call ourselves to obtain an out of network auth  She stated that whoever is scheduling the surgery must call because there will be information that they do not know on their end at the PCP office  Same goes for the office visit done today  So I called patient's insurance company and I spoke with a representative from [employer]  He directed me to their website in which there is a form that we can submit along with clinical information for out of network authorizations on office visits and surgeries  The website where we can find this form is:    [employer] gl  pdf    I have printed two of these forms, I will submit one for today's office visit along with the office note and prior clinical notes to see if it can be covered  I have also printed one for [facility] to attempt to submit to get an authorization for surgery

## 2019-08-21 NOTE — TELEPHONE ENCOUNTER
Patient's OP NOTE/LABS/IMAGING/OFFICE NOTES faxed with form obtained by Wesley Morales  She was told to complete form and fax all clinical information to back need for treatment and further surgery  Faxed form and receive confirmation of it being completed to # on form  Luisana graham in Financial office was consulted for the [employer] ID required for form  I had also called her about the Patient's insurance once it was determined it was non-par

## 2019-08-21 NOTE — PROGRESS NOTES
8/21/2019    Mervatshireen Tim  1960  [id number]        Assessment  Kidney stones s/p right ureteral stent insertion    Discussion  Clayton Rosas is a 64 y o  male being managed by Dr Olea  The patient requires cystoscopy, right ureteroscopy with laser, stone extraction, and right ureteral stent exchange  The procedure will be scheduled with Dr Aniket Johnston as he prefers to have this done at the [facility]   Procedure was reviewed with the patient in detail  Risk and benefits were discussed  He understands consent will be signed the day of the surgery when he needs Dr Aniket Johnston  He was discharged call if any issues prior to surgery  All questions were answered  History of Present Illness  64 y o  male with a history of kidney stones presents today for follow up  He was recently hospitalized for 5 5mm right ureteral calculus and infection  He underwent right ureteral stent insertion with Dr Merrill Geiger 8/13/2019  He continues to take antibiotics as prescribed  He denies any stent discomfort or pain  He is currently doing well with no complaints  He denies any fevers  He has a history of kidney stones and has required surgical intervention in the past         Review of Systems  Review of Systems   Constitutional: Negative  HENT: Negative  Respiratory: Negative  Cardiovascular: Negative  Gastrointestinal: Negative  Genitourinary:        As per HPI   Musculoskeletal: Negative  Skin: Negative  Neurological: Negative  Hematological: Negative  Urinary Incontinence Screening      Most Recent Value   Urinary Incontinence   Urinary Incontinence? No   Incomplete emptying? No   Urinary frequency? No   Urinary urgency? Yes   Urinary hesitancy? No   Dysuria (painful difficult urination)? No   Nocturia (waking up to use the bathroom)? No   Straining (having to push to go)? No   Weak stream?  No   Intermittent stream?  No   Post void dribbling?   No            Past Medical History  Past Medical History:   Diagnosis Date    Acute diastolic congestive heart failure ([facility] [address] ) 7/3/2017    Atrial fibrillation (HCC)     Bilateral edema of lower extremity 8/22/2016    CAD (coronary artery disease) 10/28/2016    Cardiac disease     Chest pain 8/22/2016    Hyperlipidemia     Hypertension     Hyperthyroidism 8/22/2016    Kidney stone     Kidney stone     Pneumothorax     Presence of IVC filter     Pulmonary embolism (HCC)     Rash 10/28/2016    SOB (shortness of breath) 8/22/2016    Tachycardia 8/22/2016       Past Surgical History  Past Surgical History:   Procedure Laterality Date    EGD AND COLONOSCOPY N/A 7/28/2017    Procedure: EGD AND COLONOSCOPY;  Surgeon: Lambert Beard MD;  Location: [facility];  Service: Gastroenterology    FL RETROGRADE PYELOGRAM  8/13/2019    LITHOTRIPSY      VA CYSTO/URETERO W/LITHOTRIPSY &INDWELL STENT INSRT Right 8/13/2019    Procedure: CYSTOSCOPY URETEROSCOPY WITH LITHOTRIPSY HOLMIUM LASER, RETROGRADE PYELOGRAM AND INSERTION STENT URETERAL;  Surgeon: Winsome Macedo MD;  Location: [facility];  Service: Urology        Past Family History  Family History   Problem Relation Age of Onset    Cancer Mother     Heart disease Father        Past Social history  Social History     Socioeconomic History    Marital status: Single     Spouse name: Not on file    Number of children: Not on file    Years of education: Not on file    Highest education level: Not on file   Occupational History    Not on file   Social Needs    Financial resource strain: Not on file    Food insecurity:     Worry: Not on file     Inability: Not on file    Transportation needs:     Medical: Not on file     Non-medical: Not on file   Tobacco Use    Smoking status: Current Every Day Smoker     Packs/day: 0 20     Types: Cigarettes    Smokeless tobacco: Never Used    Tobacco comment: Pt states he smokes when he can afford to do so    Substance and Sexual Activity    Alcohol use: No    Drug use: No    Sexual activity: Not on file   Lifestyle    Physical activity:     Days per week: Not on file     Minutes per session: Not on file    Stress: Not on file   Relationships    Social connections:     Talks on phone: Not on file     Gets together: Not on file     Attends [employer] service: Not on file     Active member of club or organization: Not on file     Attends meetings of clubs or organizations: Not on file     Relationship status: Not on file    Intimate partner violence:     Fear of current or ex partner: Not on file     Emotionally abused: Not on file     Physically abused: Not on file     Forced sexual activity: Not on file   Other Topics Concern    Not on file   Social History Narrative    Not on file       Current Medications  Current Outpatient Medications   Medication Sig Dispense Refill    atorvastatin (LIPITOR) 40 mg tablet Take 1 tablet (40 mg total) by mouth daily 30 tablet 6    ciprofloxacin (CIPRO) 500 mg tablet Take 1 tablet (500 mg total) by mouth 2 (two) times a day for 9 days 18 tablet 0    digoxin (LANOXIN) 0 25 mg tablet Take 250 mcg by mouth daily      methimazole (TAPAZOLE) 10 mg tablet Take 1 tablet (10 mg total) by mouth 2 (two) times a day 90 tablet 0    metoprolol tartrate (LOPRESSOR) 50 mg tablet TAKE ONE TABLET BY MOUTH TWICE DAILY 60 tablet 11    potassium chloride (K-DUR,KLOR-CON) 20 mEq tablet Take 20 mEq by mouth 2 (two) times a day      rivaroxaban (XARELTO) 20 mg tablet Take 1 tablet (20 mg total) by mouth daily with breakfast 30 tablet 11    torsemide (DEMADEX) 20 mg tablet Take 1 tablet by mouth daily       No current facility-administered medications for this visit  Allergies  No Known Allergies    Past Medical History, Social History, Family History, medications and allergies were reviewed and updated as appropriate      Vitals  Vitals:    08/21/19 1140   BP: 110/70   Pulse: 64   Weight: (!) 159 kg (349 lb 9 6 oz) Height: 5' 11" (1 803 m)       Physical Exam  Skin: warm, dry, intact  Cardiac: S1S2, HRR, Peripheral edema: negative  Pulmonary: Non-labored breathing, lungs clear  Abdomen: Soft, non-tender, non-distended  Musculoskeletal: AROM with no joint deformity or tenderness    Neurology: Alert and oriented      Results    Lab Results   Component Value Date    GLUCOSE 113 11/30/2015    CALCIUM 8 3 08/17/2019     02/15/2017    K 3 4 (L) 08/17/2019    CO2 32 08/17/2019    CL 99 (L) 08/17/2019    BUN 27 (H) 08/17/2019    CREATININE 0 92 08/17/2019     Lab Results   Component Value Date    WBC 8 77 08/16/2019    HGB 13 8 08/16/2019    HCT 41 6 08/16/2019    MCV 96 08/16/2019     (L) 08/16/2019

## 2019-08-21 NOTE — TELEPHONE ENCOUNTER
Patient's insurance not Par with our office or Hospital  His care was through the ED on 7/17 and the Stent was placed by Dr Bowen Mathur emergently        I spoke to Onofre Alvarez our , he is checking with the insurance to see if Patient's [address] has out of network benefits to cover the follow up surgical intervention (Ureteroscopy)ordered at his office visit today with Herlinda Luna

## 2019-08-23 NOTE — TELEPHONE ENCOUNTER
Received a call from [id number] @ [facility] Turcios was asking if Dr Ying Morgan and Dr Rajiv Baeza are in the same practice  I explained to her that Patient was in the ED and had his stent placed emergently  He was seen in follow up by Elliot Singh and it was determined he would need further surgical intervention with a Ureteroscopy with laser Lithotripsy  It was noticed then that Patient's insurance was not Par with our office or the hospital   She stated she will finish the determination of out of network benefits and fax it to our office @ [phone]

## 2019-08-26 NOTE — TELEPHONE ENCOUNTER
Received an approval for Surgery from Jose R/Lupe BRODY  Will notify Patient and schedule for 9/27/19 @ [facility]

## 2019-08-26 NOTE — TELEPHONE ENCOUNTER
Spoke to Patient and confirmed 9/27/19 @ [facility] for Right URS  Instructions and testings were discussed  Paperwork mailed today

## 2019-08-26 NOTE — TELEPHONE ENCOUNTER
Received out of network authorization for 6 office visits valid through 8/21/2019-1020/2019  I have scanned this authorization into Epic as well as entered into Epic and linked to appointment that was on 8/21/19  Authorization # is [id number]  If patient has surgery in the near future, follow up prior to 10/20/2019 will also be covered

## 2019-08-26 NOTE — TELEPHONE ENCOUNTER
Out of Network AUTH # [id number] scanned into chart and Patient made aware of surgery scheduled 9/27/19  AUTH good from 9/27/19-11/26/19  Patient will need to have follow up by this date

## 2019-09-06 ENCOUNTER — TELEPHONE (OUTPATIENT)
Dept: UROLOGY | Facility: MEDICAL CENTER | Age: 59
End: 2019-09-06

## 2019-09-06 DIAGNOSIS — N39.0 URINARY TRACT INFECTION WITH HEMATURIA, SITE UNSPECIFIED: Primary | ICD-10-CM

## 2019-09-06 DIAGNOSIS — R31.0 HEMATURIA, GROSS: Primary | ICD-10-CM

## 2019-09-06 DIAGNOSIS — R31.9 URINARY TRACT INFECTION WITH HEMATURIA, SITE UNSPECIFIED: Primary | ICD-10-CM

## 2019-09-06 RX ORDER — CEPHALEXIN 500 MG/1
500 CAPSULE ORAL 3 TIMES DAILY
Qty: 15 CAPSULE | Refills: 0 | Status: SHIPPED | OUTPATIENT
Start: 2019-09-06 | End: 2019-09-11

## 2019-09-06 NOTE — TELEPHONE ENCOUNTER
Patient of Dr Karl Dowling seen in [facility] office  Patient states he had procedure on 08/21/2019 and now he is bleeding a lot when he urinates  Please advise

## 2019-09-06 NOTE — TELEPHONE ENCOUNTER
Patient will need a urine culture to rule out any residual urinary infection  I will send an antibiotic to his pharmacy for coverage over the weekend  Hydrate and minimize strenuous exercise  Please provide ER precautions if hematuria significant, passing large clots, or unable to void  Thank you

## 2019-09-06 NOTE — TELEPHONE ENCOUNTER
Patient had Cysto stent placed for stones  And sepsis 8/13 with Dr Prescott Shall  Needs a follow up surgery that is scheduled for 9/27/19 States that for a week he has been have dark red blood in urine  No pain or burning with urination  No back pain  States he is drinking a lot of water   Suggestions

## 2019-09-06 NOTE — TELEPHONE ENCOUNTER
Called Sara Menard and explained symptoms to look for, and when to go to ER   He is coming to office to  lab scripts as he needs to go to [facility] and then will get abx at [address]

## 2019-09-07 LAB
APPEARANCE UR: ABNORMAL
BACTERIA UR QL AUTO: ABNORMAL /HPF
COLOR UR: ABNORMAL
HYALINE CASTS #/AREA URNS LPF: ABNORMAL /LPF
RBC #/AREA URNS HPF: ABNORMAL /HPF
SP GR UR STRIP: ABNORMAL
SQUAMOUS #/AREA URNS HPF: ABNORMAL /HPF
WBC #/AREA URNS HPF: ABNORMAL /HPF

## 2019-09-10 NOTE — TELEPHONE ENCOUNTER
Called patient back and told him he was negative for UTI  He is still having some blood with urination and frequency  Explained that is normal with a stent     He is schedule for a surgery 9/27/19

## 2019-09-27 ENCOUNTER — APPOINTMENT (OUTPATIENT)
Dept: RADIOLOGY | Facility: HOSPITAL | Age: 59
DRG: 710 | End: 2019-09-27
Payer: COMMERCIAL

## 2019-09-27 ENCOUNTER — ANESTHESIA (OUTPATIENT)
Dept: PERIOP | Facility: HOSPITAL | Age: 59
DRG: 710 | End: 2019-09-27
Payer: COMMERCIAL

## 2019-09-27 ENCOUNTER — HOSPITAL ENCOUNTER (INPATIENT)
Facility: HOSPITAL | Age: 59
LOS: 3 days | Discharge: HOME | DRG: 710 | End: 2019-09-30
Attending: UROLOGY | Admitting: INTERNAL MEDICINE
Payer: COMMERCIAL

## 2019-09-27 ENCOUNTER — ANESTHESIA EVENT (OUTPATIENT)
Dept: PERIOP | Facility: HOSPITAL | Age: 59
DRG: 710 | End: 2019-09-27
Payer: COMMERCIAL

## 2019-09-27 DIAGNOSIS — R13.10 SWALLOWING DYSFUNCTION: Primary | ICD-10-CM

## 2019-09-27 DIAGNOSIS — A41.9 SEPSIS, DUE TO UNSPECIFIED ORGANISM: ICD-10-CM

## 2019-09-27 DIAGNOSIS — N20.0 KIDNEY STONES: ICD-10-CM

## 2019-09-27 DIAGNOSIS — Z72.0 TOBACCO ABUSE: ICD-10-CM

## 2019-09-27 PROBLEM — I48.91 ATRIAL FIBRILLATION WITH RAPID VENTRICULAR RESPONSE (HCC): Status: ACTIVE | Noted: 2017-03-31

## 2019-09-27 PROBLEM — R31.9 HEMATURIA: Status: ACTIVE | Noted: 2019-09-27

## 2019-09-27 LAB
ALBUMIN SERPL BCP-MCNC: 2.8 G/DL (ref 3.5–5)
ALP SERPL-CCNC: 83 U/L (ref 46–116)
ALT SERPL W P-5'-P-CCNC: 31 U/L (ref 12–78)
ANION GAP SERPL CALCULATED.3IONS-SCNC: 10 MMOL/L (ref 4–13)
AST SERPL W P-5'-P-CCNC: 41 U/L (ref 5–45)
BACTERIA UR QL AUTO: ABNORMAL /HPF
BILIRUB SERPL-MCNC: 1.3 MG/DL (ref 0.2–1)
BILIRUB UR QL STRIP: ABNORMAL
BUN SERPL-MCNC: 14 MG/DL (ref 5–25)
CALCIUM SERPL-MCNC: 8.6 MG/DL (ref 8.3–10.1)
CHLORIDE SERPL-SCNC: 102 MMOL/L (ref 100–108)
CHOLEST SERPL-MCNC: 77 MG/DL (ref 50–200)
CLARITY UR: ABNORMAL
CO2 SERPL-SCNC: 26 MMOL/L (ref 21–32)
COLOR UR: ABNORMAL
CREAT SERPL-MCNC: 0.98 MG/DL (ref 0.6–1.3)
ERYTHROCYTE [DISTWIDTH] IN BLOOD BY AUTOMATED COUNT: 15.5 % (ref 11.6–15.1)
GFR SERPL CREATININE-BSD FRML MDRD: 85 ML/MIN/1.73SQ M
GLUCOSE P FAST SERPL-MCNC: 87 MG/DL (ref 65–99)
GLUCOSE SERPL-MCNC: 87 MG/DL (ref 65–140)
GLUCOSE UR STRIP-MCNC: NEGATIVE MG/DL
HCT VFR BLD AUTO: 43.8 % (ref 36.5–49.3)
HDLC SERPL-MCNC: 34 MG/DL (ref 40–60)
HGB BLD-MCNC: 14.3 G/DL (ref 12–17)
HGB UR QL STRIP.AUTO: ABNORMAL
KETONES UR STRIP-MCNC: ABNORMAL MG/DL
LACTATE SERPL-SCNC: 2.1 MMOL/L (ref 0.5–2)
LACTATE SERPL-SCNC: 3.3 MMOL/L (ref 0.5–2)
LDLC SERPL CALC-MCNC: 29 MG/DL (ref 0–100)
LEUKOCYTE ESTERASE UR QL STRIP: ABNORMAL
MCH RBC QN AUTO: 32 PG (ref 26.8–34.3)
MCHC RBC AUTO-ENTMCNC: 32.6 G/DL (ref 31.4–37.4)
MCV RBC AUTO: 98 FL (ref 82–98)
NITRITE UR QL STRIP: POSITIVE
NON-SQ EPI CELLS URNS QL MICRO: ABNORMAL /HPF
NONHDLC SERPL-MCNC: 43 MG/DL
PH UR STRIP.AUTO: 6.5 [PH]
PLATELET # BLD AUTO: 189 THOUSANDS/UL (ref 149–390)
PMV BLD AUTO: 10.9 FL (ref 8.9–12.7)
POTASSIUM SERPL-SCNC: 3.9 MMOL/L (ref 3.5–5.3)
PROT SERPL-MCNC: 7.2 G/DL (ref 6.4–8.2)
PROT UR STRIP-MCNC: >=300 MG/DL
RBC # BLD AUTO: 4.47 MILLION/UL (ref 3.88–5.62)
RBC #/AREA URNS AUTO: ABNORMAL /HPF
SODIUM SERPL-SCNC: 138 MMOL/L (ref 136–145)
SP GR UR STRIP.AUTO: 1.01 (ref 1–1.03)
TRIGL SERPL-MCNC: 70 MG/DL
UROBILINOGEN UR QL STRIP.AUTO: 4 E.U./DL
WBC # BLD AUTO: 17.62 THOUSAND/UL (ref 4.31–10.16)
WBC #/AREA URNS AUTO: ABNORMAL /HPF

## 2019-09-27 PROCEDURE — 87086 URINE CULTURE/COLONY COUNT: CPT | Performed by: INTERNAL MEDICINE

## 2019-09-27 PROCEDURE — 0TC08ZZ EXTIRPATION OF MATTER FROM RIGHT KIDNEY, VIA NATURAL OR ARTIFICIAL OPENING ENDOSCOPIC: ICD-10-PCS | Performed by: UROLOGY

## 2019-09-27 PROCEDURE — 85027 COMPLETE CBC AUTOMATED: CPT | Performed by: INTERNAL MEDICINE

## 2019-09-27 PROCEDURE — C1758 CATHETER, URETERAL: HCPCS | Performed by: UROLOGY

## 2019-09-27 PROCEDURE — 74420 UROGRAPHY RTRGR +-KUB: CPT

## 2019-09-27 PROCEDURE — 87040 BLOOD CULTURE FOR BACTERIA: CPT | Performed by: INTERNAL MEDICINE

## 2019-09-27 PROCEDURE — 0T768DZ DILATION OF RIGHT URETER WITH INTRALUMINAL DEVICE, VIA NATURAL OR ARTIFICIAL OPENING ENDOSCOPIC: ICD-10-PCS | Performed by: UROLOGY

## 2019-09-27 PROCEDURE — 88300 SURGICAL PATH GROSS: CPT | Performed by: PATHOLOGY

## 2019-09-27 PROCEDURE — 99024 POSTOP FOLLOW-UP VISIT: CPT | Performed by: UROLOGY

## 2019-09-27 PROCEDURE — 52356 CYSTO/URETERO W/LITHOTRIPSY: CPT | Performed by: UROLOGY

## 2019-09-27 PROCEDURE — 83605 ASSAY OF LACTIC ACID: CPT | Performed by: NURSE PRACTITIONER

## 2019-09-27 PROCEDURE — 93005 ELECTROCARDIOGRAM TRACING: CPT

## 2019-09-27 PROCEDURE — 71045 X-RAY EXAM CHEST 1 VIEW: CPT

## 2019-09-27 PROCEDURE — 80061 LIPID PANEL: CPT | Performed by: NURSE PRACTITIONER

## 2019-09-27 PROCEDURE — 82360 CALCULUS ASSAY QUANT: CPT | Performed by: UROLOGY

## 2019-09-27 PROCEDURE — BT161ZZ FLUOROSCOPY OF RIGHT URETER USING LOW OSMOLAR CONTRAST: ICD-10-PCS | Performed by: UROLOGY

## 2019-09-27 PROCEDURE — NC001 PR NO CHARGE: Performed by: UROLOGY

## 2019-09-27 PROCEDURE — C2617 STENT, NON-COR, TEM W/O DEL: HCPCS | Performed by: UROLOGY

## 2019-09-27 PROCEDURE — 84443 ASSAY THYROID STIM HORMONE: CPT | Performed by: NURSE PRACTITIONER

## 2019-09-27 PROCEDURE — 99223 1ST HOSP IP/OBS HIGH 75: CPT | Performed by: INTERNAL MEDICINE

## 2019-09-27 PROCEDURE — 80053 COMPREHEN METABOLIC PANEL: CPT | Performed by: INTERNAL MEDICINE

## 2019-09-27 PROCEDURE — 83605 ASSAY OF LACTIC ACID: CPT | Performed by: INTERNAL MEDICINE

## 2019-09-27 PROCEDURE — G0379 DIRECT REFER HOSPITAL OBSERV: HCPCS

## 2019-09-27 PROCEDURE — C1769 GUIDE WIRE: HCPCS | Performed by: UROLOGY

## 2019-09-27 PROCEDURE — 81001 URINALYSIS AUTO W/SCOPE: CPT | Performed by: INTERNAL MEDICINE

## 2019-09-27 DEVICE — INLAY OPTIMA URETERAL STENT W/O GUIDEWIRE
Type: IMPLANTABLE DEVICE | Site: URETER | Status: FUNCTIONAL
Brand: BARD® INLAY OPTIMA® URETERAL STENT

## 2019-09-27 RX ORDER — MIDAZOLAM HYDROCHLORIDE 1 MG/ML
INJECTION INTRAMUSCULAR; INTRAVENOUS AS NEEDED
Status: DISCONTINUED | OUTPATIENT
Start: 2019-09-27 | End: 2019-09-27 | Stop reason: SURG

## 2019-09-27 RX ORDER — PROPOFOL 10 MG/ML
INJECTION, EMULSION INTRAVENOUS AS NEEDED
Status: DISCONTINUED | OUTPATIENT
Start: 2019-09-27 | End: 2019-09-27 | Stop reason: SURG

## 2019-09-27 RX ORDER — FENTANYL CITRATE 50 UG/ML
INJECTION, SOLUTION INTRAMUSCULAR; INTRAVENOUS AS NEEDED
Status: DISCONTINUED | OUTPATIENT
Start: 2019-09-27 | End: 2019-09-27 | Stop reason: SURG

## 2019-09-27 RX ORDER — LIDOCAINE HYDROCHLORIDE 10 MG/ML
5 INJECTION, SOLUTION EPIDURAL; INFILTRATION; INTRACAUDAL; PERINEURAL ONCE
Status: DISCONTINUED | OUTPATIENT
Start: 2019-09-27 | End: 2019-09-30

## 2019-09-27 RX ORDER — ONDANSETRON 2 MG/ML
INJECTION INTRAMUSCULAR; INTRAVENOUS AS NEEDED
Status: DISCONTINUED | OUTPATIENT
Start: 2019-09-27 | End: 2019-09-27 | Stop reason: SURG

## 2019-09-27 RX ORDER — MAGNESIUM HYDROXIDE 1200 MG/15ML
LIQUID ORAL AS NEEDED
Status: DISCONTINUED | OUTPATIENT
Start: 2019-09-27 | End: 2019-09-27 | Stop reason: HOSPADM

## 2019-09-27 RX ORDER — CEFAZOLIN SODIUM 2 G/50ML
2000 SOLUTION INTRAVENOUS ONCE
Status: COMPLETED | OUTPATIENT
Start: 2019-09-27 | End: 2019-09-27

## 2019-09-27 RX ORDER — METHIMAZOLE 5 MG/1
10 TABLET ORAL 2 TIMES DAILY
Status: DISCONTINUED | OUTPATIENT
Start: 2019-09-27 | End: 2019-09-30 | Stop reason: HOSPADM

## 2019-09-27 RX ORDER — HYDROCODONE BITARTRATE AND ACETAMINOPHEN 5; 325 MG/1; MG/1
1 TABLET ORAL EVERY 4 HOURS PRN
Qty: 20 TABLET | Refills: 0 | Status: SHIPPED | OUTPATIENT
Start: 2019-09-27 | End: 2019-09-30 | Stop reason: HOSPADM

## 2019-09-27 RX ORDER — HYDROCODONE BITARTRATE AND ACETAMINOPHEN 5; 325 MG/1; MG/1
1 TABLET ORAL EVERY 4 HOURS PRN
Status: DISCONTINUED | OUTPATIENT
Start: 2019-09-27 | End: 2019-09-30 | Stop reason: HOSPADM

## 2019-09-27 RX ORDER — DOCUSATE SODIUM 100 MG/1
100 CAPSULE, LIQUID FILLED ORAL 2 TIMES DAILY PRN
Status: DISCONTINUED | OUTPATIENT
Start: 2019-09-27 | End: 2019-09-30 | Stop reason: HOSPADM

## 2019-09-27 RX ORDER — CIPROFLOXACIN 500 MG/1
500 TABLET, FILM COATED ORAL 2 TIMES DAILY
Qty: 6 TABLET | Refills: 0 | Status: SHIPPED | OUTPATIENT
Start: 2019-09-27 | End: 2019-09-30 | Stop reason: HOSPADM

## 2019-09-27 RX ORDER — CEFAZOLIN SODIUM 2 G/50ML
2000 SOLUTION INTRAVENOUS EVERY 8 HOURS
Status: DISCONTINUED | OUTPATIENT
Start: 2019-09-27 | End: 2019-09-27 | Stop reason: HOSPADM

## 2019-09-27 RX ORDER — ATORVASTATIN CALCIUM 40 MG/1
40 TABLET, FILM COATED ORAL DAILY
Status: DISCONTINUED | OUTPATIENT
Start: 2019-09-28 | End: 2019-09-30 | Stop reason: HOSPADM

## 2019-09-27 RX ORDER — METOPROLOL TARTRATE 5 MG/5ML
5 INJECTION INTRAVENOUS ONCE
Status: COMPLETED | OUTPATIENT
Start: 2019-09-27 | End: 2019-09-27

## 2019-09-27 RX ORDER — FENTANYL CITRATE/PF 50 MCG/ML
25 SYRINGE (ML) INJECTION
Status: DISCONTINUED | OUTPATIENT
Start: 2019-09-27 | End: 2019-09-27 | Stop reason: HOSPADM

## 2019-09-27 RX ORDER — ACETAMINOPHEN 325 MG/1
650 TABLET ORAL EVERY 6 HOURS PRN
Status: DISCONTINUED | OUTPATIENT
Start: 2019-09-27 | End: 2019-09-30 | Stop reason: HOSPADM

## 2019-09-27 RX ORDER — METOPROLOL TARTRATE 50 MG/1
50 TABLET, FILM COATED ORAL 2 TIMES DAILY
Status: DISCONTINUED | OUTPATIENT
Start: 2019-09-27 | End: 2019-09-30

## 2019-09-27 RX ORDER — METOCLOPRAMIDE HYDROCHLORIDE 5 MG/ML
10 INJECTION INTRAMUSCULAR; INTRAVENOUS ONCE AS NEEDED
Status: COMPLETED | OUTPATIENT
Start: 2019-09-27 | End: 2019-09-27

## 2019-09-27 RX ORDER — DIGOXIN 250 MCG
250 TABLET ORAL DAILY
Status: DISCONTINUED | OUTPATIENT
Start: 2019-09-28 | End: 2019-09-29

## 2019-09-27 RX ORDER — CALCIUM CARBONATE 200(500)MG
1000 TABLET,CHEWABLE ORAL DAILY PRN
Status: DISCONTINUED | OUTPATIENT
Start: 2019-09-27 | End: 2019-09-30 | Stop reason: HOSPADM

## 2019-09-27 RX ORDER — SODIUM CHLORIDE, SODIUM LACTATE, POTASSIUM CHLORIDE, CALCIUM CHLORIDE 600; 310; 30; 20 MG/100ML; MG/100ML; MG/100ML; MG/100ML
75 INJECTION, SOLUTION INTRAVENOUS CONTINUOUS
Status: DISCONTINUED | OUTPATIENT
Start: 2019-09-27 | End: 2019-09-29

## 2019-09-27 RX ORDER — ONDANSETRON 2 MG/ML
4 INJECTION INTRAMUSCULAR; INTRAVENOUS EVERY 6 HOURS PRN
Status: DISCONTINUED | OUTPATIENT
Start: 2019-09-27 | End: 2019-09-30 | Stop reason: HOSPADM

## 2019-09-27 RX ADMIN — ONDANSETRON 4 MG: 2 INJECTION INTRAMUSCULAR; INTRAVENOUS at 13:35

## 2019-09-27 RX ADMIN — METHIMAZOLE 10 MG: 5 TABLET ORAL at 20:42

## 2019-09-27 RX ADMIN — FENTANYL CITRATE 25 MCG: 50 INJECTION, SOLUTION INTRAMUSCULAR; INTRAVENOUS at 15:16

## 2019-09-27 RX ADMIN — MIDAZOLAM 2 MG: 1 INJECTION INTRAMUSCULAR; INTRAVENOUS at 12:37

## 2019-09-27 RX ADMIN — METOPROLOL TARTRATE 5 MG: 1 INJECTION, SOLUTION INTRAVENOUS at 16:25

## 2019-09-27 RX ADMIN — SODIUM CHLORIDE, SODIUM LACTATE, POTASSIUM CHLORIDE, AND CALCIUM CHLORIDE 125 ML/HR: .6; .31; .03; .02 INJECTION, SOLUTION INTRAVENOUS at 21:44

## 2019-09-27 RX ADMIN — METOCLOPRAMIDE 10 MG: 5 INJECTION, SOLUTION INTRAMUSCULAR; INTRAVENOUS at 15:53

## 2019-09-27 RX ADMIN — CEFAZOLIN SODIUM 1000 MG: 2 SOLUTION INTRAVENOUS at 12:50

## 2019-09-27 RX ADMIN — SODIUM CHLORIDE, SODIUM LACTATE, POTASSIUM CHLORIDE, AND CALCIUM CHLORIDE 500 ML: .6; .31; .03; .02 INJECTION, SOLUTION INTRAVENOUS at 17:59

## 2019-09-27 RX ADMIN — FENTANYL CITRATE 25 MCG: 50 INJECTION, SOLUTION INTRAMUSCULAR; INTRAVENOUS at 14:59

## 2019-09-27 RX ADMIN — FENTANYL CITRATE 25 MCG: 50 INJECTION, SOLUTION INTRAMUSCULAR; INTRAVENOUS at 15:10

## 2019-09-27 RX ADMIN — SODIUM CHLORIDE, SODIUM LACTATE, POTASSIUM CHLORIDE, AND CALCIUM CHLORIDE 75 ML/HR: .6; .31; .03; .02 INJECTION, SOLUTION INTRAVENOUS at 11:50

## 2019-09-27 RX ADMIN — PROPOFOL 200 MG: 10 INJECTION, EMULSION INTRAVENOUS at 12:41

## 2019-09-27 RX ADMIN — CEFAZOLIN SODIUM 2000 MG: 2 SOLUTION INTRAVENOUS at 12:40

## 2019-09-27 RX ADMIN — FENTANYL CITRATE 100 MCG: 50 INJECTION, SOLUTION INTRAMUSCULAR; INTRAVENOUS at 12:37

## 2019-09-27 RX ADMIN — FENTANYL CITRATE 25 MCG: 50 INJECTION, SOLUTION INTRAMUSCULAR; INTRAVENOUS at 16:10

## 2019-09-27 NOTE — ANESTHESIA PREPROCEDURE EVALUATION
Review of Systems/Medical History  Patient summary reviewed  Chart reviewed      Cardiovascular  EKG reviewed, Hyperlipidemia, Hypertension controlled, CAD , Dysrhythmias , atrial fibrillation, CHF , Orthopnea, LOGAN, DVT  PE, Pulmonary hypertension Pulmonary  Smoker cigarette smoker  Cumulative Pack Years: 36, Pneumonia, Shortness of breath, Sleep apnea CPAP,        GI/Hepatic  Negative GI/hepatic ROS          Kidney stones,        Endo/Other  History of thyroid disease , hypothyroidism,   Obesity    GYN       Hematology  Negative hematology ROS   Hypercoagulable state,    Musculoskeletal  Negative musculoskeletal ROS Osteoarthritis,   Arthritis     Neurology  Negative neurology ROS      Psychology   Negative psychology ROS              Physical Exam    Airway    Mallampati score: III  TM Distance: <3 FB  Neck ROM: limited     Dental   Comment: Missing teeth,     Cardiovascular  Rhythm: irregular, Rate: normal,     Pulmonary  Pulmonary exam normal Breath sounds clear to auscultation,     Other Findings        Anesthesia Plan  ASA Score- 3     Anesthesia Type- general with ASA Monitors  Additional Monitors:   Airway Plan: LMA  Plan Factors-    Induction- intravenous  Postoperative Plan- Plan for postoperative opioid use  Informed Consent- Anesthetic plan and risks discussed with patient

## 2019-09-28 ENCOUNTER — TELEPHONE (OUTPATIENT)
Dept: UROLOGY | Facility: CLINIC | Age: 59
End: 2019-09-28

## 2019-09-28 LAB
ANION GAP SERPL CALCULATED.3IONS-SCNC: 9 MMOL/L (ref 4–13)
BACTERIA UR CULT: NORMAL
BUN SERPL-MCNC: 13 MG/DL (ref 5–25)
CALCIUM SERPL-MCNC: 8.1 MG/DL (ref 8.3–10.1)
CHLORIDE SERPL-SCNC: 104 MMOL/L (ref 100–108)
CO2 SERPL-SCNC: 25 MMOL/L (ref 21–32)
CREAT SERPL-MCNC: 0.94 MG/DL (ref 0.6–1.3)
ERYTHROCYTE [DISTWIDTH] IN BLOOD BY AUTOMATED COUNT: 15.4 % (ref 11.6–15.1)
GFR SERPL CREATININE-BSD FRML MDRD: 90 ML/MIN/1.73SQ M
GLUCOSE SERPL-MCNC: 102 MG/DL (ref 65–140)
HCT VFR BLD AUTO: 40.5 % (ref 36.5–49.3)
HGB BLD-MCNC: 13.5 G/DL (ref 12–17)
LACTATE SERPL-SCNC: 1.1 MMOL/L (ref 0.5–2)
MAGNESIUM SERPL-MCNC: 1.3 MG/DL (ref 1.6–2.6)
MCH RBC QN AUTO: 32.6 PG (ref 26.8–34.3)
MCHC RBC AUTO-ENTMCNC: 33.3 G/DL (ref 31.4–37.4)
MCV RBC AUTO: 98 FL (ref 82–98)
PLATELET # BLD AUTO: 184 THOUSANDS/UL (ref 149–390)
PMV BLD AUTO: 10.5 FL (ref 8.9–12.7)
POTASSIUM SERPL-SCNC: 3.5 MMOL/L (ref 3.5–5.3)
RBC # BLD AUTO: 4.14 MILLION/UL (ref 3.88–5.62)
SODIUM SERPL-SCNC: 138 MMOL/L (ref 136–145)
TSH SERPL DL<=0.05 MIU/L-ACNC: 5.46 UIU/ML (ref 0.36–3.74)
WBC # BLD AUTO: 23.94 THOUSAND/UL (ref 4.31–10.16)

## 2019-09-28 PROCEDURE — 94660 CPAP INITIATION&MGMT: CPT

## 2019-09-28 PROCEDURE — 85027 COMPLETE CBC AUTOMATED: CPT | Performed by: INTERNAL MEDICINE

## 2019-09-28 PROCEDURE — 94760 N-INVAS EAR/PLS OXIMETRY 1: CPT

## 2019-09-28 PROCEDURE — 83735 ASSAY OF MAGNESIUM: CPT | Performed by: INTERNAL MEDICINE

## 2019-09-28 PROCEDURE — 80048 BASIC METABOLIC PNL TOTAL CA: CPT | Performed by: INTERNAL MEDICINE

## 2019-09-28 PROCEDURE — 99232 SBSQ HOSP IP/OBS MODERATE 35: CPT | Performed by: UROLOGY

## 2019-09-28 PROCEDURE — 83605 ASSAY OF LACTIC ACID: CPT | Performed by: PHYSICIAN ASSISTANT

## 2019-09-28 PROCEDURE — 99233 SBSQ HOSP IP/OBS HIGH 50: CPT | Performed by: INTERNAL MEDICINE

## 2019-09-28 RX ORDER — POTASSIUM CHLORIDE 20 MEQ/1
20 TABLET, EXTENDED RELEASE ORAL
Status: DISCONTINUED | OUTPATIENT
Start: 2019-09-29 | End: 2019-09-30 | Stop reason: HOSPADM

## 2019-09-28 RX ORDER — MAGNESIUM SULFATE HEPTAHYDRATE 40 MG/ML
2 INJECTION, SOLUTION INTRAVENOUS ONCE
Status: COMPLETED | OUTPATIENT
Start: 2019-09-28 | End: 2019-09-28

## 2019-09-28 RX ADMIN — SODIUM CHLORIDE, SODIUM LACTATE, POTASSIUM CHLORIDE, AND CALCIUM CHLORIDE 75 ML/HR: .6; .31; .03; .02 INJECTION, SOLUTION INTRAVENOUS at 17:14

## 2019-09-28 RX ADMIN — METOPROLOL TARTRATE 50 MG: 50 TABLET, FILM COATED ORAL at 08:44

## 2019-09-28 RX ADMIN — METHIMAZOLE 10 MG: 5 TABLET ORAL at 08:44

## 2019-09-28 RX ADMIN — METHIMAZOLE 10 MG: 5 TABLET ORAL at 17:05

## 2019-09-28 RX ADMIN — ATORVASTATIN CALCIUM 40 MG: 40 TABLET, FILM COATED ORAL at 08:44

## 2019-09-28 RX ADMIN — MAGNESIUM SULFATE HEPTAHYDRATE 2 G: 40 INJECTION, SOLUTION INTRAVENOUS at 18:13

## 2019-09-28 RX ADMIN — SODIUM CHLORIDE, SODIUM LACTATE, POTASSIUM CHLORIDE, AND CALCIUM CHLORIDE 125 ML/HR: .6; .31; .03; .02 INJECTION, SOLUTION INTRAVENOUS at 06:01

## 2019-09-28 RX ADMIN — DIGOXIN 250 MCG: 250 TABLET ORAL at 08:44

## 2019-09-28 NOTE — TELEPHONE ENCOUNTER
We will arrange for stent removal in the office in the coming week, follow-up imaging will be obtained after stent removal

## 2019-09-29 PROBLEM — R31.9 HEMATURIA: Status: RESOLVED | Noted: 2019-09-27 | Resolved: 2019-09-29

## 2019-09-29 PROBLEM — A41.9 SEPSIS DUE TO UNDETERMINED ORGANISM (HCC): Status: RESOLVED | Noted: 2019-08-14 | Resolved: 2019-09-29

## 2019-09-29 PROBLEM — R13.10 DYSPHAGIA: Status: ACTIVE | Noted: 2019-09-29

## 2019-09-29 LAB
ANION GAP SERPL CALCULATED.3IONS-SCNC: 5 MMOL/L (ref 4–13)
BUN SERPL-MCNC: 15 MG/DL (ref 5–25)
CALCIUM SERPL-MCNC: 8.5 MG/DL (ref 8.3–10.1)
CHLORIDE SERPL-SCNC: 104 MMOL/L (ref 100–108)
CO2 SERPL-SCNC: 28 MMOL/L (ref 21–32)
CREAT SERPL-MCNC: 0.76 MG/DL (ref 0.6–1.3)
ERYTHROCYTE [DISTWIDTH] IN BLOOD BY AUTOMATED COUNT: 15.6 % (ref 11.6–15.1)
GFR SERPL CREATININE-BSD FRML MDRD: 101 ML/MIN/1.73SQ M
GLUCOSE SERPL-MCNC: 91 MG/DL (ref 65–140)
HCT VFR BLD AUTO: 37.7 % (ref 36.5–49.3)
HGB BLD-MCNC: 12.3 G/DL (ref 12–17)
MAGNESIUM SERPL-MCNC: 1.6 MG/DL (ref 1.6–2.6)
MCH RBC QN AUTO: 32.1 PG (ref 26.8–34.3)
MCHC RBC AUTO-ENTMCNC: 32.6 G/DL (ref 31.4–37.4)
MCV RBC AUTO: 98 FL (ref 82–98)
PLATELET # BLD AUTO: 156 THOUSANDS/UL (ref 149–390)
PMV BLD AUTO: 11.2 FL (ref 8.9–12.7)
POTASSIUM SERPL-SCNC: 3.5 MMOL/L (ref 3.5–5.3)
RBC # BLD AUTO: 3.83 MILLION/UL (ref 3.88–5.62)
SODIUM SERPL-SCNC: 137 MMOL/L (ref 136–145)
WBC # BLD AUTO: 11.64 THOUSAND/UL (ref 4.31–10.16)

## 2019-09-29 PROCEDURE — 80048 BASIC METABOLIC PNL TOTAL CA: CPT | Performed by: INTERNAL MEDICINE

## 2019-09-29 PROCEDURE — 83735 ASSAY OF MAGNESIUM: CPT | Performed by: NURSE PRACTITIONER

## 2019-09-29 PROCEDURE — 92610 EVALUATE SWALLOWING FUNCTION: CPT

## 2019-09-29 PROCEDURE — 94660 CPAP INITIATION&MGMT: CPT

## 2019-09-29 PROCEDURE — 99253 IP/OBS CNSLTJ NEW/EST LOW 45: CPT | Performed by: INTERNAL MEDICINE

## 2019-09-29 PROCEDURE — 94760 N-INVAS EAR/PLS OXIMETRY 1: CPT

## 2019-09-29 PROCEDURE — 99232 SBSQ HOSP IP/OBS MODERATE 35: CPT | Performed by: UROLOGY

## 2019-09-29 PROCEDURE — G8980 MOBILITY D/C STATUS: HCPCS

## 2019-09-29 PROCEDURE — G8978 MOBILITY CURRENT STATUS: HCPCS

## 2019-09-29 PROCEDURE — 85027 COMPLETE CBC AUTOMATED: CPT | Performed by: INTERNAL MEDICINE

## 2019-09-29 PROCEDURE — 93005 ELECTROCARDIOGRAM TRACING: CPT

## 2019-09-29 PROCEDURE — 97162 PT EVAL MOD COMPLEX 30 MIN: CPT

## 2019-09-29 PROCEDURE — G8996 SWALLOW CURRENT STATUS: HCPCS

## 2019-09-29 PROCEDURE — G8979 MOBILITY GOAL STATUS: HCPCS

## 2019-09-29 PROCEDURE — G8997 SWALLOW GOAL STATUS: HCPCS

## 2019-09-29 PROCEDURE — 99232 SBSQ HOSP IP/OBS MODERATE 35: CPT | Performed by: INTERNAL MEDICINE

## 2019-09-29 RX ORDER — ASPIRIN 81 MG/1
81 TABLET, CHEWABLE ORAL DAILY
Status: DISCONTINUED | OUTPATIENT
Start: 2019-09-29 | End: 2019-09-30 | Stop reason: HOSPADM

## 2019-09-29 RX ORDER — MAGNESIUM SULFATE HEPTAHYDRATE 40 MG/ML
2 INJECTION, SOLUTION INTRAVENOUS ONCE
Status: COMPLETED | OUTPATIENT
Start: 2019-09-29 | End: 2019-09-29

## 2019-09-29 RX ORDER — TORSEMIDE 20 MG/1
20 TABLET ORAL DAILY
Status: DISCONTINUED | OUTPATIENT
Start: 2019-09-29 | End: 2019-09-30 | Stop reason: HOSPADM

## 2019-09-29 RX ADMIN — RIVAROXABAN 20 MG: 20 TABLET, FILM COATED ORAL at 10:43

## 2019-09-29 RX ADMIN — TORSEMIDE 20 MG: 20 TABLET ORAL at 10:44

## 2019-09-29 RX ADMIN — METOPROLOL TARTRATE 50 MG: 50 TABLET, FILM COATED ORAL at 08:46

## 2019-09-29 RX ADMIN — METHIMAZOLE 10 MG: 5 TABLET ORAL at 08:46

## 2019-09-29 RX ADMIN — MAGNESIUM SULFATE HEPTAHYDRATE 2 G: 40 INJECTION, SOLUTION INTRAVENOUS at 15:37

## 2019-09-29 RX ADMIN — SODIUM CHLORIDE, SODIUM LACTATE, POTASSIUM CHLORIDE, AND CALCIUM CHLORIDE 75 ML/HR: .6; .31; .03; .02 INJECTION, SOLUTION INTRAVENOUS at 06:15

## 2019-09-29 RX ADMIN — ATORVASTATIN CALCIUM 40 MG: 40 TABLET, FILM COATED ORAL at 08:47

## 2019-09-29 RX ADMIN — POTASSIUM CHLORIDE 20 MEQ: 20 TABLET, EXTENDED RELEASE ORAL at 08:47

## 2019-09-29 RX ADMIN — ASPIRIN 81 MG 81 MG: 81 TABLET ORAL at 10:44

## 2019-09-29 RX ADMIN — METHIMAZOLE 10 MG: 5 TABLET ORAL at 17:53

## 2019-09-29 RX ADMIN — METOPROLOL TARTRATE 50 MG: 50 TABLET, FILM COATED ORAL at 17:53

## 2019-09-30 VITALS
HEART RATE: 63 BPM | SYSTOLIC BLOOD PRESSURE: 106 MMHG | OXYGEN SATURATION: 92 % | RESPIRATION RATE: 20 BRPM | BODY MASS INDEX: 44.1 KG/M2 | HEIGHT: 71 IN | WEIGHT: 315 LBS | DIASTOLIC BLOOD PRESSURE: 68 MMHG | TEMPERATURE: 97.8 F

## 2019-09-30 PROBLEM — E66.01 MORBID OBESITY (HCC): Chronic | Status: ACTIVE | Noted: 2019-08-14

## 2019-09-30 PROBLEM — I50.32 CHRONIC DIASTOLIC CONGESTIVE HEART FAILURE (HCC): Chronic | Status: ACTIVE | Noted: 2017-07-31

## 2019-09-30 PROBLEM — N10 PYELONEPHRITIS, ACUTE: Status: RESOLVED | Noted: 2019-08-14 | Resolved: 2019-09-30

## 2019-09-30 PROBLEM — E78.5 DYSLIPIDEMIA: Chronic | Status: ACTIVE | Noted: 2018-08-17

## 2019-09-30 PROBLEM — J96.01 ACUTE RESPIRATORY FAILURE WITH HYPOXIA (HCC): Status: RESOLVED | Noted: 2019-08-15 | Resolved: 2019-09-30

## 2019-09-30 PROBLEM — I48.91 ATRIAL FIBRILLATION WITH RAPID VENTRICULAR RESPONSE (HCC): Chronic | Status: ACTIVE | Noted: 2017-03-31

## 2019-09-30 PROBLEM — Z86.711 HISTORY OF PULMONARY EMBOLISM: Chronic | Status: ACTIVE | Noted: 2018-08-17

## 2019-09-30 PROCEDURE — 99232 SBSQ HOSP IP/OBS MODERATE 35: CPT | Performed by: PHYSICIAN ASSISTANT

## 2019-09-30 PROCEDURE — 94760 N-INVAS EAR/PLS OXIMETRY 1: CPT

## 2019-09-30 PROCEDURE — 94660 CPAP INITIATION&MGMT: CPT

## 2019-09-30 PROCEDURE — 99239 HOSP IP/OBS DSCHRG MGMT >30: CPT | Performed by: HOSPITALIST

## 2019-09-30 PROCEDURE — 99232 SBSQ HOSP IP/OBS MODERATE 35: CPT | Performed by: INTERNAL MEDICINE

## 2019-09-30 RX ORDER — METOPROLOL TARTRATE 50 MG/1
50 TABLET, FILM COATED ORAL 2 TIMES DAILY
Status: DISCONTINUED | OUTPATIENT
Start: 2019-09-30 | End: 2019-09-30 | Stop reason: HOSPADM

## 2019-09-30 RX ORDER — LEVOFLOXACIN 750 MG/1
750 TABLET ORAL EVERY 24 HOURS
Qty: 6 TABLET | Refills: 0 | Status: SHIPPED | OUTPATIENT
Start: 2019-10-01 | End: 2019-10-07

## 2019-09-30 RX ORDER — DIGOXIN 250 MCG
250 TABLET ORAL DAILY
Status: DISCONTINUED | OUTPATIENT
Start: 2019-09-30 | End: 2019-09-30 | Stop reason: HOSPADM

## 2019-09-30 RX ADMIN — ATORVASTATIN CALCIUM 40 MG: 40 TABLET, FILM COATED ORAL at 08:40

## 2019-09-30 RX ADMIN — DIGOXIN 250 MCG: 250 TABLET ORAL at 12:08

## 2019-09-30 RX ADMIN — ASPIRIN 81 MG 81 MG: 81 TABLET ORAL at 08:40

## 2019-09-30 RX ADMIN — METHIMAZOLE 10 MG: 5 TABLET ORAL at 08:41

## 2019-09-30 RX ADMIN — POTASSIUM CHLORIDE 20 MEQ: 20 TABLET, EXTENDED RELEASE ORAL at 08:40

## 2019-09-30 RX ADMIN — LEVOFLOXACIN 750 MG: 500 TABLET, FILM COATED ORAL at 12:06

## 2019-09-30 RX ADMIN — TORSEMIDE 20 MG: 20 TABLET ORAL at 08:40

## 2019-09-30 RX ADMIN — RIVAROXABAN 20 MG: 20 TABLET, FILM COATED ORAL at 08:40

## 2019-10-01 ENCOUNTER — TELEPHONE (OUTPATIENT)
Dept: UROLOGY | Facility: MEDICAL CENTER | Age: 59
End: 2019-10-01

## 2019-10-01 NOTE — TELEPHONE ENCOUNTER
Patient of Dr Donna Sánchez seen at [address] [place] [address] office  Patient called regarding antibiotics picked up at pharmacy  He reported both Cipro- not noted on discharge instructions as per patient-  and Levaquin were given at the pharmacy  Requested confirmation if he should be taking both  He can be reached at [phone]    Thank you

## 2019-10-01 NOTE — TELEPHONE ENCOUNTER
Looked through post op notes and seen that Levaquin was the last prescribed for pt  Advised pt to just take the Levaquin

## 2019-10-02 LAB
ATRIAL RATE: 115 BPM
ATRIAL RATE: 156 BPM
ATRIAL RATE: 202 BPM
BACTERIA BLD CULT: NORMAL
BACTERIA BLD CULT: NORMAL
P AXIS: 108 DEGREES
PR INTERVAL: 150 MS
QRS AXIS: 12 DEGREES
QRS AXIS: 35 DEGREES
QRS AXIS: 43 DEGREES
QRSD INTERVAL: 62 MS
QRSD INTERVAL: 74 MS
QRSD INTERVAL: 82 MS
QT INTERVAL: 276 MS
QT INTERVAL: 302 MS
QT INTERVAL: 380 MS
QTC INTERVAL: 421 MS
QTC INTERVAL: 433 MS
QTC INTERVAL: 444 MS
T WAVE AXIS: 18 DEGREES
T WAVE AXIS: 24 DEGREES
T WAVE AXIS: 95 DEGREES
VENTRICULAR RATE: 124 BPM
VENTRICULAR RATE: 156 BPM
VENTRICULAR RATE: 74 BPM

## 2019-10-02 PROCEDURE — 93010 ELECTROCARDIOGRAM REPORT: CPT | Performed by: INTERNAL MEDICINE

## 2019-10-02 NOTE — TELEPHONE ENCOUNTER
Patient calling in regards to his stent removal appointment  Patient was under impression he had appointment for today  Please advise

## 2019-10-02 NOTE — TELEPHONE ENCOUNTER
Post Op Note    Jeremiah Driver is a 64 y o  male s/p Cysto performed 09/27/2019  Jeremiah Driver is a patient of Dr Taniya Henriquez and is seen at the [facility] office  How would you rate your pain on a scale from 1 to 10, 10 being the worst pain ever? 1   Have you had a fever? No    Have your bowel movements been regular? Yes  Do you have any difficulty urinating? Yes is having frequency which I said is normal with stent   {Do you have any other questions or concerns that I can address at this time?  Scheduled stent removal on Friday for 3:00

## 2019-10-04 ENCOUNTER — CLINICAL SUPPORT (OUTPATIENT)
Dept: UROLOGY | Facility: HOSPITAL | Age: 59
End: 2019-10-04
Payer: COMMERCIAL

## 2019-10-04 VITALS
HEART RATE: 86 BPM | BODY MASS INDEX: 44.1 KG/M2 | WEIGHT: 315 LBS | SYSTOLIC BLOOD PRESSURE: 118 MMHG | HEIGHT: 71 IN | DIASTOLIC BLOOD PRESSURE: 80 MMHG

## 2019-10-04 DIAGNOSIS — N20.0 NEPHROLITHIASIS: Primary | ICD-10-CM

## 2019-10-04 PROCEDURE — 99211 OFF/OP EST MAY X REQ PHY/QHP: CPT

## 2019-10-04 NOTE — PATIENT INSTRUCTIONS
URETERAL STENTS     [id number]  [id number]  A ureteral stent is a soft plastic tube with holes in it  It's temporarily inserted into a ureter to help drain urine into the bladder  One end goes in the kidney  The other end goes in the bladder  A coil on each end holds the stent in place  The stent can't be seen from outside the body  It shouldn't interfere with your normal routine  When Is a Ureteral Stent Used?  To bypass a blockage in a kidney or ureter   During kidney stone removal    To let a ureter heal after surgery  After the Procedure:  After the procedure you may experience the following symptoms  All of these are normal and should resolve within 1 or 2 days after your stent is removed:   Urinary frequency (urinating more often than usual)   Urinary urgency (the sensation that you need to urinate right away)   Painful urination (this can be pain in your bladder or in your back when  you urinate)   Blood in your urine ( a stent can irritate the lining of your bladder causing it to bleed)   Back/Flank pain, especially with urination    Staying Comfortable with a Stent in Place:   Fluids: Stay very well hydrated  Try to drink at least 6-8 glasses of water per day   Ibuprofen 600mg every 6-8 hours  Ask your doctor if you need to reduce the dose (ie, for renal insufficiency)   Flomax (tamsulosin): Usually provided at hospital discharge  Taken daily, relaxes the muscle around the ureter tube, and makes stents more comfortable   Narcotics (Percocet, Vicodin, Oxycodone) are usually provided at hospital discharge - for severe pain when needed   Use a stool softener! Constipation worsens stent discomfort  We recommend using Colace, Miralax, and/or Senna every day  Stent Removal:  You will be scheduled for stent removal in the office  In most cases this is removed by the nurse, using the black string which is secured from your urethra    Sometimes stents are removed in the office using a cystoscope  Stent removal   Blood may continue in the urine for a few days   May develop Flank pain on the side the stent was placed, this may last a few days  Take ibuprofen 600 mg by mouth, three times a day (every 8 hours), as needed   Increase water intake   No restrictions after stent removed      Call Your Doctor   You have a fever over 101°F (38 5°C), chills, nausea, or vomiting   Your urine contains blood clots or you see a large amount of blood-tinged urine      Your pain is not relieved with medication   You constantly leak urine   Your stent is accidentally removed

## 2019-10-08 LAB
CA PHOS MFR STONE: 5 %
COLOR STONE: NORMAL
COM MFR STONE: 95 %
COMMENT-STONE3: NORMAL
COMPOSITION: NORMAL
LABORATORY COMMENT REPORT: NORMAL
NIDUS STONE QL: NORMAL
PHOTO: NORMAL
SIZE STONE: NORMAL MM
STONE ANALYSIS-IMP: NORMAL
WT STONE: 20.8 MG

## 2019-10-15 DIAGNOSIS — I48.91 AF (ATRIAL FIBRILLATION) (HCC): ICD-10-CM

## 2019-10-15 RX ORDER — METOPROLOL TARTRATE 50 MG/1
TABLET, FILM COATED ORAL
Qty: 60 TABLET | Refills: 11 | Status: SHIPPED | OUTPATIENT
Start: 2019-10-15 | End: 2020-10-23 | Stop reason: SDUPTHER

## 2019-10-29 ENCOUNTER — TELEPHONE (OUTPATIENT)
Dept: UROLOGY | Facility: AMBULATORY SURGERY CENTER | Age: 59
End: 2019-10-29

## 2019-10-29 NOTE — TELEPHONE ENCOUNTER
Patient is scheduled to be seen on 11/13  The patient has [facility] which we are non-par with  Patient was seen on 9/27 and had a stent put in, that stent was removed on 10/4 visit  This patient can not be seen the Urology facility  Let patient know that if he comes for appointment he will be responsible for full payment  If patient does not wish that please remove patient from schedule

## 2019-11-18 DIAGNOSIS — I48.0 PAROXYSMAL ATRIAL FIBRILLATION (HCC): ICD-10-CM

## 2019-11-18 RX ORDER — RIVAROXABAN 20 MG/1
TABLET, FILM COATED ORAL
Qty: 30 TABLET | Refills: 11 | Status: SHIPPED | OUTPATIENT
Start: 2019-11-18 | End: 2020-03-31 | Stop reason: ALTCHOICE

## 2019-12-05 ENCOUNTER — HOSPITAL ENCOUNTER (EMERGENCY)
Facility: HOSPITAL | Age: 59
Discharge: HOME | End: 2019-12-05
Attending: EMERGENCY MEDICINE | Admitting: EMERGENCY MEDICINE
Payer: COMMERCIAL

## 2019-12-05 VITALS
OXYGEN SATURATION: 95 % | BODY MASS INDEX: 49.74 KG/M2 | HEART RATE: 105 BPM | SYSTOLIC BLOOD PRESSURE: 147 MMHG | DIASTOLIC BLOOD PRESSURE: 79 MMHG | WEIGHT: 315 LBS | TEMPERATURE: 97.7 F | RESPIRATION RATE: 20 BRPM

## 2019-12-05 DIAGNOSIS — S80.12XA TRAUMATIC HEMATOMA OF LEFT LOWER LEG, INITIAL ENCOUNTER: Primary | ICD-10-CM

## 2019-12-05 PROCEDURE — 99284 EMERGENCY DEPT VISIT MOD MDM: CPT | Performed by: EMERGENCY MEDICINE

## 2019-12-05 PROCEDURE — 93005 ELECTROCARDIOGRAM TRACING: CPT

## 2019-12-05 PROCEDURE — 10140 I&D HMTMA SEROMA/FLUID COLLJ: CPT | Performed by: EMERGENCY MEDICINE

## 2019-12-05 PROCEDURE — 99283 EMERGENCY DEPT VISIT LOW MDM: CPT

## 2019-12-05 RX ORDER — LIDOCAINE HYDROCHLORIDE AND EPINEPHRINE 10; 10 MG/ML; UG/ML
10 INJECTION, SOLUTION INFILTRATION; PERINEURAL ONCE
Status: COMPLETED | OUTPATIENT
Start: 2019-12-05 | End: 2019-12-05

## 2019-12-05 RX ADMIN — LIDOCAINE HYDROCHLORIDE,EPINEPHRINE BITARTRATE 10 ML: 10; .01 INJECTION, SOLUTION INFILTRATION; PERINEURAL at 22:54

## 2019-12-06 NOTE — DISCHARGE INSTRUCTIONS
Hematoma   WHAT YOU NEED TO KNOW:   A hematoma is a collection of blood  A bruise is a type of hematoma  A hematoma may form in a muscle or in the tissues just under the skin  A hematoma that forms under the skin will feel like a bump or hard mass  Hematomas can happen anywhere in your body, including in your brain  Your body may break down and absorb a mild hematoma on its own  A more serious hematoma may need treatment  DISCHARGE INSTRUCTIONS:   Medicines: You may need any of the following:  · Prescription pain medicine  may be given  Ask how to take this medicine safely  · NSAIDs , such as ibuprofen, help decrease swelling, pain, and fever  This medicine is available with or without a doctor's order  NSAIDs can cause stomach bleeding or kidney problems in certain people  If you take blood thinner medicine, always ask your healthcare provider if NSAIDs are safe for you  Always read the medicine label and follow directions  · Antibiotics  prevent or treat a bacterial infection  · Take your medicine as directed  Contact your healthcare provider if you think your medicine is not helping or if you have side effects  Tell him of her if you are allergic to any medicine  Keep a list of the medicines, vitamins, and herbs you take  Include the amounts, and when and why you take them  Bring the list or the pill bottles to follow-up visits  Carry your medicine list with you in case of an emergency  Return to the emergency department if:   · You have new or worsening pain, or pain that does not get better with medicine  · You have a fever  · You have trouble moving the body part that has the hematoma  Contact your healthcare provider if:   · You have questions or concerns about your condition or care  Follow up with your healthcare provider as directed: You may need to have surgery if your hematoma is severe   You may also need other tests to make sure there is no other damage that needs to be treated  Write down your questions so you remember to ask them during your visits  Self-care:   · Rest the area  Rest will help your body heal and will also help prevent more damage  · Apply ice as directed  Ice helps reduce swelling  Ice may also help prevent tissue damage  Use an ice pack, or put crushed ice in a bag  Cover it with a towel  Place it on your hematoma for 20 minutes every hour, or as directed  Ask how many times each day to apply ice, and for how many days  · Compress the injury if possible  Lightly wrap the injury with an elastic or soft bandage  This may help control swelling  Ask your healthcare provider how to wrap your injury properly  · Elevate the area as directed  If possible, raise the area above the level of your heart as often as you can  This will help decrease swelling  · Keep the hematoma covered with a bandage  This will help protect the area while it heals  © 2017 [address]  Information is for End User's use only and may not be sold, redistributed or otherwise used for commercial purposes  All illustrations and images included in CareNotes® are the copyrighted property of [employer]  or Devin Sood  The above information is an  only  It is not intended as medical advice for individual conditions or treatments  Talk to your doctor, nurse or pharmacist before following any medical regimen to see if it is safe and effective for you

## 2019-12-06 NOTE — ED NOTES
Medication placed at bedside for Dr Montserrat Hearn to administer        Ira Jorgensen RN  12/05/19 1263

## 2019-12-09 LAB
ATRIAL RATE: 241 BPM
QRS AXIS: 14 DEGREES
QRSD INTERVAL: 80 MS
QT INTERVAL: 346 MS
QTC INTERVAL: 439 MS
T WAVE AXIS: 40 DEGREES
VENTRICULAR RATE: 97 BPM

## 2019-12-09 PROCEDURE — 93010 ELECTROCARDIOGRAM REPORT: CPT | Performed by: INTERNAL MEDICINE

## 2020-02-17 ENCOUNTER — OFFICE VISIT (OUTPATIENT)
Dept: FAMILY MEDICINE CLINIC | Facility: HOSPITAL | Age: 60
End: 2020-02-17
Payer: COMMERCIAL

## 2020-02-17 VITALS
HEART RATE: 106 BPM | DIASTOLIC BLOOD PRESSURE: 92 MMHG | BODY MASS INDEX: 41.75 KG/M2 | HEIGHT: 73 IN | TEMPERATURE: 99.1 F | WEIGHT: 315 LBS | SYSTOLIC BLOOD PRESSURE: 128 MMHG

## 2020-02-17 DIAGNOSIS — I48.91 ATRIAL FIBRILLATION WITH RAPID VENTRICULAR RESPONSE (HCC): Primary | Chronic | ICD-10-CM

## 2020-02-17 DIAGNOSIS — Z23 ENCOUNTER FOR IMMUNIZATION: ICD-10-CM

## 2020-02-17 DIAGNOSIS — I10 ESSENTIAL HYPERTENSION: Chronic | ICD-10-CM

## 2020-02-17 PROCEDURE — 93000 ELECTROCARDIOGRAM COMPLETE: CPT | Performed by: NURSE PRACTITIONER

## 2020-02-17 PROCEDURE — 3080F DIAST BP >= 90 MM HG: CPT | Performed by: NURSE PRACTITIONER

## 2020-02-17 PROCEDURE — 3074F SYST BP LT 130 MM HG: CPT | Performed by: NURSE PRACTITIONER

## 2020-02-17 PROCEDURE — 3008F BODY MASS INDEX DOCD: CPT | Performed by: NURSE PRACTITIONER

## 2020-02-17 PROCEDURE — 90715 TDAP VACCINE 7 YRS/> IM: CPT | Performed by: NURSE PRACTITIONER

## 2020-02-17 PROCEDURE — 99203 OFFICE O/P NEW LOW 30 MIN: CPT | Performed by: NURSE PRACTITIONER

## 2020-02-17 PROCEDURE — 90471 IMMUNIZATION ADMIN: CPT | Performed by: NURSE PRACTITIONER

## 2020-02-17 RX ORDER — DIGOXIN 250 MCG
250 TABLET ORAL DAILY
Qty: 30 TABLET | Refills: 0 | Status: SHIPPED | OUTPATIENT
Start: 2020-02-17 | End: 2020-03-11 | Stop reason: SDUPTHER

## 2020-02-17 NOTE — PROGRESS NOTES
Assessment/Plan:    No problem-specific Assessment & Plan notes found for this encounter  Diagnoses and all orders for this visit:    Atrial fibrillation with rapid ventricular response ([facility] [address] )  Comments:  aFib w/o RVR as seen on EKG in the office  In controlled Afib  Stopped Xarelto three weeks ago due to expense  Start 81mg ASA  Referred to Cardiology  Orders:  -     Ambulatory referral to Cardiology; Future  -     POCT ECG  -     digoxin (LANOXIN) 0 25 mg tablet; Take 1 tablet (250 mcg total) by mouth daily    Encounter for immunization  Comments:  Received TDAP during this encounter  Orders:  -     TDAP VACCINE GREATER THAN OR EQUAL TO 8YO IM    Essential hypertension  Comments:  BP stable  Continue taking all medication as prescribed  Follow-up with cardiology    BMI 45 0-49 9, adult [facility])  Comments:  Encouraged lifestyle modification to incorporate moderate physical activity and DASH diet/low fat/low carb  Subjective:      Patient ID: Miguel Angel Obando is a 64 y o  male  Patient is a 64year old male who presents to this office to establish care  Patient reports history of aFib, CHF, PE  Denies taking Xarelto due to insurance coverage issues  He stopped taking it three weeks ago  Patient stated he would have to pay $170/month for Xarelto and could no longer afford the medication  Not currently being followed by a cardiologist  He used to see Dr Jose Quintero but has not seen him in a while  Denies chest, SOB, palpitations or discomfort  Reports bilateral lower extremely swelling (trace)  Patient would like to get back on an affordable anticoagulant  Hx of difficulty maintaining therapeutic INR on coumadin and the reason why he was placed on Xarelto  Agreeable to seeing a cardiologist more regularly  Currently out of work on disability  Used to be homeless for 2 5 years and had to sleep and live in his car  He has been living with a couple for the past year        The following portions of the patient's history were reviewed and updated as appropriate: allergies, current medications, past medical history and problem list     Review of Systems   HENT: Negative  Eyes: Negative  Respiratory: Positive for shortness of breath (when walking long distances)  Cardiovascular: Positive for leg swelling  Gastrointestinal: Negative  Endocrine: Negative  Genitourinary: Negative  Musculoskeletal: Negative  Skin: Negative  Neurological: Negative  Hematological: Bruises/bleeds easily (from previous anticoagulation therapy)  Psychiatric/Behavioral: Negative  Uses CPAP at bedtime         Objective:      /92   Pulse (!) 106   Temp 99 1 °F (37 3 °C)   Ht 6' 0 5" (1 842 m)   Wt (!) 163 kg (359 lb 6 4 oz)   BMI 48 07 kg/m²          Physical Exam   Constitutional: He is oriented to person, place, and time  He appears well-developed and well-nourished  No distress  HENT:   Head: Normocephalic and atraumatic  Eyes: Pupils are equal, round, and reactive to light  EOM are normal    Neck: Normal range of motion  No JVD present  Carotid bruit is not present  Cardiovascular: Normal rate, S1 normal, S2 normal, normal heart sounds and normal pulses  A regularly irregular rhythm present  No murmur heard  Pulmonary/Chest: Effort normal and breath sounds normal  No respiratory distress  He has no wheezes  Abdominal: Soft  Bowel sounds are normal  There is no tenderness  Musculoskeletal: Normal range of motion  Right ankle: He exhibits swelling  Left ankle: He exhibits swelling  Neurological: He is alert and oriented to person, place, and time  Skin: Skin is warm and dry  Capillary refill takes less than 2 seconds  Psychiatric: He has a normal mood and affect  His behavior is normal  Judgment and thought content normal    Vitals reviewed

## 2020-03-11 DIAGNOSIS — I48.20 CHRONIC ATRIAL FIBRILLATION (HCC): ICD-10-CM

## 2020-03-11 DIAGNOSIS — I48.91 ATRIAL FIBRILLATION WITH RAPID VENTRICULAR RESPONSE (HCC): Chronic | ICD-10-CM

## 2020-03-11 DIAGNOSIS — I50.32 CHRONIC DIASTOLIC CONGESTIVE HEART FAILURE (HCC): ICD-10-CM

## 2020-03-11 NOTE — TELEPHONE ENCOUNTER
Requested medication(s) are due for refill today: Yes  Patient has already received a courtesy refill: No  Other reason request has been forwarded to provider: you last rx'd digoxin at his establish visit  He is scheduled to see you in April  Also has upcoming appt with cardiology  Not sure if you wanted to refill atorvastatin or have cardiology do it

## 2020-03-13 RX ORDER — ATORVASTATIN CALCIUM 40 MG/1
40 TABLET, FILM COATED ORAL DAILY
Qty: 30 TABLET | Refills: 0 | Status: SHIPPED | OUTPATIENT
Start: 2020-03-13 | End: 2020-04-18

## 2020-03-13 RX ORDER — DIGOXIN 250 MCG
250 TABLET ORAL DAILY
Qty: 30 TABLET | Refills: 0 | Status: SHIPPED | OUTPATIENT
Start: 2020-03-13 | End: 2020-04-18

## 2020-03-31 ENCOUNTER — OFFICE VISIT (OUTPATIENT)
Dept: CARDIOLOGY CLINIC | Facility: CLINIC | Age: 60
End: 2020-03-31
Payer: COMMERCIAL

## 2020-03-31 VITALS
DIASTOLIC BLOOD PRESSURE: 70 MMHG | HEIGHT: 72 IN | SYSTOLIC BLOOD PRESSURE: 122 MMHG | BODY MASS INDEX: 42.66 KG/M2 | WEIGHT: 315 LBS | HEART RATE: 96 BPM

## 2020-03-31 DIAGNOSIS — I48.19 PERSISTENT ATRIAL FIBRILLATION (HCC): ICD-10-CM

## 2020-03-31 DIAGNOSIS — I50.32 CHRONIC DIASTOLIC CONGESTIVE HEART FAILURE (HCC): Primary | Chronic | ICD-10-CM

## 2020-03-31 PROCEDURE — 99214 OFFICE O/P EST MOD 30 MIN: CPT | Performed by: INTERNAL MEDICINE

## 2020-03-31 PROCEDURE — 3074F SYST BP LT 130 MM HG: CPT | Performed by: INTERNAL MEDICINE

## 2020-03-31 PROCEDURE — 3078F DIAST BP <80 MM HG: CPT | Performed by: INTERNAL MEDICINE

## 2020-03-31 PROCEDURE — 3008F BODY MASS INDEX DOCD: CPT | Performed by: INTERNAL MEDICINE

## 2020-03-31 RX ORDER — ASPIRIN 81 MG/1
81 TABLET ORAL DAILY
COMMUNITY

## 2020-03-31 NOTE — PATIENT INSTRUCTIONS
Low-Sodium Diet   AMBULATORY CARE:   A low-sodium diet  limits foods that are high in sodium (salt)  You will need to follow a low-sodium diet if you have high blood pressure, kidney disease, or heart failure  You may also need to follow this diet if you have a condition that is causing your body to retain (hold) extra fluid  You may need to limit the amount of sodium you eat to 1,500 mg  Ask your healthcare provider how much sodium you can have each day  How to use food labels to choose foods that are low in sodium:  Read food labels to find the amount of sodium they contain  The amount of sodium is listed in milligrams (mg)  The % Daily Value (DV) column tells you how much of your daily needs are met by 1 serving of the food for each nutrient listed  Choose foods that have less than 5% of the DV of sodium  These foods are considered low in sodium  Foods that have 20% or more of the DV of sodium are considered high in sodium  Some food labels may also list any of the following terms that tell you about the sodium content in the food:  · Sodium-free:  Less than 5 mg in each serving    · Very low sodium:  35 mg of sodium or less in each serving    · Low sodium:  140 mg of sodium or less in each serving    · Reduced sodium: At least 25% less sodium in each serving than the regular type    · Light in sodium:  50% less sodium in each serving    · Unsalted or no added salt:  No extra salt is added during processing (the food may still contain sodium)  Foods to avoid:  Salty foods are high in sodium   You should avoid the following:  · Processed foods:      ¨ Mixes for cornbread, biscuits, cake, and pudding     ¨ Instant foods, such as potatoes, cereals, noodles, and rice     ¨ Packaged foods, such as bread stuffing, rice and pasta mixes, snack dip mixes, and macaroni and cheese     ¨ Canned foods, such as canned vegetables, soups, broths, sauces, and vegetable or tomato juice    ¨ Snack foods, such as salted chips, popcorn, pretzels, pork rinds, salted crackers, and salted nuts    ¨ Frozen foods, such as dinners, entrees, vegetables with sauces, and breaded meats    ¨ Sauerkraut, pickled vegetables, and other foods prepared in brine    · Meats and cheeses:      ¨ Smoked or cured meat, such as corned beef, dunaway, ham, hot dogs, and sausage    ¨ Canned meats or spreads, such as potted meats, sardines, anchovies, and imitation seafood    ¨ Deli or lunch meats, such as bologna, ham, turkey, and roast beef    ¨ Processed cheese, such as American cheese and cheese spreads    · Condiments, sauces, and seasonings:      ¨ Salt (¼ teaspoon of salt contains 575 mg of sodium)    ¨ Seasonings made with salt, such as garlic salt, celery salt, onion salt, and seasoned salt    ¨ Regular soy sauce, barbecue sauce, teriyaki sauce, steak sauce, Worcestershire sauce, and most flavored vinegars    ¨ Canned gravy and mixes     ¨ Regular condiments, such as mustard, ketchup, and salad dressings    ¨ Pickles and olives    ¨ Meat tenderizers and monosodium glutamate (MSG)  Foods to include:  Read the food label to find the exact amount of sodium in each serving  · Bread and cereal:  Try to choose breads with less than 80 mg of sodium per serving  ¨ Bread, roll, valeriano, tortilla, or unsalted crackers  ¨ Ready-to-eat cereals with less than 5% DV of sodium (examples include shredded wheat and puffed rice)    ¨ Pasta    · Vegetables and fruits:      ¨ Unsalted fresh, frozen, or canned vegetables    ¨ Fresh, frozen, or canned fruits    ¨ Fruit juice    · Dairy:  One serving has about 150 mg of sodium  ¨ Milk, all types    ¨ Yogurt    ¨ Hard cheese, such as cheddar, Swiss, [employer], or mozzarella    · Meat and other protein foods:  Some raw meats may have added sodium       ¨ Plain meats, fish, and poultry     ¨ Eggs    · Other foods:      ¨ Homemade pudding    ¨ Unsalted nuts, popcorn, or pretzels    ¨ Unsalted butter or margarine  Ways to decrease sodium:   · Add spices and herbs to foods instead of salt during cooking  Use salt-free seasonings to add flavor to foods  Examples include onion powder, garlic powder, basil, padilla powder, paprika, and parsley  Try lemon or lime juice or vinegar to give foods a tart flavor  Use hot peppers, pepper, or cayenne pepper to add a spicy flavor to foods  · Do not keep a salt shaker at your kitchen table  This may help keep you from adding salt to food at the table  It may take time to get used to enjoying the natural flavor of food instead of adding salt  Talk to your healthcare provider before you use salt substitutes  Some salt substitutes have a high amount of potassium and need to be avoided if you have kidney disease  · Choose low-sodium foods at restaurants  Meals from restaurants are often high in sodium  Some restaurants have nutrition information on the menu that tells you the amount of sodium in their foods  If possible, ask for your food to be prepared with less, or no salt  · Shop for unsalted or low-sodium foods and snacks at the grocery store  Examples include unsalted or low-sodium broths, soups, and canned vegetables  Choose fresh or frozen vegetables instead  Choose unsalted nuts or seeds or fresh fruits or vegetables as snacks  Read food labels and choose salt-free, very low-sodium, or low-sodium foods  © 2017 [facility] Information is for End User's use only and may not be sold, redistributed or otherwise used for commercial purposes  All illustrations and images included in CareNotes® are the copyrighted property of [employer]  or Devin Sood  The above information is an  only  It is not intended as medical advice for individual conditions or treatments  Talk to your doctor, nurse or pharmacist before following any medical regimen to see if it is safe and effective for you

## 2020-03-31 NOTE — PROGRESS NOTES
Cardiology Follow Up    Pravin Going  1960  [id number]  HEART & VASCULAR  [facility] Fernando Davalos  [address] [facility] [address]    1  Chronic diastolic congestive heart failure (HCC)     2  Persistent atrial fibrillation  Ambulatory referral to Cardiology    aFib w/o RVR as seen on EKG in the office  In controlled Afib  Stopped Xarelto three weeks ago due to expense  Start 81mg ASA  Referred to Cardiology  Interval History:    Janeth Esquivel comes in for follow-up given his persistent atrial fibrillation and right-sided CHF/edema  Janeth Esquivel was in the hospital with acute pulmonary embolism a few years back  He was also found to be in atrial fibrillation and rates were rapid during his acute state  He carried this history of atrial fibrillation for many years  When he was admitted he was not on any medications, as he was homeless living in his car  With treating his PE and adjusting his heart rate medications, his AF became under good control  Echocardiogram demonstrated normal LV systolic function  There was a dilated RV and biatrial enlargement  There was also mild to moderate pulmonary hypertension  He was also very volume overloaded, and torsemide was started  In follow-up Janeth Esquivel looked grossly volume overloaded and his weight was significantly up  He has significant lower extremity edema  He was not taking his torsemide as directed  He was homeless, usually staying in his car or shelter and because of the problems with having to use the bathroom he only was mamie his torsemide 3 times a week  Over the last couple visits he has been trying to take this is often as possible, but there are days he does not take it  [facility] has been stable from both the CHF and atrial fibrillation standpoint  He did have to changes insurance his, and had to see another cardiologist down near [place] since I last saw him    The only change was that he was put on digoxin  Because of his insurance changed, he was not able to afford the newer anticoagulant chose not to go on Coumadin  He is currently on aspirin  Recently in his PCPs office he was somewhat rapid, and referred back to us  His heart rates are under better control today  He tells me he overall feels well  He does not feel his atrial fibrillation  He continues to have chronic lower extremity edema, which he tells me goes up and down  He has been trying to watch his sodium intake          Patient Active Problem List   Diagnosis    Hypertension    Hyperthyroidism    CAD (coronary artery disease)    Atrial fibrillation with rapid ventricular response (HCC)    Homelessness    Chronic diastolic congestive heart failure (HCC)    Pulmonary hypertension (HCC)    History of pulmonary embolism    Dyslipidemia    Kidney stone    Sepsis ([facility] [address] )    Morbid obesity ([facility] [facility] )    Kidney stones    Dysphagia    Swallowing dysfunction    Tobacco abuse    BMI 45 0-49 9, adult [facility])     Past Medical History:   Diagnosis Date    Acute diastolic congestive heart failure ([facility] [facility] ) 7/3/2017    Atrial fibrillation (HCC)     Bilateral edema of lower extremity 8/22/2016    CAD (coronary artery disease) 10/28/2016    Cardiac disease     Chest pain 8/22/2016    CPAP (continuous positive airway pressure) dependence     Hyperlipidemia     Hypertension     Hyperthyroidism 8/22/2016    Kidney stone     Kidney stone     Pneumothorax     Presence of IVC filter     Pulmonary embolism (HCC)     Rash 10/28/2016    Sleep apnea     SOB (shortness of breath) 8/22/2016    Tachycardia 8/22/2016     Social History     Socioeconomic History    Marital status: Single     Spouse name: Not on file    Number of children: Not on file    Years of education: Not on file    Highest education level: Not on file   Occupational History    Not on file   Social Needs    Financial resource strain: Not on file   King-Marisa insecurity:     Worry: Not on file     Inability: Not on file    Transportation needs:     Medical: Not on file     Non-medical: Not on file   Tobacco Use    Smoking status: Current Every Day Smoker     Packs/day: 1 00     Types: Cigarettes    Smokeless tobacco: Never Used    Tobacco comment: Pt states he smokes when he can afford to do so    Substance and Sexual Activity    Alcohol use: No    Drug use: No    Sexual activity: Not on file   Lifestyle    Physical activity:     Days per week: Not on file     Minutes per session: Not on file    Stress: Not on file   Relationships    Social connections:     Talks on phone: Not on file     Gets together: Not on file     Attends Rastafari service: Not on file     Active member of club or organization: Not on file     Attends meetings of clubs or organizations: Not on file     Relationship status: Not on file    Intimate partner violence:     Fear of current or ex partner: Not on file     Emotionally abused: Not on file     Physically abused: Not on file     Forced sexual activity: Not on file   Other Topics Concern    Not on file   Social History Narrative    Not on file      Family History   Problem Relation Age of Onset    Cancer Mother     Heart disease Father      Past Surgical History:   Procedure Laterality Date    EGD AND COLONOSCOPY N/A 7/28/2017    Procedure: EGD AND COLONOSCOPY;  Surgeon: Lucrecia Garcia MD;  Location:  MAIN OR;  Service: Gastroenterology    FL RETROGRADE PYELOGRAM  8/13/2019    FL RETROGRADE PYELOGRAM  9/27/2019    LITHOTRIPSY      NH CYSTO/URETERO W/LITHOTRIPSY &INDWELL STENT INSRT Right 8/13/2019    Procedure: CYSTOSCOPY URETEROSCOPY WITH LITHOTRIPSY HOLMIUM LASER, RETROGRADE PYELOGRAM AND INSERTION STENT URETERAL;  Surgeon: Hugo Santos MD;  Location:  MAIN OR;  Service: Urology    NH CYSTO/URETERO W/LITHOTRIPSY &INDWELL STENT INSRT Right 9/27/2019    Procedure: CYSTOSCOPY URETEROSCOPY WITH LITHOTRIPSY HOLMIUM LASER, RETROGRADE PYELOGRAM AND INSERTION STENT URETERAL;  Surgeon: Kristin Williamson MD;  Location:  MAIN OR;  Service: Urology       Current Outpatient Medications:     aspirin (ECOTRIN LOW STRENGTH) 81 mg EC tablet, Take 81 mg by mouth daily, Disp: , Rfl:     atorvastatin (LIPITOR) 40 mg tablet, Take 1 tablet (40 mg total) by mouth daily, Disp: 30 tablet, Rfl: 0    digoxin (LANOXIN) 0 25 mg tablet, Take 1 tablet (250 mcg total) by mouth daily, Disp: 30 tablet, Rfl: 0    methimazole (TAPAZOLE) 10 mg tablet, Take 1 tablet (10 mg total) by mouth 2 (two) times a day, Disp: 90 tablet, Rfl: 0    metoprolol tartrate (LOPRESSOR) 50 mg tablet, TAKE 1 TABLET BY MOUTH TWICE DAILY, Disp: 60 tablet, Rfl: 11    potassium chloride (K-DUR,KLOR-CON) 20 mEq tablet, Take 20 mEq by mouth 2 (two) times a day, Disp: , Rfl:     torsemide (DEMADEX) 20 mg tablet, Take 1 tablet by mouth daily, Disp: , Rfl:   No Known Allergies    Labs:  Lab Results   Component Value Date     02/15/2017    K 3 5 09/29/2019    K 4 7 08/31/2018     09/29/2019     08/31/2018    CO2 28 09/29/2019    CO2 23 08/14/2019    CO2 23 08/31/2018    BUN 15 09/29/2019    BUN 19 08/31/2018    CREATININE 0 76 09/29/2019    CREATININE 0 72 02/15/2017    GLUCOSE 113 11/30/2015    CALCIUM 8 5 09/29/2019    CALCIUM 9 6 08/31/2018     Imaging:  ECG obtained in the office demonstrated atrial fibrillation with a heart rate of 59  ECHO:  LEFT VENTRICLE:  Systolic function was normal  Ejection fraction was estimated to be 60 %  There were no regional wall motion abnormalities      LEFT ATRIUM:  The atrium was mildly dilated      MITRAL VALVE:  There was mild annular calcification  There was mild regurgitation      TRICUSPID VALVE:  There was mild regurgitation  Pulmonary artery systolic pressure was mildly increased  Review of Systems:  Review of Systems   Constitutional: Positive for fatigue  HENT: Negative  Eyes: Negative      Respiratory: Negative  Cardiovascular: Positive for leg swelling  Gastrointestinal: Negative  Musculoskeletal: Negative  Skin: Negative  Allergic/Immunologic: Negative  Neurological: Negative  Hematological: Negative  Psychiatric/Behavioral: Negative  All other systems reviewed and are negative  Vitals:    03/31/20 0808   BP: 122/70   Pulse: 96       Physical Exam:  Physical Exam   Constitutional: He is oriented to person, place, and time  He appears well-developed and well-nourished  HENT:   Head: Normocephalic and atraumatic  Eyes: Pupils are equal, round, and reactive to light  EOM are normal  Right eye exhibits no discharge  Left eye exhibits no discharge  No scleral icterus  Neck: Normal range of motion  Neck supple  No JVD present  No thyromegaly present  Cardiovascular: Normal rate, S1 normal, S2 normal, normal heart sounds and intact distal pulses  An irregularly irregular rhythm present  Exam reveals decreased pulses  Exam reveals no gallop and no friction rub  No murmur heard  Pulmonary/Chest: Effort normal  No respiratory distress  He has decreased breath sounds  He has no wheezes  He has no rales  Abdominal: Soft  Bowel sounds are normal  He exhibits no distension  There is no tenderness  Musculoskeletal: Normal range of motion  He exhibits edema  He exhibits no tenderness or deformity  Neurological: He is alert and oriented to person, place, and time  No cranial nerve deficit  Skin: Skin is warm and dry  No rash noted  Psychiatric: He has a normal mood and affect  Judgment and thought content normal    Nursing note and vitals reviewed  Discussion/Summary:    1  Persistent atrial fibrillation - [place] heart rates are well controlled  He is essentially asymptomatic from this standpoint  No changes were made  They were recently rapid in his PCPs office, but her better today  No changes were made to his medical therapy    He is not able to afford the new anticoagulant, and chooses not to go on Coumadin, therefore remain on aspirin for stroke prevention  His stroke risk is relatively low  2   Chronic diastolic CHF - He remains chronically volume overloaded, but this has improved over the last few visits  His weight has been stable  I have encouraged him to remain on the same medical therapy, watch his sodium intake and weight closely  We will see him back in 6 months  Counseling / Coordination of Care  Total floor / unit time spent today 25 minutes  Greater than 50% of total time was spent with the patient and / or family counseling and / or coordination of care

## 2020-04-14 DIAGNOSIS — I48.20 CHRONIC ATRIAL FIBRILLATION (HCC): ICD-10-CM

## 2020-04-14 DIAGNOSIS — I50.32 CHRONIC DIASTOLIC CONGESTIVE HEART FAILURE (HCC): ICD-10-CM

## 2020-04-14 DIAGNOSIS — I48.91 ATRIAL FIBRILLATION WITH RAPID VENTRICULAR RESPONSE (HCC): Chronic | ICD-10-CM

## 2020-04-14 RX ORDER — DIGOXIN 250 MCG
250 TABLET ORAL DAILY
Qty: 30 TABLET | Refills: 0 | OUTPATIENT
Start: 2020-04-14

## 2020-04-14 RX ORDER — ATORVASTATIN CALCIUM 40 MG/1
40 TABLET, FILM COATED ORAL DAILY
Qty: 30 TABLET | Refills: 0 | OUTPATIENT
Start: 2020-04-14

## 2020-04-18 DIAGNOSIS — I48.91 ATRIAL FIBRILLATION WITH RAPID VENTRICULAR RESPONSE (HCC): Chronic | ICD-10-CM

## 2020-04-18 DIAGNOSIS — I50.32 CHRONIC DIASTOLIC CONGESTIVE HEART FAILURE (HCC): ICD-10-CM

## 2020-04-18 DIAGNOSIS — I48.20 CHRONIC ATRIAL FIBRILLATION (HCC): ICD-10-CM

## 2020-04-18 RX ORDER — DIGOXIN 250 MCG
TABLET ORAL
Qty: 30 TABLET | Refills: 0 | Status: SHIPPED | OUTPATIENT
Start: 2020-04-18 | End: 2020-05-29 | Stop reason: SDUPTHER

## 2020-04-18 RX ORDER — ATORVASTATIN CALCIUM 40 MG/1
TABLET, FILM COATED ORAL
Qty: 30 TABLET | Refills: 0 | Status: SHIPPED | OUTPATIENT
Start: 2020-04-18 | End: 2020-05-18 | Stop reason: SDUPTHER

## 2020-04-21 ENCOUNTER — OFFICE VISIT (OUTPATIENT)
Dept: FAMILY MEDICINE CLINIC | Facility: HOSPITAL | Age: 60
End: 2020-04-21
Payer: COMMERCIAL

## 2020-04-21 VITALS
BODY MASS INDEX: 42.66 KG/M2 | TEMPERATURE: 98.3 F | SYSTOLIC BLOOD PRESSURE: 110 MMHG | DIASTOLIC BLOOD PRESSURE: 82 MMHG | HEIGHT: 72 IN | WEIGHT: 315 LBS | OXYGEN SATURATION: 96 % | HEART RATE: 86 BPM

## 2020-04-21 DIAGNOSIS — E66.01 MORBID OBESITY WITH BMI OF 45.0-49.9, ADULT (HCC): ICD-10-CM

## 2020-04-21 DIAGNOSIS — E05.90 HYPERTHYROIDISM: Chronic | ICD-10-CM

## 2020-04-21 DIAGNOSIS — I48.91 ATRIAL FIBRILLATION WITH RAPID VENTRICULAR RESPONSE (HCC): Chronic | ICD-10-CM

## 2020-04-21 DIAGNOSIS — I10 ESSENTIAL HYPERTENSION: Primary | Chronic | ICD-10-CM

## 2020-04-21 DIAGNOSIS — E78.5 DYSLIPIDEMIA: Chronic | ICD-10-CM

## 2020-04-21 PROCEDURE — 3008F BODY MASS INDEX DOCD: CPT | Performed by: NURSE PRACTITIONER

## 2020-04-21 PROCEDURE — 3074F SYST BP LT 130 MM HG: CPT | Performed by: NURSE PRACTITIONER

## 2020-04-21 PROCEDURE — 3079F DIAST BP 80-89 MM HG: CPT | Performed by: NURSE PRACTITIONER

## 2020-04-21 PROCEDURE — 99214 OFFICE O/P EST MOD 30 MIN: CPT | Performed by: NURSE PRACTITIONER

## 2020-04-23 LAB
ALBUMIN SERPL-MCNC: 3.9 G/DL (ref 3.8–4.9)
ALBUMIN/GLOB SERPL: 1 {RATIO} (ref 1.2–2.2)
ALP SERPL-CCNC: 113 IU/L (ref 39–117)
ALT SERPL-CCNC: 33 IU/L (ref 0–44)
AST SERPL-CCNC: 35 IU/L (ref 0–40)
BASOPHILS # BLD AUTO: 0 X10E3/UL (ref 0–0.2)
BASOPHILS NFR BLD AUTO: 0 %
BILIRUB SERPL-MCNC: 0.6 MG/DL (ref 0–1.2)
BUN SERPL-MCNC: 17 MG/DL (ref 6–24)
BUN/CREAT SERPL: 22 (ref 9–20)
CALCIUM SERPL-MCNC: 9.6 MG/DL (ref 8.7–10.2)
CHLORIDE SERPL-SCNC: 99 MMOL/L (ref 96–106)
CHOLEST SERPL-MCNC: 78 MG/DL (ref 100–199)
CHOLEST/HDLC SERPL: 2.3 RATIO (ref 0–5)
CO2 SERPL-SCNC: 22 MMOL/L (ref 20–29)
CREAT SERPL-MCNC: 0.76 MG/DL (ref 0.76–1.27)
EOSINOPHIL # BLD AUTO: 0.3 X10E3/UL (ref 0–0.4)
EOSINOPHIL NFR BLD AUTO: 2 %
ERYTHROCYTE [DISTWIDTH] IN BLOOD BY AUTOMATED COUNT: 12.4 % (ref 11.6–15.4)
GLOBULIN SER-MCNC: 3.8 G/DL (ref 1.5–4.5)
GLUCOSE SERPL-MCNC: 105 MG/DL (ref 65–99)
HCT VFR BLD AUTO: 46.3 % (ref 37.5–51)
HDLC SERPL-MCNC: 34 MG/DL
HGB BLD-MCNC: 15.6 G/DL (ref 13–17.7)
IMM GRANULOCYTES # BLD: 0 X10E3/UL (ref 0–0.1)
IMM GRANULOCYTES NFR BLD: 0 %
LDLC SERPL CALC-MCNC: 29 MG/DL (ref 0–99)
LYMPHOCYTES # BLD AUTO: 2 X10E3/UL (ref 0.7–3.1)
LYMPHOCYTES NFR BLD AUTO: 18 %
MCH RBC QN AUTO: 31.5 PG (ref 26.6–33)
MCHC RBC AUTO-ENTMCNC: 33.7 G/DL (ref 31.5–35.7)
MCV RBC AUTO: 93 FL (ref 79–97)
MONOCYTES # BLD AUTO: 0.8 X10E3/UL (ref 0.1–0.9)
MONOCYTES NFR BLD AUTO: 8 %
NEUTROPHILS # BLD AUTO: 7.6 X10E3/UL (ref 1.4–7)
NEUTROPHILS NFR BLD AUTO: 72 %
PLATELET # BLD AUTO: 272 X10E3/UL (ref 150–450)
POTASSIUM SERPL-SCNC: 4.5 MMOL/L (ref 3.5–5.2)
PROT SERPL-MCNC: 7.7 G/DL (ref 6–8.5)
RBC # BLD AUTO: 4.96 X10E6/UL (ref 4.14–5.8)
SL AMB EGFR AFRICAN AMERICAN: 117 ML/MIN/1.73
SL AMB EGFR NON AFRICAN AMERICAN: 101 ML/MIN/1.73
SL AMB VLDL CHOLESTEROL CALC: 15 MG/DL (ref 5–40)
SODIUM SERPL-SCNC: 139 MMOL/L (ref 134–144)
T3FREE SERPL-MCNC: 5.8 PG/ML (ref 2–4.4)
T4 FREE SERPL-MCNC: 2.12 NG/DL (ref 0.82–1.77)
TRIGL SERPL-MCNC: 77 MG/DL (ref 0–149)
TSH SERPL DL<=0.005 MIU/L-ACNC: <0.005 UIU/ML (ref 0.45–4.5)
WBC # BLD AUTO: 10.8 X10E3/UL (ref 3.4–10.8)

## 2020-04-29 ENCOUNTER — TELEMEDICINE (OUTPATIENT)
Dept: ENDOCRINOLOGY | Facility: HOSPITAL | Age: 60
End: 2020-04-29
Payer: COMMERCIAL

## 2020-04-29 VITALS — WEIGHT: 315 LBS | BODY MASS INDEX: 47.2 KG/M2

## 2020-04-29 DIAGNOSIS — R93.89 ABNORMAL THYROID ULTRASOUND: ICD-10-CM

## 2020-04-29 DIAGNOSIS — E05.00 GRAVES DISEASE: ICD-10-CM

## 2020-04-29 DIAGNOSIS — E05.90 HYPERTHYROIDISM: Primary | Chronic | ICD-10-CM

## 2020-04-29 PROCEDURE — 99244 OFF/OP CNSLTJ NEW/EST MOD 40: CPT | Performed by: INTERNAL MEDICINE

## 2020-04-29 RX ORDER — METHIMAZOLE 10 MG/1
10 TABLET ORAL 3 TIMES DAILY
Qty: 90 TABLET | Refills: 6 | Status: SHIPPED | OUTPATIENT
Start: 2020-04-29 | End: 2020-09-17 | Stop reason: ALTCHOICE

## 2020-04-29 RX ORDER — METHIMAZOLE 10 MG/1
10 TABLET ORAL 3 TIMES DAILY
Qty: 90 TABLET | Refills: 6 | Status: SHIPPED | OUTPATIENT
Start: 2020-04-29 | End: 2020-04-29 | Stop reason: SDUPTHER

## 2020-05-14 DIAGNOSIS — E05.90 HYPERTHYROIDISM: Primary | Chronic | ICD-10-CM

## 2020-05-14 LAB
ALBUMIN SERPL-MCNC: 3.9 G/DL (ref 3.8–4.9)
ALBUMIN/GLOB SERPL: 1 {RATIO} (ref 1.2–2.2)
ALP SERPL-CCNC: 109 IU/L (ref 39–117)
ALT SERPL-CCNC: 28 IU/L (ref 0–44)
AST SERPL-CCNC: 29 IU/L (ref 0–40)
BILIRUB SERPL-MCNC: 0.7 MG/DL (ref 0–1.2)
BUN SERPL-MCNC: 13 MG/DL (ref 6–24)
BUN/CREAT SERPL: 21 (ref 9–20)
CALCIUM SERPL-MCNC: 9.4 MG/DL (ref 8.7–10.2)
CHLORIDE SERPL-SCNC: 101 MMOL/L (ref 96–106)
CO2 SERPL-SCNC: 24 MMOL/L (ref 20–29)
CREAT SERPL-MCNC: 0.62 MG/DL (ref 0.76–1.27)
GLOBULIN SER-MCNC: 3.8 G/DL (ref 1.5–4.5)
GLUCOSE SERPL-MCNC: 105 MG/DL (ref 65–99)
POTASSIUM SERPL-SCNC: 4.6 MMOL/L (ref 3.5–5.2)
PROT SERPL-MCNC: 7.7 G/DL (ref 6–8.5)
SL AMB EGFR AFRICAN AMERICAN: 127 ML/MIN/1.73
SL AMB EGFR NON AFRICAN AMERICAN: 110 ML/MIN/1.73
SODIUM SERPL-SCNC: 138 MMOL/L (ref 134–144)
T3FREE SERPL-MCNC: 4.5 PG/ML (ref 2–4.4)
T4 FREE SERPL-MCNC: 1.57 NG/DL (ref 0.82–1.77)
TSH SERPL DL<=0.005 MIU/L-ACNC: <0.005 UIU/ML (ref 0.45–4.5)

## 2020-05-18 DIAGNOSIS — I48.20 CHRONIC ATRIAL FIBRILLATION (HCC): ICD-10-CM

## 2020-05-18 DIAGNOSIS — I48.91 ATRIAL FIBRILLATION WITH RAPID VENTRICULAR RESPONSE (HCC): Chronic | ICD-10-CM

## 2020-05-18 DIAGNOSIS — I50.32 CHRONIC DIASTOLIC CONGESTIVE HEART FAILURE (HCC): ICD-10-CM

## 2020-05-18 DIAGNOSIS — I48.91 AF (ATRIAL FIBRILLATION) (HCC): ICD-10-CM

## 2020-05-18 RX ORDER — METOPROLOL TARTRATE 50 MG/1
50 TABLET, FILM COATED ORAL 2 TIMES DAILY
Qty: 60 TABLET | Refills: 11 | OUTPATIENT
Start: 2020-05-18

## 2020-05-18 RX ORDER — DIGOXIN 250 MCG
250 TABLET ORAL DAILY
Qty: 30 TABLET | Refills: 0 | OUTPATIENT
Start: 2020-05-18

## 2020-05-18 RX ORDER — ATORVASTATIN CALCIUM 40 MG/1
40 TABLET, FILM COATED ORAL DAILY
Qty: 30 TABLET | Refills: 2 | Status: SHIPPED | OUTPATIENT
Start: 2020-05-18 | End: 2020-07-06 | Stop reason: SDUPTHER

## 2020-05-21 DIAGNOSIS — I48.91 ATRIAL FIBRILLATION WITH RAPID VENTRICULAR RESPONSE (HCC): Chronic | ICD-10-CM

## 2020-05-21 RX ORDER — DIGOXIN 250 MCG
TABLET ORAL
Qty: 30 TABLET | Refills: 0 | OUTPATIENT
Start: 2020-05-21

## 2020-05-29 DIAGNOSIS — I48.91 ATRIAL FIBRILLATION WITH RAPID VENTRICULAR RESPONSE (HCC): Chronic | ICD-10-CM

## 2020-05-29 RX ORDER — DIGOXIN 250 MCG
250 TABLET ORAL DAILY
Qty: 90 TABLET | Refills: 1 | Status: SHIPPED | OUTPATIENT
Start: 2020-05-29 | End: 2020-12-03 | Stop reason: SDUPTHER

## 2020-06-04 ENCOUNTER — APPOINTMENT (OUTPATIENT)
Dept: LAB | Facility: HOSPITAL | Age: 60
End: 2020-06-04
Payer: MEDICARE

## 2020-06-04 DIAGNOSIS — E05.90 HYPERTHYROIDISM: Primary | ICD-10-CM

## 2020-06-04 LAB
ALBUMIN SERPL BCP-MCNC: 3.6 G/DL (ref 3.5–5)
ALP SERPL-CCNC: 121 U/L (ref 46–116)
ALT SERPL W P-5'-P-CCNC: 26 U/L (ref 12–78)
ANION GAP SERPL CALCULATED.3IONS-SCNC: 4 MMOL/L (ref 4–13)
AST SERPL W P-5'-P-CCNC: 22 U/L (ref 5–45)
BILIRUB SERPL-MCNC: 0.56 MG/DL (ref 0.2–1)
BUN SERPL-MCNC: 18 MG/DL (ref 5–25)
CALCIUM SERPL-MCNC: 9.6 MG/DL (ref 8.3–10.1)
CHLORIDE SERPL-SCNC: 107 MMOL/L (ref 100–108)
CO2 SERPL-SCNC: 27 MMOL/L (ref 21–32)
CREAT SERPL-MCNC: 0.84 MG/DL (ref 0.6–1.3)
GFR SERPL CREATININE-BSD FRML MDRD: 97 ML/MIN/1.73SQ M
GLUCOSE P FAST SERPL-MCNC: 107 MG/DL (ref 65–99)
POTASSIUM SERPL-SCNC: 4.4 MMOL/L (ref 3.5–5.3)
PROT SERPL-MCNC: 8.9 G/DL (ref 6.4–8.2)
SODIUM SERPL-SCNC: 138 MMOL/L (ref 136–145)
T3FREE SERPL-MCNC: 2.08 PG/ML (ref 2.3–4.2)
T4 FREE SERPL-MCNC: 0.81 NG/DL (ref 0.76–1.46)
TSH SERPL DL<=0.05 MIU/L-ACNC: 0.2 UIU/ML (ref 0.36–3.74)

## 2020-06-04 PROCEDURE — 80053 COMPREHEN METABOLIC PANEL: CPT

## 2020-06-04 PROCEDURE — 36415 COLL VENOUS BLD VENIPUNCTURE: CPT

## 2020-06-04 PROCEDURE — 84439 ASSAY OF FREE THYROXINE: CPT

## 2020-06-04 PROCEDURE — 84443 ASSAY THYROID STIM HORMONE: CPT

## 2020-06-04 PROCEDURE — 84481 FREE ASSAY (FT-3): CPT

## 2020-06-11 ENCOUNTER — TELEPHONE (OUTPATIENT)
Dept: ENDOCRINOLOGY | Facility: HOSPITAL | Age: 60
End: 2020-06-11

## 2020-06-12 ENCOUNTER — OFFICE VISIT (OUTPATIENT)
Dept: ENDOCRINOLOGY | Facility: HOSPITAL | Age: 60
End: 2020-06-12
Payer: MEDICARE

## 2020-06-12 VITALS
TEMPERATURE: 98.3 F | HEART RATE: 106 BPM | DIASTOLIC BLOOD PRESSURE: 82 MMHG | HEIGHT: 72 IN | SYSTOLIC BLOOD PRESSURE: 122 MMHG | BODY MASS INDEX: 42.66 KG/M2 | WEIGHT: 315 LBS

## 2020-06-12 DIAGNOSIS — E05.00 GRAVES DISEASE: ICD-10-CM

## 2020-06-12 DIAGNOSIS — E04.9 GOITER: ICD-10-CM

## 2020-06-12 DIAGNOSIS — E05.90 HYPERTHYROIDISM: Primary | Chronic | ICD-10-CM

## 2020-06-12 PROCEDURE — 3074F SYST BP LT 130 MM HG: CPT | Performed by: INTERNAL MEDICINE

## 2020-06-12 PROCEDURE — 99214 OFFICE O/P EST MOD 30 MIN: CPT | Performed by: INTERNAL MEDICINE

## 2020-06-12 PROCEDURE — 3079F DIAST BP 80-89 MM HG: CPT | Performed by: INTERNAL MEDICINE

## 2020-06-12 PROCEDURE — 3008F BODY MASS INDEX DOCD: CPT | Performed by: INTERNAL MEDICINE

## 2020-06-18 ENCOUNTER — HOSPITAL ENCOUNTER (OUTPATIENT)
Dept: ULTRASOUND IMAGING | Facility: HOSPITAL | Age: 60
Discharge: HOME | End: 2020-06-18
Attending: INTERNAL MEDICINE
Payer: MEDICARE

## 2020-06-18 DIAGNOSIS — E05.00 GRAVES DISEASE: ICD-10-CM

## 2020-06-18 DIAGNOSIS — E05.90 HYPERTHYROIDISM: ICD-10-CM

## 2020-06-18 DIAGNOSIS — E04.9 GOITER: ICD-10-CM

## 2020-06-18 PROCEDURE — 76536 US EXAM OF HEAD AND NECK: CPT

## 2020-06-22 ENCOUNTER — HOSPITAL ENCOUNTER (OUTPATIENT)
Dept: NUCLEAR MEDICINE | Facility: HOSPITAL | Age: 60
Discharge: HOME | End: 2020-06-22

## 2020-06-22 ENCOUNTER — TELEPHONE (OUTPATIENT)
Dept: ENDOCRINOLOGY | Facility: HOSPITAL | Age: 60
End: 2020-06-22

## 2020-06-22 DIAGNOSIS — E05.90 THYROTOXICOSIS WITH OR WITHOUT GOITER: ICD-10-CM

## 2020-07-06 DIAGNOSIS — I48.20 CHRONIC ATRIAL FIBRILLATION (HCC): ICD-10-CM

## 2020-07-06 DIAGNOSIS — I50.32 CHRONIC DIASTOLIC CONGESTIVE HEART FAILURE (HCC): ICD-10-CM

## 2020-07-06 RX ORDER — ATORVASTATIN CALCIUM 40 MG/1
40 TABLET, FILM COATED ORAL DAILY
Qty: 90 TABLET | Refills: 1 | Status: SHIPPED | OUTPATIENT
Start: 2020-07-06 | End: 2021-01-18

## 2020-07-16 ENCOUNTER — HOSPITAL ENCOUNTER (OUTPATIENT)
Dept: NUCLEAR MEDICINE | Facility: HOSPITAL | Age: 60
Discharge: HOME | End: 2020-07-16
Payer: MEDICARE

## 2020-07-16 DIAGNOSIS — E05.00 GRAVES DISEASE: ICD-10-CM

## 2020-07-16 DIAGNOSIS — E05.90 HYPERTHYROIDISM: ICD-10-CM

## 2020-07-16 DIAGNOSIS — E04.9 GOITER: ICD-10-CM

## 2020-07-16 PROCEDURE — A9516 IODINE I-123 SOD IODIDE MIC: HCPCS

## 2020-07-16 PROCEDURE — 78014 THYROID IMAGING W/BLOOD FLOW: CPT

## 2020-07-17 ENCOUNTER — HOSPITAL ENCOUNTER (OUTPATIENT)
Dept: RADIOLOGY | Age: 60
Discharge: HOME | End: 2020-07-17
Payer: MEDICARE

## 2020-07-17 ENCOUNTER — HOSPITAL ENCOUNTER (OUTPATIENT)
Dept: NUCLEAR MEDICINE | Facility: HOSPITAL | Age: 60
Discharge: HOME | End: 2020-07-17
Payer: MEDICARE

## 2020-07-17 DIAGNOSIS — E05.00 GRAVES DISEASE: ICD-10-CM

## 2020-07-17 DIAGNOSIS — E05.90 HYPERTHYROIDISM: Chronic | ICD-10-CM

## 2020-07-17 DIAGNOSIS — E04.9 GOITER: ICD-10-CM

## 2020-07-17 PROCEDURE — A9517 I131 IODIDE CAP, RX: HCPCS

## 2020-07-17 PROCEDURE — 79005 NUCLEAR RX ORAL ADMIN: CPT

## 2020-07-20 ENCOUNTER — TELEPHONE (OUTPATIENT)
Dept: ENDOCRINOLOGY | Facility: HOSPITAL | Age: 60
End: 2020-07-20

## 2020-07-21 ENCOUNTER — TELEMEDICINE (OUTPATIENT)
Dept: FAMILY MEDICINE CLINIC | Facility: HOSPITAL | Age: 60
End: 2020-07-21
Payer: MEDICARE

## 2020-07-21 DIAGNOSIS — Z00.00 INITIAL MEDICARE ANNUAL WELLNESS VISIT: Primary | ICD-10-CM

## 2020-07-21 DIAGNOSIS — I50.32 CHRONIC DIASTOLIC CONGESTIVE HEART FAILURE (HCC): Chronic | ICD-10-CM

## 2020-07-21 DIAGNOSIS — Z12.5 PROSTATE CANCER SCREENING: ICD-10-CM

## 2020-07-21 DIAGNOSIS — R73.9 HYPERGLYCEMIA: ICD-10-CM

## 2020-07-21 DIAGNOSIS — E78.5 DYSLIPIDEMIA: Chronic | ICD-10-CM

## 2020-07-21 DIAGNOSIS — I10 ESSENTIAL HYPERTENSION: Chronic | ICD-10-CM

## 2020-07-21 DIAGNOSIS — E05.00 GRAVES DISEASE: ICD-10-CM

## 2020-07-21 PROBLEM — A41.9 SEPSIS (HCC): Status: RESOLVED | Noted: 2019-08-14 | Resolved: 2020-07-21

## 2020-07-21 PROCEDURE — NC001 PR NO CHARGE: Performed by: NURSE PRACTITIONER

## 2020-07-21 PROCEDURE — 3074F SYST BP LT 130 MM HG: CPT | Performed by: NURSE PRACTITIONER

## 2020-07-21 PROCEDURE — 3079F DIAST BP 80-89 MM HG: CPT | Performed by: NURSE PRACTITIONER

## 2020-07-21 NOTE — PATIENT INSTRUCTIONS
Medicare Preventive Visit Patient Instructions  Thank you for completing your Welcome to Medicare Visit or Medicare Annual Wellness Visit today  Your next wellness visit will be due in one year (7/21/2021)  The screening/preventive services that you may require over the next 5-10 years are detailed below  Some tests may not apply to you based off risk factors and/or age  Screening tests ordered at today's visit but not completed yet may show as past due  Also, please note that scanned in results may not display below  Preventive Screenings:  Service Recommendations Previous Testing/Comments   Colorectal Cancer Screening  · Colonoscopy    · Fecal Occult Blood Test (FOBT)/Fecal Immunochemical Test (FIT)  · Fecal DNA/Cologuard Test  · Flexible Sigmoidoscopy Age: 54-65 years old   Colonoscopy: every 10 years (May be performed more frequently if at higher risk)  OR  FOBT/FIT: every 1 year  OR  Cologuard: every 3 years  OR  Sigmoidoscopy: every 5 years  Screening may be recommended earlier than age 48 if at higher risk for colorectal cancer  Also, an individualized decision between you and your healthcare provider will decide whether screening between the ages of 74-80 would be appropriate   Colonoscopy: 07/28/2017  FOBT/FIT: Not on file  Cologuard: Not on file  Sigmoidoscopy: Not on file    Screening Current     Prostate Cancer Screening Individualized decision between patient and health care provider in men between ages of 53-78   Medicare will cover every 12 months beginning on the day after your 50th birthday PSA: No results in last 5 years          Hepatitis C Screening Once for adults born between 1945 and 1965  More frequently in patients at high risk for Hepatitis C Hep C Antibody: 07/17/2017    Screening Current   Diabetes Screening 1-2 times per year if you're at risk for diabetes or have pre-diabetes Fasting glucose: 107 mg/dL   A1C: 5 1 %    Screening Current   Cholesterol Screening Once every 5 years if you don't have a lipid disorder  May order more often based on risk factors  Lipid panel: 04/22/2020    Screening Current      Other Preventive Screenings Covered by Medicare:  1  Abdominal Aortic Aneurysm (AAA) Screening: covered once if your at risk  You're considered to be at risk if you have a family history of AAA or a male between the age of 73-68 who smoking at least 100 cigarettes in your lifetime  2  Lung Cancer Screening: covers low dose CT scan once per year if you meet all of the following conditions: (1) Age 50-69; (2) No signs or symptoms of lung cancer; (3) Current smoker or have quit smoking within the last 15 years; (4) You have a tobacco smoking history of at least 30 pack years (packs per day x number of years you smoked); (5) You get a written order from a healthcare provider  3  Glaucoma Screening: covered annually if you're considered high risk: (1) You have diabetes OR (2) Family history of glaucoma OR (3)  aged 48 and older OR (3)  American aged 72 and older  3  Osteoporosis Screening: covered every 2 years if you meet one of the following conditions: (1) Have a vertebral abnormality; (2) On glucocorticoid therapy for more than 3 months; (3) Have primary hyperparathyroidism; (4) On osteoporosis medications and need to assess response to drug therapy  5  HIV Screening: covered annually if you're between the age of 12-76  Also covered annually if you are younger than 13 and older than 72 with risk factors for HIV infection  For pregnant patients, it is covered up to 3 times per pregnancy      Immunizations:  Immunization Recommendations   Influenza Vaccine Annual influenza vaccination during flu season is recommended for all persons aged >= 6 months who do not have contraindications   Pneumococcal Vaccine (Prevnar and Pneumovax)  * Prevnar = PCV13  * Pneumovax = PPSV23 Adults 25-60 years old: 1-3 doses may be recommended based on certain risk factors  Adults 65+ years old: Prevnar (PCV13) vaccine recommended followed by Pneumovax (PPSV23) vaccine  If already received PPSV23 since turning 65, then PCV13 recommended at least one year after PPSV23 dose  Hepatitis B Vaccine 3 dose series if at intermediate or high risk (ex: diabetes, end stage renal disease, liver disease)   Tetanus (Td) Vaccine - COST NOT COVERED BY MEDICARE PART B Following completion of primary series, a booster dose should be given every 10 years to maintain immunity against tetanus  Td may also be given as tetanus wound prophylaxis  Tdap Vaccine - COST NOT COVERED BY MEDICARE PART B Recommended at least once for all adults  For pregnant patients, recommended with each pregnancy  Shingles Vaccine (Shingrix) - COST NOT COVERED BY MEDICARE PART B  2 shot series recommended in those aged 48 and above     Health Maintenance Due:      Topic Date Due    CRC Screening: Colonoscopy  07/28/2027    Hepatitis C Screening  Completed     Immunizations Due:      Topic Date Due    Pneumococcal Vaccine: Pediatrics (0 to 5 Years) and At-Risk Patients (6 to 59 Years) (1 of 1 - PPSV23) 05/27/1966    Influenza Vaccine  07/01/2020     Advance Directives   What are advance directives? Advance directives are legal documents that state your wishes and plans for medical care  These plans are made ahead of time in case you lose your ability to make decisions for yourself  Advance directives can apply to any medical decision, such as the treatments you want, and if you want to donate organs  What are the types of advance directives? There are many types of advance directives, and each state has rules about how to use them  You may choose a combination of any of the following:  · Living will: This is a written record of the treatment you want  You can also choose which treatments you do not want, which to limit, and which to stop at a certain time  This includes surgery, medicine, IV fluid, and tube feedings     · Durable power of  for healthcare [facility]): This is a written record that states who you want to make healthcare choices for you when you are unable to make them for yourself  This person, called a proxy, is usually a family member or a friend  You may choose more than 1 proxy  · Do not resuscitate (DNR) order:  A DNR order is used in case your heart stops beating or you stop breathing  It is a request not to have certain forms of treatment, such as CPR  A DNR order may be included in other types of advance directives  · Medical directive: This covers the care that you want if you are in a coma, near death, or unable to make decisions for yourself  You can list the treatments you want for each condition  Treatment may include pain medicine, surgery, blood transfusions, dialysis, IV or tube feedings, and a ventilator (breathing machine)  · Values history: This document has questions about your views, beliefs, and how you feel and think about life  This information can help others choose the care that you would choose  Why are advance directives important? An advance directive helps you control your care  Although spoken wishes may be used, it is better to have your wishes written down  Spoken wishes can be misunderstood, or not followed  Treatments may be given even if you do not want them  An advance directive may make it easier for your family to make difficult choices about your care  Cigarette Smoking and Your Health   Risks to your health if you smoke:  Nicotine and other chemicals found in tobacco damage every cell in your body  Even if you are a light smoker, you have an increased risk for cancer, heart disease, and lung disease  If you are pregnant or have diabetes, smoking increases your risk for complications  Benefits to your health if you stop smoking:   · You decrease respiratory symptoms such as coughing, wheezing, and shortness of breath     · You reduce your risk for cancers of the lung, mouth, throat, kidney, bladder, pancreas, stomach, and cervix  If you already have cancer, you increase the benefits of chemotherapy  You also reduce your risk for cancer returning or a second cancer from developing  · You reduce your risk for heart disease, blood clots, heart attack, and stroke  · You reduce your risk for lung infections, and diseases such as pneumonia, asthma, chronic bronchitis, and emphysema  · Your circulation improves  More oxygen can be delivered to your body  If you have diabetes, you lower your risk for complications, such as kidney, artery, and eye diseases  You also lower your risk for nerve damage  Nerve damage can lead to amputations, poor vision, and blindness  · You improve your body's ability to heal and to fight infections  For more information and support to stop smoking:   · [employer]  Phone: 6- 981 - 089-3583  Web Address: [employer]  Weight Management   Why it is important to manage your weight:  Being overweight increases your risk of health conditions such as heart disease, high blood pressure, type 2 diabetes, and certain types of cancer  It can also increase your risk for osteoarthritis, sleep apnea, and other respiratory problems  Aim for a slow, steady weight loss  Even a small amount of weight loss can lower your risk of health problems  How to lose weight safely:  A safe and healthy way to lose weight is to eat fewer calories and get regular exercise  You can lose up about 1 pound a week by decreasing the number of calories you eat by 500 calories each day  Healthy meal plan for weight management:  A healthy meal plan includes a variety of foods, contains fewer calories, and helps you stay healthy  A healthy meal plan includes the following:  · Eat whole-grain foods more often  A healthy meal plan should contain fiber  Fiber is the part of grains, fruits, and vegetables that is not broken down by your body   Whole-grain foods are healthy and provide extra fiber in your diet  Some examples of whole-grain foods are whole-wheat breads and pastas, oatmeal, brown rice, and bulgur  · Eat a variety of vegetables every day  Include dark, leafy greens such as spinach, kale, gloria greens, and mustard greens  Eat yellow and orange vegetables such as carrots, sweet potatoes, and winter squash  · Eat a variety of fruits every day  Choose fresh or canned fruit (canned in its own juice or light syrup) instead of juice  Fruit juice has very little or no fiber  · Eat low-fat dairy foods  Drink fat-free (skim) milk or 1% milk  Eat fat-free yogurt and low-fat cottage cheese  Try low-fat cheeses such as mozzarella and other reduced-fat cheeses  · Choose meat and other protein foods that are low in fat  Choose beans or other legumes such as split peas or lentils  Choose fish, skinless poultry (chicken or turkey), or lean cuts of red meat (beef or pork)  Before you cook meat or poultry, cut off any visible fat  · Use less fat and oil  Try baking foods instead of frying them  Add less fat, such as margarine, sour cream, regular salad dressing and mayonnaise to foods  Eat fewer high-fat foods  Some examples of high-fat foods include french fries, doughnuts, ice cream, and cakes  · Eat fewer sweets  Limit foods and drinks that are high in sugar  This includes candy, cookies, regular soda, and sweetened drinks  Exercise:  Exercise at least 30 minutes per day on most days of the week  Some examples of exercise include walking, biking, dancing, and swimming  You can also fit in more physical activity by taking the stairs instead of the elevator or parking farther away from stores  Ask your healthcare provider about the best exercise plan for you  © Copyright [employer] 2018 Information is for End User's use only and may not be sold, redistributed or otherwise used for commercial purposes   All illustrations and images included in CareNotes® are the copyrighted property of [employer]  or [address] [place] [address]

## 2020-07-21 NOTE — PROGRESS NOTES
Assessment and Plan:     Problem List Items Addressed This Visit        Endocrine    Graves disease     Medical management per endocrine  Maintain f/u as scheduled  Cardiovascular and Mediastinum    Hypertension (Chronic)     Pt asymptomatic but unable to assess control w/o home BP readings  Continue same meds as rx'd and maximize lifestyle efforts  Schedule OV for BP check in 3 months  F/U with cardiology when due in September  Chronic diastolic congestive heart failure (HCC) (Chronic)     Wt Readings from Last 3 Encounters:   06/12/20 (!) 162 kg (357 lb 9 6 oz)   04/29/20 (!) 158 kg (348 lb)   04/21/20 (!) 158 kg (348 lb 9 6 oz)     Pt appears asymptomatic/clinically stable  F/U w/cardiology when due in Sept  Call sooner w/any acute changes/concerns  Other    Dyslipidemia (Chronic)     Compliant w/statin daily  Update FLP in August when next TFTs due - will call w/results  Relevant Orders    Comprehensive metabolic panel    Lipid panel    Hyperglycemia     6/2020 glucose elevated at 107  A1C ordered to be checked w/labs due in August - will call w/results  Relevant Orders    Comprehensive metabolic panel    Hemoglobin A1C      Other Visit Diagnoses     Initial Medicare annual wellness visit    -  Primary    AWV updated, next due in 1 year    Prostate cancer screening        Relevant Orders    PSA, Total Screen           Preventive health issues were discussed with patient, and age appropriate screening tests were ordered as noted in patient's After Visit Summary  Personalized health advice and appropriate referrals for health education or preventive services given if needed, as noted in patient's After Visit Summary  30 mins spent with patient, >50% spent in counseling and coordination of care  History of Present Illness:     Patient presents for Welcome to Medicare visit       After connecting through google duo, the patient was identified by name and date of birth  Patient was informed that this is a telemedicine visit and that the visit is being conducted through [facility]  My office door was closed  No one else was in the room  He acknowledged consent and understanding of privacy and security of the platform  The patient has agreed to participate and understands he can discontinue the visit at any time  States he has been well without concerns  Completed thyroid scan last week per endocrine  Tapazole decreased to twice daily  Due for recheck labs in August    Has not seen cardiology since March  F/U due in 6 months and states he is waiting for call to schedule appt  Patient Care Team:  José Luis Lam as PCP - General (Family Medicine)  Elyssa Guajardo DO as PCP - [address] [place], [place] [id number] [facility] ([facility])  Keily Goodman MD as PCP - Endocrinology (Endocrinology)  MD Jarred Anders DO (Family Medicine)  Lindsey Morris MD as Endoscopist     Review of Systems:     Review of Systems   Constitutional: Negative for activity change, appetite change, fatigue and unexpected weight change  Respiratory: Positive for shortness of breath (getting slightly worse w/exertion/activity)  Negative for cough  Cardiovascular: Negative for chest pain, palpitations and leg swelling        Problem List:     Patient Active Problem List   Diagnosis    Hypertension    Hyperthyroidism    CAD (coronary artery disease)    Atrial fibrillation with rapid ventricular response (HCC)    Homelessness    Chronic diastolic congestive heart failure (HCC)    Pulmonary hypertension (HCC)    History of pulmonary embolism    Dyslipidemia    Kidney stone    Morbid obesity ([address] )    Kidney stones    Dysphagia    Swallowing dysfunction    Tobacco abuse    BMI 45 0-49 9, adult (HCC)    Graves disease    Abnormal thyroid ultrasound    Goiter    Hyperglycemia      Past Medical and Surgical History:     Past Medical History:   Diagnosis Date    Acute diastolic congestive heart failure (HCC) 7/3/2017    Atrial fibrillation (HCC)     Bilateral edema of lower extremity 8/22/2016    CAD (coronary artery disease) 10/28/2016    Chest pain 8/22/2016    CPAP (continuous positive airway pressure) dependence     Hyperlipidemia     Hypertension     Hyperthyroidism 8/22/2016    Kidney stone     Pneumothorax     Presence of IVC filter     Pulmonary embolism (HCC)     Rash 10/28/2016    Sepsis ([address] ) 8/14/2019    Sleep apnea     SOB (shortness of breath) 8/22/2016    Tachycardia 8/22/2016     Past Surgical History:   Procedure Laterality Date    EGD AND COLONOSCOPY N/A 7/28/2017    Procedure: EGD AND COLONOSCOPY;  Surgeon: Irwin Franks MD;  Location: [facility];  Service: Gastroenterology    FL RETROGRADE PYELOGRAM  8/13/2019    FL RETROGRADE PYELOGRAM  9/27/2019    IVC FILTER INSERTION      LITHOTRIPSY      NC CYSTO/URETERO W/LITHOTRIPSY &INDWELL STENT INSRT Right 8/13/2019    Procedure: CYSTOSCOPY URETEROSCOPY WITH LITHOTRIPSY HOLMIUM LASER, RETROGRADE PYELOGRAM AND INSERTION STENT URETERAL;  Surgeon: Bri Ellis MD;  Location: [facility];  Service: Urology    NC CYSTO/URETERO W/LITHOTRIPSY &INDWELL STENT INSRT Right 9/27/2019    Procedure: CYSTOSCOPY URETEROSCOPY WITH LITHOTRIPSY HOLMIUM LASER, RETROGRADE PYELOGRAM AND INSERTION STENT URETERAL;  Surgeon: Eneida Stevens MD;  Location: [facility];  Service: Urology      Family History:     Family History   Problem Relation Age of Onset    Cancer Mother         metastatic, breast primary    Breast cancer Mother     Heart disease Father     Lung disease Father     Thyroid disease unspecified Father         thyroidectomy    Thyroid disease unspecified Sister     Hyperthyroidism Sister         in remission      Social History:        Social History     Socioeconomic History    Marital status: Single     Spouse name: None    Number of children: None    Years of education: None    Highest education level: None   Occupational History     Comment: disability   Social Needs    Financial resource strain: None    Food insecurity:     Worry: None     Inability: None    Transportation needs:     Medical: None     Non-medical: None   Tobacco Use    Smoking status: Current Every Day Smoker     Packs/day: 1 00     Types: Cigarettes    Smokeless tobacco: Never Used    Tobacco comment: Pt states he smokes when he can afford to do so    Substance and Sexual Activity    Alcohol use: No    Drug use: No    Sexual activity: None   Lifestyle    Physical activity:     Days per week: None     Minutes per session: None    Stress: None   Relationships    Social connections:     Talks on phone: None     Gets together: None     Attends Judaism service: None     Active member of club or organization: None     Attends meetings of clubs or organizations: None     Relationship status: None    Intimate partner violence:     Fear of current or ex partner: None     Emotionally abused: None     Physically abused: None     Forced sexual activity: None   Other Topics Concern    None   Social History Narrative    None      Medications and Allergies:     Current Outpatient Medications   Medication Sig Dispense Refill    aspirin (ECOTRIN LOW STRENGTH) 81 mg EC tablet Take 81 mg by mouth daily      atorvastatin (LIPITOR) 40 mg tablet Take 1 tablet (40 mg total) by mouth daily 90 tablet 1    digoxin (LANOXIN) 0 25 mg tablet Take 1 tablet (250 mcg total) by mouth daily 90 tablet 1    methimazole (TAPAZOLE) 10 mg tablet Take 1 tablet (10 mg total) by mouth 3 (three) times a day 90 tablet 6    metoprolol tartrate (LOPRESSOR) 50 mg tablet TAKE 1 TABLET BY MOUTH TWICE DAILY 60 tablet 11    potassium chloride (K-DUR,KLOR-CON) 20 mEq tablet Take 20 mEq by mouth 2 (two) times a day      torsemide (DEMADEX) 20 mg tablet Take 1 tablet by mouth daily       No current facility-administered medications for this visit        No Known Allergies   Immunizations:     Immunization History   Administered Date(s) Administered    Hep A / Hep B 01/09/2019, 02/11/2019, 07/12/2019    INFLUENZA 10/10/2019, 10/10/2019    Td (adult), adsorbed 01/01/2010    Tdap 02/17/2020      Health Maintenance:         Topic Date Due    CRC Screening: Colonoscopy  07/28/2027    Hepatitis C Screening  Completed         Topic Date Due    Pneumococcal Vaccine: Pediatrics (0 to 5 Years) and At-Risk Patients (6 to 59 Years) (1 of 1 - PPSV23) 05/27/1966    Influenza Vaccine  07/01/2020      Medicare Screening Tests and Risk Assessments:     Cara Williamson is here for his Welcome to Medicare visit  Last Medicare Wellness visit information reviewed, patient interviewed and updates made to the record today  Health Risk Assessment:   Patient rates overall health as good  Patient feels that their physical health rating is slightly worse  Eyesight was rated as same  Hearing was rated as same  Patient feels that their emotional and mental health rating is same  Pain experienced in the last 7 days has been none  Patient states that he has experienced weight loss or gain in last 6 months  Depression Screening:   PHQ-2 Score: 0      Fall Risk Screening: In the past year, patient has experienced: no history of falling in past year      Home Safety:  Patient does not have trouble with stairs inside or outside of their home  Patient has working smoke alarms and has working carbon monoxide detector  Home safety hazards include: none  Nutrition:   Current diet is Regular and No Added Salt  Medications:   Patient is currently taking over-the-counter supplements  OTC medications include: see medication list  Patient is able to manage medications  Activities of Daily Living (ADLs)/Instrumental Activities of Daily Living (IADLs):   Walk and transfer into and out of bed and chair?: Yes  Dress and groom yourself?: Yes    Bathe or shower yourself?: Yes    Feed yourself?  Yes  Do your laundry/housekeeping?: Yes  Manage your money, pay your bills and track your expenses?: Yes  Make your own meals?: Yes    Do your own shopping?: Yes    Previous Hospitalizations:   Any hospitalizations or ED visits within the last 12 months?: No      Advance Care Planning:   Living will: No    Durable POA for healthcare: No    Advanced directive: No    Five wishes given: No      PREVENTIVE SCREENINGS      Cardiovascular Screening:    General: Screening Current      Diabetes Screening:     General: Screening Current      Colorectal Cancer Screening:     General: Screening Current      Prostate Cancer Screening:      Due for: PSA      Osteoporosis Screening:    General: Screening Not Indicated      Abdominal Aortic Aneurysm (AAA) Screening:    Risk factors include: tobacco use        Lung Cancer Screening:     General: Screening Not Indicated      Hepatitis C Screening:    General: Screening Current    No exam data present     Physical Exam:     There were no vitals taken for this visit  Physical Exam   Constitutional: He is oriented to person, place, and time  He appears well-developed and well-nourished  No distress  HENT:   Head: Normocephalic and atraumatic  Pulmonary/Chest: Effort normal  No respiratory distress  No cough   Neurological: He is alert and oriented to person, place, and time  Psychiatric: He has a normal mood and affect  His behavior is normal  Judgment and thought content normal    Vitals reviewed        ASHLEY Andrade

## 2020-07-21 NOTE — ASSESSMENT & PLAN NOTE
Pt asymptomatic but unable to assess control w/o home BP readings  Continue same meds as rx'd and maximize lifestyle efforts  Schedule OV for BP check in 3 months  F/U with cardiology when due in September

## 2020-07-21 NOTE — ASSESSMENT & PLAN NOTE
6/2020 glucose elevated at 107  A1C ordered to be checked w/labs due in August - will call w/results

## 2020-07-21 NOTE — ASSESSMENT & PLAN NOTE
Wt Readings from Last 3 Encounters:   06/12/20 (!) 162 kg (357 lb 9 6 oz)   04/29/20 (!) 158 kg (348 lb)   04/21/20 (!) 158 kg (348 lb 9 6 oz)     Pt appears asymptomatic/clinically stable  F/U w/cardiology when due in Sept  Call sooner w/any acute changes/concerns

## 2020-07-24 ENCOUNTER — TELEPHONE (OUTPATIENT)
Dept: FAMILY MEDICINE CLINIC | Facility: HOSPITAL | Age: 60
End: 2020-07-24

## 2020-08-25 ENCOUNTER — TRANSCRIBE ORDERS (OUTPATIENT)
Dept: ADMINISTRATIVE | Facility: HOSPITAL | Age: 60
End: 2020-08-25

## 2020-08-25 ENCOUNTER — APPOINTMENT (OUTPATIENT)
Dept: LAB | Facility: HOSPITAL | Age: 60
End: 2020-08-25
Payer: MEDICARE

## 2020-08-25 DIAGNOSIS — E78.5 DYSLIPIDEMIA: Chronic | ICD-10-CM

## 2020-08-25 DIAGNOSIS — Z00.00 ROUTINE GENERAL MEDICAL EXAMINATION AT A HEALTH CARE FACILITY: Primary | ICD-10-CM

## 2020-08-25 DIAGNOSIS — R73.9 HYPERGLYCEMIA: ICD-10-CM

## 2020-08-25 DIAGNOSIS — Z12.5 PROSTATE CANCER SCREENING: ICD-10-CM

## 2020-08-25 DIAGNOSIS — Z00.00 ROUTINE GENERAL MEDICAL EXAMINATION AT A HEALTH CARE FACILITY: ICD-10-CM

## 2020-08-25 LAB
ALBUMIN SERPL BCP-MCNC: 3.4 G/DL (ref 3.5–5)
ALP SERPL-CCNC: 83 U/L (ref 46–116)
ALT SERPL W P-5'-P-CCNC: 21 U/L (ref 12–78)
ANION GAP SERPL CALCULATED.3IONS-SCNC: 7 MMOL/L (ref 4–13)
AST SERPL W P-5'-P-CCNC: 17 U/L (ref 5–45)
BILIRUB SERPL-MCNC: 0.91 MG/DL (ref 0.2–1)
BUN SERPL-MCNC: 15 MG/DL (ref 5–25)
CALCIUM SERPL-MCNC: 8.8 MG/DL (ref 8.3–10.1)
CHLORIDE SERPL-SCNC: 107 MMOL/L (ref 100–108)
CHOLEST SERPL-MCNC: 101 MG/DL (ref 50–200)
CO2 SERPL-SCNC: 24 MMOL/L (ref 21–32)
CREAT SERPL-MCNC: 0.86 MG/DL (ref 0.6–1.3)
EST. AVERAGE GLUCOSE BLD GHB EST-MCNC: 131 MG/DL
GFR SERPL CREATININE-BSD FRML MDRD: 96 ML/MIN/1.73SQ M
GLUCOSE P FAST SERPL-MCNC: 114 MG/DL (ref 65–99)
HBA1C MFR BLD: 6.2 %
HDLC SERPL-MCNC: 32 MG/DL
LDLC SERPL CALC-MCNC: 50 MG/DL (ref 0–100)
NONHDLC SERPL-MCNC: 69 MG/DL
POTASSIUM SERPL-SCNC: 4 MMOL/L (ref 3.5–5.3)
PROT SERPL-MCNC: 8.2 G/DL (ref 6.4–8.2)
PSA SERPL-MCNC: 0.2 NG/ML (ref 0–4)
SODIUM SERPL-SCNC: 138 MMOL/L (ref 136–145)
T3FREE SERPL-MCNC: 1.45 PG/ML (ref 2.3–4.2)
T4 FREE SERPL-MCNC: 0.35 NG/DL (ref 0.76–1.46)
TRIGL SERPL-MCNC: 94 MG/DL
TSH SERPL DL<=0.05 MIU/L-ACNC: 22.2 UIU/ML (ref 0.36–3.74)

## 2020-08-25 PROCEDURE — 84439 ASSAY OF FREE THYROXINE: CPT

## 2020-08-25 PROCEDURE — 80061 LIPID PANEL: CPT

## 2020-08-25 PROCEDURE — 84481 FREE ASSAY (FT-3): CPT

## 2020-08-25 PROCEDURE — 84443 ASSAY THYROID STIM HORMONE: CPT

## 2020-08-25 PROCEDURE — 83036 HEMOGLOBIN GLYCOSYLATED A1C: CPT

## 2020-08-25 PROCEDURE — 36415 COLL VENOUS BLD VENIPUNCTURE: CPT

## 2020-08-25 PROCEDURE — G0103 PSA SCREENING: HCPCS

## 2020-08-25 PROCEDURE — 80053 COMPREHEN METABOLIC PANEL: CPT

## 2020-09-17 ENCOUNTER — OFFICE VISIT (OUTPATIENT)
Dept: ENDOCRINOLOGY | Facility: HOSPITAL | Age: 60
End: 2020-09-17
Payer: MEDICARE

## 2020-09-17 VITALS
SYSTOLIC BLOOD PRESSURE: 134 MMHG | HEART RATE: 72 BPM | WEIGHT: 315 LBS | DIASTOLIC BLOOD PRESSURE: 86 MMHG | TEMPERATURE: 97.5 F | BODY MASS INDEX: 42.66 KG/M2 | HEIGHT: 72 IN

## 2020-09-17 DIAGNOSIS — E05.00 GRAVES DISEASE: ICD-10-CM

## 2020-09-17 DIAGNOSIS — E04.9 GOITER: ICD-10-CM

## 2020-09-17 DIAGNOSIS — Z92.3 STATUS POST RADIOACTIVE IODINE THYROID ABLATION: ICD-10-CM

## 2020-09-17 DIAGNOSIS — Z86.39 HISTORY OF HYPERTHYROIDISM: ICD-10-CM

## 2020-09-17 DIAGNOSIS — E89.0 POSTABLATIVE HYPOTHYROIDISM: Primary | ICD-10-CM

## 2020-09-17 DIAGNOSIS — E04.1 RIGHT THYROID NODULE: ICD-10-CM

## 2020-09-17 PROBLEM — R93.89 ABNORMAL THYROID ULTRASOUND: Status: RESOLVED | Noted: 2020-04-29 | Resolved: 2020-09-17

## 2020-09-17 PROCEDURE — 99214 OFFICE O/P EST MOD 30 MIN: CPT | Performed by: INTERNAL MEDICINE

## 2020-09-17 NOTE — PATIENT INSTRUCTIONS
The thyroid is underactive  Stop the methimazole completely  We'll recheck blood work in 4 weeks  We will have to biopsy the right thyroid nodule, we can order that after next visit  Follow up with the cardiologist given the breathing issues  Follow up in 4 months with blood work

## 2020-09-17 NOTE — PROGRESS NOTES
9/18/2020    Assessment/Plan      Diagnoses and all orders for this visit:    Postablative hypothyroidism  -     TSH, 3rd generation Lab Collect; Future  -     T4, free Lab Collect; Future  -     T3, free; Future  -     TSH, 3rd generation Lab Collect; Future  -     T4, free Lab Collect; Future  -     T3, free; Future    Graves disease  -     TSH, 3rd generation Lab Collect; Future  -     T4, free Lab Collect; Future  -     T3, free; Future  -     TSH, 3rd generation Lab Collect; Future  -     T4, free Lab Collect; Future  -     T3, free; Future    Right thyroid nodule  -     TSH, 3rd generation Lab Collect; Future  -     T4, free Lab Collect; Future  -     T3, free; Future  -     TSH, 3rd generation Lab Collect; Future  -     T4, free Lab Collect; Future  -     T3, free; Future    Goiter  -     TSH, 3rd generation Lab Collect; Future  -     T4, free Lab Collect; Future  -     T3, free; Future  -     TSH, 3rd generation Lab Collect; Future  -     T4, free Lab Collect; Future  -     T3, free; Future    History of hyperthyroidism  -     TSH, 3rd generation Lab Collect; Future  -     T4, free Lab Collect; Future  -     T3, free; Future  -     TSH, 3rd generation Lab Collect; Future  -     T4, free Lab Collect; Future  -     T3, free; Future    Status post radioactive iodine thyroid ablation  -     TSH, 3rd generation Lab Collect; Future  -     T4, free Lab Collect; Future  -     T3, free; Future  -     TSH, 3rd generation Lab Collect; Future  -     T4, free Lab Collect; Future  -     T3, free; Future        Assessment/Plan:  1  Hyperthyroidism due to Graves disease  He is now post I 131 treatment with hypothyroid blood work  This may be due to his methimazole usage or the radioactive iodine treatment  For now, we will discontinue his methimazole and then I will repeat his thyroid function tests in 1 month    If he is still hypothyroid then, then I will start thyroid hormone replacement as I do expect him to become hypothyroid post I 131 treatment  Of note, he has had an increase in shortness of breath which could be due to his weight gain and fatigue but given his cardiac issues, I have asked him to make sure that he follows up with his cardiologist   2  Graves disease  He has no evidence of Graves ophthalmopathy  3  Goiter with right-sided thyroid nodule  He does have a right-sided thyroid nodule that does eventually need to be biopsied  I would like to get his thyroid hormone status under control before doing this biopsy so likely after his next visit I will order a biopsy of that right nodule  I have asked him to follow up in 3 months with preceding TSH, free T4, and free T3  He will be getting a TSH, free T4, and free T3 in 4 weeks  CC:  Hyperthyroid follow-up    History of Present Illness     HPI: Minerva Coon is a 64y o  year old male with history of hyperthyroidism due to Graves disease with thyroid goiter for follow-up visit  She was diagnosed with hyperthyroidism due to Graves disease around 2008  He was on antithyroid medication at varying dosages over the years but was lost to endocrinology follow-up  Blood work in April 2020 showed he was floridly hyperthyroid and methimazole dosage was increased  He does have a history of atrial fibrillation  He is now post I 131 treatment to ablate his thyroid on 07/17/2020 of 10 mCi of I 131  He remains on methimazole 10 mg 2 tablets daily  He has a 21 lb weight gain since June 2020  He has noticed fatigue over the last several months  He will still have some diaphoresis at times but has noticed being more colder in general   He denies heat intolerance, palpitation, tremors, anxiety or depression  He had some recent constipation which did resolve  He denies insomnia but has dry skin and brittle nails  He denies hair loss  He denies diplopia      He has a history of an abnormal ultrasound with markedly large goiter and a mass in the central portion of his thyroid close to the isthmus measuring 3 6 cm on ultrasound in 2017  He denies compressive thyroid symptoms or difficulties with swallowing  Review of Systems   Constitutional: Positive for diaphoresis and fatigue  Negative for unexpected weight change  Sweats in certain places  Fatigue worse over the last several months  21 lbs more than June 2020  HENT: Negative for trouble swallowing  Eyes: Negative for visual disturbance  Wears glasses  No diplopia  Respiratory: Positive for shortness of breath  Negative for chest tightness  SOB with just walking short distances  Cardiovascular: Negative for chest pain and palpitations  Gastrointestinal: Positive for constipation  Negative for abdominal pain, diarrhea and nausea  Was constipated until the last 3 days  Endocrine: Positive for cold intolerance  Negative for heat intolerance  Colder in general    Skin: Negative for rash  Has dry skin  Has brittle nails  No hair loss  Neurological: Negative for dizziness, tremors, light-headedness and headaches  Psychiatric/Behavioral: Negative for dysphoric mood and sleep disturbance  The patient is not nervous/anxious          Historical Information   Past Medical History:   Diagnosis Date    [facility] 7/3/2017    Atrial fibrillation (HCC)     Bilateral edema of lower extremity 8/22/2016    CAD (coronary artery disease) 10/28/2016    Chest pain 8/22/2016    CPAP (continuous positive airway pressure) dependence     Hyperlipidemia     Hypertension     Hyperthyroidism 8/22/2016    Kidney stone     Pneumothorax     Presence of IVC filter     Pulmonary embolism (HCC)     Rash 10/28/2016    Sepsis ([facility] ) 8/14/2019    Sleep apnea     SOB (shortness of breath) 8/22/2016    Tachycardia 8/22/2016     Past Surgical History:   Procedure Laterality Date    EGD AND COLONOSCOPY N/A 7/28/2017    Procedure: EGD AND COLONOSCOPY;  Surgeon: Arnoldo Belcher MD;  [facility];  Service: Gastroenterology    FL RETROGRADE PYELOGRAM  8/13/2019    FL RETROGRADE PYELOGRAM  9/27/2019    IVC FILTER INSERTION      LITHOTRIPSY      DC CYSTO/URETERO W/LITHOTRIPSY &INDWELL STENT INSRT Right 8/13/2019    Procedure: CYSTOSCOPY URETEROSCOPY WITH LITHOTRIPSY HOLMIUM LASER, RETROGRADE PYELOGRAM AND INSERTION STENT URETERAL;  Surgeon: Maykel Jacques MD;  [facility];  Service: Urology    DC CYSTO/URETERO W/LITHOTRIPSY &INDWELL STENT INSRT Right 9/27/2019    Procedure: CYSTOSCOPY URETEROSCOPY WITH LITHOTRIPSY HOLMIUM LASER, RETROGRADE PYELOGRAM AND INSERTION STENT URETERAL;  Surgeon: Dianne Mccabe MD;  [facility];  Service: Urology     Social History   Social History     Substance and Sexual Activity   Alcohol Use No     Social History     Substance and Sexual Activity   Drug Use No     Social History     Tobacco Use   Smoking Status Current Every Day Smoker    Packs/day: 1 00    Types: Cigarettes   Smokeless Tobacco Never Used   Tobacco Comment    Pt states he smokes when he can afford to do so      Family History:   Family History   Problem Relation Age of Onset    Cancer Mother         metastatic, breast primary    Breast cancer Mother     Heart disease Father     Lung disease Father     Thyroid disease unspecified Father         thyroidectomy    Thyroid disease unspecified Sister     Hyperthyroidism Sister         in remission       Meds/Allergies   Current Outpatient Medications   Medication Sig Dispense Refill    aspirin (ECOTRIN LOW STRENGTH) 81 mg EC tablet Take 81 mg by mouth daily      atorvastatin (LIPITOR) 40 mg tablet Take 1 tablet (40 mg total) by mouth daily 90 tablet 1    digoxin (LANOXIN) 0 25 mg tablet Take 1 tablet (250 mcg total) by mouth daily 90 tablet 1    metoprolol tartrate (LOPRESSOR) 50 mg tablet TAKE 1 TABLET BY MOUTH TWICE DAILY 60 tablet 11    potassium chloride (K-DUR,KLOR-CON) 20 mEq tablet Take 20 mEq by mouth 2 (two) times a day      torsemide (DEMADEX) 20 mg tablet Take 1 tablet by mouth daily       No current facility-administered medications for this visit  No Known Allergies    Objective   Vitals: Blood pressure 134/86, pulse 72, temperature 97 5 °F (36 4 °C), height 6' (1 829 m), weight (!) 171 kg (378 lb)  Invasive Devices     Drain            Ureteral Drain/Stent Right ureter 6 Fr  357 days                Physical Exam  Vitals signs reviewed  Constitutional:       Appearance: Normal appearance  He is well-developed  He is obese  HENT:      Head: Normocephalic and atraumatic  Eyes:      Extraocular Movements: Extraocular movements intact  Conjunctiva/sclera: Conjunctivae normal       Comments: No lid lag, stare, proptosis, or periorbital edema  Neck:      Musculoskeletal: Normal range of motion and neck supple  Thyroid: No thyromegaly  Vascular: No carotid bruit  Comments: Irregular feeling thyroid and enlarged with a 1-2 cm right thyroid nodule palpable  No bruits over the thyroid gland  Cardiovascular:      Rate and Rhythm: Normal rate and regular rhythm  Heart sounds: Normal heart sounds  No murmur  Pulmonary:      Breath sounds: Normal breath sounds  No wheezing  Abdominal:      Palpations: Abdomen is soft  Musculoskeletal:         General: No deformity  Right lower leg: Edema present  Left lower leg: Edema present  Comments: No tremor of the outstretched hands  Trace to 1+ bilateral lower extremity edema  Lymphadenopathy:      Cervical: No cervical adenopathy  Skin:     General: Skin is warm and dry  Findings: No erythema or rash  Neurological:      Mental Status: He is alert and oriented to person, place, and time  Deep Tendon Reflexes: Reflexes are normal and symmetric        Comments: Deep tendon reflexes normal          The history was obtained from the review of the chart and from the patient  Lab Results:    Blood work done on 08/25/2020 showed a TSH of 22 2  Free T4 0 35 with a free T3 of 1 45  Lab Results   Component Value Date    CREATININE 0 86 08/25/2020    CREATININE 0 84 06/04/2020    CREATININE 0 62 (L) 05/13/2020    BUN 15 08/25/2020     02/15/2017    K 4 0 08/25/2020     08/25/2020    CO2 24 08/25/2020     eGFR   Date Value Ref Range Status   08/25/2020 96 ml/min/1 73sq m Final     Lab Results   Component Value Date    HDL 32 (L) 08/25/2020    TRIG 94 08/25/2020    CHOLHDL 2 3 04/22/2020     Lab Results   Component Value Date    ALT 21 08/25/2020    AST 17 08/25/2020    ALKPHOS 83 08/25/2020    BILITOT 0 7 11/22/2016       Thyroid ultrasound:  THYROID ULTRASOUND performed on 06/18/2020     INDICATION:    E05 90: Thyrotoxicosis, unspecified without thyrotoxic crisis or storm  E05 00: Thyrotoxicosis with diffuse goiter without thyrotoxic crisis or storm  E04 9: Nontoxic goiter, unspecified      COMPARISON:  7/18/2017     TECHNIQUE:   Ultrasound of the thyroid was performed with a high frequency linear transducer in transverse and sagittal planes including volumetric imaging sweeps as well as traditional still imaging technique      FINDINGS:  Thyroid parenchyma is diffusely heterogeneous in echotexture with focal nodule(s) as described below      Right lobe:  7 6 x 3 0 x 3 5 cm   37 8 mL  Left lobe:  8 1 x 2 3 x 2 6 cm   23 3 mL  Isthmus:  0 8 cm      Nodule #1  Image 4  RIGHT lower pole nodule measuring 1 0 x 0 8 x 1 1 cm  This nodule was not measured on the prior study, but in retrospect was present and unchanged based on review of the cine images  COMPOSITION:  2 points, solid or almost completely solid   ECHOGENICITY:  1 point, hyperechoic or isoechoic  SHAPE:  0 points, wider-than-tall  MARGIN: 0 points, smooth  ECHOGENIC FOCI:  0 points, none or large comet-tail artifacts  TI-RADS Classification: TR 3 (3 points), FNA if >2 5 cm    Follow if >1 5 cm      Nodule #2  Image 7  RIGHT midgland nodule measuring 1 4 x 1 3 x 1 6 cm  Not clearly seen on the prior study  Of note, the nodule looks most like a discrete nodule as opposed to heterogeneous tissue on sagittal cine clip  COMPOSITION:  2 points, solid or almost completely solid   ECHOGENICITY:  2 points, hypoechoic  SHAPE:  0 points, wider-than-tall  MARGIN: 0 points, ill-defined  ECHOGENIC FOCI:  0 points, none or large comet-tail artifacts  TI-RADS Classification: TR 4 (4-6 points), FNA if > 1 5 cm  Follow if > 1cm           IMPRESSION:     Right-sided thyroid nodules  The following meet current ACR criteria for recommending ultrasound guided biopsy:         The 1 4 x 1 3 x 1 6 cm right mid gland nodule  (Image number 7) (CRITERIA: TR 4, Moderately suspicious  FNA if > 1 5 cm  Thyroid uptake and scan:  THYROID SCAN AND UPTAKE performed on 07/17/2020     INDICATION:  E05 90: Thyrotoxicosis, unspecified without thyrotoxic crisis or storm  E05 00: Thyrotoxicosis with diffuse goiter without thyrotoxic crisis or storm  E04 9: Nontoxic goiter, unspecified      COMPARISON:  Thyroid ultrasound 6/18/2020     TECHNIQUE:   The study was performed following the oral administration of 267 uCi I-123      FINDINGS:      The thyroid scan demonstrates diffusely increased distribution of the radiopharmaceutical throughout both thyroid lobes without evidence of a discrete cold or hot nodule  Left lobe uptake is slightly heterogeneous  Focal radiotracer uptake noted   superior to the thyroid isthmus likely pyramidal lobe      Thyroid uptake values are:     6 hours:   61 9% (N =  5 -15%)     24 hours: 73 6% (N =15 - 35%)     IMPRESSION:     1   Elevated thyroid uptake at both 6 and 24 hours  Appearance favors Graves' disease      I 131 treatment:    I-131 THERAPY performed on 07/17/2020     INDICATION: E05 90: Thyrotoxicosis, unspecified without thyrotoxic crisis or storm  E05 00: Thyrotoxicosis with diffuse goiter without thyrotoxic crisis or storm  E04 9: Nontoxic goiter, unspecified     DOSAGE:  10 0 mCi        MEDICATIONS:  Metoprolol     PROCEDURE:      The risks, benefits, and alternatives of this therapy were fully discussed with the patient  Informed consent for treatment was obtained  Visual confirmation of ingestion of the therapy was performed  There were no immediate complications   The patient   was released to home isolation with written instructions regarding at home care      IMPRESSION:     Hyperthyroidism  I-131 therapy with 10 0 mCi administered orally         Future Appointments   Date Time Provider Ulises Warneri   10/19/2020  1:00 PM ASHLEY Wright Ra, DR [facility]   12/23/2020 11:40 AM Krishna Chandra MD ENDO [address] [facility]

## 2020-10-08 ENCOUNTER — OFFICE VISIT (OUTPATIENT)
Dept: CARDIOLOGY CLINIC | Facility: CLINIC | Age: 60
End: 2020-10-08
Payer: MEDICARE

## 2020-10-08 VITALS
DIASTOLIC BLOOD PRESSURE: 74 MMHG | HEIGHT: 72 IN | WEIGHT: 315 LBS | SYSTOLIC BLOOD PRESSURE: 130 MMHG | HEART RATE: 78 BPM | TEMPERATURE: 98.1 F | BODY MASS INDEX: 42.66 KG/M2

## 2020-10-08 DIAGNOSIS — I48.19 PERSISTENT ATRIAL FIBRILLATION (HCC): Primary | ICD-10-CM

## 2020-10-08 DIAGNOSIS — I50.32 CHRONIC DIASTOLIC CONGESTIVE HEART FAILURE (HCC): Chronic | ICD-10-CM

## 2020-10-08 DIAGNOSIS — R06.00 DYSPNEA ON EXERTION: ICD-10-CM

## 2020-10-08 DIAGNOSIS — G47.33 OSA ON CPAP: ICD-10-CM

## 2020-10-08 DIAGNOSIS — Z99.89 OSA ON CPAP: ICD-10-CM

## 2020-10-08 PROCEDURE — 99214 OFFICE O/P EST MOD 30 MIN: CPT | Performed by: INTERNAL MEDICINE

## 2020-10-08 PROCEDURE — 93000 ELECTROCARDIOGRAM COMPLETE: CPT | Performed by: INTERNAL MEDICINE

## 2020-10-08 RX ORDER — TORSEMIDE 20 MG/1
40 TABLET ORAL DAILY
Qty: 180 TABLET | Refills: 3 | Status: SHIPPED | OUTPATIENT
Start: 2020-10-08 | End: 2021-02-03 | Stop reason: SDUPTHER

## 2020-10-16 ENCOUNTER — LAB (OUTPATIENT)
Dept: LAB | Facility: HOSPITAL | Age: 60
End: 2020-10-16
Payer: MEDICARE

## 2020-10-16 DIAGNOSIS — E04.9 GOITER: ICD-10-CM

## 2020-10-16 DIAGNOSIS — E05.00 GRAVES DISEASE: ICD-10-CM

## 2020-10-16 DIAGNOSIS — E89.0 POSTABLATIVE HYPOTHYROIDISM: Primary | ICD-10-CM

## 2020-10-16 DIAGNOSIS — E89.0 POSTABLATIVE HYPOTHYROIDISM: ICD-10-CM

## 2020-10-16 DIAGNOSIS — Z92.3 STATUS POST RADIOACTIVE IODINE THYROID ABLATION: ICD-10-CM

## 2020-10-16 DIAGNOSIS — E04.1 RIGHT THYROID NODULE: ICD-10-CM

## 2020-10-16 DIAGNOSIS — Z86.39 HISTORY OF HYPERTHYROIDISM: ICD-10-CM

## 2020-10-16 LAB
ANION GAP SERPL CALCULATED.3IONS-SCNC: 5 MMOL/L (ref 4–13)
BUN SERPL-MCNC: 14 MG/DL (ref 5–25)
CALCIUM SERPL-MCNC: 8.6 MG/DL (ref 8.3–10.1)
CHLORIDE SERPL-SCNC: 109 MMOL/L (ref 100–108)
CO2 SERPL-SCNC: 26 MMOL/L (ref 21–32)
CREAT SERPL-MCNC: 0.74 MG/DL (ref 0.6–1.3)
GFR SERPL CREATININE-BSD FRML MDRD: 102 ML/MIN/1.73SQ M
GLUCOSE P FAST SERPL-MCNC: 110 MG/DL (ref 65–99)
POTASSIUM SERPL-SCNC: 4 MMOL/L (ref 3.5–5.3)
SODIUM SERPL-SCNC: 140 MMOL/L (ref 136–145)
T3FREE SERPL-MCNC: 5.36 PG/ML (ref 2.3–4.2)
T4 FREE SERPL-MCNC: 1.47 NG/DL (ref 0.76–1.46)
TSH SERPL DL<=0.05 MIU/L-ACNC: 0.04 UIU/ML (ref 0.36–3.74)

## 2020-10-16 PROCEDURE — 84439 ASSAY OF FREE THYROXINE: CPT

## 2020-10-16 PROCEDURE — 80048 BASIC METABOLIC PNL TOTAL CA: CPT | Performed by: INTERNAL MEDICINE

## 2020-10-16 PROCEDURE — 84443 ASSAY THYROID STIM HORMONE: CPT

## 2020-10-16 PROCEDURE — 36415 COLL VENOUS BLD VENIPUNCTURE: CPT | Performed by: INTERNAL MEDICINE

## 2020-10-16 PROCEDURE — 84481 FREE ASSAY (FT-3): CPT

## 2020-10-23 DIAGNOSIS — I48.91 AF (ATRIAL FIBRILLATION) (HCC): ICD-10-CM

## 2020-10-23 RX ORDER — METOPROLOL TARTRATE 50 MG/1
50 TABLET, FILM COATED ORAL 2 TIMES DAILY
Qty: 180 TABLET | Refills: 3 | Status: SHIPPED | OUTPATIENT
Start: 2020-10-23 | End: 2021-10-26 | Stop reason: SDUPTHER

## 2020-10-26 ENCOUNTER — OFFICE VISIT (OUTPATIENT)
Dept: FAMILY MEDICINE CLINIC | Facility: HOSPITAL | Age: 60
End: 2020-10-26
Payer: MEDICARE

## 2020-10-26 VITALS
SYSTOLIC BLOOD PRESSURE: 118 MMHG | HEIGHT: 72 IN | HEART RATE: 81 BPM | TEMPERATURE: 97.5 F | WEIGHT: 315 LBS | DIASTOLIC BLOOD PRESSURE: 70 MMHG | BODY MASS INDEX: 42.66 KG/M2

## 2020-10-26 DIAGNOSIS — I11.0 HYPERTENSIVE HEART DISEASE WITH HEART FAILURE (HCC): ICD-10-CM

## 2020-10-26 DIAGNOSIS — I50.32 CHRONIC DIASTOLIC CONGESTIVE HEART FAILURE (HCC): Chronic | ICD-10-CM

## 2020-10-26 DIAGNOSIS — E78.5 DYSLIPIDEMIA: Chronic | ICD-10-CM

## 2020-10-26 DIAGNOSIS — Z23 ENCOUNTER FOR IMMUNIZATION: ICD-10-CM

## 2020-10-26 DIAGNOSIS — J44.9 CHRONIC OBSTRUCTIVE PULMONARY DISEASE, UNSPECIFIED COPD TYPE (HCC): ICD-10-CM

## 2020-10-26 DIAGNOSIS — E05.00 GRAVES DISEASE: ICD-10-CM

## 2020-10-26 DIAGNOSIS — I10 ESSENTIAL HYPERTENSION: Primary | Chronic | ICD-10-CM

## 2020-10-26 DIAGNOSIS — R73.9 HYPERGLYCEMIA: ICD-10-CM

## 2020-10-26 PROCEDURE — 90682 RIV4 VACC RECOMBINANT DNA IM: CPT

## 2020-10-26 PROCEDURE — G0008 ADMIN INFLUENZA VIRUS VAC: HCPCS

## 2020-10-26 PROCEDURE — 99214 OFFICE O/P EST MOD 30 MIN: CPT | Performed by: NURSE PRACTITIONER

## 2020-10-26 RX ORDER — METHIMAZOLE 10 MG/1
10 TABLET ORAL DAILY
COMMUNITY
End: 2020-11-02 | Stop reason: SDUPTHER

## 2020-10-27 PROBLEM — I11.0 HYPERTENSIVE HEART DISEASE WITH HEART FAILURE (HCC): Status: ACTIVE | Noted: 2020-10-27

## 2020-11-02 DIAGNOSIS — E05.00 GRAVES DISEASE: Primary | ICD-10-CM

## 2020-11-02 RX ORDER — METHIMAZOLE 10 MG/1
10 TABLET ORAL DAILY
Qty: 90 TABLET | Refills: 1 | Status: SHIPPED | OUTPATIENT
Start: 2020-11-02 | End: 2021-06-17 | Stop reason: SDUPTHER

## 2020-11-06 ENCOUNTER — HOSPITAL ENCOUNTER (OUTPATIENT)
Dept: NON INVASIVE DIAGNOSTICS | Age: 60
Discharge: HOME | End: 2020-11-06
Payer: MEDICARE

## 2020-11-06 DIAGNOSIS — I48.19 PERSISTENT ATRIAL FIBRILLATION (HCC): ICD-10-CM

## 2020-11-06 DIAGNOSIS — R06.00 DYSPNEA ON EXERTION: ICD-10-CM

## 2020-11-06 DIAGNOSIS — I50.32 CHRONIC DIASTOLIC CONGESTIVE HEART FAILURE (HCC): Chronic | ICD-10-CM

## 2020-11-06 PROCEDURE — 93018 CV STRESS TEST I&R ONLY: CPT | Performed by: INTERNAL MEDICINE

## 2020-11-06 PROCEDURE — G1004 CDSM NDSC: HCPCS

## 2020-11-06 PROCEDURE — 93306 TTE W/DOPPLER COMPLETE: CPT

## 2020-11-06 PROCEDURE — A9502 TC99M TETROFOSMIN: HCPCS

## 2020-11-06 PROCEDURE — 78452 HT MUSCLE IMAGE SPECT MULT: CPT

## 2020-11-06 PROCEDURE — 93017 CV STRESS TEST TRACING ONLY: CPT

## 2020-11-06 PROCEDURE — 93016 CV STRESS TEST SUPVJ ONLY: CPT | Performed by: INTERNAL MEDICINE

## 2020-11-06 PROCEDURE — 78452 HT MUSCLE IMAGE SPECT MULT: CPT | Performed by: INTERNAL MEDICINE

## 2020-11-06 PROCEDURE — 93306 TTE W/DOPPLER COMPLETE: CPT | Performed by: INTERNAL MEDICINE

## 2020-11-06 RX ADMIN — REGADENOSON 0.4 MG: 0.08 INJECTION, SOLUTION INTRAVENOUS at 10:33

## 2020-11-10 LAB
CHEST PAIN STATEMENT: NORMAL
MAX DIASTOLIC BP: 79 MMHG
MAX HEART RATE: 103 BPM
MAX PREDICTED HEART RATE: 162 BPM
MAX. SYSTOLIC BP: 155 MMHG
PROTOCOL NAME: NORMAL
REASON FOR TERMINATION: NORMAL
TARGET HR FORMULA: NORMAL
TEST INDICATION: NORMAL
TIME IN EXERCISE PHASE: NORMAL

## 2020-11-16 ENCOUNTER — LAB (OUTPATIENT)
Dept: LAB | Facility: HOSPITAL | Age: 60
End: 2020-11-16
Payer: MEDICARE

## 2020-11-16 DIAGNOSIS — E89.0 POSTABLATIVE HYPOTHYROIDISM: ICD-10-CM

## 2020-11-16 LAB
T3FREE SERPL-MCNC: 2.13 PG/ML (ref 2.3–4.2)
T4 FREE SERPL-MCNC: 0.67 NG/DL (ref 0.76–1.46)
TSH SERPL DL<=0.05 MIU/L-ACNC: 4.66 UIU/ML (ref 0.36–3.74)

## 2020-11-16 PROCEDURE — 36415 COLL VENOUS BLD VENIPUNCTURE: CPT

## 2020-11-16 PROCEDURE — 84439 ASSAY OF FREE THYROXINE: CPT

## 2020-11-16 PROCEDURE — 84481 FREE ASSAY (FT-3): CPT

## 2020-11-16 PROCEDURE — 84443 ASSAY THYROID STIM HORMONE: CPT

## 2020-12-03 DIAGNOSIS — I48.91 ATRIAL FIBRILLATION WITH RAPID VENTRICULAR RESPONSE (HCC): Chronic | ICD-10-CM

## 2020-12-04 RX ORDER — DIGOXIN 250 MCG
250 TABLET ORAL DAILY
Qty: 90 TABLET | Refills: 3 | Status: SHIPPED | OUTPATIENT
Start: 2020-12-04 | End: 2021-12-03 | Stop reason: SDUPTHER

## 2020-12-07 ENCOUNTER — LAB (OUTPATIENT)
Dept: LAB | Facility: HOSPITAL | Age: 60
End: 2020-12-07
Payer: MEDICARE

## 2020-12-07 ENCOUNTER — TRANSCRIBE ORDERS (OUTPATIENT)
Dept: ADMINISTRATIVE | Facility: HOSPITAL | Age: 60
End: 2020-12-07

## 2020-12-07 DIAGNOSIS — E04.9 ENLARGEMENT OF THYROID: ICD-10-CM

## 2020-12-07 DIAGNOSIS — E05.00 GRAVES' DISEASE WITH ACROPACHY: ICD-10-CM

## 2020-12-07 DIAGNOSIS — Z92.3 PERSONAL HISTORY OF IRRADIATION, PRESENTING HAZARDS TO HEALTH: ICD-10-CM

## 2020-12-07 DIAGNOSIS — E04.1 NONTOXIC UNINODULAR GOITER: ICD-10-CM

## 2020-12-07 DIAGNOSIS — E89.0 POSTSURGICAL HYPOTHYROIDISM: ICD-10-CM

## 2020-12-07 DIAGNOSIS — Z86.39 HISTORY OF THYROID CYST: ICD-10-CM

## 2020-12-07 DIAGNOSIS — E89.0 POSTSURGICAL HYPOTHYROIDISM: Primary | ICD-10-CM

## 2020-12-07 LAB
T3FREE SERPL-MCNC: 2.66 PG/ML (ref 2.3–4.2)
T4 FREE SERPL-MCNC: 0.86 NG/DL (ref 0.76–1.46)
TSH SERPL DL<=0.05 MIU/L-ACNC: 1.73 UIU/ML (ref 0.36–3.74)

## 2020-12-07 PROCEDURE — 36415 COLL VENOUS BLD VENIPUNCTURE: CPT

## 2020-12-07 PROCEDURE — 84443 ASSAY THYROID STIM HORMONE: CPT

## 2020-12-07 PROCEDURE — 84439 ASSAY OF FREE THYROXINE: CPT

## 2020-12-07 PROCEDURE — 84481 FREE ASSAY (FT-3): CPT

## 2020-12-23 ENCOUNTER — OFFICE VISIT (OUTPATIENT)
Dept: ENDOCRINOLOGY | Facility: HOSPITAL | Age: 60
End: 2020-12-23
Payer: MEDICARE

## 2020-12-23 VITALS
SYSTOLIC BLOOD PRESSURE: 122 MMHG | WEIGHT: 315 LBS | BODY MASS INDEX: 42.66 KG/M2 | DIASTOLIC BLOOD PRESSURE: 80 MMHG | HEIGHT: 72 IN | HEART RATE: 100 BPM

## 2020-12-23 DIAGNOSIS — E04.9 GOITER: ICD-10-CM

## 2020-12-23 DIAGNOSIS — E04.1 RIGHT THYROID NODULE: ICD-10-CM

## 2020-12-23 DIAGNOSIS — E05.00 GRAVES DISEASE: Primary | ICD-10-CM

## 2020-12-23 PROCEDURE — 99214 OFFICE O/P EST MOD 30 MIN: CPT | Performed by: INTERNAL MEDICINE

## 2021-01-18 DIAGNOSIS — I48.20 CHRONIC ATRIAL FIBRILLATION (HCC): ICD-10-CM

## 2021-01-18 DIAGNOSIS — I50.32 CHRONIC DIASTOLIC CONGESTIVE HEART FAILURE (HCC): ICD-10-CM

## 2021-01-18 RX ORDER — ATORVASTATIN CALCIUM 40 MG/1
TABLET, FILM COATED ORAL
Qty: 90 TABLET | Refills: 1 | Status: SHIPPED | OUTPATIENT
Start: 2021-01-18 | End: 2021-07-14 | Stop reason: SDUPTHER

## 2021-01-26 ENCOUNTER — OFFICE VISIT (OUTPATIENT)
Dept: FAMILY MEDICINE CLINIC | Facility: HOSPITAL | Age: 61
End: 2021-01-26
Payer: MEDICARE

## 2021-01-26 VITALS
SYSTOLIC BLOOD PRESSURE: 120 MMHG | OXYGEN SATURATION: 96 % | HEIGHT: 72 IN | HEART RATE: 85 BPM | BODY MASS INDEX: 42.66 KG/M2 | WEIGHT: 315 LBS | DIASTOLIC BLOOD PRESSURE: 80 MMHG | TEMPERATURE: 95.7 F

## 2021-01-26 DIAGNOSIS — E05.00 GRAVES DISEASE: ICD-10-CM

## 2021-01-26 DIAGNOSIS — I48.19 PERSISTENT ATRIAL FIBRILLATION (HCC): ICD-10-CM

## 2021-01-26 DIAGNOSIS — G47.33 OSA (OBSTRUCTIVE SLEEP APNEA): ICD-10-CM

## 2021-01-26 DIAGNOSIS — Z72.0 TOBACCO ABUSE: ICD-10-CM

## 2021-01-26 DIAGNOSIS — Z11.4 ENCOUNTER FOR SCREENING FOR HIV: ICD-10-CM

## 2021-01-26 DIAGNOSIS — I48.91 ATRIAL FIBRILLATION WITH RAPID VENTRICULAR RESPONSE (HCC): Chronic | ICD-10-CM

## 2021-01-26 DIAGNOSIS — J44.9 CHRONIC OBSTRUCTIVE PULMONARY DISEASE, UNSPECIFIED COPD TYPE (HCC): ICD-10-CM

## 2021-01-26 DIAGNOSIS — I50.32 CHRONIC DIASTOLIC CONGESTIVE HEART FAILURE (HCC): Primary | Chronic | ICD-10-CM

## 2021-01-26 DIAGNOSIS — Z00.00 ENCOUNTER FOR SUBSEQUENT ANNUAL WELLNESS VISIT (AWV) IN MEDICARE PATIENT: ICD-10-CM

## 2021-01-26 DIAGNOSIS — E78.5 DYSLIPIDEMIA: Chronic | ICD-10-CM

## 2021-01-26 PROCEDURE — G0439 PPPS, SUBSEQ VISIT: HCPCS | Performed by: NURSE PRACTITIONER

## 2021-01-26 PROCEDURE — 99214 OFFICE O/P EST MOD 30 MIN: CPT | Performed by: NURSE PRACTITIONER

## 2021-01-26 NOTE — PROGRESS NOTES
Assessment/Plan:    Graves disease  Continues to follow with endocrinology  He is s/p radioactive iodine ablation procedure  Chronic obstructive pulmonary disease ([facility] )  He is a current smoker  He states he has never seen pulmonary medicine  A PFT was ordered and the benefits of smoking cessation were discussed  A referral to pulmonologist was given to patient  Hypertensive heart disease with heart failure ([place] [id number] )  Wt Readings from Last 3 Encounters:   01/26/21 (!) 167 kg (368 lb 12 8 oz)   12/23/20 (!) 166 kg (366 lb 12 8 oz)   10/26/20 (!) 166 kg (366 lb)         Americo's BP is well controlled and 120/80 in the office today  He believes he has a follow up appointment with cardiology in April  He has poor compliance with torsemide because frequent urination is disrupting his day  I suggested he try taking it earlier in the day and he will try this  Counseled him on the importance of medication compliance    Morbid obesity ([place] [id number] )    David Stephens on the benefits of lifestyle changes such as decreasing carbohydrates, increasing vegetables and using healthy fats in addition to starting some light cardiovascular exercise such as walking 10 minutes each day and building up to 30 minutes 5 times per week  Atrial fibrillation with rapid ventricular response (HCC)  Clinically stable/asymptomatic  Maintain f/u with cardiology as planned  Chronic diastolic congestive heart failure (HCC)  Wt Readings from Last 3 Encounters:   01/26/21 (!) 167 kg (368 lb 12 8 oz)   12/23/20 (!) 166 kg (366 lb 12 8 oz)   10/26/20 (!) 166 kg (366 lb)     Maintain f/u with cardiology  BMI 50 0-59 9, adult (HCC)  Weight loss encouraged w/diet and exercise efforts         Diagnoses and all orders for this visit:    Chronic diastolic congestive heart failure ([facility] )    Dyslipidemia    Tobacco abuse  Comments:  counseled on smoking cessation    Chronic obstructive pulmonary disease, unspecified COPD type ([place] [id number] )  -     Complete PFT with post bronchodilator; Future  -     Ambulatory referral to Pulmonology; Future    Graves disease    Persistent atrial fibrillation (HCC)    FABIAN (obstructive sleep apnea)  Comments:  Needs updated equipment  Referal placed to sleep medicine  Orders:  -     Ambulatory referral to Sleep Medicine; Future    Encounter for screening for HIV  -     HIV 1/2 Antigen/Antibody (4th Generation) w Reflex [facility]; Future    Atrial fibrillation with rapid ventricular response ([facility] [address] )    Encounter for subsequent annual wellness visit (AWV) in Medicare patient    BMI 50 0-59 9, adult ([facility] [facility] )          Subjective:      Patient ID: Maye Crawford is a 64 y o  male  Sajan Barbosa presents to office for routine follow up  He states he has no complaints other than occasional SOB  He denies CP, palpitations, or cough  He states there are no provocating factors  Upon further questioning he does admit to frequently skipping doses of his torsemide because the urinary frequency is disruptive to his day  He is unable to weigh himself so is unsure if he gains weight  He notes the breathing will improve when he starts retaking torsemide  The following portions of the patient's history were reviewed and updated as appropriate: allergies, current medications, past family history, past medical history, past social history, past surgical history and problem list     Review of Systems   Constitutional: Negative for chills and fever  HENT: Negative  Respiratory: Positive for shortness of breath  Negative for cough  Cardiovascular: Negative for chest pain and palpitations  Gastrointestinal: Negative  Genitourinary: Negative  Musculoskeletal: Negative  Skin: Negative  Neurological: Negative  Psychiatric/Behavioral: Negative  Negative for sleep disturbance and suicidal ideas           Objective:      /80 (Patient Position: Sitting, Cuff Size: Large)   Pulse 85   Temp (!) 95 7 °F (35 4 °C) (Temporal)   Ht 6' (1 829 m) Wt (!) 167 kg (368 lb 12 8 oz)   SpO2 96%   BMI 50 02 kg/m²          Physical Exam  Vitals signs reviewed  Constitutional:       General: He is not in acute distress  Appearance: He is obese  HENT:      Head: Normocephalic and atraumatic  Eyes:      Conjunctiva/sclera: Conjunctivae normal       Pupils: Pupils are equal, round, and reactive to light  Cardiovascular:      Rate and Rhythm: Normal rate  Rhythm irregular  Pulses: Normal pulses  Radial pulses are 2+ on the right side and 2+ on the left side  Heart sounds: Normal heart sounds  No murmur  Pulmonary:      Effort: Pulmonary effort is normal       Breath sounds: Normal breath sounds  Comments: Diminished at bases  Abdominal:      General: Bowel sounds are normal       Palpations: Abdomen is soft  Musculoskeletal: Normal range of motion  Right lower le+ Pitting Edema present  Left lower le+ Pitting Edema present  Skin:     General: Skin is warm and dry  Capillary Refill: Capillary refill takes less than 2 seconds  Neurological:      General: No focal deficit present  Mental Status: He is alert and oriented to person, place, and time  Psychiatric:         Mood and Affect: Mood normal          Behavior: Behavior normal          Thought Content: Thought content normal          Judgment: Judgment normal        BMI Counseling: Body mass index is 50 02 kg/m²  The BMI is above normal  Nutrition recommendations include reducing portion sizes, 3-5 servings of fruits/vegetables daily, reducing fast food intake, consuming healthier snacks, decreasing soda and/or juice intake and moderation in carbohydrate intake  Exercise recommendations include moderate aerobic physical activity for 150 minutes/week and exercising 3-5 times per week

## 2021-01-26 NOTE — ASSESSMENT & PLAN NOTE
Carlos Blades on the benefits of lifestyle changes such as decreasing carbohydrates, increasing vegetables and using healthy fats in addition to starting some light cardiovascular exercise such as walking 10 minutes each day and building up to 30 minutes 5 times per week

## 2021-01-26 NOTE — PROGRESS NOTES
Assessment and Plan:     Problem List Items Addressed This Visit        Endocrine    Graves disease     Continues to follow with endocrinology  He is s/p radioactive iodine ablation procedure  Respiratory    Chronic obstructive pulmonary disease ([address] )     He is a current smoker  He states he has never seen pulmonary medicine  A PFT was ordered and the benefits of smoking cessation were discussed  A referral to pulmonologist was given to patient  Relevant Orders    Complete PFT with post bronchodilator    Ambulatory referral to Pulmonology       Cardiovascular and Mediastinum    Chronic diastolic congestive heart failure (HCC) - Primary (Chronic)       Other    Dyslipidemia (Chronic)    Tobacco abuse      Other Visit Diagnoses     Persistent atrial fibrillation (HCC)        FABIAN (obstructive sleep apnea)        Needs updated equipment  Referal placed to sleep medicine    Relevant Orders    Ambulatory referral to Sleep Medicine    Encounter for screening for HIV        Relevant Orders    HIV 1/2 Antigen/Antibody (4th Generation) w Reflex [facility]           Preventive health issues were discussed with patient, and age appropriate screening tests were ordered as noted in patient's After Visit Summary  Personalized health advice and appropriate referrals for health education or preventive services given if needed, as noted in patient's After Visit Summary  History of Present Illness:     Patient presents for Welcome to Medicare visit       Patient Care Team:  Brien Mota as PCP - General (Family Medicine)  Corin Robertson DO as PCP - [address] [place], [place] [address]Th [facility] ([facility])  Juancho Chaudhari MD as PCP - Endocrinology (Endocrinology)  MD Baldev Mckinney DO (Family Medicine)  Amado Steele MD as Endoscopist     Review of Systems:     Review of Systems   Problem List:     Patient Active Problem List   Diagnosis    Hypertension    CAD (coronary artery disease)    Atrial fibrillation with rapid ventricular response (HCC)    Homelessness    Chronic diastolic congestive heart failure (HCC)    Pulmonary hypertension (HCC)    History of pulmonary embolism    Dyslipidemia    Kidney stone    Morbid obesity ([address] )    Kidney stones    Dysphagia    Swallowing dysfunction    Tobacco abuse    BMI 45 0-49 9, adult (HCC)    Graves disease    Goiter    Hyperglycemia    Postablative hypothyroidism    Right thyroid nodule    History of hyperthyroidism    Status post radioactive iodine thyroid ablation    FABIAN on CPAP    Chronic obstructive pulmonary disease (HCC)    Hypertensive heart disease with heart failure (HCC)      Past Medical and Surgical History:     Past Medical History:   Diagnosis Date    Acute diastolic congestive heart failure (HCC) 7/3/2017    Atrial fibrillation (HCC)     Bilateral edema of lower extremity 8/22/2016    CAD (coronary artery disease) 10/28/2016    Chest pain 8/22/2016    CPAP (continuous positive airway pressure) dependence     Hyperlipidemia     Hypertension     Hyperthyroidism 8/22/2016    Kidney stone     Pneumothorax     Presence of IVC filter     Pulmonary embolism (HCC)     Rash 10/28/2016    Sepsis [facility] ) 8/14/2019    Sleep apnea     SOB (shortness of breath) 8/22/2016    Tachycardia 8/22/2016     Past Surgical History:   Procedure Laterality Date    EGD AND COLONOSCOPY N/A 7/28/2017    Procedure: EGD AND COLONOSCOPY;  Surgeon: Tamir Magallon MD;  [facility];  Service: Gastroenterology    FL RETROGRADE PYELOGRAM  8/13/2019    FL RETROGRADE PYELOGRAM  9/27/2019    IVC FILTER INSERTION      LITHOTRIPSY      [place] CYSTO/URETERO W/LITHOTRIPSY &INDWELL STENT INSRT Right 8/13/2019    Procedure: CYSTOSCOPY URETEROSCOPY WITH LITHOTRIPSY HOLMIUM LASER, RETROGRADE PYELOGRAM AND INSERTION STENT URETERAL;  Surgeon: Kerrie Mayer MD;  [facility];  Service: Urology    [place] CYSTO/URETERO W/LITHOTRIPSY &INDWELL STENT INSRT Right 9/27/2019    Procedure: CYSTOSCOPY URETEROSCOPY WITH LITHOTRIPSY HOLMIUM LASER, RETROGRADE PYELOGRAM AND INSERTION STENT URETERAL;  Surgeon: Calderon Mckoy MD;  Location:  MAIN OR;  Service: Urology      Family History:     Family History   Problem Relation Age of Onset    Cancer Mother         metastatic, breast primary   Honey Crystal Breast cancer Mother     Heart disease Father     Lung disease Father     Thyroid disease unspecified Father         thyroidectomy    Thyroid disease unspecified Sister     Hyperthyroidism Sister         in remission      Social History:        Social History     Socioeconomic History    Marital status: Single     Spouse name: None    Number of children: None    Years of education: None    Highest education level: None   Occupational History     Comment: disability   Social Needs    Financial resource strain: None    Food insecurity     Worry: None     Inability: None    Transportation needs     Medical: None     Non-medical: None   Tobacco Use    Smoking status: Current Every Day Smoker     Packs/day: 1 00     Types: Cigarettes    Smokeless tobacco: Never Used    Tobacco comment: Pt states he smokes when he can afford to do so    Substance and Sexual Activity    Alcohol use: No    Drug use: No    Sexual activity: None   Lifestyle    Physical activity     Days per week: None     Minutes per session: None    Stress: None   Relationships    Social connections     Talks on phone: None     Gets together: None     Attends Confucianism service: None     Active member of club or organization: None     Attends meetings of clubs or organizations: None     Relationship status: None    Intimate partner violence     Fear of current or ex partner: None     Emotionally abused: None     Physically abused: None     Forced sexual activity: None   Other Topics Concern    None   Social History Narrative    None      Medications and Allergies:     Current Outpatient Medications   Medication Sig Dispense Refill    aspirin (ECOTRIN LOW STRENGTH) 81 mg EC tablet Take 81 mg by mouth daily      atorvastatin (LIPITOR) 40 mg tablet Take 1 tablet by mouth once daily 90 tablet 1    digoxin (LANOXIN) 0 25 mg tablet Take 1 tablet (250 mcg total) by mouth daily 90 tablet 3    methimazole (TAPAZOLE) 10 mg tablet Take 1 tablet (10 mg total) by mouth daily (Patient taking differently: Take 5 mg by mouth daily ) 90 tablet 1    metoprolol tartrate (LOPRESSOR) 50 mg tablet Take 1 tablet (50 mg total) by mouth 2 (two) times a day 180 tablet 3    torsemide (DEMADEX) 20 mg tablet Take 2 tablets (40 mg total) by mouth daily 180 tablet 3    potassium chloride (K-DUR,KLOR-CON) 20 mEq tablet Take 20 mEq by mouth 2 (two) times a day       No current facility-administered medications for this visit  No Known Allergies   Immunizations:     Immunization History   Administered Date(s) Administered    Hep A / Hep B 01/09/2019, 02/11/2019, 07/12/2019    INFLUENZA 10/10/2019, 10/10/2019    Influenza, recombinant, quadrivalent,injectable, preservative free 10/26/2020    Td (adult), adsorbed 01/01/2010    Tdap 02/17/2020      Health Maintenance:         Topic Date Due    HIV Screening  05/27/1977    Colorectal Cancer Screening  07/28/2027    Hepatitis C Screening  Completed         Topic Date Due    Pneumococcal Vaccine: Pediatrics (0 to 5 Years) and At-Risk Patients (6 to 59 Years) (1 of 1 - PPSV23) 05/27/1968      Medicare Screening Tests and Risk Assessments:     Munira Zhu is here for his Subsequent Wellness visit  Last Medicare Wellness visit information reviewed, patient interviewed and updates made to the record today  Health Risk Assessment:   Patient rates overall health as good  Patient feels that their physical health rating is same  Eyesight was rated as slightly worse  Hearing was rated as same  Patient feels that their emotional and mental health rating is same   Pain experienced in the last 7 days has been none  Patient states that he has experienced no weight loss or gain in last 6 months  Depression Screening:   PHQ-2 Score: 0      Fall Risk Screening: In the past year, patient has experienced: no history of falling in past year      Home Safety:  Patient does not have trouble with stairs inside or outside of their home  Patient has working smoke alarms and has working carbon monoxide detector  Home safety hazards include: none  Nutrition:   Current diet is Regular  Medications:   Patient is currently taking over-the-counter supplements  OTC medications include: see medication list  Patient is able to manage medications  Activities of Daily Living (ADLs)/Instrumental Activities of Daily Living (IADLs):   Walk and transfer into and out of bed and chair?: Yes  Dress and groom yourself?: Yes    Bathe or shower yourself?: Yes    Feed yourself?  Yes  Do your laundry/housekeeping?: Yes  Manage your money, pay your bills and track your expenses?: Yes  Make your own meals?: Yes    Do your own shopping?: Yes    Previous Hospitalizations:   Any hospitalizations or ED visits within the last 12 months?: No      Advance Care Planning:   Living will: No    Durable POA for healthcare: No    Advanced directive: No    Five wishes given: No      PREVENTIVE SCREENINGS      Cardiovascular Screening:    General: Screening Current      Diabetes Screening:     General: Screening Current      Colorectal Cancer Screening:     General: Screening Current      Prostate Cancer Screening:    General: Screening Current      Abdominal Aortic Aneurysm (AAA) Screening:    Risk factors include: tobacco use        Lung Cancer Screening:     General: Screening Not Indicated      Hepatitis C Screening:    General: Screening Current     Visual Acuity Screening    Right eye Left eye Both eyes   Without correction:      With correction: 20/25 20/25         Physical Exam:     /80 (Patient Position: Sitting, Cuff Size: Large) Pulse 85   Temp (!) 95 7 °F (35 4 °C) (Temporal)   Ht 6' (1 829 m)   Wt (!) 167 kg (368 lb 12 8 oz)   SpO2 96%   BMI 50 02 kg/m²     Physical Exam     ASHLEY Arrington

## 2021-01-26 NOTE — ASSESSMENT & PLAN NOTE
He is a current smoker  He states he has never seen pulmonary medicine  A PFT was ordered and the benefits of smoking cessation were discussed  A referral to pulmonologist was given to patient

## 2021-01-26 NOTE — PATIENT INSTRUCTIONS
Obesity   AMBULATORY CARE:   Obesity  is when your body mass index (BMI) is greater than 30  Your healthcare provider will use your height and weight to measure your BMI  The risks of obesity include  many health problems, such as injuries or physical disability  You may need tests to check for the following:  · Diabetes    · High blood pressure or high cholesterol    · Heart disease    · Gallbladder or liver disease    · Cancer of the colon, breast, prostate, liver, or kidney    · Sleep apnea    · Arthritis or gout    Seek care immediately if:   · You have a severe headache, confusion, or difficulty speaking  · You have weakness on one side of your body  · You have chest pain, sweating, or shortness of breath  Contact your healthcare provider if:   · You have symptoms of gallbladder or liver disease, such as pain in your upper abdomen  · You have knee or hip pain and discomfort while walking  · You have symptoms of diabetes, such as intense hunger and thirst, and frequent urination  · You have symptoms of sleep apnea, such as snoring or daytime sleepiness  · You have questions or concerns about your condition or care  Treatment for obesity  focuses on helping you lose weight to improve your health  Even a small decrease in BMI can reduce the risk for many health problems  Your healthcare provider will help you set a weight-loss goal   · Lifestyle changes  are the first step in treating obesity  These include making healthy food choices and getting regular physical activity  Your healthcare provider may suggest a weight-loss program that involves coaching, education, and therapy  · Medicine  may help you lose weight when it is used with a healthy diet and physical activity  · Surgery  can help you lose weight if you are very obese and have other health problems  There are several types of weight-loss surgery  Ask your healthcare provider for more information      Be successful losing weight:   · Set small, realistic goals  An example of a small goal is to walk for 20 minutes 5 days a week  Anther goal is to lose 5% of your body weight  · Tell friends, family members, and coworkers about your goals  and ask for their support  Ask a friend to lose weight with you, or join a weight-loss support group  · Identify foods or triggers that may cause you to overeat , and find ways to avoid them  Remove tempting high-calorie foods from your home and workplace  Place a bowl of fresh fruit on your kitchen counter  If stress causes you to eat, then find other ways to cope with stress  · Keep a diary to track what you eat and drink  Also write down how many minutes of physical activity you do each day  Weigh yourself once a week and record it in your diary  Eating changes: You will need to eat 500 to 1,000 fewer calories each day than you currently eat to lose 1 to 2 pounds a week  The following changes will help you cut calories:  · Eat smaller portions  Use small plates, no larger than 9 inches in diameter  Fill your plate half full of fruits and vegetables  Measure your food using measuring cups until you know what a serving size looks like  · Eat 3 meals and 1 or 2 snacks each day  Plan your meals in advance  [employer] and eat at home most of the time  Eat slowly  Do not skip meals  Skipping meals can lead to overeating later in the day  This can make it harder for you to lose weight  Talk with a dietitian to help you make a meal plan and schedule that is right for you  · Eat fruits and vegetables at every meal   They are low in calories and high in fiber, which makes you feel full  Do not add butter, margarine, or cream sauce to vegetables  Use herbs to season steamed vegetables  · Eat less fat and fewer fried foods  Eat more baked or grilled chicken and fish  These protein sources are lower in calories and fat than red meat  Limit fast food   Dress your salads with olive oil and vinegar instead of bottled dressing  · Limit the amount of sugar you eat  Do not drink sugary beverages  Limit alcohol  Activity changes:  Physical activity is good for your body in many ways  It helps you burn calories and build strong muscles  It decreases stress and depression, and improves your mood  It can also help you sleep better  Talk to your healthcare provider before you begin an exercise program   · Exercise for at least 30 minutes 5 days a week  Start slowly  Set aside time each day for physical activity that you enjoy and that is convenient for you  It is best to do both weight training and an activity that increases your heart rate, such as walking, bicycling, or swimming  · Find ways to be more active  Do yard work and housecleaning  Walk up the stairs instead of using elevators  Spend your leisure time going to events that require walking, such as outdoor festivals or fairs  This extra physical activity can help you lose weight and keep it off  Follow up with your healthcare provider as directed: You may need to meet with a dietitian  Write down your questions so you remember to ask them during your visits  © Copyright [facility] [address] Information is for End User's use only and may not be sold, redistributed or otherwise used for commercial purposes  All illustrations and images included in CareNotes® are the copyrighted property of [employer]  or [facility] Aury Mireles   The above information is an  only  It is not intended as medical advice for individual conditions or treatments  Talk to your doctor, nurse or pharmacist before following any medical regimen to see if it is safe and effective for you

## 2021-01-26 NOTE — ASSESSMENT & PLAN NOTE
Wt Readings from Last 3 Encounters:   01/26/21 (!) 167 kg (368 lb 12 8 oz)   12/23/20 (!) 166 kg (366 lb 12 8 oz)   10/26/20 (!) 166 kg (366 lb)         Americo's BP is well controlled and 120/80 in the office today  He believes he has a follow up appointment with cardiology in April  He has poor compliance with torsemide because frequent urination is disrupting his day  I suggested he try taking it earlier in the day and he will try this    Counseled him on the importance of medication compliance

## 2021-01-26 NOTE — ASSESSMENT & PLAN NOTE
Wt Readings from Last 3 Encounters:   01/26/21 (!) 167 kg (368 lb 12 8 oz)   12/23/20 (!) 166 kg (366 lb 12 8 oz)   10/26/20 (!) 166 kg (366 lb)     Maintain f/u with cardiology

## 2021-02-03 DIAGNOSIS — I50.32 CHRONIC DIASTOLIC CONGESTIVE HEART FAILURE (HCC): Chronic | ICD-10-CM

## 2021-02-03 RX ORDER — TORSEMIDE 20 MG/1
40 TABLET ORAL DAILY
Qty: 180 TABLET | Refills: 0 | Status: SHIPPED | OUTPATIENT
Start: 2021-02-03 | End: 2021-05-06 | Stop reason: SDUPTHER

## 2021-02-03 RX ORDER — POTASSIUM CHLORIDE 20 MEQ/1
20 TABLET, EXTENDED RELEASE ORAL 2 TIMES DAILY
Qty: 180 TABLET | Refills: 0 | Status: SHIPPED | OUTPATIENT
Start: 2021-02-03 | End: 2021-05-07 | Stop reason: SDUPTHER

## 2021-02-05 ENCOUNTER — TELEPHONE (OUTPATIENT)
Dept: FAMILY MEDICINE CLINIC | Facility: HOSPITAL | Age: 61
End: 2021-02-05

## 2021-02-05 ENCOUNTER — LAB (OUTPATIENT)
Dept: LAB | Facility: HOSPITAL | Age: 61
End: 2021-02-05
Payer: MEDICARE

## 2021-02-05 DIAGNOSIS — E04.9 GOITER: ICD-10-CM

## 2021-02-05 DIAGNOSIS — E05.00 GRAVES DISEASE: ICD-10-CM

## 2021-02-05 LAB
T3FREE SERPL-MCNC: 3.99 PG/ML (ref 2.3–4.2)
T4 FREE SERPL-MCNC: 1.25 NG/DL (ref 0.76–1.46)
TSH SERPL DL<=0.05 MIU/L-ACNC: 0.12 UIU/ML (ref 0.36–3.74)

## 2021-02-05 PROCEDURE — 84481 FREE ASSAY (FT-3): CPT

## 2021-02-05 PROCEDURE — 84439 ASSAY OF FREE THYROXINE: CPT

## 2021-02-05 PROCEDURE — 36415 COLL VENOUS BLD VENIPUNCTURE: CPT

## 2021-02-05 PROCEDURE — 84443 ASSAY THYROID STIM HORMONE: CPT

## 2021-02-05 NOTE — TELEPHONE ENCOUNTER
OK  Please let patient know lab did not draw fastings he is due for, and ask for him to return to the lab in next couple days  Labs will check electrolyte levels to see if that is reason for cramping

## 2021-02-05 NOTE — TELEPHONE ENCOUNTER
He had thyroid labs drawn this am and also has fasting labs ordered that should have been drawn also  Is lab running a CMP, A1C and lipid also?

## 2021-02-05 NOTE — TELEPHONE ENCOUNTER
Pt states that when he takes his water pill he gets cramps in his ribs  Says that the cramps sometimes go in to his back  Pt is asking if this is a side effect and if so, is there anything he can do for it  Please advise

## 2021-02-05 NOTE — TELEPHONE ENCOUNTER
Spoke to Ruiz Adorno in the lab, they did not draw CMP, A1C or lipid panel  They cannot be added to what was drawn earlier

## 2021-02-09 ENCOUNTER — LAB (OUTPATIENT)
Dept: LAB | Facility: HOSPITAL | Age: 61
End: 2021-02-09
Payer: MEDICARE

## 2021-02-09 DIAGNOSIS — E78.5 DYSLIPIDEMIA: Chronic | ICD-10-CM

## 2021-02-09 DIAGNOSIS — R73.9 HYPERGLYCEMIA: ICD-10-CM

## 2021-02-09 DIAGNOSIS — Z11.4 ENCOUNTER FOR SCREENING FOR HIV: ICD-10-CM

## 2021-02-09 LAB
ALBUMIN SERPL BCP-MCNC: 3.7 G/DL (ref 3.5–5)
ALP SERPL-CCNC: 118 U/L (ref 46–116)
ALT SERPL W P-5'-P-CCNC: 36 U/L (ref 12–78)
ANION GAP SERPL CALCULATED.3IONS-SCNC: 1 MMOL/L (ref 4–13)
AST SERPL W P-5'-P-CCNC: 30 U/L (ref 5–45)
BILIRUB SERPL-MCNC: 0.52 MG/DL (ref 0.2–1)
BUN SERPL-MCNC: 21 MG/DL (ref 5–25)
CALCIUM SERPL-MCNC: 10.3 MG/DL (ref 8.3–10.1)
CHLORIDE SERPL-SCNC: 106 MMOL/L (ref 100–108)
CHOLEST SERPL-MCNC: 100 MG/DL (ref 50–200)
CO2 SERPL-SCNC: 33 MMOL/L (ref 21–32)
CREAT SERPL-MCNC: 0.89 MG/DL (ref 0.6–1.3)
EST. AVERAGE GLUCOSE BLD GHB EST-MCNC: 117 MG/DL
GFR SERPL CREATININE-BSD FRML MDRD: 94 ML/MIN/1.73SQ M
GLUCOSE P FAST SERPL-MCNC: 122 MG/DL (ref 65–99)
HBA1C MFR BLD: 5.7 %
HDLC SERPL-MCNC: 37 MG/DL
LDLC SERPL CALC-MCNC: 39 MG/DL (ref 0–100)
NONHDLC SERPL-MCNC: 63 MG/DL
POTASSIUM SERPL-SCNC: 4.5 MMOL/L (ref 3.5–5.3)
PROT SERPL-MCNC: 8.7 G/DL (ref 6.4–8.2)
SODIUM SERPL-SCNC: 140 MMOL/L (ref 136–145)
TRIGL SERPL-MCNC: 122 MG/DL

## 2021-02-09 PROCEDURE — 36415 COLL VENOUS BLD VENIPUNCTURE: CPT

## 2021-02-09 PROCEDURE — 87389 HIV-1 AG W/HIV-1&-2 AB AG IA: CPT

## 2021-02-09 PROCEDURE — 83036 HEMOGLOBIN GLYCOSYLATED A1C: CPT

## 2021-02-09 PROCEDURE — 80053 COMPREHEN METABOLIC PANEL: CPT

## 2021-02-09 PROCEDURE — 80061 LIPID PANEL: CPT

## 2021-02-10 LAB — HIV 1+2 AB+HIV1 P24 AG SERPL QL IA: NORMAL

## 2021-02-16 DIAGNOSIS — E83.52 HYPERCALCEMIA: Primary | ICD-10-CM

## 2021-03-03 ENCOUNTER — LAB (OUTPATIENT)
Dept: LAB | Facility: HOSPITAL | Age: 61
End: 2021-03-03
Payer: MEDICARE

## 2021-03-03 DIAGNOSIS — E05.00 GRAVES DISEASE: ICD-10-CM

## 2021-03-03 DIAGNOSIS — E83.52 HYPERCALCEMIA: ICD-10-CM

## 2021-03-03 DIAGNOSIS — E04.9 GOITER: ICD-10-CM

## 2021-03-03 LAB
CALCIUM SERPL-MCNC: 8.9 MG/DL (ref 8.3–10.1)
PTH-INTACT SERPL-MCNC: 137.9 PG/ML (ref 18.4–80.1)
T3FREE SERPL-MCNC: 2.73 PG/ML (ref 2.3–4.2)
T4 FREE SERPL-MCNC: 1.16 NG/DL (ref 0.76–1.46)
TSH SERPL DL<=0.05 MIU/L-ACNC: 0.02 UIU/ML (ref 0.36–3.74)

## 2021-03-03 PROCEDURE — 84439 ASSAY OF FREE THYROXINE: CPT

## 2021-03-03 PROCEDURE — 82310 ASSAY OF CALCIUM: CPT

## 2021-03-03 PROCEDURE — 84481 FREE ASSAY (FT-3): CPT

## 2021-03-03 PROCEDURE — 36415 COLL VENOUS BLD VENIPUNCTURE: CPT

## 2021-03-03 PROCEDURE — 84443 ASSAY THYROID STIM HORMONE: CPT

## 2021-03-03 PROCEDURE — 83970 ASSAY OF PARATHORMONE: CPT

## 2021-03-04 DIAGNOSIS — E05.00 GRAVES DISEASE: ICD-10-CM

## 2021-03-10 DIAGNOSIS — Z23 ENCOUNTER FOR IMMUNIZATION: ICD-10-CM

## 2021-03-16 ENCOUNTER — OFFICE VISIT (OUTPATIENT)
Dept: SLEEP CENTER | Facility: HOSPITAL | Age: 61
End: 2021-03-16
Payer: MEDICARE

## 2021-03-16 VITALS
BODY MASS INDEX: 42.66 KG/M2 | DIASTOLIC BLOOD PRESSURE: 80 MMHG | HEART RATE: 100 BPM | SYSTOLIC BLOOD PRESSURE: 110 MMHG | HEIGHT: 72 IN | WEIGHT: 315 LBS

## 2021-03-16 DIAGNOSIS — I10 ESSENTIAL HYPERTENSION: ICD-10-CM

## 2021-03-16 DIAGNOSIS — I27.20 PULMONARY HYPERTENSION (HCC): ICD-10-CM

## 2021-03-16 DIAGNOSIS — I48.91 ATRIAL FIBRILLATION WITH RAPID VENTRICULAR RESPONSE (HCC): ICD-10-CM

## 2021-03-16 DIAGNOSIS — Z72.0 TOBACCO ABUSE: ICD-10-CM

## 2021-03-16 DIAGNOSIS — G47.19 EXCESSIVE DAYTIME SLEEPINESS: ICD-10-CM

## 2021-03-16 DIAGNOSIS — E66.01 MORBID OBESITY (HCC): ICD-10-CM

## 2021-03-16 DIAGNOSIS — J44.9 CHRONIC OBSTRUCTIVE PULMONARY DISEASE, UNSPECIFIED COPD TYPE (HCC): ICD-10-CM

## 2021-03-16 DIAGNOSIS — G47.33 OSA (OBSTRUCTIVE SLEEP APNEA): Primary | ICD-10-CM

## 2021-03-16 DIAGNOSIS — I50.32 CHRONIC DIASTOLIC CONGESTIVE HEART FAILURE (HCC): ICD-10-CM

## 2021-03-16 DIAGNOSIS — F45.8 BRUXISM: ICD-10-CM

## 2021-03-16 DIAGNOSIS — I25.10 CORONARY ARTERY DISEASE INVOLVING NATIVE CORONARY ARTERY OF NATIVE HEART WITHOUT ANGINA PECTORIS: ICD-10-CM

## 2021-03-16 PROCEDURE — 99204 OFFICE O/P NEW MOD 45 MIN: CPT | Performed by: INTERNAL MEDICINE

## 2021-03-16 NOTE — PROGRESS NOTES
Review of Systems      Genitourinary difficulty with erection   Cardiology ankle/leg swelling   Gastrointestinal none   Neurology none   Constitutional fatigue   Integumentary rash or dry skin   Psychiatry none   Musculoskeletal back pain   Pulmonary shortness of breath with activity   ENT throat clearing   Endocrine excessive thirst   Hematological none

## 2021-03-16 NOTE — PROGRESS NOTES
Consultation - [id number]  64 y o  male  :1960  [id number]    Physician Requesting Consult: Melina Blanco, Magaly Alvarado     Reason for Consult : At your kind request I saw Jp Nguyễn for initial sleep evaluation today  He has previously diagnosed obstructive sleep apnea for which he is using CPAP  He is here to establish care and needs replacement of his supplies  Results of a split study done at [facility] in 2019:  The diagnostic portion demonstrated an AHI of 64 6 per hour  He spent 16 1% of this portion of the study with saturations less than 90%  During the therapeutic portion, sleep disordered breathing was adequately remediated with BiPAP at 16/12 cm H2O  Minimum oxygen saturation was 97%  PFSH, Problem List, Medications & Allergies were reviewed in EMR  Elvia Crystal  has a past medical history of Acute diastolic congestive heart failure ([facility] ) (7/3/2017), Atrial fibrillation ([facility] ), Bilateral edema of lower extremity (2016), BMI 45 0-49 9, adult ([facility] ) (2020), CAD (coronary artery disease) (10/28/2016), Chest pain (2016), CPAP (continuous positive airway pressure) dependence, Hyperlipidemia, Hypertension, Hyperthyroidism (2016), Kidney stone, Pneumothorax, Presence of IVC filter, Pulmonary embolism ([facility] ), Rash (10/28/2016), Sepsis ([facility] ) (2019), Sleep apnea, SOB (shortness of breath) (2016), and Tachycardia (2016)  He has a current medication list which includes the following prescription(s): aspirin, atorvastatin, digoxin, methimazole, metoprolol tartrate, potassium chloride, and torsemide  HPI:  He is using CPAP regularly and benefits from use  He reports no snoring or breathing difficulties during sleep  He is experiencing no adverse effects  Other Complaints: none   Restless Leg Syndrome: reports no suggestive symptoms    Parasomnia: reports teeth grinding during sleep, but no other features of parasomnia   Sleep Routine (averages): Typical Bedtime:  8:00 p m  Gets OOB:  4:00 a m  TIB:8 hrs  Sleep latency:< 15 minutes Sleep Interruptions:infrequent/nite   Awakens: spontaneously  Upon awakening: feels refreshed  Remona Sharper reports occasional Daytime Sleepiness feels drowsy in the afternoons, may nap occasionally  and rated himself at Total score: 16 /24 on the [place] Sleepiness Scale  Habits:  reports that he has been smoking cigarettes  He has been smoking about 1 00 pack per day  He has never used smokeless tobacco  ;   E-Cigarette/Vaping    E-Cigarette Use Never User     ;  reports no history of drug use ;  reports no history of alcohol use  ; Caffeine use:limited ; Exercise routine: regular    Family History:  Father had obstructive sleep apnea  ROS - see attached  Significant for weight has been stable  He has some postnasal drip  He reports shortness for breath  He reported no other cardiac or respiratory symptoms  Concha Serum EXAM:  /80   Pulse 100   Ht 6' (1 829 m)   Wt (!) 165 kg (363 lb)   BMI 49 23 kg/m²    General: Well groomed male, well appearing, in no apparent distress  Neurological: Alert and orientated;  cooperative; Cranial nerves intact;    Psychiatric: Speech:clear and coherent; Normal mood, affect & thought   Skin: warm and dry; Color& Hydration good; no facial rashes or lesions   HEENT:  Craniofacial anatomy: normal Sinuses: non- tender  TMJ: Normal    Eyes: EOM's intact;  conjunctiva/corneas clear   Ears: Externallyappear normal     Nasal Airway: is patent Septum:intact; Mucosa: normal; Turbinates: normal; Rhinorrhea: None   Mouth: Lips: normal posture; Dentition: worn down  Mucosa:moist  ; Hard Palate:normal    Oropharryx: crowded and AP narrowing Tongue: Mallampati:Class IV, Mobile and MacroglossiaSoft Palate:  redundant  Tonsils: cryptic  Neck:is thick and excess fatty tissue; Neck Circumference: 20 "; Supple; no abnormal masses;  Thyroid:normal  Trachea:central     Lymph: No Cervical or Submandibular Lymhadenopathy  Heart: S1,S2 normal; RRR; no gallop; nomurmurs   Lungs: Respiratory Effort:normal  Air entry decreased bilaterally  No wheezes  No rales  Abdomen: Obese, Soft & non-tender    Extremities: 1+ pedal edema  No clubbing or cyanosis  Musculoskeletal:  Motor normal; Gait:normal        IMPRESSION: Primary, Secondary (to Medical or Psych conditions) Diagnoses & Comorbidities   1  FABIAN (obstructive sleep apnea)  Ambulatory referral to Sleep Medicine    PAP DME Resupply/Reorder    Needs updated equipment  Referal placed to sleep medicine   2  Excessive daytime sleepiness     3  Bruxism     4  Tobacco abuse     5  Chronic obstructive pulmonary disease, unspecified COPD type ([facility] )     6  Pulmonary hypertension ([facility] )     7  Morbid obesity ([facility] )     8  Essential hypertension     9  Atrial fibrillation with rapid ventricular response (HCC)     10  Coronary artery disease involving native coronary artery of native heart without angina pectoris     11  Chronic diastolic congestive heart failure ([facility] )          PLAN:   1  I reviewed results of the Sleep study with the patient  2  With respect to above conditions, I counseled on pathophysiology, diagnosis, treatment options, risks and benefits; inter-relationship and effects on symptoms and comorbidities; risks of no treatment; costs and insurance aspects  3  Patient elected to continue positive airway pressure therapy and it is medically necessary  Pressure settin/12 cm H2O     4  I provided a prescription to renew supplies and discussed hygiene and maintenance of his equipment  5  I also advised on weight reduction and smoking cessation  6  Follow-up to be scheduled in 2 months to monitor compliance, progress and to adjust therapy           Sincerely,     Authenticated electronically by Jerica Florentino MD   on    Board Certified Specialist

## 2021-03-17 ENCOUNTER — TELEPHONE (OUTPATIENT)
Dept: SLEEP CENTER | Facility: CLINIC | Age: 61
End: 2021-03-17

## 2021-03-18 ENCOUNTER — HOSPITAL ENCOUNTER (OUTPATIENT)
Dept: PULMONOLOGY | Facility: HOSPITAL | Age: 61
Discharge: HOME | End: 2021-03-18
Payer: MEDICARE

## 2021-03-18 DIAGNOSIS — J44.9 CHRONIC OBSTRUCTIVE PULMONARY DISEASE, UNSPECIFIED COPD TYPE (HCC): ICD-10-CM

## 2021-03-18 PROCEDURE — 94726 PLETHYSMOGRAPHY LUNG VOLUMES: CPT | Performed by: INTERNAL MEDICINE

## 2021-03-18 PROCEDURE — 94726 PLETHYSMOGRAPHY LUNG VOLUMES: CPT

## 2021-03-18 PROCEDURE — 94729 DIFFUSING CAPACITY: CPT

## 2021-03-18 PROCEDURE — 94010 BREATHING CAPACITY TEST: CPT | Performed by: INTERNAL MEDICINE

## 2021-03-18 PROCEDURE — 94010 BREATHING CAPACITY TEST: CPT

## 2021-03-18 PROCEDURE — 94729 DIFFUSING CAPACITY: CPT | Performed by: INTERNAL MEDICINE

## 2021-03-18 PROCEDURE — 94760 N-INVAS EAR/PLS OXIMETRY 1: CPT

## 2021-03-18 NOTE — NURSING NOTE
Consult completed for thyroid bx  Instructions given, detailed description of procedure reviewed  Meds/allergies verified, no thinners  All questions answered

## 2021-03-23 ENCOUNTER — HOSPITAL ENCOUNTER (OUTPATIENT)
Dept: ULTRASOUND IMAGING | Facility: HOSPITAL | Age: 61
Discharge: HOME | End: 2021-03-23
Attending: INTERNAL MEDICINE | Admitting: RADIOLOGY
Payer: MEDICARE

## 2021-03-23 DIAGNOSIS — E04.1 RIGHT THYROID NODULE: ICD-10-CM

## 2021-03-23 PROCEDURE — 88173 CYTOPATH EVAL FNA REPORT: CPT | Performed by: PATHOLOGY

## 2021-03-23 PROCEDURE — 88172 CYTP DX EVAL FNA 1ST EA SITE: CPT | Performed by: PATHOLOGY

## 2021-03-23 PROCEDURE — 10005 FNA BX W/US GDN 1ST LES: CPT

## 2021-03-23 RX ORDER — LIDOCAINE HYDROCHLORIDE 10 MG/ML
5 INJECTION, SOLUTION EPIDURAL; INFILTRATION; INTRACAUDAL; PERINEURAL ONCE
Status: COMPLETED | OUTPATIENT
Start: 2021-03-23 | End: 2021-03-23

## 2021-03-23 RX ADMIN — LIDOCAINE HYDROCHLORIDE 2.5 ML: 10 INJECTION, SOLUTION EPIDURAL; INFILTRATION; INTRACAUDAL; PERINEURAL at 15:05

## 2021-04-05 ENCOUNTER — OFFICE VISIT (OUTPATIENT)
Dept: ENDOCRINOLOGY | Facility: HOSPITAL | Age: 61
End: 2021-04-05
Payer: MEDICARE

## 2021-04-05 VITALS
DIASTOLIC BLOOD PRESSURE: 68 MMHG | HEART RATE: 100 BPM | SYSTOLIC BLOOD PRESSURE: 118 MMHG | BODY MASS INDEX: 42.66 KG/M2 | WEIGHT: 315 LBS | HEIGHT: 72 IN

## 2021-04-05 DIAGNOSIS — E89.0 POSTABLATIVE HYPOTHYROIDISM: Primary | ICD-10-CM

## 2021-04-05 DIAGNOSIS — E04.1 RIGHT THYROID NODULE: ICD-10-CM

## 2021-04-05 DIAGNOSIS — E34.9 ELEVATED PARATHYROID HORMONE: ICD-10-CM

## 2021-04-05 DIAGNOSIS — Z92.3 STATUS POST RADIOACTIVE IODINE THYROID ABLATION: ICD-10-CM

## 2021-04-05 DIAGNOSIS — E04.9 GOITER: ICD-10-CM

## 2021-04-05 DIAGNOSIS — E83.52 HYPERCALCEMIA: ICD-10-CM

## 2021-04-05 DIAGNOSIS — Z86.39 HISTORY OF HYPERTHYROIDISM: ICD-10-CM

## 2021-04-05 DIAGNOSIS — E05.00 GRAVES DISEASE: ICD-10-CM

## 2021-04-05 PROCEDURE — 99214 OFFICE O/P EST MOD 30 MIN: CPT | Performed by: INTERNAL MEDICINE

## 2021-04-05 NOTE — PATIENT INSTRUCTIONS
The thyroid blood work is due now  Continue the same methimazole dosage  the biopsy final is still pending  We may cora surgery or another radioactive iodine treatment based on the results  We'll evaluate for parathyroid an calcium with the blood work also    Follow up to be determined

## 2021-04-05 NOTE — PROGRESS NOTES
4/6/2021    Assessment/Plan      Diagnoses and all orders for this visit:    Postablative hypothyroidism  -     PTH, intact Lab Collect Lab Collect  -     Protein electrophoresis, serum Lab Collect  -     Phosphorus Lab Collect  -     Magnesium Lab Collect  -     Vitamin D 25 hydroxy Lab Collect  -     TSH, 3rd generation Lab Collect  -     T4, free Lab Collect  -     Comprehensive metabolic panel Lab Collect  -     T3, free    Graves disease  -     PTH, intact Lab Collect Lab Collect  -     Protein electrophoresis, serum Lab Collect  -     Phosphorus Lab Collect  -     Magnesium Lab Collect  -     Vitamin D 25 hydroxy Lab Collect  -     TSH, 3rd generation Lab Collect  -     T4, free Lab Collect  -     Comprehensive metabolic panel Lab Collect  -     T3, free    Goiter  -     PTH, intact Lab Collect Lab Collect  -     Protein electrophoresis, serum Lab Collect  -     Phosphorus Lab Collect  -     Magnesium Lab Collect  -     Vitamin D 25 hydroxy Lab Collect  -     TSH, 3rd generation Lab Collect  -     T4, free Lab Collect  -     Comprehensive metabolic panel Lab Collect  -     T3, free    Right thyroid nodule  -     PTH, intact Lab Collect Lab Collect  -     Protein electrophoresis, serum Lab Collect  -     Phosphorus Lab Collect  -     Magnesium Lab Collect  -     Vitamin D 25 hydroxy Lab Collect  -     TSH, 3rd generation Lab Collect  -     T4, free Lab Collect  -     Comprehensive metabolic panel Lab Collect  -     T3, free    Elevated parathyroid hormone  -     PTH, intact Lab Collect Lab Collect  -     Protein electrophoresis, serum Lab Collect  -     Phosphorus Lab Collect  -     Magnesium Lab Collect  -     Vitamin D 25 hydroxy Lab Collect  -     TSH, 3rd generation Lab Collect  -     T4, free Lab Collect  -     Comprehensive metabolic panel Lab Collect  -     T3, free    History of hyperthyroidism  -     PTH, intact Lab Collect Lab Collect  -     Protein electrophoresis, serum Lab Collect  -     Phosphorus Lab Collect  -     Magnesium Lab Collect  -     Vitamin D 25 hydroxy Lab Collect  -     TSH, 3rd generation Lab Collect  -     T4, free Lab Collect  -     Comprehensive metabolic panel Lab Collect  -     T3, free    Status post radioactive iodine thyroid ablation  -     PTH, intact Lab Collect Lab Collect  -     Protein electrophoresis, serum Lab Collect  -     Phosphorus Lab Collect  -     Magnesium Lab Collect  -     Vitamin D 25 hydroxy Lab Collect  -     TSH, 3rd generation Lab Collect  -     T4, free Lab Collect  -     Comprehensive metabolic panel Lab Collect  -     T3, free    Hypercalcemia  -     PTH, intact Lab Collect Lab Collect  -     Protein electrophoresis, serum Lab Collect  -     Phosphorus Lab Collect  -     Magnesium Lab Collect  -     Vitamin D 25 hydroxy Lab Collect  -     TSH, 3rd generation Lab Collect  -     T4, free Lab Collect  -     Comprehensive metabolic panel Lab Collect  -     T3, free        Assessment/Plan:    1  Hyperthyroidism due to Graves disease post I 131 treatment  He still is hyperthyroid on blood work done several months ago with an increase in methimazole dosage  For now, he will continue the same methimazole 5 mg alternating with 10 mg every other day as he is due for blood work now and I will have him repeat his thyroid blood work now  2 Large right thyroid nodule with goiter  He is post fine-needle aspiration biopsy of that right-sided thyroid nodule  The biopsy demonstrated a Calvert class for lesion and Afirma testing is as yet still pending  I did inform him that if the Afirma testing comes back positive then surgery is the likely course  3  Hypercalcemia with elevated parathyroid hormone level    I will further workup for possible hyperparathyroidism and hypercalcemia by doing a CMP with magnesium, phosphorus, serum protein electrophoresis,  Twenty-five hydroxy vitamin-D level, and intact parathyroid hormone level along with his thyroid blood work     He will get his blood work done now consisting of a TSH, free T4, free T3, CMP, magnesium, phosphorus, serum protein electrophoresis, 25 hydroxy vitamin-D level, and intact parathyroid hormone level  CC:   Hyperthyroid, Graves disease, thyroid nodule follow-up    History of Present Illness     HPI: Wai Bernstein is a 64y o  year old male with history of Graves disease and hyperthyroidism with a thyroid goiter and nodules for follow-up visit  He was diagnosed with Graves disease around 2008  He was on antithyroid medication at varying dosages but over the years was lost to follow-up with endocrinology for some time  Blood work in April 2020 showed he was floridly hyperthyroid and methimazole dosage was increased  He does of note have a history of atrial fibrillation  He is now post I 131 treatment of 10 mCi on 07/17/2020  He has continued to be somewhat hyperthyroid post the I 131 treatment and remains on low-dose methimazole  he is currently taking methimazole 5 mg alternating with 10 mg every other day  He denies heat or cold intolerance, palpitation, or tremors  He denies weight changes  He does have some fatigue  He denies insomnia, anxiety or depression, diarrhea or constipation  He has dry skin but no brittle nails or hair loss  He denies diplopia  He has a history of a right-sided thyroid nodule which is T rad 4 on thyroid ultrasound in June 2020  He is post fine-needle aspiration biopsy of that nodule under ultrasound guidance on 03/18/2021  He is having some difficulties with swallowing liquids  Not always taking his diuretic  Review of Systems   Constitutional: Positive for fatigue  Negative for unexpected weight change  Weight 3 lb less than December 2020  HENT: Positive for trouble swallowing  Will choke on liquids at dinner, not food  Eyes: Negative for visual disturbance  No diplopia  Wears glasses      Respiratory: Positive for shortness of breath  Negative for chest tightness  SOB at times  Recent PFTs negative  No SOB when laying down  Cardiovascular: Negative for chest pain and palpitations  Gastrointestinal: Negative for abdominal pain, constipation, diarrhea and nausea  Endocrine: Negative for cold intolerance and heat intolerance  Skin: Negative for rash  Has dry skin  No brittle nails  No hair loss  Neurological: Positive for dizziness, weakness and light-headedness  Negative for tremors and headaches  Will get very weak in the legs and dizzy and lightheaded when standing up over the last 3 weeks or so  It started before the biopsy and now getting somewhat worse  Psychiatric/Behavioral: Negative for dysphoric mood and sleep disturbance  The patient is not nervous/anxious  Historical Information   Past Medical History:   Diagnosis Date    Acute diastolic congestive heart failure ([facility] ) 7/3/2017    Atrial fibrillation (HCC)     Bilateral edema of lower extremity 8/22/2016    BMI 45 0-49 9, adult ([facility] ) 2/17/2020    Encouraged lifestyle modification to incorporate moderate physiscal activity and DASH diet/low fat/low carb      CAD (coronary artery disease) 10/28/2016    Chest pain 8/22/2016    CPAP (continuous positive airway pressure) dependence     Hyperlipidemia     Hypertension     Hyperthyroidism 8/22/2016    Kidney stone     Pneumothorax     Presence of IVC filter     Pulmonary embolism (HCC)     Rash 10/28/2016    Sepsis ([facility] ) 8/14/2019    Sleep apnea     SOB (shortness of breath) 8/22/2016    Tachycardia 8/22/2016     Past Surgical History:   Procedure Laterality Date    EGD AND COLONOSCOPY N/A 7/28/2017    Procedure: EGD AND COLONOSCOPY;  Surgeon: Paul Ying MD;  Location:  MAIN OR;  Service: Gastroenterology    FL RETROGRADE PYELOGRAM  8/13/2019    FL RETROGRADE PYELOGRAM  9/27/2019    IVC FILTER INSERTION      LITHOTRIPSY      HI CYSTO/URETERO W/LITHOTRIPSY &INDWELL STENT INSRT Right 8/13/2019    Procedure: CYSTOSCOPY URETEROSCOPY WITH LITHOTRIPSY HOLMIUM LASER, RETROGRADE PYELOGRAM AND INSERTION STENT URETERAL;  Surgeon: Maria Victoria Mercedes MD;  Location: [facility];  Service: Urology    OR CYSTO/URETERO W/LITHOTRIPSY &INDWELL STENT INSRT Right 9/27/2019    Procedure: CYSTOSCOPY URETEROSCOPY WITH LITHOTRIPSY HOLMIUM LASER, RETROGRADE PYELOGRAM AND INSERTION STENT URETERAL;  Surgeon: Lucie La MD;  Location: [facility];  Service: Urology    US GUIDED THYROID BIOPSY  3/23/2021     Social History   Social History     Substance and Sexual Activity   Alcohol Use No     Social History     Substance and Sexual Activity   Drug Use No     Social History     Tobacco Use   Smoking Status Current Every Day Smoker    Packs/day: 1 00    Types: Cigarettes   Smokeless Tobacco Never Used   Tobacco Comment    Pt states he smokes when he can afford to do so      Family History:   Family History   Problem Relation Age of Onset    Cancer Mother         metastatic, breast primary    Breast cancer Mother     Heart disease Father     Lung disease Father     Thyroid disease unspecified Father         thyroidectomy    Thyroid disease unspecified Sister     Hyperthyroidism Sister         in remission       Meds/Allergies   Current Outpatient Medications   Medication Sig Dispense Refill    aspirin (ECOTRIN LOW STRENGTH) 81 mg EC tablet Take 81 mg by mouth daily      atorvastatin (LIPITOR) 40 mg tablet Take 1 tablet by mouth once daily 90 tablet 1    digoxin (LANOXIN) 0 25 mg tablet Take 1 tablet (250 mcg total) by mouth daily 90 tablet 3    methimazole (TAPAZOLE) 10 mg tablet Take 1 tablet (10 mg total) by mouth daily (Patient taking differently: Take 5 mg by mouth daily 5 mg alternating with 10 mg every other day) 90 tablet 1    metoprolol tartrate (LOPRESSOR) 50 mg tablet Take 1 tablet (50 mg total) by mouth 2 (two) times a day 180 tablet 3    potassium chloride (K-DUR,KLOR-CON) 20 mEq tablet Take 1 tablet (20 mEq total) by mouth 2 (two) times a day 180 tablet 0    torsemide (DEMADEX) 20 mg tablet Take 2 tablets (40 mg total) by mouth daily 180 tablet 0     No current facility-administered medications for this visit  No Known Allergies    Objective   Vitals: Blood pressure 118/68, pulse 100, height 6' (1 829 m), weight (!) 165 kg (363 lb)  Invasive Devices     Drain            Ureteral Drain/Stent Right ureter 6 Fr  557 days                Physical Exam  Vitals signs reviewed  Constitutional:       Appearance: Normal appearance  He is well-developed  He is obese  HENT:      Head: Normocephalic and atraumatic  Eyes:      Extraocular Movements: Extraocular movements intact  Conjunctiva/sclera: Conjunctivae normal       Comments: No lid lag, stare, proptosis, or periorbital edema  Neck:      Musculoskeletal: Normal range of motion and neck supple  Thyroid: No thyromegaly  Vascular: No carotid bruit  Comments: Large thyroid goiter  Right-sided significantly larger than left  No bruits over the thyroid gland  Cardiovascular:      Rate and Rhythm: Normal rate and regular rhythm  Heart sounds: Normal heart sounds  No murmur  Pulmonary:      Effort: Pulmonary effort is normal       Breath sounds: Normal breath sounds  No wheezing  Abdominal:      Palpations: Abdomen is soft  Tenderness: There is no abdominal tenderness  Musculoskeletal: Normal range of motion  General: No deformity  Right lower leg: No edema  Left lower leg: No edema  Comments: No tremor of the outstretched hands  Lymphadenopathy:      Cervical: No cervical adenopathy  Skin:     General: Skin is warm and dry  Findings: No erythema or rash  Neurological:      Mental Status: He is alert and oriented to person, place, and time  Deep Tendon Reflexes: Reflexes are normal and symmetric        Comments: Deep tendon reflexes normal          The history was obtained from the review of the chart and from the patient  Lab Results:     Blood work done on 03/03/2021 showed a TSH of 0 023 with a free T4 of 1 16 and a free T3 of 2 73  Calcium is 8 9 with an intact parathyroid hormone level of 137 9  Lab Results   Component Value Date    CREATININE 0 88 04/06/2021    CREATININE 0 89 02/09/2021    CREATININE 0 74 10/16/2020    BUN 14 04/06/2021     02/15/2017    K 3 6 04/06/2021     04/06/2021    CO2 29 04/06/2021     eGFR   Date Value Ref Range Status   04/06/2021 95 ml/min/1 73sq m Final       Lab Results   Component Value Date    HDL 37 (L) 02/09/2021    TRIG 122 02/09/2021    CHOLHDL 2 3 04/22/2020       Lab Results   Component Value Date    ALT 26 04/06/2021    AST 17 04/06/2021    ALKPHOS 100 04/06/2021    BILITOT 0 7 11/22/2016       Lab Results   Component Value Date    TSH <0 005 (L) 05/13/2020    FREET4 1 16 04/06/2021       Fine-needle aspiration biopsy under ultrasound guidance:    Fine-needle aspiration biopsy under ultrasound guidance performed on 03/23/2021 of the right mid thyroid nodule  Pathology demonstrates follicular earlier Bhupendra Wayne as some /suspicious for follicular neoplasm, [place] category 4  Sample has been sent for Afirma testing but results are not yet available      Future Appointments   Date Time Provider [facility]   4/12/2021  3:50 PM [facility] [id number]   4/27/2021  9:40 AM ASHLEY Singh DR [facility]   5/18/2021  2:45 PM Munir Mason MD [phone]

## 2021-04-06 ENCOUNTER — LAB (OUTPATIENT)
Dept: LAB | Facility: HOSPITAL | Age: 61
End: 2021-04-06
Payer: MEDICARE

## 2021-04-06 LAB
25(OH)D3 SERPL-MCNC: 12.6 NG/ML (ref 30–100)
ALBUMIN SERPL BCP-MCNC: 3.3 G/DL (ref 3.5–5)
ALP SERPL-CCNC: 100 U/L (ref 46–116)
ALT SERPL W P-5'-P-CCNC: 26 U/L (ref 12–78)
ANION GAP SERPL CALCULATED.3IONS-SCNC: 7 MMOL/L (ref 4–13)
AST SERPL W P-5'-P-CCNC: 17 U/L (ref 5–45)
BILIRUB SERPL-MCNC: 0.53 MG/DL (ref 0.2–1)
BUN SERPL-MCNC: 14 MG/DL (ref 5–25)
CALCIUM ALBUM COR SERPL-MCNC: 9.9 MG/DL (ref 8.3–10.1)
CALCIUM SERPL-MCNC: 9.3 MG/DL (ref 8.3–10.1)
CHLORIDE SERPL-SCNC: 101 MMOL/L (ref 100–108)
CO2 SERPL-SCNC: 29 MMOL/L (ref 21–32)
CREAT SERPL-MCNC: 0.88 MG/DL (ref 0.6–1.3)
GFR SERPL CREATININE-BSD FRML MDRD: 95 ML/MIN/1.73SQ M
GLUCOSE P FAST SERPL-MCNC: 125 MG/DL (ref 65–99)
MAGNESIUM SERPL-MCNC: 2.1 MG/DL (ref 1.6–2.6)
PHOSPHATE SERPL-MCNC: 2.5 MG/DL (ref 2.7–4.5)
POTASSIUM SERPL-SCNC: 3.6 MMOL/L (ref 3.5–5.3)
PROT SERPL-MCNC: 7.9 G/DL (ref 6.4–8.2)
PTH-INTACT SERPL-MCNC: 141.8 PG/ML (ref 18.4–80.1)
SODIUM SERPL-SCNC: 137 MMOL/L (ref 136–145)
T3FREE SERPL-MCNC: 2.99 PG/ML (ref 2.3–4.2)
T4 FREE SERPL-MCNC: 1.16 NG/DL (ref 0.76–1.46)
TSH SERPL DL<=0.05 MIU/L-ACNC: 0.04 UIU/ML (ref 0.36–3.74)

## 2021-04-06 PROCEDURE — 84481 FREE ASSAY (FT-3): CPT | Performed by: INTERNAL MEDICINE

## 2021-04-06 PROCEDURE — 84443 ASSAY THYROID STIM HORMONE: CPT | Performed by: INTERNAL MEDICINE

## 2021-04-06 PROCEDURE — 82306 VITAMIN D 25 HYDROXY: CPT | Performed by: INTERNAL MEDICINE

## 2021-04-06 PROCEDURE — 84165 PROTEIN E-PHORESIS SERUM: CPT | Performed by: INTERNAL MEDICINE

## 2021-04-06 PROCEDURE — 84439 ASSAY OF FREE THYROXINE: CPT | Performed by: INTERNAL MEDICINE

## 2021-04-06 PROCEDURE — 36415 COLL VENOUS BLD VENIPUNCTURE: CPT | Performed by: INTERNAL MEDICINE

## 2021-04-06 PROCEDURE — 83735 ASSAY OF MAGNESIUM: CPT | Performed by: INTERNAL MEDICINE

## 2021-04-06 PROCEDURE — 83970 ASSAY OF PARATHORMONE: CPT | Performed by: INTERNAL MEDICINE

## 2021-04-06 PROCEDURE — 84100 ASSAY OF PHOSPHORUS: CPT | Performed by: INTERNAL MEDICINE

## 2021-04-06 PROCEDURE — 84165 PROTEIN E-PHORESIS SERUM: CPT | Performed by: PATHOLOGY

## 2021-04-06 PROCEDURE — 80053 COMPREHEN METABOLIC PANEL: CPT | Performed by: INTERNAL MEDICINE

## 2021-04-08 LAB
ALBUMIN SERPL ELPH-MCNC: 3.56 G/DL (ref 3.5–5)
ALBUMIN SERPL ELPH-MCNC: 46.9 % (ref 52–65)
ALPHA1 GLOB SERPL ELPH-MCNC: 0.28 G/DL (ref 0.1–0.4)
ALPHA1 GLOB SERPL ELPH-MCNC: 3.7 % (ref 2.5–5)
ALPHA2 GLOB SERPL ELPH-MCNC: 1.03 G/DL (ref 0.4–1.2)
ALPHA2 GLOB SERPL ELPH-MCNC: 13.6 % (ref 7–13)
BETA GLOB ABNORMAL SERPL ELPH-MCNC: 0.45 G/DL (ref 0.4–0.8)
BETA1 GLOB SERPL ELPH-MCNC: 5.9 % (ref 5–13)
BETA2 GLOB SERPL ELPH-MCNC: 7.5 % (ref 2–8)
BETA2+GAMMA GLOB SERPL ELPH-MCNC: 0.57 G/DL (ref 0.2–0.5)
GAMMA GLOB ABNORMAL SERPL ELPH-MCNC: 1.7 G/DL (ref 0.5–1.6)
GAMMA GLOB SERPL ELPH-MCNC: 22.4 % (ref 12–22)
IGG/ALB SER: 0.88 {RATIO} (ref 1.1–1.8)
PROT PATTERN SERPL ELPH-IMP: ABNORMAL
PROT SERPL-MCNC: 7.6 G/DL (ref 6.4–8.2)

## 2021-04-09 PROBLEM — E55.9 VITAMIN D DEFICIENCY: Status: ACTIVE | Noted: 2021-04-09

## 2021-04-12 ENCOUNTER — IMMUNIZATIONS (OUTPATIENT)
Dept: FAMILY MEDICINE CLINIC | Facility: HOSPITAL | Age: 61
End: 2021-04-12

## 2021-04-12 DIAGNOSIS — E89.0 POSTABLATIVE HYPOTHYROIDISM: Primary | ICD-10-CM

## 2021-04-12 DIAGNOSIS — E83.52 HYPERCALCEMIA: ICD-10-CM

## 2021-04-12 DIAGNOSIS — Z86.39 HISTORY OF HYPERTHYROIDISM: ICD-10-CM

## 2021-04-12 DIAGNOSIS — E34.9 ELEVATED PARATHYROID HORMONE: ICD-10-CM

## 2021-04-12 DIAGNOSIS — E05.00 GRAVES DISEASE: ICD-10-CM

## 2021-04-12 DIAGNOSIS — E83.52 HYPERCALCEMIA: Primary | ICD-10-CM

## 2021-04-12 DIAGNOSIS — Z23 ENCOUNTER FOR IMMUNIZATION: Primary | ICD-10-CM

## 2021-04-12 PROCEDURE — 91300 SARS-COV-2 / COVID-19 MRNA VACCINE (PFIZER-BIONTECH) 30 MCG: CPT

## 2021-04-12 PROCEDURE — 0001A SARS-COV-2 / COVID-19 MRNA VACCINE (PFIZER-BIONTECH) 30 MCG: CPT

## 2021-04-12 RX ORDER — ERGOCALCIFEROL 1.25 MG/1
50000 CAPSULE ORAL WEEKLY
Qty: 12 CAPSULE | Refills: 1 | Status: SHIPPED | OUTPATIENT
Start: 2021-04-12 | End: 2021-09-23 | Stop reason: SDUPTHER

## 2021-04-27 ENCOUNTER — TELEPHONE (OUTPATIENT)
Dept: FAMILY MEDICINE CLINIC | Facility: HOSPITAL | Age: 61
End: 2021-04-27

## 2021-04-27 ENCOUNTER — OFFICE VISIT (OUTPATIENT)
Dept: FAMILY MEDICINE CLINIC | Facility: HOSPITAL | Age: 61
End: 2021-04-27
Payer: MEDICARE

## 2021-04-27 VITALS
OXYGEN SATURATION: 100 % | SYSTOLIC BLOOD PRESSURE: 106 MMHG | HEIGHT: 72 IN | DIASTOLIC BLOOD PRESSURE: 74 MMHG | WEIGHT: 315 LBS | TEMPERATURE: 97.5 F | BODY MASS INDEX: 42.66 KG/M2 | HEART RATE: 85 BPM

## 2021-04-27 DIAGNOSIS — J44.9 CHRONIC OBSTRUCTIVE PULMONARY DISEASE, UNSPECIFIED COPD TYPE (HCC): ICD-10-CM

## 2021-04-27 DIAGNOSIS — I48.91 ATRIAL FIBRILLATION WITH RAPID VENTRICULAR RESPONSE (HCC): Chronic | ICD-10-CM

## 2021-04-27 DIAGNOSIS — I50.32 CHRONIC DIASTOLIC CONGESTIVE HEART FAILURE (HCC): Primary | Chronic | ICD-10-CM

## 2021-04-27 DIAGNOSIS — I10 ESSENTIAL HYPERTENSION: Chronic | ICD-10-CM

## 2021-04-27 DIAGNOSIS — R06.00 DOE (DYSPNEA ON EXERTION): Primary | ICD-10-CM

## 2021-04-27 DIAGNOSIS — R06.00 DOE (DYSPNEA ON EXERTION): ICD-10-CM

## 2021-04-27 DIAGNOSIS — E05.00 GRAVES DISEASE: ICD-10-CM

## 2021-04-27 PROBLEM — R13.10 DYSPHAGIA: Status: RESOLVED | Noted: 2019-09-29 | Resolved: 2021-04-27

## 2021-04-27 PROBLEM — I11.0 HYPERTENSIVE HEART DISEASE WITH HEART FAILURE (HCC): Status: RESOLVED | Noted: 2020-10-27 | Resolved: 2021-04-27

## 2021-04-27 PROBLEM — Z59.00 HOMELESSNESS: Chronic | Status: RESOLVED | Noted: 2017-07-03 | Resolved: 2021-04-27

## 2021-04-27 PROCEDURE — 99214 OFFICE O/P EST MOD 30 MIN: CPT | Performed by: NURSE PRACTITIONER

## 2021-04-27 RX ORDER — ALBUTEROL SULFATE 90 UG/1
2 AEROSOL, METERED RESPIRATORY (INHALATION) EVERY 4 HOURS PRN
Qty: 8.5 G | Refills: 0 | Status: SHIPPED | OUTPATIENT
Start: 2021-04-27

## 2021-04-27 NOTE — ASSESSMENT & PLAN NOTE
Wt Readings from Last 3 Encounters:   04/27/21 (!) 162 kg (358 lb 3 2 oz)   04/05/21 (!) 165 kg (363 lb)   03/16/21 (!) 165 kg (363 lb)     May be contributing to ongoing LOGAN  Advise he continue same meds as rx'd & schedule f/u with cardiology he is overdue for

## 2021-04-27 NOTE — PROGRESS NOTES
Assessment/Plan:    Graves disease  Clinically stable  Maintain endocrine f/u as scheduled  Chronic obstructive pulmonary disease (HCC)  Remains symptomatic w/LOGAN  Has not yet scheduled pulmonary f/u as previously ordered  Our office will arrange appt for him  In the meantime, rx issued to start advair inhaler BID and use proair as needed as rx'd  Hypertension  BP low (and may be contributing to lightheadedness)  Advise he continue same metoprolol as rx'd by cardiology and schedule f/u w/Dr Ashwin Duque he is overdue for  Chronic diastolic congestive heart failure (HCC)  Wt Readings from Last 3 Encounters:   04/27/21 (!) 162 kg (358 lb 3 2 oz)   04/05/21 (!) 165 kg (363 lb)   03/16/21 (!) 165 kg (363 lb)     May be contributing to ongoing LOGAN  Advise he continue same meds as rx'd & schedule f/u with cardiology he is overdue for  Diagnoses and all orders for this visit:    Chronic diastolic congestive heart failure (HCC)    Atrial fibrillation with rapid ventricular response (HCC)    LOGAN (dyspnea on exertion)  Comments:  likely multifactorial (pulm, cardiac, FABIAN); will arrange further eval w/pulmonary & advise he f/u with cardiology as he is due for  Orders:  -     fluticasone-salmeterol (Pardeep Daniel) 250-50 mcg/dose inhaler; Inhale 1 puff 2 (two) times a day Rinse mouth after use  -     albuterol (ProAir HFA) 90 mcg/act inhaler; Inhale 2 puffs every 4 (four) hours as needed for wheezing or shortness of breath    Chronic obstructive pulmonary disease, unspecified COPD type ([address] )    Graves disease    Essential hypertension          Subjective:      Patient ID: Lexa Barragan is a 64 y o  male  Here for complaints of lightheadedness and LOGAN x 1 month  Wife reports breathing is also labored at rest  Reports L thigh numbness when laying down at night  Complaint with all medications  Denies checking blood pressure at home  Reports fatigue but no disturbances in sleep, wears Cpap machine at night  Current every day smoker  Smoking cessation education provided  Recently completed PFT testing  The following portions of the patient's history were reviewed and updated as appropriate: allergies, current medications, past family history, past medical history, past social history, past surgical history and problem list     Review of Systems   Constitutional: Positive for fatigue  Negative for activity change and fever  HENT: Negative for congestion, sinus pain and trouble swallowing  Eyes: Negative for photophobia, pain and visual disturbance  Respiratory: Positive for cough, shortness of breath and wheezing  Negative for chest tightness  Cardiovascular: Negative for chest pain, palpitations and leg swelling  Gastrointestinal: Negative for abdominal distention, abdominal pain, diarrhea, nausea and vomiting  Genitourinary: Negative for difficulty urinating, flank pain, frequency, hematuria and urgency  Musculoskeletal: Negative for back pain, gait problem and joint swelling  Neurological: Positive for weakness and light-headedness  Negative for dizziness, tremors and numbness  Psychiatric/Behavioral: Negative for agitation, behavioral problems, hallucinations and sleep disturbance  The patient is not nervous/anxious  Objective:      /74 (Patient Position: Sitting, Cuff Size: Large)   Pulse 85   Temp 97 5 °F (36 4 °C) (Tympanic)   Ht 6' (1 829 m)   Wt (!) 162 kg (358 lb 3 2 oz)   SpO2 100%   BMI 48 58 kg/m²          Physical Exam  Vitals signs reviewed  Constitutional:       General: He is not in acute distress  Appearance: He is obese  HENT:      Head: Normocephalic  Eyes:      Pupils: Pupils are equal, round, and reactive to light  Neck:      Comments: Large thyroid goiter  Cardiovascular:      Rate and Rhythm: Rhythm irregular  Pulmonary:      Effort: Pulmonary effort is normal  No tachypnea or respiratory distress        Breath sounds: Examination of the right-lower field reveals decreased breath sounds  Examination of the left-lower field reveals decreased breath sounds  Decreased breath sounds present  Abdominal:      General: Bowel sounds are normal       Palpations: Abdomen is soft  Musculoskeletal: Normal range of motion  Right lower le+ Pitting Edema present  Left lower le+ Pitting Edema present  Skin:     General: Skin is warm and dry  Neurological:      Mental Status: He is alert and oriented to person, place, and time     Psychiatric:         Mood and Affect: Mood normal          Behavior: Behavior normal

## 2021-04-27 NOTE — ASSESSMENT & PLAN NOTE
Remains symptomatic w/LOGAN  Has not yet scheduled pulmonary f/u as previously ordered  Our office will arrange appt for him  In the meantime, rx issued to start advair inhaler BID and use proair as needed as rx'd

## 2021-04-27 NOTE — TELEPHONE ENCOUNTER
Spoke to pulmonary  [facility] doesn't have an appt until 6/9  Scheduled pt in [place] for 5/5 at 1pm   They are requesting he have a CXR prior to appt, can you order?

## 2021-04-28 ENCOUNTER — HOSPITAL ENCOUNTER (OUTPATIENT)
Dept: RADIOLOGY | Facility: HOSPITAL | Age: 61
Discharge: HOME | End: 2021-04-28
Payer: MEDICARE

## 2021-04-28 DIAGNOSIS — R06.00 DOE (DYSPNEA ON EXERTION): ICD-10-CM

## 2021-04-28 PROCEDURE — 71046 X-RAY EXAM CHEST 2 VIEWS: CPT

## 2021-05-04 ENCOUNTER — IMMUNIZATIONS (OUTPATIENT)
Dept: FAMILY MEDICINE CLINIC | Facility: HOSPITAL | Age: 61
End: 2021-05-04

## 2021-05-04 DIAGNOSIS — Z23 ENCOUNTER FOR IMMUNIZATION: Primary | ICD-10-CM

## 2021-05-04 PROCEDURE — 0002A SARS-COV-2 / COVID-19 MRNA VACCINE (PFIZER-BIONTECH) 30 MCG: CPT

## 2021-05-04 PROCEDURE — 91300 SARS-COV-2 / COVID-19 MRNA VACCINE (PFIZER-BIONTECH) 30 MCG: CPT

## 2021-05-05 ENCOUNTER — OFFICE VISIT (OUTPATIENT)
Dept: PULMONOLOGY | Facility: CLINIC | Age: 61
End: 2021-05-05
Payer: MEDICARE

## 2021-05-05 VITALS
SYSTOLIC BLOOD PRESSURE: 110 MMHG | TEMPERATURE: 98.5 F | OXYGEN SATURATION: 98 % | BODY MASS INDEX: 42.66 KG/M2 | DIASTOLIC BLOOD PRESSURE: 70 MMHG | WEIGHT: 315 LBS | HEART RATE: 90 BPM | HEIGHT: 72 IN

## 2021-05-05 DIAGNOSIS — G47.33 OSA (OBSTRUCTIVE SLEEP APNEA): ICD-10-CM

## 2021-05-05 DIAGNOSIS — J44.9 MODERATE COPD (CHRONIC OBSTRUCTIVE PULMONARY DISEASE) (HCC): Primary | ICD-10-CM

## 2021-05-05 DIAGNOSIS — R06.00 DOE (DYSPNEA ON EXERTION): ICD-10-CM

## 2021-05-05 DIAGNOSIS — F17.210 CIGARETTE NICOTINE DEPENDENCE WITHOUT COMPLICATION: ICD-10-CM

## 2021-05-05 DIAGNOSIS — E66.01 CLASS 3 SEVERE OBESITY DUE TO EXCESS CALORIES WITH SERIOUS COMORBIDITY AND BODY MASS INDEX (BMI) OF 45.0 TO 49.9 IN ADULT (HCC): ICD-10-CM

## 2021-05-05 PROCEDURE — 99205 OFFICE O/P NEW HI 60 MIN: CPT | Performed by: INTERNAL MEDICINE

## 2021-05-06 ENCOUNTER — TELEPHONE (OUTPATIENT)
Dept: PULMONOLOGY | Facility: CLINIC | Age: 61
End: 2021-05-06

## 2021-05-06 DIAGNOSIS — I50.32 CHRONIC DIASTOLIC CONGESTIVE HEART FAILURE (HCC): Chronic | ICD-10-CM

## 2021-05-06 NOTE — TELEPHONE ENCOUNTER
[address] calling stating Severo Montoya will need a prior auth   He will be faxing a form to the [place] office at [phone]

## 2021-05-07 DIAGNOSIS — I50.32 CHRONIC DIASTOLIC CONGESTIVE HEART FAILURE (HCC): Chronic | ICD-10-CM

## 2021-05-07 RX ORDER — POTASSIUM CHLORIDE 20 MEQ/1
20 TABLET, EXTENDED RELEASE ORAL 2 TIMES DAILY
Qty: 180 TABLET | Refills: 1 | Status: SHIPPED | OUTPATIENT
Start: 2021-05-07 | End: 2021-11-03 | Stop reason: SDUPTHER

## 2021-05-07 RX ORDER — TORSEMIDE 20 MG/1
40 TABLET ORAL DAILY
Qty: 180 TABLET | Refills: 0 | Status: SHIPPED | OUTPATIENT
Start: 2021-05-07 | End: 2021-09-01 | Stop reason: SDUPTHER

## 2021-05-07 NOTE — TELEPHONE ENCOUNTER
Stiolto denied    Alternatives are Anoro ellipta 62 5mcg, bevespi 9-48mcg, breo ellipta 100-25mcg amd 200mcg, breztri 160, trelegy

## 2021-05-17 ENCOUNTER — CLINICAL SUPPORT (OUTPATIENT)
Dept: PULMONOLOGY | Facility: HOSPITAL | Age: 61
End: 2021-05-17
Payer: MEDICARE

## 2021-05-17 ENCOUNTER — HOSPITAL ENCOUNTER (OUTPATIENT)
Dept: CT IMAGING | Facility: HOSPITAL | Age: 61
Discharge: HOME | End: 2021-05-17
Payer: MEDICARE

## 2021-05-17 DIAGNOSIS — F17.210 CIGARETTE NICOTINE DEPENDENCE WITHOUT COMPLICATION: ICD-10-CM

## 2021-05-17 DIAGNOSIS — J44.9 MODERATE COPD (CHRONIC OBSTRUCTIVE PULMONARY DISEASE) (HCC): ICD-10-CM

## 2021-05-17 DIAGNOSIS — R06.00 DOE (DYSPNEA ON EXERTION): ICD-10-CM

## 2021-05-17 DIAGNOSIS — J44.9 MODERATE COPD (CHRONIC OBSTRUCTIVE PULMONARY DISEASE) (HCC): Primary | ICD-10-CM

## 2021-05-17 PROCEDURE — 71271 CT THORAX LUNG CANCER SCR C-: CPT

## 2021-05-17 NOTE — PROGRESS NOTES
[address] [facility]  [place]        Dear Dex Comer    Emotional well-being and depression is addressed in the Pulmonary rehab evaluation  To assess the severity of depression, patients are given the PHQ-9 Depression Questionnaire  This is to inform you that your patient Candida Lakhani scored a 5 which is interpreted as 5-9 = Mild Depression  Your patient was provided contact information for [facility] and has been encouraged to enroll in [employer]  A repeat PHQ -9 will be administered in 30 days to assess improvement  Thank you for your support of cardiopulmonary rehab      Sincerely,      Aristeo Robertson, CRT

## 2021-05-17 NOTE — PROGRESS NOTES
PULMONARY REHAB ASSESSMENT    Today's date: May 17, 2021  Patient name: Lexa Barragan     : 1960       MRN: [id number]  PCP: ASHLEY Adame  Referring Physician: Beata Rivera DO  Pulmonologist: Beata Rivera DO    Dx: COPD      Date of onset: 21  Cultural needs:     Weight:    Wt Readings from Last 1 Encounters:   21 (!) 161 kg (354 lb)      Height:   Ht Readings from Last 1 Encounters:   21 6' (1 829 m)     Medical History:   Past Medical History:   Diagnosis Date    Acute diastolic congestive heart failure ([facility] ) 7/3/2017    Atrial fibrillation ([facility] )     Atrial fibrillation with rapid ventricular response ([facility] ) 3/31/2017    Bilateral edema of lower extremity 2016    BMI 45 0-49 9, adult ([facility] ) 2020    Encouraged lifestyle modification to incorporate moderate physiscal activity and DASH diet/low fat/low carb      CAD (coronary artery disease) 10/28/2016    Chest pain 2016    CPAP (continuous positive airway pressure) dependence     Dysphagia 2019    Homelessness 7/3/2017    Hyperlipidemia     Hypertension     Hypertensive heart disease with heart failure ([facility] ) 10/27/2020    Hyperthyroidism 2016    Kidney stone     Pneumothorax     Presence of IVC filter     Pulmonary embolism (HCC)     Pulmonary hypertension ([facility] ) 9/15/2016    Rash 10/28/2016    Sepsis ([facility] ) 2019    Sleep apnea     SOB (shortness of breath) 2016    Tachycardia 2016         Physical Limitations: SOB, fatigue    Oxygen needs: none    Risk Factors   Cholesterol: Yes  Smoking: Not ready to quit and does not want to discuss smoking cessation at this time  HTN: yes  DM: Type 1   Obesity: Yes   Inactivity: Yes  Stress:  perceived  stress: 4/10   Stressors:people being late for things in his life   Goals for Stress Management:relaxation techniques, pursed lip breathing, journaling    Family History:  Family History   Problem Relation Age of Onset    Cancer Mother metastatic, breast primary    Breast cancer Mother     Heart disease Father     Lung disease Father     Thyroid disease unspecified Father         thyroidectomy    Thyroid disease unspecified Sister     Hyperthyroidism Sister         in remission       Allergies: Patient has no known allergies  ETOH:   Social History     Substance and Sexual Activity   Alcohol Use No         Current Medications:   Current Outpatient Medications   Medication Sig Dispense Refill    albuterol (ProAir HFA) 90 mcg/act inhaler Inhale 2 puffs every 4 (four) hours as needed for wheezing or shortness of breath 8 5 g 0    aspirin (ECOTRIN LOW STRENGTH) 81 mg EC tablet Take 81 mg by mouth daily      atorvastatin (LIPITOR) 40 mg tablet Take 1 tablet by mouth once daily 90 tablet 1    digoxin (LANOXIN) 0 25 mg tablet Take 1 tablet (250 mcg total) by mouth daily 90 tablet 3    ergocalciferol (VITAMIN D2) 50,000 units Take 1 capsule (50,000 Units total) by mouth once a week 12 capsule 1    methimazole (TAPAZOLE) 10 mg tablet Take 1 tablet (10 mg total) by mouth daily (Patient taking differently: Take 5 mg by mouth daily 5 mg alternating with 10 mg every other day) 90 tablet 1    metoprolol tartrate (LOPRESSOR) 50 mg tablet Take 1 tablet (50 mg total) by mouth 2 (two) times a day 180 tablet 3    potassium chloride (K-DUR,KLOR-CON) 20 mEq tablet Take 1 tablet (20 mEq total) by mouth 2 (two) times a day 180 tablet 1    torsemide (DEMADEX) 20 mg tablet Take 2 tablets (40 mg total) by mouth daily 180 tablet 0    umeclidinium-vilanterol (ANORO ELLIPTA) 62 5-25 MCG/INH inhaler Inhale 1 puff daily 1 each 3     No current facility-administered medications for this visit              Functional Status Prior to Diagnosis for Treatment   Occupation: unemployed  Recreation: fishing, golfing  ADLs: Capable of performing light to moderate ADLs  [place]: Capable of performing light to moderate ADLs  Exercise: none  Other:     Current Functional Status  Occupation: unemployed  Recreation: [employer], [employer]   ADLs:Capable of performing light to moderate ADLs  [place]: Capable of performing light to moderate ADLs  Exercise: none  Other:     Patient Specific Goals:  Decrease SOB, increase energy    Short Term Program Goals: dietary modifications increased strength improved energy/stamina with ADLs exercise 120-150 mins/wk improved BG control    Long Term Goals: increased maximal walking duration  increased intial training workload  Improved Duke Activity Status score  Improved functional capacity  Improved Quality of Life - [facility] score reduced  Reduced body fat%  improved Rate Your Plate Score    Oxygen Goals: Maintain SpO2>90% titrating supplemental oxygen as needed     Ability to reach goals/rehabilitation potential:  Very Good     Projected return to function: 12 weeks  Objective tests: 6 MWT      Nutritional   Reviewed details of Rate your Plate  Discussed key elements of heart healthy eating  Reviewed patient goals for dietary modifications and their clinical implications  Reviewed most recent lipid profile  Goals for dietary modification: choose lean cuts of meat  poultry without the skin  low fat ground meat and poultry  eliminate processed meats  reduce portions of meat to 3 oz  increase fish intake  more meatless meals  low fat dairy   reduced fat cheese  increase whole grains  increase fruits and vegetables  eliminate butter      Emotional/Social  Patient reports he/she is coping well with good social support and denies depression or anxiety    SOCIAL SUPPORT NETWORK    Marital status:       Domestic Violence Screening: No    Comments: Patient came in for initial pulmonary rehab evaluation today  He has a hx of COPD, hypertension, A fib, CAD, PE ( filter sx 2017)), FABIAN ( for which he wear bipap 16/12 cm H20 hs), high cholesterol  He has smoked for the past 43 years at times up to 3 packs per day    His goals are to decrease SOB, increase energy and be able to do ADL's better and not have to rest as much  He did a 6 MWT today ambulating 630 feet at 1 9 mets  His room air SOa2 was 96% with hr 95 bpm and resting BP at 128/80  His SOB was 1/10  He did have to rest for over a minute on minute 5 of the walk test where his SOB was 8/10 at highest and RPE 9/10  His exercise BP was 140/82  His hr increased to 145 bpm right at the time of rest during minute 5 of the walk  Mr Jayson Banks has agreed to attend pulmonary rehab 2x/ week  I stressed importance of regular attendance  We will monitor cardiac status on first exercise session at minimum and n and pulmonary status throughout and report data per protocol

## 2021-05-17 NOTE — PROGRESS NOTES
Pulmonary Rehabilitation Plan of Care   Initial Care Plan      Today's date: 2021   # of Exercise Sessions Completed: initial  Patient name: Afua Trevizo      : 1960  Age: 64 y o  MRN: [id number]  Referring Physician: Cehl Carmona DO  Pulmonologist: Chel Carmona DO  Provider: Toan  Clinician: Joe Recinos CRT    Dx:   Encounter Diagnoses   Name Primary?  Moderate COPD (chronic obstructive pulmonary disease) (HCC)     LOGAN (dyspnea on exertion)      Date of onset: 21      SUMMARY OF PROGRESS:     Patient came in for initial pulmonary rehab evaluation today  He has a hx of COPD, hypertension, A fib, CAD, PE ( filter sx 2017)), FABIAN ( for which he wear bipap 16/12 cm H20 hs), high cholesterol  He has smoked for the past 43 years at times up to 3 packs per day  His goals are to decrease SOB, increase energy and be able to do ADL's better and not have to rest as much  He did a 6 MWT today ambulating 630 feet at 1 9 mets  His room air SOa2 was 96% with hr 95 bpm and resting BP at 128/80  His SOB was 1/10  He did have to rest for over a minute on minute 5 of the walk test where his SOB was 8/10 at highest and RPE 9/10  His exercise BP was 140/82  His hr increased to 145 bpm right at the time of rest during minute 5 of the walk  Mr Mini Horan has agreed to attend pulmonary rehab 2x/ week  I stressed importance of regular attendance  We will monitor cardiac status on first exercise session at minimum and n and pulmonary status throughout and report data per protocol        Medication compliance: Yes   Comments: Fall Risk: Low   Comments:     Smoking: Current user:  :  1 PPD current    RPD @ Rest: 1/10    Assessment of progression of lung disease and functional status:  CAT: 23/40  Shortness of breath questionnaire: 69/120      EXERCISE ASSESSMENT and PLAN    Current Exercise Program in Rehab:       Frequency: 2 days/week        Minutes: 30         METS: 1 8-2 0              SpO2: greater than 90%              RPD: 3-5                      HR: no more than 30 bpm greater than resting    RPE: 4-5         Modalities: Treadmill, UBE, NuStep and Recumbent bike      Exercise Progression 30 Day Goals :    Frequency: 2 days/week        Minutes: 30         METS: 2 0-2 2              SpO2: greater than 90%              RPD: 3-5                      HR: no more than 30 bpm higer than resting hr   RPE: 4-5        Modalities: Treadmill, UBE, NuStep and Recumbent bike     Strength trainin-3 days / week  will add in per patient tolerance   Modalities: Chest Press, Pull Downs, Lateral Raise, Arm Curl and Front Raises    Oxygen Needs: on room air at rest  Oxygen Goal: Maintain SpO2>90% during exercise    Home Exercise: none  Education: pursed lipped breathing, home exercise, benefits of exercise for pulmonary disease, RPE scale, RPD scale, O2 saturation monitoring, appropriate O2 response to exercise and energy conservation    Goals: reduced score on  USCD Shortness of Breath Questionnaire, Improved 6MW distance by 10%, reduced dyspnea during exercise (0-3/10), improved exercise tolerance (max METs tolerated in pulmonary rehab), SpO2 >90% during exercise, improved DUKE activity score, reduced score on CAT, reduced number of COPD exacerbations, attend pulmonary rehab regularly and decrease sitting time at home  Progressing:  Reviewed Pt goals and determined plan of care    Plan: Titrate supplemental oxygen as needed to maintain SpO2>90% with exercise    Readiness to change: Preparation:  (Getting ready to change)       NUTRITION ASSESSMENT AND PLAN    Weight control:    Starting weight: 354   Current weight:       Diabetes: N/A    Goals:choose lean meat (93-95%), eliminate processed meats, reduce portion sizes of meat to 3oz or less, increase intake of fish, shellfish, cook without added fat or use vegetable oil/spray, use low fat dairy, reduce cheese intake or use reduced-fat, eat 3 or more servings of whole grains a day, Eat 4-5 cups of fruits and vegetables daily, choose low sodium processed foods, eliminate butter, use fat-free dressings/arguello or seldom use, choose healthy snacks: light popcorn, plain pretzels, Increase intake of nuts and seeds and seldom eat or choose low fat ice-cream, fruit juice bars or frozen yogurt   Education: heart healthy eating  low sodium diet  hydration  Progressing:Reviewed Pt goals and determined plan of care  Plan: switch to low fat cheeses, replace butter with soft spreads made with olive oil, canola or yogurt, replace refined grain bread with whole grain bread, replace unhealthy snacks with fruits & vegs, reduce portion sizes, reduce red meat 1x/wk, switch to skim or 1% milk, eat fewer desserts and sweets, avoid processed foods, monitor home blood glucose and drink more water  Readiness to change: Preparation:  (Getting ready to change)       PSYCHOSOCIAL ASSESSMENT AND PLAN    Emotional:  Depression assessment:  PHQ-9 = 5-9 = Mild Depression            Anxiety measure:  MEKA-7 = 0-4  = Not anxious  Self-reported stress level: 3   Social support: Very Good    Goals:  PHQ-9 - reduced severity by one level, Physical Fitness in [facility] Score < 3, Daily Activity in [facility] Score < 3, Overall Health in [facility] Score < 3 and increased energy  Education: signs/sxs of depression    Progressing:Reviewed Pt goals and determined plan of care  Plan: PHQ-9 >5 will refer to MD, Practice relaxation techniques, Exercise, Spend time outdoors, Enjoy a hobby, Enjoy family and Repeat PHQ-9 every 30 days if score >5  Readiness to change: Preparation:  (Getting ready to change)       OTHER CORE COMPONENTS     Tobacco:   Social History     Tobacco Use   Smoking Status Current Every Day Smoker    Packs/day: 1 00    Types: Cigarettes    Start date: 1978   Smokeless Tobacco Never Used   Tobacco Comment    Pt states he smokes when he can afford to do so        Tobacco Use Intervention: Referral to tobacco expert:   Pt continues to smoke 1 ppd   Pt is not ready to quit    Blood pressure:    Restin/80   Exercise: 142/82    Goals: consistent BP < 130/80, moderate intensity exercise >150 mins/wk and reduce number of cigarettes/day  Education:  pathophysiology of pulmonary disease, dangers of smoking and bronchodilators  Progressing:Reviewed Pt goals and determined plan of care  Plan: reduce number of cigarettes per day, Avoid Processed foods, engage in regular exercise, eliminate salt shaker at the table, use salt substitutes, check labels for sodium content and monitor home BP  Readiness to change: Contemplation:  (Acknowledging that there is a problem but not yet ready or sure of wanting to make a change)

## 2021-05-19 ENCOUNTER — OFFICE VISIT (OUTPATIENT)
Dept: PULMONOLOGY | Facility: HOSPITAL | Age: 61
End: 2021-05-19

## 2021-05-19 DIAGNOSIS — J42 CHRONIC BRONCHITIS, UNSPECIFIED CHRONIC BRONCHITIS TYPE (HCC): Primary | ICD-10-CM

## 2021-05-19 PROCEDURE — RECHECK: Performed by: INTERNAL MEDICINE

## 2021-05-19 NOTE — PROGRESS NOTES
Medical Director 30 day Pulmonary Rehabilitation Review    I certify that I have met with Tomeka Smith face-to face to provide a 30 day progress review of his/her pulmonary rehabilitation program at [facility]    I have reviewed the most recent individualized treatment plan (ITP), outcomes assessment, and provided opportunity for discussion with the patient    Comments:no issues of concern today    Continue with current treatment plan yes    Please provide the following modifications to the current treatment plan: N/A      Nelly Chavez, DO

## 2021-05-24 ENCOUNTER — CLINICAL SUPPORT (OUTPATIENT)
Dept: PULMONOLOGY | Facility: HOSPITAL | Age: 61
End: 2021-05-24
Payer: MEDICARE

## 2021-05-24 ENCOUNTER — TELEPHONE (OUTPATIENT)
Dept: PULMONOLOGY | Facility: CLINIC | Age: 61
End: 2021-05-24

## 2021-05-24 DIAGNOSIS — J44.9 CHRONIC OBSTRUCTIVE PULMONARY DISEASE, UNSPECIFIED COPD TYPE (HCC): ICD-10-CM

## 2021-05-24 PROCEDURE — G0424 PULMONARY REHAB W EXER: HCPCS

## 2021-05-26 ENCOUNTER — CLINICAL SUPPORT (OUTPATIENT)
Dept: PULMONOLOGY | Facility: HOSPITAL | Age: 61
End: 2021-05-26
Payer: MEDICARE

## 2021-05-26 ENCOUNTER — APPOINTMENT (OUTPATIENT)
Dept: PULMONOLOGY | Facility: HOSPITAL | Age: 61
End: 2021-05-26
Payer: MEDICARE

## 2021-05-26 DIAGNOSIS — J44.9 CHRONIC OBSTRUCTIVE PULMONARY DISEASE, UNSPECIFIED COPD TYPE (HCC): ICD-10-CM

## 2021-05-26 PROCEDURE — G0424 PULMONARY REHAB W EXER: HCPCS

## 2021-06-02 ENCOUNTER — CLINICAL SUPPORT (OUTPATIENT)
Dept: PULMONOLOGY | Facility: HOSPITAL | Age: 61
End: 2021-06-02
Payer: MEDICARE

## 2021-06-02 DIAGNOSIS — J42 CHRONIC BRONCHITIS, UNSPECIFIED CHRONIC BRONCHITIS TYPE (HCC): ICD-10-CM

## 2021-06-02 PROCEDURE — G0424 PULMONARY REHAB W EXER: HCPCS

## 2021-06-04 ENCOUNTER — OFFICE VISIT (OUTPATIENT)
Dept: CARDIOLOGY CLINIC | Facility: CLINIC | Age: 61
End: 2021-06-04
Payer: MEDICARE

## 2021-06-04 VITALS
DIASTOLIC BLOOD PRESSURE: 78 MMHG | WEIGHT: 315 LBS | SYSTOLIC BLOOD PRESSURE: 128 MMHG | BODY MASS INDEX: 42.66 KG/M2 | HEIGHT: 72 IN | HEART RATE: 79 BPM

## 2021-06-04 DIAGNOSIS — I50.32 CHRONIC DIASTOLIC CONGESTIVE HEART FAILURE (HCC): Chronic | ICD-10-CM

## 2021-06-04 DIAGNOSIS — E78.5 DYSLIPIDEMIA: Chronic | ICD-10-CM

## 2021-06-04 DIAGNOSIS — G47.33 OSA (OBSTRUCTIVE SLEEP APNEA): ICD-10-CM

## 2021-06-04 DIAGNOSIS — I48.19 PERSISTENT ATRIAL FIBRILLATION (HCC): Primary | ICD-10-CM

## 2021-06-04 PROCEDURE — 93000 ELECTROCARDIOGRAM COMPLETE: CPT | Performed by: INTERNAL MEDICINE

## 2021-06-04 PROCEDURE — 99214 OFFICE O/P EST MOD 30 MIN: CPT | Performed by: INTERNAL MEDICINE

## 2021-06-04 NOTE — PROGRESS NOTES
Cardiology Follow Up    Gabe Tyson  1960  [id number]  HEART & VASCULAR  [facility] [place]  [address] [facility] [address]    1  Persistent atrial fibrillation (HCC)  POCT ECG   2  Chronic diastolic congestive heart failure ([address] )     3  FABIAN (obstructive sleep apnea)     4  Dyslipidemia           Discussion/Summary:    1  Persistent atrial fibrillation - Americo heart rates are well controlled  No changes were made to his medical therapy  He is not able to afford the new anticoagulant, and chooses not to go on Coumadin, therefore remain on aspirin for stroke prevention  I continue to recommend full-dose anticoagulation  2   Chronic diastolic CHF - He remains chronically volume overloaded, but this has been stable over the last few visits  He is compliant on torsemide and potassium supplementation  His blood work has remained stable  We went over following a low-sodium diet  He will be back to see us in 6 months  3   Shortness of breath with exertion - this has worsened in the last year  We did do updated cardiac testing which was unchanged this past November  He has normal LV systolic function and his stress nuclear study was normal   I do feel that most of this is coming from his obesity hypoventilation syndrome/obstructive sleep apnea along with COPD  He will continue to follow with Pulmonary Medicine  Interval History:    Kellee Mayo comes in for follow-up given his persistent atrial fibrillation and right-sided CHF/edema  Kellee Mayo was in the hospital with acute pulmonary embolism a few years back when I met him  He was also found to be in atrial fibrillation and rates were rapid during his acute state  He carried this history of atrial fibrillation for many years  When he was admitted he was not on any medications, as he was homeless living in his car    With treating his PE and adjusting his heart rate medications, his AF became under good control  Echocardiogram demonstrated normal LV systolic function  There was a dilated RV and biatrial enlargement  There was also mild to moderate pulmonary hypertension  He was also very volume overloaded, and torsemide was started  In follow-up Nini Corcoran looked grossly volume overloaded and his weight was significantly up  He has significant lower extremity edema  He was not taking his torsemide as directed  He was homeless, usually staying in his car or shelter and because of the problems with having to use the bathroom he only was mamie his torsemide 3 times a week  However, now he lives at her residence and has been taking torsemide regularly  Nini Corcoran did changed insurances and had to see another cardiologist about 2 years ago  He was placed on digoxin, is atrial fibrillation is under good control  However he was not able to afford Xarelto and came off of this, and has just been taking aspirin  He chose not to go on warfarin  At our last visit Nini Corcoran was having issues with worsening shortness of breath and therefore we did updated cardiac testing  Echocardiogram showed no significant change with normal LV systolic function, just a mildly dilated RV and biatrial enlargement  We did an ischemic evaluation with stress nuclear study which was normal   He does have obstructive sleep apnea and is compliant on CPAP  He has been seen pulmonary medicine his well as he was also diagnosed with COPD  Nini Corcoran feels about the same as last visit  He does have shortness of breath with exertion but is comfortable at rest   No orthopnea or PND  He does hook with lower extremity edema, mostly controlled on torsemide  He denies chest pain or any symptoms of angina  He denies palpitations or any symptoms of his atrial fibrillation  No lightheadedness or syncope        Patient Active Problem List   Diagnosis    Hypertension    Chronic diastolic congestive heart failure ([facility] )    History of pulmonary embolism    Dyslipidemia    Kidney stone    Morbid obesity ([place] [address] )    Kidney stones    Swallowing dysfunction    Tobacco abuse    BMI 50 0-59 9, adult ([facility])    Graves disease    Goiter    Hyperglycemia    Postablative hypothyroidism    Right thyroid nodule    History of hyperthyroidism    Status post radioactive iodine thyroid ablation    FABIAN (obstructive sleep apnea)    Chronic obstructive pulmonary disease (HCC)    Elevated parathyroid hormone    Hypercalcemia    Vitamin D deficiency     Past Medical History:   Diagnosis Date    Acute diastolic congestive heart failure ([place] [address] ) 7/3/2017    Atrial fibrillation ([facility])     Atrial fibrillation with rapid ventricular response ([place] [address] ) 3/31/2017    Bilateral edema of lower extremity 8/22/2016    BMI 45 0-49 9, adult ([place] [address] ) 2/17/2020    Encouraged lifestyle modification to incorporate moderate physiscal activity and DASH diet/low fat/low carb      CAD (coronary artery disease) 10/28/2016    Chest pain 8/22/2016    CPAP (continuous positive airway pressure) dependence     Dysphagia 9/29/2019    Homelessness 7/3/2017    Hyperlipidemia     Hypertension     Hypertensive heart disease with heart failure ([place] [address] ) 10/27/2020    Hyperthyroidism 8/22/2016    Kidney stone     Pneumothorax     Presence of IVC filter     Pulmonary embolism ([facility])     Pulmonary hypertension ([place] [address] ) 9/15/2016    Rash 10/28/2016    Sepsis ([place] [address] ) 8/14/2019    Sleep apnea     SOB (shortness of breath) 8/22/2016    Tachycardia 8/22/2016     Social History     Socioeconomic History    Marital status: Single     Spouse name: Not on file    Number of children: Not on file    Years of education: Not on file    Highest education level: Not on file   Occupational History     Comment: disability   Social Needs    Financial resource strain: Not on file    Food insecurity     Worry: Not on file     Inability: Not on file    Transportation needs     Medical: Not on file Non-medical: Not on file   Tobacco Use    Smoking status: Current Every Day Smoker     Packs/day: 1 00     Types: Cigarettes     Start date: 1    Smokeless tobacco: Never Used    Tobacco comment: Pt states he smokes when he can afford to do so    Substance and Sexual Activity    Alcohol use: No    Drug use: No    Sexual activity: Not on file   Lifestyle    Physical activity     Days per week: Not on file     Minutes per session: Not on file    Stress: Not on file   Relationships    Social connections     Talks on phone: Not on file     Gets together: Not on file     Attends Holiness service: Not on file     Active member of club or organization: Not on file     Attends meetings of clubs or organizations: Not on file     Relationship status: Not on file    Intimate partner violence     Fear of current or ex partner: Not on file     Emotionally abused: Not on file     Physically abused: Not on file     Forced sexual activity: Not on file   Other Topics Concern    Not on file   Social History Narrative    Not on file      Family History   Problem Relation Age of Onset    Cancer Mother         metastatic, breast primary    Breast cancer Mother     Heart disease Father     Lung disease Father     Thyroid disease unspecified Father         thyroidectomy    Thyroid disease unspecified Sister     Hyperthyroidism Sister         in remission     Past Surgical History:   Procedure Laterality Date    EGD AND COLONOSCOPY N/A 7/28/2017    Procedure: EGD AND COLONOSCOPY;  Surgeon: René Hawk MD;  Location:  MAIN OR;  Service: Gastroenterology    FL RETROGRADE PYELOGRAM  8/13/2019    FL RETROGRADE PYELOGRAM  9/27/2019    IVC FILTER INSERTION      LITHOTRIPSY      SD CYSTO/URETERO W/LITHOTRIPSY &INDWELL STENT INSRT Right 8/13/2019    Procedure: CYSTOSCOPY URETEROSCOPY WITH LITHOTRIPSY HOLMIUM LASER, RETROGRADE PYELOGRAM AND INSERTION STENT URETERAL;  Surgeon: Alex Medina MD;  Location:  MAIN OR;  Service: Urology    OH CYSTO/URETERO W/LITHOTRIPSY &INDWELL STENT INSRT Right 9/27/2019    Procedure: CYSTOSCOPY URETEROSCOPY WITH LITHOTRIPSY HOLMIUM LASER, RETROGRADE PYELOGRAM AND INSERTION STENT URETERAL;  Surgeon: Tono Dean MD;  Location: [facility] MAIN OR;  Service: Urology    US GUIDED THYROID BIOPSY  3/23/2021       Current Outpatient Medications:     albuterol (ProAir HFA) 90 mcg/act inhaler, Inhale 2 puffs every 4 (four) hours as needed for wheezing or shortness of breath, Disp: 8 5 g, Rfl: 0    aspirin (ECOTRIN LOW STRENGTH) 81 mg EC tablet, Take 81 mg by mouth daily, Disp: , Rfl:     atorvastatin (LIPITOR) 40 mg tablet, Take 1 tablet by mouth once daily, Disp: 90 tablet, Rfl: 1    digoxin (LANOXIN) 0 25 mg tablet, Take 1 tablet (250 mcg total) by mouth daily, Disp: 90 tablet, Rfl: 3    ergocalciferol (VITAMIN D2) 50,000 units, Take 1 capsule (50,000 Units total) by mouth once a week, Disp: 12 capsule, Rfl: 1    methimazole (TAPAZOLE) 10 mg tablet, Take 1 tablet (10 mg total) by mouth daily (Patient taking differently: Take 5 mg by mouth daily 5 mg alternating with 10 mg every other day), Disp: 90 tablet, Rfl: 1    metoprolol tartrate (LOPRESSOR) 50 mg tablet, Take 1 tablet (50 mg total) by mouth 2 (two) times a day, Disp: 180 tablet, Rfl: 3    potassium chloride (K-DUR,KLOR-CON) 20 mEq tablet, Take 1 tablet (20 mEq total) by mouth 2 (two) times a day, Disp: 180 tablet, Rfl: 1    torsemide (DEMADEX) 20 mg tablet, Take 2 tablets (40 mg total) by mouth daily, Disp: 180 tablet, Rfl: 0    umeclidinium-vilanterol (ANORO ELLIPTA) 62 5-25 MCG/INH inhaler, Inhale 1 puff daily (Patient not taking: Reported on 6/4/2021), Disp: 1 each, Rfl: 3  No Known Allergies    Labs:  Lab Results   Component Value Date     02/15/2017    K 3 6 04/06/2021    K 4 6 05/13/2020     04/06/2021     05/13/2020    CO2 29 04/06/2021    CO2 24 05/13/2020    BUN 14 04/06/2021    BUN 13 05/13/2020 CREATININE 0 88 04/06/2021    CREATININE 0 72 02/15/2017    GLUCOSE 113 11/30/2015    CALCIUM 9 3 04/06/2021    CALCIUM 9 6 08/31/2018     Imaging:  ECG obtained in the office demonstrated atrial fibrillation with a heart rate of 79 bpm     STRESS NUCLEAR (11/2020):  SUMMARY:  -  Stress results: There was no chest pain during stress  -  ECG conclusions: The stress ECG was negative for ischemia and normal  Arrhythmia during stress: Persistence of atrial fibrillation   -  Perfusion imaging: There were no perfusion defects   -  Gated SPECT: The calculated left ventricular ejection fraction was 63 %  Left ventricular ejection fraction was within normal limits by visual estimate  There was no left ventricular regional abnormality      IMPRESSIONS: Normal study after pharmacologic stress  Myocardial perfusion imaging was normal at rest and with stress  Left ventricular systolic function was normal       ECHO (11/2020):  LEFT VENTRICLE:  Systolic function was normal  Ejection fraction was estimated to be 60 %  There were no regional wall motion abnormalities  Wall thickness was mildly increased      RIGHT VENTRICLE:  The ventricle was mildly dilated  Systolic function was normal      LEFT ATRIUM:  The atrium was mildly dilated      RIGHT ATRIUM:  The atrium was mildly to moderately dilated      MITRAL VALVE:  There was mild annular calcification  There was trace regurgitation      TRICUSPID VALVE:  There was mild to moderate regurgitation  Pulmonary artery systolic pressure was mildly increased  Review of Systems:  Review of Systems   Constitutional: Positive for fatigue  HENT: Negative  Eyes: Negative  Respiratory: Positive for shortness of breath  Cardiovascular: Positive for leg swelling  Gastrointestinal: Negative  Musculoskeletal: Negative  Skin: Negative  Allergic/Immunologic: Negative  Neurological: Negative  Hematological: Negative  Psychiatric/Behavioral: Negative      All other systems reviewed and are negative  Vitals:    06/04/21 0856   BP: 128/78   BP Location: Left arm   Patient Position: Sitting   Cuff Size: Large   Pulse: 79   Weight: (!) 161 kg (355 lb 12 8 oz)   Height: 6' (1 829 m)     Physical Exam:  Physical Exam   Constitutional: He is oriented to person, place, and time  He appears well-developed and well-nourished  HENT:   Head: Normocephalic and atraumatic  Eyes: Pupils are equal, round, and reactive to light  EOM are normal  Right eye exhibits no discharge  Left eye exhibits no discharge  No scleral icterus  Neck: Normal range of motion  Neck supple  No JVD present  No thyromegaly present  Cardiovascular: Normal rate, S1 normal, S2 normal, normal heart sounds and intact distal pulses  An irregularly irregular rhythm present  Exam reveals decreased pulses  Exam reveals no gallop and no friction rub  No murmur heard  Pulmonary/Chest: Effort normal  No respiratory distress  He has decreased breath sounds  He has no wheezes  He has no rales  Abdominal: Soft  Bowel sounds are normal  He exhibits no distension  There is no abdominal tenderness  Musculoskeletal: Normal range of motion  General: Edema present  No tenderness or deformity  Neurological: He is alert and oriented to person, place, and time  No cranial nerve deficit  Skin: Skin is warm and dry  No rash noted  Psychiatric: He has a normal mood and affect  Judgment and thought content normal    Nursing note and vitals reviewed  Counseling / Coordination of Care  Total floor / unit time spent today 25 minutes  Greater than 50% of total time was spent with the patient and / or family counseling and / or coordination of care

## 2021-06-07 ENCOUNTER — CLINICAL SUPPORT (OUTPATIENT)
Dept: PULMONOLOGY | Facility: HOSPITAL | Age: 61
End: 2021-06-07
Payer: MEDICARE

## 2021-06-07 DIAGNOSIS — J44.9 CHRONIC OBSTRUCTIVE PULMONARY DISEASE, UNSPECIFIED COPD TYPE (HCC): ICD-10-CM

## 2021-06-07 PROCEDURE — G0424 PULMONARY REHAB W EXER: HCPCS

## 2021-06-09 ENCOUNTER — APPOINTMENT (OUTPATIENT)
Dept: PULMONOLOGY | Facility: HOSPITAL | Age: 61
End: 2021-06-09
Payer: MEDICARE

## 2021-06-10 NOTE — PROGRESS NOTES
Pulmonary Rehabilitation Plan of Care   30 Day Reassessment      Today's date: 6/10/2021   # of Exercise Sessions Completed: initial  Patient name: Alex Olmedo      : 1960  Age: 63 y o  MRN: [id number]  Referring Physician: Mau Patel MD  Pulmonologist: Mau Patel MD  Provider: Jose Antonio mckinney  Clinician: Aristeo Robertson, CRT    Dx:   Encounter Diagnosis   Name Primary?  Chronic obstructive pulmonary disease, unspecified COPD type [facility])      Date of onset: 21      SUMMARY OF PROGRESS:     Mr Oxana Cheney  is being re-evaluated for his 30 day  progress in the pulmonary rehab program   To date he has attended 4 exercise  sessions  He has a hx of COPD, hypoventilation syndrome,  hypertension, A fib, CAD, PE ( filter sx 2017)), FABIAN ( for which he wear bipap 16/12 cm H20 hs), high cholesterol  He has smoked for the past 43 years at times up to 3 packs per day  He does continue to smoke   His goals are to decrease SOB, increase energy and be able to do ADL's better and not have to rest as much  He has progressed to 30-35 minutes of exercise during class and is able to exercise on room air maintaining his Sao2 in the low to mid 90's   His resting BP is 124/78 and resting HR is  75  Mr Oxana Cheney  exercise HR is around 90 bpm with  RPE ranges between 4-6/10 with his SOB still varying depending on the day and exercise  His exercise heart rate is more stable than when he arrived for initial evaluation where it increased significantly on his 6 MWT compared to rest  Mr Oxana Cheney Met level is currently 3 1  Mr Oxana Cheney does have some limitations as to which exercises he can perform   Walking on the treadmill seems to be a problem for him  However he does use a bicycle, Nustep and upper body machine at class  Mr Oxana Cheney has attended a few formal education meetings here at pulmonary rehab and I believe he is benefiting from the education   He has agreed to continue with  pulmonary rehab 2x/week where he will be monitored and results reported per protocol  Medication compliance: Yes   Comments: Fall Risk: Low   Comments:     Smoking: Current user:  :  1 PPD current    RPD @ Rest: 1/10    Assessment of progression of lung disease and functional status:  CAT: 23/40  Shortness of breath questionnaire: 69/120      EXERCISE ASSESSMENT and PLAN    Current Exercise Program in Rehab:       Frequency: 2 days/week        Minutes: 30         METS: 1 8-2 0              SpO2: greater than 90%              RPD: 3-5                      HR: no more than 30 bpm greater than resting    RPE: 4-5         Modalities: Treadmill, UBE, NuStep and Recumbent bike      Exercise Progression 30 Day Goals :    Frequency: 2 days/week        Minutes: 30         METS: 2 0-2 2              SpO2: greater than 90%              RPD: 3-5                      HR: no more than 30 bpm higer than resting hr   RPE: 4-5        Modalities: Airdyne bike, UBE, NuStep and Recumbent bike     Strength training:  has not been able to tolerate yet   Modalities: none    Oxygen Needs: on room air at rest  Oxygen Goal: Maintain SpO2>90% during exercise    Home Exercise: none  Education: pursed lipped breathing, home exercise, benefits of exercise for pulmonary disease, RPE scale, RPD scale, O2 saturation monitoring, appropriate O2 response to exercise and energy conservation    Goals: reduced score on  USCD Shortness of Breath Questionnaire, Improved 6MW distance by 10%, reduced dyspnea during exercise (0-3/10), improved exercise tolerance (max METs tolerated in pulmonary rehab), SpO2 >90% during exercise, improved DUKE activity score, reduced score on CAT, reduced number of COPD exacerbations, attend pulmonary rehab regularly and decrease sitting time at home  Progressing:  Pt is progressing and showing improvement  toward the following goals:  patient is able to use pursed lip breathing techniques for SOB control        Plan: Titrate supplemental oxygen as needed to maintain SpO2>90% with exercise    Readiness to change: Action:  (Changing behavior)      NUTRITION ASSESSMENT AND PLAN    Weight control:    Starting weight: 354   Current weight:       Diabetes: N/A    Goals:choose lean meat (93-95%), eliminate processed meats, reduce portion sizes of meat to 3oz or less, increase intake of fish, shellfish, cook without added fat or use vegetable oil/spray, use low fat dairy, reduce cheese intake or use reduced-fat, eat 3 or more servings of whole grains a day, Eat 4-5 cups of fruits and vegetables daily, choose low sodium processed foods, eliminate butter, use fat-free dressings/arguello or seldom use, choose healthy snacks: light popcorn, plain pretzels, Increase intake of nuts and seeds and seldom eat or choose low fat ice-cream, fruit juice bars or frozen yogurt   Education: heart healthy eating  low sodium diet  hydration  Progressing:Reviewed Pt goals and determined plan of care  Plan: switch to low fat cheeses, replace butter with soft spreads made with olive oil, canola or yogurt, replace refined grain bread with whole grain bread, replace unhealthy snacks with fruits & vegs, reduce portion sizes, reduce red meat 1x/wk, switch to skim or 1% milk, eat fewer desserts and sweets, avoid processed foods, monitor home blood glucose and drink more water  Readiness to change: Preparation:  (Getting ready to change)       PSYCHOSOCIAL ASSESSMENT AND PLAN    Emotional:  Depression assessment:  PHQ-9 = 5-9 = Mild Depression            Anxiety measure:  MEKA-7 = 0-4  = Not anxious  Self-reported stress level: 3   Social support: Very Good    Goals:  PHQ-9 - reduced severity by one level, Physical Fitness in [facility] Score < 3, Daily Activity in [facility] Score < 3, Overall Health in [facility] Score < 3 and increased energy  Education: signs/sxs of depression    Progressing:Reviewed Pt goals and determined plan of care  Plan: PHQ-9 >5 will refer to MD, Practice relaxation techniques, Exercise, Spend time outdoors, Enjoy a hobby, Enjoy family and Repeat PHQ-9 every 30 days if score >5  Readiness to change: Preparation:  (Getting ready to change)       OTHER CORE COMPONENTS     Tobacco:   Social History     Tobacco Use   Smoking Status Current Every Day Smoker    Packs/day: 1 00    Types: Cigarettes    Start date:    Smokeless Tobacco Never Used   Tobacco Comment    Pt states he smokes when he can afford to do so        Tobacco Use Intervention: Referral to tobacco expert:   Pt continues to smoke 1 ppd   Pt is not ready to quit    Blood pressure:    Restin/78   Exercise: 142/82    Goals: consistent BP < 130/80, moderate intensity exercise >150 mins/wk and reduce number of cigarettes/day  Education:  pathophysiology of pulmonary disease, dangers of smoking and bronchodilators  Progressing:Reviewed Pt goals and determined plan of care  Plan: reduce number of cigarettes per day, Avoid Processed foods, engage in regular exercise, eliminate salt shaker at the table, use salt substitutes, check labels for sodium content and monitor home BP  Readiness to change: Preparation:  (Getting ready to change)

## 2021-06-10 NOTE — PROGRESS NOTES
[address] Cardiopulmonary Rehab  [place]        Dear Maritza Macedo    Emotional well-being and depression is addressed in the Pulmonary rehab evaluation  To assess the severity of depression, patients are given the PHQ-9 Depression Questionnaire  This is to inform you that your patient Payton Jack scored a 5 which is interpreted as 5-9 = Mild Depression  Your patient was provided contact information for [facility] and has been encouraged to enroll in [employer]  A repeat PHQ -9 will be administered in 30 days to assess improvement  Thank you for your support of cardiopulmonary rehab      Sincerely,      Yina Jaramillo, CRT

## 2021-06-14 ENCOUNTER — CLINICAL SUPPORT (OUTPATIENT)
Dept: PULMONOLOGY | Facility: HOSPITAL | Age: 61
End: 2021-06-14
Payer: MEDICARE

## 2021-06-14 DIAGNOSIS — J44.9 CHRONIC OBSTRUCTIVE PULMONARY DISEASE, UNSPECIFIED COPD TYPE (HCC): ICD-10-CM

## 2021-06-14 PROCEDURE — G0424 PULMONARY REHAB W EXER: HCPCS

## 2021-06-15 ENCOUNTER — OFFICE VISIT (OUTPATIENT)
Dept: PULMONOLOGY | Facility: HOSPITAL | Age: 61
End: 2021-06-15

## 2021-06-15 DIAGNOSIS — J42 CHRONIC BRONCHITIS, UNSPECIFIED CHRONIC BRONCHITIS TYPE (HCC): Primary | ICD-10-CM

## 2021-06-15 PROCEDURE — RECHECK: Performed by: INTERNAL MEDICINE

## 2021-06-15 NOTE — PROGRESS NOTES
Medical Director 30 day Pulmonary Rehabilitation Review    I certify that I have met with Manual  face-to face to provide a 30 day progress review of his pulmonary rehabilitation program at [address]  I have reviewed the most recent individualized treatment plan (ITP), outcomes assessment, and provided opportunity for discussion with the patient    Comments: He states that he has been doing well  He continues to note clinical improvement with rehab though it is slow  He denies any difficulty or complaints with therapy        Continue with current treatment plan yes    Please provide the following modifications to the current treatment plan: N/A      Benji Urrutia, DO

## 2021-06-16 ENCOUNTER — LAB (OUTPATIENT)
Dept: LAB | Facility: HOSPITAL | Age: 61
End: 2021-06-16
Payer: MEDICARE

## 2021-06-16 DIAGNOSIS — E34.9 ELEVATED PARATHYROID HORMONE: ICD-10-CM

## 2021-06-16 DIAGNOSIS — E89.0 POSTABLATIVE HYPOTHYROIDISM: ICD-10-CM

## 2021-06-16 DIAGNOSIS — E83.52 HYPERCALCEMIA: ICD-10-CM

## 2021-06-16 DIAGNOSIS — E05.00 GRAVES DISEASE: ICD-10-CM

## 2021-06-16 DIAGNOSIS — Z86.39 HISTORY OF HYPERTHYROIDISM: ICD-10-CM

## 2021-06-16 LAB
25(OH)D3 SERPL-MCNC: 42.2 NG/ML (ref 30–100)
ALBUMIN SERPL BCP-MCNC: 3.2 G/DL (ref 3.5–5)
ALP SERPL-CCNC: 111 U/L (ref 46–116)
ALT SERPL W P-5'-P-CCNC: 27 U/L (ref 12–78)
ANION GAP SERPL CALCULATED.3IONS-SCNC: 5 MMOL/L (ref 4–13)
AST SERPL W P-5'-P-CCNC: 26 U/L (ref 5–45)
BILIRUB SERPL-MCNC: 0.51 MG/DL (ref 0.2–1)
BUN SERPL-MCNC: 16 MG/DL (ref 5–25)
CALCIUM ALBUM COR SERPL-MCNC: 9.6 MG/DL (ref 8.3–10.1)
CALCIUM SERPL-MCNC: 9 MG/DL (ref 8.3–10.1)
CHLORIDE SERPL-SCNC: 105 MMOL/L (ref 100–108)
CO2 SERPL-SCNC: 29 MMOL/L (ref 21–32)
CREAT SERPL-MCNC: 0.84 MG/DL (ref 0.6–1.3)
GFR SERPL CREATININE-BSD FRML MDRD: 96 ML/MIN/1.73SQ M
GLUCOSE P FAST SERPL-MCNC: 144 MG/DL (ref 65–99)
POTASSIUM SERPL-SCNC: 4 MMOL/L (ref 3.5–5.3)
PROT SERPL-MCNC: 8 G/DL (ref 6.4–8.2)
PTH-INTACT SERPL-MCNC: 153.1 PG/ML (ref 18.4–80.1)
SODIUM SERPL-SCNC: 139 MMOL/L (ref 136–145)
T3FREE SERPL-MCNC: 3.77 PG/ML (ref 2.3–4.2)
T4 FREE SERPL-MCNC: 1.28 NG/DL (ref 0.76–1.46)
TSH SERPL DL<=0.05 MIU/L-ACNC: <0.007 UIU/ML (ref 0.36–3.74)

## 2021-06-16 PROCEDURE — 84481 FREE ASSAY (FT-3): CPT

## 2021-06-16 PROCEDURE — 36415 COLL VENOUS BLD VENIPUNCTURE: CPT

## 2021-06-16 PROCEDURE — 80053 COMPREHEN METABOLIC PANEL: CPT

## 2021-06-16 PROCEDURE — 84443 ASSAY THYROID STIM HORMONE: CPT

## 2021-06-16 PROCEDURE — 83970 ASSAY OF PARATHORMONE: CPT

## 2021-06-16 PROCEDURE — 82306 VITAMIN D 25 HYDROXY: CPT

## 2021-06-16 PROCEDURE — 84439 ASSAY OF FREE THYROXINE: CPT

## 2021-06-17 ENCOUNTER — OFFICE VISIT (OUTPATIENT)
Dept: FAMILY MEDICINE CLINIC | Facility: HOSPITAL | Age: 61
End: 2021-06-17
Payer: MEDICARE

## 2021-06-17 VITALS
HEIGHT: 72 IN | DIASTOLIC BLOOD PRESSURE: 56 MMHG | BODY MASS INDEX: 42.66 KG/M2 | WEIGHT: 315 LBS | TEMPERATURE: 96.8 F | HEART RATE: 78 BPM | SYSTOLIC BLOOD PRESSURE: 128 MMHG

## 2021-06-17 DIAGNOSIS — L30.9 DERMATITIS: Primary | ICD-10-CM

## 2021-06-17 DIAGNOSIS — E05.00 GRAVES DISEASE: ICD-10-CM

## 2021-06-17 DIAGNOSIS — E89.0 POSTABLATIVE HYPOTHYROIDISM: Primary | ICD-10-CM

## 2021-06-17 PROCEDURE — 99213 OFFICE O/P EST LOW 20 MIN: CPT | Performed by: INTERNAL MEDICINE

## 2021-06-17 RX ORDER — METHIMAZOLE 10 MG/1
TABLET ORAL
Start: 2021-06-17 | End: 2021-07-14 | Stop reason: SDUPTHER

## 2021-06-17 RX ORDER — TRIAMCINOLONE ACETONIDE 5 MG/G
CREAM TOPICAL 2 TIMES DAILY
Qty: 30 G | Refills: 1 | Status: SHIPPED | OUTPATIENT
Start: 2021-06-17 | End: 2021-09-29 | Stop reason: HOSPADM

## 2021-06-17 NOTE — PROGRESS NOTES
Assessment/Plan:       Diagnoses and all orders for this visit:    Dermatitis  Comments:  Skin at neck with mild irritation - poss d/t photosensitivity with Torsemide (but does not involve arms which were also in the sun) - reassured did not appear to be an infection nor does it appear to be shingles and is B/L, will try trial of steroid cream bid x 10 days (3 days off) - may repeat cycle x 2 if needed, advised to avoid sun and call with new/worse rash OR if not better at all  Orders:  -     triamcinolone (KENALOG) 0 5 % cream; Apply topically 2 (two) times a day          Subjective:      Patient ID: Evalina Duverney is a 63 y o  male  HPI Pt here with a rash on his neck    He went to bed last night and felt fine and woke up this am with a red rash on his neck  He notes "it itches but it don't"  He denies any pain  He notes no F/C and does not feel ill  He denies any new meds/changes in meds/new soaps or lotions or detergents  He was out in the sun yesterday  He notes no one else at home with similar rash  He has not tried anything on the rash since it occurred this am           Review of Systems   Constitutional: Negative for chills and fever  HENT: Negative for congestion and sinus pain  Respiratory: Positive for shortness of breath  Negative for cough  Cardiovascular: Negative for chest pain and palpitations  Gastrointestinal: Negative for diarrhea, nausea and vomiting  Skin: Positive for rash  Negative for wound  Neurological: Negative for dizziness and headaches  Psychiatric/Behavioral: Negative for behavioral problems and confusion  Objective:    /56   Pulse 78   Temp (!) 96 8 °F (36 °C) (Tympanic)   Ht 6' (1 829 m)   Wt (!) 165 kg (363 lb 3 2 oz)   BMI 49 26 kg/m²      Physical Exam  Vitals and nursing note reviewed  Constitutional:       General: He is not in acute distress  Appearance: He is well-developed  He is obese  He is not ill-appearing     HENT:      Head: Normocephalic and atraumatic  Eyes:      General:         Right eye: No discharge  Left eye: No discharge  Conjunctiva/sclera: Conjunctivae normal    Neck:      Trachea: No tracheal deviation  Cardiovascular:      Rate and Rhythm: Normal rate and regular rhythm  Heart sounds: No murmur heard  Pulmonary:      Effort: Pulmonary effort is normal  No respiratory distress  Breath sounds: Normal breath sounds  No wheezing, rhonchi or rales  Musculoskeletal:         General: No deformity or signs of injury  Cervical back: Neck supple  Skin:     General: Skin is warm and dry  Coloration: Skin is not pale  Comments: Ill defined area or redness around neck R>L side, no papules/pustules/pain/warmth noted   Neurological:      Mental Status: He is alert  Motor: No abnormal muscle tone  Psychiatric:         Behavior: Behavior normal          Thought Content:  Thought content normal          Judgment: Judgment normal

## 2021-06-18 ENCOUNTER — TELEPHONE (OUTPATIENT)
Dept: FAMILY MEDICINE CLINIC | Facility: HOSPITAL | Age: 61
End: 2021-06-18

## 2021-06-18 NOTE — TELEPHONE ENCOUNTER
Pt would like to know if Mayur Rodriges filled out disability forms that he dropped off  He left this message on the voicemail so does anyone know anything about these forms?

## 2021-06-21 ENCOUNTER — CLINICAL SUPPORT (OUTPATIENT)
Dept: PULMONOLOGY | Facility: HOSPITAL | Age: 61
End: 2021-06-21
Payer: MEDICARE

## 2021-06-21 DIAGNOSIS — J42 CHRONIC BRONCHITIS, UNSPECIFIED CHRONIC BRONCHITIS TYPE (HCC): ICD-10-CM

## 2021-06-21 PROCEDURE — G0424 PULMONARY REHAB W EXER: HCPCS

## 2021-06-21 NOTE — PROGRESS NOTES
Ariana Yuen        Dear Dr Armin Alvarado,    Emotional well-being and depression is addressed in the Pulmonary rehab evaluation  To assess the severity of depression, patients are given the PHQ-9 Depression Questionnaire  This is to inform you that your patient Jennifer Do scored a  13 which is interpreted as 15-19 = Moderately Severe Depression  Your patient was provided contact information for [facility] and has been encouraged to enroll in [facility]  A repeat PHQ -9 will be administered in 30 days to assess improvement  Thank you for your support of cardiopulmonary rehab      Sincerely,      Kay Omer, CRT

## 2021-06-23 ENCOUNTER — CLINICAL SUPPORT (OUTPATIENT)
Dept: PULMONOLOGY | Facility: HOSPITAL | Age: 61
End: 2021-06-23
Payer: MEDICARE

## 2021-06-23 DIAGNOSIS — J42 CHRONIC BRONCHITIS, UNSPECIFIED CHRONIC BRONCHITIS TYPE (HCC): ICD-10-CM

## 2021-06-23 PROCEDURE — G0424 PULMONARY REHAB W EXER: HCPCS

## 2021-06-28 ENCOUNTER — CLINICAL SUPPORT (OUTPATIENT)
Dept: PULMONOLOGY | Facility: HOSPITAL | Age: 61
End: 2021-06-28
Payer: MEDICARE

## 2021-06-28 DIAGNOSIS — J44.9 CHRONIC OBSTRUCTIVE PULMONARY DISEASE, UNSPECIFIED COPD TYPE (HCC): ICD-10-CM

## 2021-06-28 PROCEDURE — G0424 PULMONARY REHAB W EXER: HCPCS

## 2021-06-30 ENCOUNTER — CLINICAL SUPPORT (OUTPATIENT)
Dept: PULMONOLOGY | Facility: HOSPITAL | Age: 61
End: 2021-06-30
Payer: MEDICARE

## 2021-06-30 DIAGNOSIS — J44.9 CHRONIC OBSTRUCTIVE PULMONARY DISEASE, UNSPECIFIED COPD TYPE (HCC): ICD-10-CM

## 2021-06-30 PROCEDURE — G0424 PULMONARY REHAB W EXER: HCPCS

## 2021-07-01 NOTE — PROGRESS NOTES
Pulmonary Rehabilitation Plan of Care   60 Day Reassessment      Today's date: 2021   # of Exercise Sessions Completed: 9  Patient name: Tomeka Smith      : 1960  Age: 63 y o  MRN: [id number]  Referring Physician: Carlos Holt MD  Pulmonologist: Carlos Holt MD  Provider: Toan  Clinician: Fay Rodriguez CRT    Dx:   Encounter Diagnosis   Name Primary?  Chronic obstructive pulmonary disease, unspecified COPD type [facility])      Date of onset: 21      SUMMARY OF PROGRESS:     Mr Elliot Jenkins  is being re-evaluated for his 60 day  progress in the pulmonary rehab program   To date he has attended 9 exercise  Sessions and is progressing nicely in class  He has a hx of COPD, hypoventilation syndrome,  hypertension, A fib, CAD, PE ( filter sx 2017)), FABIAN ( for which he wear bipap 16/12 cm H20 hs), high cholesterol  He has smoked for the past 43 years at times up to 3 packs per day  He does continue to smoke   His goals are to decrease SOB, increase energy and be able to do ADL's better and not have to rest as much  He has progressed to 35 minutes of exercise during class and is able to exercise on room air maintaining his Sao2 in the low to mid 90's   His resting BP is 122/76 and resting HR is 63  Mr Elliot Jenkins  exercise HR is around 98 bpm with  RPE had decreased to 2/10 with his SOB 1-2/10 depending on the day and exercise  He does have some better day than others in regards to SOB but I do notice improvement  Since beginning the program  His exercise heart rate is more stable than when he arrived for initial evaluation where it increased significantly on his 6 MWT compared to rest  Mr Elliot Jenkins remains at a MET level of  3 1  Mr Elliot Jenkins does have some limitations as to which exercises he can perform   Walking on the treadmill seems to be a problem for him  However he does use a bicycle, Nustep and upper body machine at class  Mr Elliot Jenkins has attended several formal education meetings here at pulmonary rehab and I believe he is benefiting from the education  Mr Irlanda Goddard is a pleasure to have in class  He has agreed to continue with  pulmonary rehab 2x/week where he will be monitored and results reported per protocol         Medication compliance: Yes   Comments: Fall Risk: Low   Comments:     Smoking: Current user:  :  1 PPD current    RPD @ Rest: 1/10    Assessment of progression of lung disease and functional status:  CAT: 23/40  Shortness of breath questionnaire: 69/120      EXERCISE ASSESSMENT and PLAN    Current Exercise Program in Rehab:       Frequency: 2 days/week        Minutes: 35-40         METS: 3 1              SpO2: greater than 90%              RPD: 2-3                      HR: no more than 30 bpm greater than resting    RPE: 2-3         Modalities: UBE, NuStep and Recumbent bike      Exercise Progression 60 Day Goals :    Frequency: 2 days/week        Minutes: 35-40         METS: 3 0-3 4              SpO2: greater than 90%              RPD: 3-5                      HR: no more than 30 bpm higer than resting hr   RPE: 4-5        Modalities: Airdyne bike, UBE, NuStep and Recumbent bike     Strength training:  has not been able to tolerate yet   Modalities: none    Oxygen Needs: on room air at rest  Oxygen Goal: Maintain SpO2>90% during exercise    Home Exercise: none  Education: pursed lipped breathing, home exercise, benefits of exercise for pulmonary disease, RPE scale, RPD scale, O2 saturation monitoring, appropriate O2 response to exercise and energy conservation    Goals: reduced score on  USCD Shortness of Breath Questionnaire, Improved 6MW distance by 10%, reduced dyspnea during exercise (0-3/10), improved exercise tolerance (max METs tolerated in pulmonary rehab), SpO2 >90% during exercise, improved DUKE activity score, reduced score on CAT, reduced number of COPD exacerbations, attend pulmonary rehab regularly and decrease sitting time at home  Progressing:  Pt is progressing and showing improvement  toward the following goals:  patient is able to use pursed lip breathing techniques for SOB control        Plan: Titrate supplemental oxygen as needed to maintain SpO2>90% with exercise    Readiness to change: Action:  (Changing behavior)      NUTRITION ASSESSMENT AND PLAN    Weight control:    Starting weight: 354   Current weight:       Diabetes: N/A    Goals:choose lean meat (93-95%), eliminate processed meats, reduce portion sizes of meat to 3oz or less, increase intake of fish, shellfish, cook without added fat or use vegetable oil/spray, use low fat dairy, reduce cheese intake or use reduced-fat, eat 3 or more servings of whole grains a day, Eat 4-5 cups of fruits and vegetables daily, choose low sodium processed foods, eliminate butter, use fat-free dressings/arguello or seldom use, choose healthy snacks: light popcorn, plain pretzels, Increase intake of nuts and seeds and seldom eat or choose low fat ice-cream, fruit juice bars or frozen yogurt   Education: heart healthy eating  low sodium diet  hydration  Progressing:Reviewed Pt goals and determined plan of care  Plan: switch to low fat cheeses, replace butter with soft spreads made with olive oil, canola or yogurt, replace refined grain bread with whole grain bread, replace unhealthy snacks with fruits & vegs, reduce portion sizes, reduce red meat 1x/wk, switch to skim or 1% milk, eat fewer desserts and sweets, avoid processed foods, monitor home blood glucose and drink more water  Readiness to change: Preparation:  (Getting ready to change)       PSYCHOSOCIAL ASSESSMENT AND PLAN    Emotional:  Depression assessment:  PHQ-9 = 5-9 = Mild Depression            Anxiety measure:  MEKA-7 = 0-4  = Not anxious  Self-reported stress level: 3   Social support: Very Good    Goals:  PHQ-9 - reduced severity by one level, Physical Fitness in [facility] Score < 3, Daily Activity in [facility] Score < 3, Overall Health in [facility] Score < 3 and increased energy  Education: signs/sxs of depression    Progressing:Reviewed Pt goals and determined plan of care  Plan: PHQ-9 >5 will refer to MD, Practice relaxation techniques, Exercise, Spend time outdoors, Enjoy a hobby, Enjoy family and Repeat PHQ-9 every 30 days if score >5  Readiness to change: Preparation:  (Getting ready to change)       OTHER CORE COMPONENTS     Tobacco:   Social History     Tobacco Use   Smoking Status Current Every Day Smoker    Packs/day: 1 00    Types: Cigarettes    Start date:    Smokeless Tobacco Never Used   Tobacco Comment    Pt states he smokes when he can afford to do so        Tobacco Use Intervention: Referral to tobacco expert:   Pt continues to smoke 1 ppd   Pt is not ready to quit    Blood pressure:    Restin/78   Exercise: 142/82    Goals: consistent BP < 130/80, moderate intensity exercise >150 mins/wk and reduce number of cigarettes/day  Education:  pathophysiology of pulmonary disease, dangers of smoking and bronchodilators  Progressing:Reviewed Pt goals and determined plan of care  Plan: reduce number of cigarettes per day, Avoid Processed foods, engage in regular exercise, eliminate salt shaker at the table, use salt substitutes, check labels for sodium content and monitor home BP  Readiness to change: Preparation:  (Getting ready to change)

## 2021-07-05 ENCOUNTER — APPOINTMENT (OUTPATIENT)
Dept: PULMONOLOGY | Facility: HOSPITAL | Age: 61
End: 2021-07-05
Payer: MEDICARE

## 2021-07-07 ENCOUNTER — APPOINTMENT (OUTPATIENT)
Dept: PULMONOLOGY | Facility: HOSPITAL | Age: 61
End: 2021-07-07
Payer: MEDICARE

## 2021-07-07 ENCOUNTER — CLINICAL SUPPORT (OUTPATIENT)
Dept: PULMONOLOGY | Facility: HOSPITAL | Age: 61
End: 2021-07-07
Payer: MEDICARE

## 2021-07-07 ENCOUNTER — OFFICE VISIT (OUTPATIENT)
Dept: FAMILY MEDICINE CLINIC | Facility: HOSPITAL | Age: 61
End: 2021-07-07
Payer: MEDICARE

## 2021-07-07 VITALS
BODY MASS INDEX: 42.66 KG/M2 | DIASTOLIC BLOOD PRESSURE: 80 MMHG | HEIGHT: 72 IN | OXYGEN SATURATION: 97 % | WEIGHT: 315 LBS | SYSTOLIC BLOOD PRESSURE: 130 MMHG | HEART RATE: 98 BPM | TEMPERATURE: 98.7 F

## 2021-07-07 DIAGNOSIS — R21 RASH: Primary | ICD-10-CM

## 2021-07-07 DIAGNOSIS — J44.9 CHRONIC OBSTRUCTIVE PULMONARY DISEASE, UNSPECIFIED COPD TYPE (HCC): ICD-10-CM

## 2021-07-07 DIAGNOSIS — R22.1 LOCALIZED SWELLING, MASS AND LUMP, NECK: ICD-10-CM

## 2021-07-07 PROCEDURE — 99214 OFFICE O/P EST MOD 30 MIN: CPT | Performed by: NURSE PRACTITIONER

## 2021-07-07 PROCEDURE — G0424 PULMONARY REHAB W EXER: HCPCS

## 2021-07-07 RX ORDER — NYSTATIN 100000 U/G
CREAM TOPICAL 2 TIMES DAILY
Qty: 30 G | Refills: 1 | Status: SHIPPED | OUTPATIENT
Start: 2021-07-07 | End: 2021-10-26 | Stop reason: ALTCHOICE

## 2021-07-07 NOTE — PROGRESS NOTES
Assessment/Plan:     I suspect fungal rash (likely yeast) given candida appearing rash below breasts  Since he was non responsive to topical steroid, will prescribe Nystatin to use on chest and under breasts  Lump left side of neck feels like a lipoma but with rash needs US to better characterize  Does not feel like lymph node and may be soft tissue swelling from rash  Diagnoses and all orders for this visit:    Rash  -     nystatin (MYCOSTATIN) cream; Apply topically 2 (two) times a day    Localized swelling, mass and lump, neck  -     US head neck soft tissue; Future          Subjective:     Patient ID: Omar Mejia is a 63 y o  male  Rash on chest started a few weeks ago  Saw Dr Alex Harrell and was prescribed Kenalog which did not help  Itchy and bothersome but not painful  Now has lump on neck which started 1 week ago  No fever or chills  No recent illness  Also has rash under both breasts which wife has been treating with diaper cream  They do soak in a hot tub and rinse afterward  Review of Systems   Constitutional: Negative for chills and fever  Skin: Positive for rash  Lump left side of neck         The following portions of the patient's history were reviewed and updated as appropriate: allergies, current medications, past family history, past medical history, past social history, past surgical history and problem list     Objective:  Vitals:    07/07/21 1436   BP: 130/80   Pulse: 98   Temp: 98 7 °F (37 1 °C)   SpO2: 97%      Physical Exam  Vitals reviewed  Constitutional:       Appearance: Normal appearance  He is obese  Cardiovascular:      Rate and Rhythm: Normal rate and regular rhythm  Heart sounds: Normal heart sounds  Pulmonary:      Effort: Pulmonary effort is normal       Breath sounds: Normal breath sounds  Skin:     General: Skin is warm and dry  Findings: Rash present            Neurological:      Mental Status: He is alert and oriented to person, place, and time  Psychiatric:         Mood and Affect: Mood normal          Behavior: Behavior normal          Thought Content:  Thought content normal          Judgment: Judgment normal

## 2021-07-10 ENCOUNTER — HOSPITAL ENCOUNTER (OUTPATIENT)
Dept: ULTRASOUND IMAGING | Facility: HOSPITAL | Age: 61
Discharge: HOME | End: 2021-07-10
Payer: MEDICARE

## 2021-07-10 DIAGNOSIS — R22.1 LOCALIZED SWELLING, MASS AND LUMP, NECK: ICD-10-CM

## 2021-07-10 PROCEDURE — 76536 US EXAM OF HEAD AND NECK: CPT

## 2021-07-12 ENCOUNTER — APPOINTMENT (OUTPATIENT)
Dept: PULMONOLOGY | Facility: HOSPITAL | Age: 61
End: 2021-07-12
Payer: MEDICARE

## 2021-07-13 ENCOUNTER — LAB (OUTPATIENT)
Dept: LAB | Facility: HOSPITAL | Age: 61
End: 2021-07-13
Payer: MEDICARE

## 2021-07-13 DIAGNOSIS — E89.0 POSTABLATIVE HYPOTHYROIDISM: ICD-10-CM

## 2021-07-13 LAB
T3FREE SERPL-MCNC: 2.88 PG/ML (ref 2.3–4.2)
T4 FREE SERPL-MCNC: 1.09 NG/DL (ref 0.76–1.46)
TSH SERPL DL<=0.05 MIU/L-ACNC: 0.01 UIU/ML (ref 0.36–3.74)

## 2021-07-13 PROCEDURE — 84439 ASSAY OF FREE THYROXINE: CPT

## 2021-07-13 PROCEDURE — 84481 FREE ASSAY (FT-3): CPT

## 2021-07-13 PROCEDURE — 36415 COLL VENOUS BLD VENIPUNCTURE: CPT

## 2021-07-13 PROCEDURE — 84443 ASSAY THYROID STIM HORMONE: CPT

## 2021-07-14 ENCOUNTER — OFFICE VISIT (OUTPATIENT)
Dept: PULMONOLOGY | Facility: HOSPITAL | Age: 61
End: 2021-07-14

## 2021-07-14 ENCOUNTER — APPOINTMENT (OUTPATIENT)
Dept: PULMONOLOGY | Facility: HOSPITAL | Age: 61
End: 2021-07-14
Payer: MEDICARE

## 2021-07-14 DIAGNOSIS — J44.9 CHRONIC OBSTRUCTIVE PULMONARY DISEASE, UNSPECIFIED COPD TYPE (HCC): Primary | ICD-10-CM

## 2021-07-14 DIAGNOSIS — E05.00 GRAVES DISEASE: Primary | ICD-10-CM

## 2021-07-14 DIAGNOSIS — I50.32 CHRONIC DIASTOLIC CONGESTIVE HEART FAILURE (HCC): ICD-10-CM

## 2021-07-14 DIAGNOSIS — I48.20 CHRONIC ATRIAL FIBRILLATION (HCC): ICD-10-CM

## 2021-07-14 PROCEDURE — RECHECK: Performed by: INTERNAL MEDICINE

## 2021-07-14 RX ORDER — METHIMAZOLE 10 MG/1
TABLET ORAL
Qty: 46 TABLET | Refills: 5 | Status: SHIPPED | OUTPATIENT
Start: 2021-07-14 | End: 2021-07-20 | Stop reason: SDUPTHER

## 2021-07-14 NOTE — PROGRESS NOTES
eMedical Director 30 day Pulmonary Rehabilitation Review    I certify that I have met with Vannessa Altagracia face-to face to provide a 30 day progress review of his/her pulmonary rehabilitation program at [address] [place] [address]    I have reviewed the most recent individualized treatment plan (ITP), outcomes assessment, and provided opportunity for discussion with the patient    Comments:He has had to cancel exercise for the pas t2 days because of the humidity  He has been improving in his activities at rehab - as long as he is in [facility]  No issues with the exercise    Continue with current treatment plan yes    Please provide the following modifications to the current treatment plan: N/A      [place], DO

## 2021-07-16 RX ORDER — ATORVASTATIN CALCIUM 40 MG/1
40 TABLET, FILM COATED ORAL DAILY
Qty: 90 TABLET | Refills: 1 | Status: SHIPPED | OUTPATIENT
Start: 2021-07-16 | End: 2022-01-21 | Stop reason: SDUPTHER

## 2021-07-19 ENCOUNTER — APPOINTMENT (OUTPATIENT)
Dept: PULMONOLOGY | Facility: HOSPITAL | Age: 61
End: 2021-07-19
Payer: MEDICARE

## 2021-07-20 ENCOUNTER — OFFICE VISIT (OUTPATIENT)
Dept: ENDOCRINOLOGY | Facility: HOSPITAL | Age: 61
End: 2021-07-20
Payer: MEDICARE

## 2021-07-20 VITALS
DIASTOLIC BLOOD PRESSURE: 72 MMHG | WEIGHT: 315 LBS | SYSTOLIC BLOOD PRESSURE: 130 MMHG | HEART RATE: 86 BPM | HEIGHT: 72 IN | BODY MASS INDEX: 42.66 KG/M2

## 2021-07-20 DIAGNOSIS — E04.9 GOITER: ICD-10-CM

## 2021-07-20 DIAGNOSIS — Z92.3 STATUS POST RADIOACTIVE IODINE THYROID ABLATION: ICD-10-CM

## 2021-07-20 DIAGNOSIS — E04.1 RIGHT THYROID NODULE: ICD-10-CM

## 2021-07-20 DIAGNOSIS — E05.90 HYPERTHYROIDISM: Chronic | ICD-10-CM

## 2021-07-20 DIAGNOSIS — E05.00 GRAVES DISEASE: Primary | ICD-10-CM

## 2021-07-20 PROBLEM — E89.0 POSTABLATIVE HYPOTHYROIDISM: Status: RESOLVED | Noted: 2020-09-17 | Resolved: 2021-07-20

## 2021-07-20 PROCEDURE — 99214 OFFICE O/P EST MOD 30 MIN: CPT | Performed by: INTERNAL MEDICINE

## 2021-07-20 RX ORDER — METHIMAZOLE 10 MG/1
TABLET ORAL
Qty: 46 TABLET | Refills: 5
Start: 2021-07-20 | End: 2021-10-26 | Stop reason: ALTCHOICE

## 2021-07-20 NOTE — PATIENT INSTRUCTIONS
The thyroid blood work is a bit overactive  Increase the methimazole to 15 mg, 1 and 1/2 10 mg tablets daily  You can decide to have surgery to remove the thyroid an make sure to get the ok with heart and lung doctors  Dr Tc Stevenson is the surgeon  Follow up in 3 months with blood work

## 2021-07-20 NOTE — PROGRESS NOTES
7/20/2021    Assessment/Plan      Diagnoses and all orders for this visit:    Graves disease  -     Ambulatory referral to Surgical Oncology; Future  -     methimazole (TAPAZOLE) 10 mg tablet; 15 mg daily  -     Comprehensive metabolic panel Lab Collect; Future  -     TSH, 3rd generation Lab Collect; Future  -     T4, free Lab Collect; Future  -     T3, free; Future    Hyperthyroidism  -     Ambulatory referral to Surgical Oncology; Future  -     Comprehensive metabolic panel Lab Collect; Future  -     TSH, 3rd generation Lab Collect; Future  -     T4, free Lab Collect; Future  -     T3, free; Future    Right thyroid nodule  -     Ambulatory referral to Surgical Oncology; Future  -     Comprehensive metabolic panel Lab Collect; Future  -     TSH, 3rd generation Lab Collect; Future  -     T4, free Lab Collect; Future  -     T3, free; Future    Goiter  -     Ambulatory referral to Surgical Oncology; Future  -     Comprehensive metabolic panel Lab Collect; Future  -     TSH, 3rd generation Lab Collect; Future  -     T4, free Lab Collect; Future  -     T3, free; Future    Status post radioactive iodine thyroid ablation  -     Ambulatory referral to Surgical Oncology; Future  -     Comprehensive metabolic panel Lab Collect; Future  -     TSH, 3rd generation Lab Collect; Future  -     T4, free Lab Collect; Future  -     T3, free; Future        Assessment/Plan:   1  Hyperthyroidism due to Graves disease  Most recent thyroid function tests continue to show a low TSH that is improving but a normal free T4 and free T3  He is biochemically hyperthyroid and I have asked him to increase his methimazole to 10 mg 1 5  Tablets or 15 mg daily  I had a long discussion with him and his significant other regarding treatment  His I 131 treatment done 1 year ago did not seem to have an effect on his hyperthyroidism  This can occur in 15% of patients    More definitive treatment is preferred in him then antithyroid medications given his history of atrial fibrillation  We could give him another treatment of I 131 but he is not interested in this  The other definitive treatment would be surgical removal which would eliminate the very large goiter and nodule that was biopsied  I would want to make sure that both his cardiologist and his pulmonary physician would be agreeable to this course of action  He seems to be more interested in this  I have given him a referral to our local thyroid surgeon, Dr Nallely Allen to have an evaluation  2  Graves disease  He has no evidence of Graves ophthalmopathy  3  Goiter with right-sided thyroid nodule  His right nodule was biopsy [place] class 4 but Afirma testing was benign  This is quite large but not causing significant compressive symptoms at this time  I have asked him to follow up in 3 months with preceding CMP, TSH, free T4, and free T3       CC:   Hyperthyroid, Graves disease, thyroid nodule with goiter follow-up    History of Present Illness     HPI: Theresa Cuadra is a 63y o  year old male with history of  Hyperthyroidism due to Graves disease and thyroid goiter with nodules for follow-up visit  He was diagnosed with Graves disease around 2008  He has been on antithyroid medication at varying dosages over the years but was lost to follow-up with endocrinology for son time  Blood work in April 2020 showed he was floridly hyperthyroid and methimazole dosage was increased  He underwent I 131 treatment of 10 mCi on 07/17/2020 but has remained hyperthyroid post I 131 treatment on the methimazole  He is currently taking methimazole 10 mg 1 tablet alternating with 1 5 tablets every other day  He is always somewhat hot and sweaty  He is fatigued and has to nap during the day  He has insomnia and wakes at 1-2 a m  and will not be able to get back to sleep  He denies diarrhea or constipation, anxiety or depression, palpitation, or tremors but remains in atrial fibrillation    He has dry skin and brittle nails  He denies diplopia  Weight is stable  He has a history of a right-sided thyroid nodule T rad 4 on thyroid ultrasound in June 2020  He is post fine-needle aspiration biopsy of that nodule under ultrasound guidance in March 2021 which was benign  It demonstrated a [place] class 4 pathology but Afirma  Genetic testing was benign  He will occasionally get food or water stuck in his throat  Review of Systems   Constitutional: Positive for fatigue  Negative for unexpected weight change  Always very fatigued and has to nap during the day  HENT: Positive for trouble swallowing  Occasionally gets food or water stuck in throat  Eyes: Negative for visual disturbance  Wears glasses  No diplopia  Respiratory: Positive for shortness of breath  Negative for chest tightness  SOB with any activity  Now in pulmonary rehab and some improvement  Cardiovascular: Positive for leg swelling  Negative for chest pain and palpitations  Sees Dr Escobar Davi  Gastrointestinal: Negative for abdominal pain, constipation, diarrhea and nausea  Endocrine: Positive for heat intolerance  Negative for cold intolerance  Always hot and sweaty  Skin: Positive for rash  Has upper chest rash thought to be a fungal rash  Had a lump in left supraclavicular area improving, recent ultrasound not helpful  Has dry skin  Has brittle nails  Neurological: Positive for light-headedness  Negative for dizziness, tremors and headaches  Will get lightheaded at times after getting up and walking about 5 feet  Psychiatric/Behavioral: Positive for sleep disturbance  Negative for dysphoric mood  The patient is not nervous/anxious  Insomnia, wakes at 1-2 am and will not be able to get back to sleep         Historical Information   Past Medical History:   Diagnosis Date    Acute diastolic congestive heart failure ([facility] [address] ) 7/3/2017    Atrial fibrillation [facility])     Atrial fibrillation with rapid ventricular response ([facility] [address] ) 3/31/2017    Bilateral edema of lower extremity 8/22/2016    BMI 45 0-49 9, adult ([facility] ) 2/17/2020    Encouraged lifestyle modification to incorporate moderate physiscal activity and DASH diet/low fat/low carb      CAD (coronary artery disease) 10/28/2016    Chest pain 8/22/2016    CPAP (continuous positive airway pressure) dependence     Dysphagia 9/29/2019    Homelessness 7/3/2017    Hyperlipidemia     Hypertension     Hypertensive heart disease with heart failure ([facility] ) 10/27/2020    Hyperthyroidism 8/22/2016    Kidney stone     Pneumothorax     Presence of IVC filter     Pulmonary embolism (HCC)     Pulmonary hypertension ([facility] ) 9/15/2016    Rash 10/28/2016    Sepsis ([place] [address] ) 8/14/2019    Sleep apnea     SOB (shortness of breath) 8/22/2016    Tachycardia 8/22/2016     Past Surgical History:   Procedure Laterality Date    EGD AND COLONOSCOPY N/A 7/28/2017    Procedure: EGD AND COLONOSCOPY;  Surgeon: Lawrence Stevens MD;  [facility];  Service: Gastroenterology    FL RETROGRADE PYELOGRAM  8/13/2019    FL RETROGRADE PYELOGRAM  9/27/2019    IVC FILTER INSERTION      LITHOTRIPSY      MO CYSTO/URETERO W/LITHOTRIPSY &INDWELL STENT INSRT Right 8/13/2019    Procedure: CYSTOSCOPY URETEROSCOPY WITH LITHOTRIPSY HOLMIUM LASER, RETROGRADE PYELOGRAM AND INSERTION STENT URETERAL;  Surgeon: Alex Monroe MD;  [facility];  Service: Urology    MO CYSTO/URETERO W/LITHOTRIPSY &INDWELL STENT INSRT Right 9/27/2019    Procedure: CYSTOSCOPY URETEROSCOPY WITH LITHOTRIPSY HOLMIUM LASER, RETROGRADE PYELOGRAM AND INSERTION STENT URETERAL;  Surgeon: Divine Albrecht MD;  [facility];  Service: Urology   [facility] THYROID BIOPSY  3/23/2021     Social History   Social History     Substance and Sexual Activity   Alcohol Use No     Social History     Substance and Sexual Activity   Drug Use No     Social History     Tobacco Use Smoking Status Current Every Day Smoker    Packs/day: 1 00    Types: Cigarettes    Start date: 1   Smokeless Tobacco Never Used   Tobacco Comment    Pt states he smokes when he can afford to do so      Family History:   Family History   Problem Relation Age of Onset    Cancer Mother         metastatic, breast primary    Breast cancer Mother     Heart disease Father     Lung disease Father     Thyroid disease unspecified Father         thyroidectomy    Thyroid disease unspecified Sister     Hyperthyroidism Sister         in remission       Meds/Allergies   Current Outpatient Medications   Medication Sig Dispense Refill    albuterol (ProAir HFA) 90 mcg/act inhaler Inhale 2 puffs every 4 (four) hours as needed for wheezing or shortness of breath 8 5 g 0    aspirin (ECOTRIN LOW STRENGTH) 81 mg EC tablet Take 81 mg by mouth daily      atorvastatin (LIPITOR) 40 mg tablet Take 1 tablet (40 mg total) by mouth daily 90 tablet 1    digoxin (LANOXIN) 0 25 mg tablet Take 1 tablet (250 mcg total) by mouth daily 90 tablet 3    ergocalciferol (VITAMIN D2) 50,000 units Take 1 capsule (50,000 Units total) by mouth once a week 12 capsule 1    methimazole (TAPAZOLE) 10 mg tablet 15 mg daily 46 tablet 5    metoprolol tartrate (LOPRESSOR) 50 mg tablet Take 1 tablet (50 mg total) by mouth 2 (two) times a day 180 tablet 3    nystatin (MYCOSTATIN) cream Apply topically 2 (two) times a day 30 g 1    potassium chloride (K-DUR,KLOR-CON) 20 mEq tablet Take 1 tablet (20 mEq total) by mouth 2 (two) times a day 180 tablet 1    torsemide (DEMADEX) 20 mg tablet Take 2 tablets (40 mg total) by mouth daily 180 tablet 0    triamcinolone (KENALOG) 0 5 % cream Apply topically 2 (two) times a day 30 g 1    umeclidinium-vilanterol (ANORO ELLIPTA) 62 5-25 MCG/INH inhaler Inhale 1 puff daily (Patient not taking: Reported on 6/4/2021) 1 each 3     No current facility-administered medications for this visit       No Known Allergies    Objective   Vitals: Blood pressure 130/72, pulse 86, height 6' (1 829 m), weight (!) 166 kg (365 lb)  Invasive Devices     Drain            Ureteral Drain/Stent Right ureter 6 Fr  662 days                Physical Exam  Vitals reviewed  Constitutional:       Appearance: Normal appearance  He is well-developed  He is obese  HENT:      Head: Normocephalic and atraumatic  Eyes:      Extraocular Movements: Extraocular movements intact  Conjunctiva/sclera: Conjunctivae normal       Comments: No lid lag, stare, proptosis, or periorbital edema  Neck:      Thyroid: No thyromegaly  Vascular: No carotid bruit  Comments: Enlarged thyroid, nodular in feel  No bruits over the thyroid gland  Cardiovascular:      Rate and Rhythm: Normal rate and regular rhythm  Heart sounds: Normal heart sounds  No murmur heard  Pulmonary:      Effort: Pulmonary effort is normal       Breath sounds: Normal breath sounds  No wheezing  Abdominal:      Palpations: Abdomen is soft  Musculoskeletal:         General: No deformity  Normal range of motion  Cervical back: Normal range of motion and neck supple  Right lower leg: Edema present  Left lower leg: Edema present  Comments: 1+ Bilateral lower extremity edema  No tremor of the outstretched hands  Lymphadenopathy:      Cervical: No cervical adenopathy  Skin:     General: Skin is warm and dry  Findings: No erythema or rash  Neurological:      Mental Status: He is alert and oriented to person, place, and time  Deep Tendon Reflexes: Reflexes are normal and symmetric  Comments: Deep tendon reflexes normal          The history was obtained from the review of the chart and from the patient and significant other  Lab Results:     Blood work done on 07/13/2021 showed a TSH of 0 015 with a free T4 of 1 09 and a free T3 of  2 88      Lab Results   Component Value Date    CREATININE 0 84 06/16/2021    CREATININE 0 88 04/06/2021    CREATININE 0 89 02/09/2021    BUN 16 06/16/2021     02/15/2017    K 4 0 06/16/2021     06/16/2021    CO2 29 06/16/2021     eGFR   Date Value Ref Range Status   06/16/2021 96 ml/min/1 73sq m Final       Lab Results   Component Value Date    HDL 37 (L) 02/09/2021    TRIG 122 02/09/2021    CHOLHDL 2 3 04/22/2020       Lab Results   Component Value Date    ALT 27 06/16/2021    AST 26 06/16/2021    ALKPHOS 111 06/16/2021    BILITOT 0 7 11/22/2016           Future Appointments   Date Time Provider Ulises Vazquez   7/21/2021  8:00 AM [facility]   7/26/2021  8:00 AM [facility]   7/27/2021 10:00 AM ASHLEY Zavaleta [facility]   7/28/2021  8:00 AM [facility]   8/2/2021  8:00 AM [facility]   8/4/2021  8:00 AM [facility]   8/9/2021  8:00 AM [facility]ERC[facility]   8/11/2021  8:00 AM [facility]   8/11/2021 12:40 PM Jailyn Morelos DO [facility]   8/16/2021  8:00 AM [facility]   10/28/2021  2:40 PM Elizabeth Dunlap MD END[facility]

## 2021-07-21 ENCOUNTER — APPOINTMENT (OUTPATIENT)
Dept: PULMONOLOGY | Facility: HOSPITAL | Age: 61
End: 2021-07-21
Payer: MEDICARE

## 2021-07-21 NOTE — PROGRESS NOTES
Pulmonary Rehabilitation Plan of Care   Discharge      Today's date: 2021   # of Exercise Sessions Completed: 11  Patient name: Korina Beavers      : 1960  Age: 63 y o  MRN: [id number]  Referring Physician: Trisha Orona MD  Pulmonologist: Trisha Orona MD  Provider: Antolin Clements  Clinician: Murray Clay, CRT    Dx:   Encounter Diagnosis   Name Primary?  Chronic obstructive pulmonary disease, unspecified COPD type [facility])      Date of onset: 21      SUMMARY OF PROGRESS:     Mr Abel Hugo  is being re-evaluated for his discharge in pulmonary rehab  The patient has unfortunately decided to quit the program with no reason given  To date he has attended 11 exercise  He had not been attending his sessions since 21  Prior to quitting , he was progressing  nicely  He has a hx of COPD, hypoventilation syndrome,  hypertension, A fib, CAD, PE ( filter sx 2017)), FABIAN ( for which he wear bipap 16/12 cm H20 hs), high cholesterol  He has smoked for the past 43 years at times up to 3 packs per day  He does continue to smoke   His goals were to decrease SOB, increase energy and be able to do ADL's better and not have to rest as much  He has progressed to 35 minutes of exercise during class and is able to exercise on room air maintaining his Sao2 in the low to mid 90's   His resting BP is 122/76 and resting HR is 63  Mr Abel Hugo  exercise HR is around 98 bpm with  RPE had decreased to 2/10 with his SOB 1-2/10 depending on the day and exercise  He does have some better day than others in regards to SOB but I do notice improvement  Since beginning the program  His exercise heart rate is more stable than when he arrived for initial evaluation where it increased significantly on his 6 MWT compared to rest  Mr Abel Hugo remains at a MET level of  3 1  Mr Abel Hugo does have some limitations as to which exercises he can perform   Walking on the treadmill seems to be a problem for him   However he does use a bicycle, Nustep and upper body machine at class  Mr Zoran Koch has attended several formal education meetings here at pulmonary rehab and I believe he is benefiting from the education  Mr Zoran Koch was a pleasure to have in class  As stated above , he has decided to quit class without finishing 24 recommended sessions         Medication compliance: Yes   Comments: Fall Risk: Low   Comments:     Smoking: Current user:  :  1 PPD current    RPD @ Rest: 1/10    Assessment of progression of lung disease and functional status:  CAT: 23/40  Shortness of breath questionnaire: 69/120      EXERCISE ASSESSMENT and PLAN    Current Exercise Program in Rehab:       Frequency: 2 days/week        Minutes: 35-40         METS: 3 1              SpO2: greater than 90%              RPD: 2-3                      HR: no more than 30 bpm greater than resting    RPE: 2-3         Modalities: UBE, NuStep and Recumbent bike      Exercise Progression 60 Day Goals :    Frequency: 2 days/week        Minutes: 35-40         METS: 3 0-3 4              SpO2: greater than 90%              RPD: 3-5                      HR: no more than 30 bpm higer than resting hr   RPE: 4-5        Modalities: Airdyne bike, UBE, NuStep and Recumbent bike     Strength training:  has not been able to tolerate yet   Modalities: none    Oxygen Needs: on room air at rest  Oxygen Goal: Maintain SpO2>90% during exercise    Home Exercise: none  Education: pursed lipped breathing, home exercise, benefits of exercise for pulmonary disease, RPE scale, RPD scale, O2 saturation monitoring, appropriate O2 response to exercise and energy conservation    Goals: reduced score on  USCD Shortness of Breath Questionnaire, Improved 6MW distance by 10%, reduced dyspnea during exercise (0-3/10), improved exercise tolerance (max METs tolerated in pulmonary rehab), SpO2 >90% during exercise, improved DUKE activity score, reduced score on CAT, reduced number of COPD exacerbations, attend pulmonary rehab regularly and decrease sitting time at home  Progressing:  Pt is progressing and showing improvement  toward the following goals:  patient is able to use pursed lip breathing techniques for SOB control        Plan: Titrate supplemental oxygen as needed to maintain SpO2>90% with exercise    Readiness to change: Action:  (Changing behavior)      NUTRITION ASSESSMENT AND PLAN    Weight control:    Starting weight: 354   Current weight:   357    Diabetes: N/A    Goals:choose lean meat (93-95%), eliminate processed meats, reduce portion sizes of meat to 3oz or less, increase intake of fish, shellfish, cook without added fat or use vegetable oil/spray, use low fat dairy, reduce cheese intake or use reduced-fat, eat 3 or more servings of whole grains a day, Eat 4-5 cups of fruits and vegetables daily, choose low sodium processed foods, eliminate butter, use fat-free dressings/arguello or seldom use, choose healthy snacks: light popcorn, plain pretzels, Increase intake of nuts and seeds and seldom eat or choose low fat ice-cream, fruit juice bars or frozen yogurt   Education: heart healthy eating  low sodium diet  hydration  Progressing:Reviewed Pt goals and determined plan of care  Plan: switch to low fat cheeses, replace butter with soft spreads made with olive oil, canola or yogurt, replace refined grain bread with whole grain bread, replace unhealthy snacks with fruits & vegs, reduce portion sizes, reduce red meat 1x/wk, switch to skim or 1% milk, eat fewer desserts and sweets, avoid processed foods, monitor home blood glucose and drink more water  Readiness to change: Preparation:  (Getting ready to change)       PSYCHOSOCIAL ASSESSMENT AND PLAN    Emotional:  Depression assessment:  PHQ-9 = 1-4 = Minimal Depression            Anxiety measure:  MEKA-7 = 0-4  = Not anxious  Self-reported stress level: 3   Social support: Very Good    Goals:  PHQ-9 - reduced severity by one level, Physical Fitness in [employer] Score < 3, Daily Activity in [facility] Score < 3, Overall Health in [facility] Score < 3 and increased energy  Education: signs/sxs of depression    Progressing:Reviewed Pt goals and determined plan of care  Plan: PHQ-9 >5 will refer to MD, Practice relaxation techniques, Exercise, Spend time outdoors, Enjoy a hobby, Enjoy family and Repeat PHQ-9 every 30 days if score >5  Readiness to change: Preparation:  (Getting ready to change)       OTHER CORE COMPONENTS     Tobacco:   Social History     Tobacco Use   Smoking Status Current Every Day Smoker    Packs/day: 1 00    Types: Cigarettes    Start date:    Smokeless Tobacco Never Used   Tobacco Comment    Pt states he smokes when he can afford to do so        Tobacco Use Intervention: Referral to tobacco expert:   Pt continues to smoke 1 ppd   Pt is not ready to quit    Blood pressure:    Restin/78   Exercise: 142/82    Goals: consistent BP < 130/80, moderate intensity exercise >150 mins/wk and reduce number of cigarettes/day  Education:  pathophysiology of pulmonary disease, dangers of smoking and bronchodilators  Progressing:Reviewed Pt goals and determined plan of care  Plan: reduce number of cigarettes per day, Avoid Processed foods, engage in regular exercise, eliminate salt shaker at the table, use salt substitutes, check labels for sodium content and monitor home BP  Readiness to change: Preparation:  (Getting ready to change)

## 2021-07-26 ENCOUNTER — APPOINTMENT (OUTPATIENT)
Dept: PULMONOLOGY | Facility: HOSPITAL | Age: 61
End: 2021-07-26
Payer: MEDICARE

## 2021-07-26 ENCOUNTER — TELEPHONE (OUTPATIENT)
Dept: HEMATOLOGY ONCOLOGY | Facility: CLINIC | Age: 61
End: 2021-07-26

## 2021-07-26 NOTE — TELEPHONE ENCOUNTER
New Patient Encounter    New Patient Intake Form   Patient Details:  Delmon Kocher  1960  [id number]    Background Information:  [address] starts by opening a telephone encounter and gathering the following information   Who is calling to schedule? If not self, relationship to patient? Patient   Referring Provider Dr Arielle Link   What is the diagnosis? Graves disease, hyperthyroidism, right thyroid nodule, goiter   Is this Cancer or Non-Cancer? Non-Cancer   Is this diagnosis confirmed? Yes   When was the diagnosis? 7/2021   Is there a confirmed diagnosis from a biopsy/tissue reviewed by pathology? NA   Were outside slides requested? NA   Is patient aware of diagnosis? Yes   Is there a personal history and what kind? No   Is there a family history and what kind? Yes   Reason for visit? New Diagnosis   Have you had any imaging or labs done? If so: when, where? yes  At [facility] [place] ([facility]   Are records in [facility]? yes   If patient has a prior history of cancer were old records obtained? NA   Was the patient told to bring a disk? No   Does the patient smoke or Vape? Yes   If yes, how many packs or cartridges per day? 1 pack per day   Scheduling Information:   [facility] [place]:  [facility]     Are there any dates/time the patient cannot be seen? Na/   Miscellaneous: n/a   After completing the above information, please route to Financial Counselor and the appropriate Nurse Navigator for review

## 2021-07-27 ENCOUNTER — OFFICE VISIT (OUTPATIENT)
Dept: FAMILY MEDICINE CLINIC | Facility: HOSPITAL | Age: 61
End: 2021-07-27
Payer: MEDICARE

## 2021-07-27 VITALS
SYSTOLIC BLOOD PRESSURE: 120 MMHG | WEIGHT: 315 LBS | OXYGEN SATURATION: 97 % | HEART RATE: 73 BPM | HEIGHT: 72 IN | TEMPERATURE: 98 F | BODY MASS INDEX: 42.66 KG/M2 | DIASTOLIC BLOOD PRESSURE: 74 MMHG

## 2021-07-27 DIAGNOSIS — E78.5 DYSLIPIDEMIA: Chronic | ICD-10-CM

## 2021-07-27 DIAGNOSIS — Z72.0 TOBACCO ABUSE: ICD-10-CM

## 2021-07-27 DIAGNOSIS — I10 ESSENTIAL HYPERTENSION: Chronic | ICD-10-CM

## 2021-07-27 DIAGNOSIS — E05.00 GRAVES DISEASE: Primary | ICD-10-CM

## 2021-07-27 DIAGNOSIS — R73.01 IMPAIRED FASTING GLUCOSE: ICD-10-CM

## 2021-07-27 DIAGNOSIS — J44.9 CHRONIC OBSTRUCTIVE PULMONARY DISEASE, UNSPECIFIED COPD TYPE (HCC): ICD-10-CM

## 2021-07-27 DIAGNOSIS — I50.32 CHRONIC DIASTOLIC CONGESTIVE HEART FAILURE (HCC): Chronic | ICD-10-CM

## 2021-07-27 PROCEDURE — 99214 OFFICE O/P EST MOD 30 MIN: CPT | Performed by: NURSE PRACTITIONER

## 2021-07-27 NOTE — PROGRESS NOTES
Assessment/Plan:    Graves disease  Methimazole recently increased by endocrine  Maintain f/u as planned  Chronic obstructive pulmonary disease (HCC)  He self dc'd anoro ellipta inhaler due to expense  Remains LOGAN using proair twice daily  Has f/u scheduled w/pulmonary next month and will likely need to resume an affordable maintenance inhaler  Hypertension  Stable BP control on metoprolol as rx'd by cardiology  Maintain f/u as scheduled  Chronic diastolic congestive heart failure (HCC)  Wt Readings from Last 3 Encounters:   07/27/21 (!) 164 kg (362 lb)   07/20/21 (!) 166 kg (365 lb)   07/07/21 (!) 166 kg (365 lb 12 8 oz)     Weight has increased slightly  Continue management w/cardiology as planned  Dyslipidemia  Compliant w/atorvastatin daily  Order given to update FLP before next appt in 3 months  Tobacco abuse  He continues to smoke daily  Enforced importance of smoking cessation  Impaired fasting glucose  2/2021 A1C of 5 7% managing w/lifestyle  Order given to update before next appt in 3 months  Diagnoses and all orders for this visit:    Graves disease    Chronic obstructive pulmonary disease, unspecified COPD type ([facility] [address] )    Essential hypertension    Chronic diastolic congestive heart failure (HCC)    Dyslipidemia  -     Lipid panel; Future    Impaired fasting glucose  -     Hemoglobin A1C; Future    Tobacco abuse      Covid series complete  Colon screen UTD  Subjective:      Patient ID: Jesse Dubon is a 63 y o  male  States he was here a couple weeks ago and had US for swelling at neck  Swelling has resolved and rash improved w/cream given  Dr Neetu Greer recently increased methimazole to 15mg daily  He is considering having thyroid removed so he doesn't have to be on med  Had to stop anoro inhaler due to expense  Using rescue inhaler twice/day  States he continues to smoke daily         The following portions of the patient's history were reviewed and updated as appropriate: allergies, current medications, past family history, past medical history, past social history, past surgical history and problem list     Review of Systems   Constitutional: Negative for unexpected weight change  Respiratory: Positive for shortness of breath (LOGAN)  Negative for cough and wheezing  Cardiovascular: Negative for chest pain, palpitations and leg swelling  Objective:      /74 (Patient Position: Sitting, Cuff Size: Large)   Pulse 73   Temp 98 °F (36 7 °C) (Tympanic)   Ht 6' (1 829 m)   Wt (!) 164 kg (362 lb)   SpO2 97%   BMI 49 10 kg/m²          Physical Exam  Vitals reviewed  Constitutional:       General: He is not in acute distress  Appearance: Normal appearance  He is obese  HENT:      Head: Normocephalic and atraumatic  Eyes:      General: No scleral icterus  Neck:      Thyroid: Thyroid mass present  Vascular: No carotid bruit  Cardiovascular:      Rate and Rhythm: Normal rate and regular rhythm  Heart sounds: Murmur (2/6 NAV) heard  Pulmonary:      Effort: Pulmonary effort is normal  No respiratory distress  Breath sounds: Normal breath sounds  Musculoskeletal:      Right lower leg: No edema  Left lower leg: No edema  Neurological:      General: No focal deficit present  Mental Status: He is alert and oriented to person, place, and time  Psychiatric:         Mood and Affect: Mood normal          Behavior: Behavior normal          Thought Content:  Thought content normal          Judgment: Judgment normal

## 2021-07-27 NOTE — ASSESSMENT & PLAN NOTE
Wt Readings from Last 3 Encounters:   07/27/21 (!) 164 kg (362 lb)   07/20/21 (!) 166 kg (365 lb)   07/07/21 (!) 166 kg (365 lb 12 8 oz)     Weight has increased slightly  Continue management w/cardiology as planned

## 2021-07-27 NOTE — ASSESSMENT & PLAN NOTE
He self dc'd anoro ellipta inhaler due to expense  Remains LOGAN using proair twice daily  Has f/u scheduled w/pulmonary next month and will likely need to resume an affordable maintenance inhaler

## 2021-07-28 ENCOUNTER — APPOINTMENT (OUTPATIENT)
Dept: PULMONOLOGY | Facility: HOSPITAL | Age: 61
End: 2021-07-28
Payer: MEDICARE

## 2021-08-02 ENCOUNTER — APPOINTMENT (OUTPATIENT)
Dept: PULMONOLOGY | Facility: HOSPITAL | Age: 61
End: 2021-08-02
Payer: MEDICARE

## 2021-08-03 ENCOUNTER — TELEPHONE (OUTPATIENT)
Dept: HEMATOLOGY ONCOLOGY | Facility: CLINIC | Age: 61
End: 2021-08-03

## 2021-08-03 NOTE — TELEPHONE ENCOUNTER
New Patient Request   Patient Details:     Radha Garcia      1960      [id number]      Reason for Appointment   Who is calling to schedule? Patient   If not Patient, what is their name? What is the diagnosis? Status post radioactive iodine thyroid ablation   Who is the referring doctor? Mirta Morgan MD   Scheduling Information   Which department are you scheduling with ? Surg Onc   Preferred [facility]   Best Number to call back on? If calling from the [facility], use the Nurse number   [phone]   Miscellaneous Information: Dr Christos Moyer    Please advise the patient, a new patient  will be calling them back within 1 business day

## 2021-08-04 ENCOUNTER — APPOINTMENT (OUTPATIENT)
Dept: PULMONOLOGY | Facility: HOSPITAL | Age: 61
End: 2021-08-04
Payer: MEDICARE

## 2021-08-09 ENCOUNTER — TELEPHONE (OUTPATIENT)
Dept: PULMONOLOGY | Facility: CLINIC | Age: 61
End: 2021-08-09

## 2021-08-09 ENCOUNTER — APPOINTMENT (OUTPATIENT)
Dept: PULMONOLOGY | Facility: HOSPITAL | Age: 61
End: 2021-08-09
Payer: MEDICARE

## 2021-08-09 NOTE — TELEPHONE ENCOUNTER
Patient has left a message this morning requesting to reschedule his appointment with Dr Tereso Gonzalez on 08/11/21  I did call him back but no answer, so I left him a voice message to give our office a call and we would be happy to help reschedule his appointment

## 2021-08-11 ENCOUNTER — APPOINTMENT (OUTPATIENT)
Dept: PULMONOLOGY | Facility: HOSPITAL | Age: 61
End: 2021-08-11
Payer: MEDICARE

## 2021-08-16 ENCOUNTER — APPOINTMENT (OUTPATIENT)
Dept: PULMONOLOGY | Facility: HOSPITAL | Age: 61
End: 2021-08-16
Payer: MEDICARE

## 2021-08-24 NOTE — PROGRESS NOTES
Pulmonary Follow Up Note   Sunni Gifford 63 y o  male MRN: [id number]  8/25/2021      Assessment/Plan:    1  Moderate COPD /dyspnea on exertion  - patient is a chronic smoker  Pulmonary function test borderline diagnostic of COPD  Likely altered secondary to body habitus and restrictive pattern  Patient has symptomatology consistent with COPD  - prescribed Bevespi due to cost of Anoro and Stiolto; if still too expensive will prescribed Spiriva hand inhaler  - continue albuterol p r n   - demonstrated inhaler use for Handinhaler and Respimat  - continue pulmonary rehab  - new 4 mm juxtapleural nodule in left upper lobe noted on CT lung cancer screening in May 2021     2  Obstructive sleep apnea -  severe  - established with sleep medicine and has follow-up  - encouraged compliance with BiPAP     3  Morbid obesity  - advised diet and exercise  - instructed patient to exercise 150 minutes per week and decrease caloric intake  - goal to lose 10% of body weight which is approximately 35 lb      4  Tobacco abuse  - current user 1 pack per day  - counseled 10 minutes on cessation techniques and cessation options  - patient believes that this is more of a habit instead of addictive cravings  - patient will consider nicotine patch or Chantix if has significant difficulty with cessation  - will also consider referral to smoking cessation program at future visit  - obtain annual CT lung cancer screening;  May 2022    5  Pulmonary nodule  - new 4 mm juxtapleural nodule in left upper lobe noted on CT lung cancer screening in May 2021  - size below 6 mm and location makes it low risk but however patient's tobacco use history elevates risks for malignancy  - given the size and location will follow-up pulmonary nodule with annual lung cancer screening in May 2022    Return in about 4 months (around 12/25/2021) for Recheck      History of Present Illness   HPI:  Sunni Gifford is a 63 y o  male with moderate COPD, severe obstructive sleep apnea, morbid obesity presenting for follow-up  Patient reports that overall his respiratory status is largely unchanged  He still continues to have some dyspnea on exertion  Reports on/off wheezing and morning cough with grayish sputum production  Patient states that since last visit he has only been using albuterol rescue inhaler  He uses albuterol rescue inhaler approximately 1-3 times daily  He was provided samples of Stiolto and prescribed Anoro  Patient reports that albuterol rescue inhaler does provide some relief  He also reported that when he was using Stiolto and Anoro it did provide him some relief  Unfortunately he was unable to afford Stiolto or Anoro  Otherwise he is compliant with all his medications denies side effects from the medications  Patient is compliant with pulmonary rehab  Patient continues to smoke  Reports due to have it  Currently not ready to quit  Occupational History:  Previously worked with concrete, 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       Social History:  Patient smoking 1 pack per day for 43 years; at peak smoked 3 packs per day  [place] air at [place]       Malignancy History:  Unknown malignancy in the mother but reports that it was metastatic     Pets/animal exposure: none  Denies exposure to farm animals     Inhalers: Albuterol    Review of systems:  12 point review of systems was completed and was otherwise negative except as listed in HPI  Historical Information   Past Medical History:   Diagnosis Date    Acute diastolic congestive heart failure ([facility] ) 7/3/2017    Atrial fibrillation (HCC)     Atrial fibrillation with rapid ventricular response ([facility] ) 3/31/2017    Bilateral edema of lower extremity 8/22/2016    BMI 45 0-49 9, adult ([facility] ) 2/17/2020    Encouraged lifestyle modification to incorporate moderate physiscal activity and DASH diet/low fat/low carb   CAD (coronary artery disease) 10/28/2016    Chest pain 8/22/2016    CPAP (continuous positive airway pressure) dependence     Dysphagia 9/29/2019    Homelessness 7/3/2017    Hyperlipidemia     Hypertension     Hypertensive heart disease with heart failure ([facility] ) 10/27/2020    Hyperthyroidism 8/22/2016    Kidney stone     Pneumothorax     Presence of IVC filter     Pulmonary embolism (HCC)     Pulmonary hypertension ([facility] [address] ) 9/15/2016    Rash 10/28/2016    Sepsis ([facility] ) 8/14/2019    Sleep apnea     SOB (shortness of breath) 8/22/2016    Tachycardia 8/22/2016     Past Surgical History:   Procedure Laterality Date    EGD AND COLONOSCOPY N/A 7/28/2017    Procedure: EGD AND COLONOSCOPY;  Surgeon: Pancho Oviedo MD;  Location: [facility];  Service: Gastroenterology    FL RETROGRADE PYELOGRAM  8/13/2019    FL RETROGRADE PYELOGRAM  9/27/2019    IVC FILTER INSERTION      LITHOTRIPSY      NC CYSTO/URETERO W/LITHOTRIPSY &INDWELL STENT INSRT Right 8/13/2019    Procedure: CYSTOSCOPY URETEROSCOPY WITH LITHOTRIPSY HOLMIUM LASER, RETROGRADE PYELOGRAM AND INSERTION STENT URETERAL;  Surgeon: Aman Ventura MD;  Location: [facility];  Service: Urology    NC CYSTO/URETERO W/LITHOTRIPSY &INDWELL STENT INSRT Right 9/27/2019    Procedure: CYSTOSCOPY URETEROSCOPY WITH LITHOTRIPSY HOLMIUM LASER, RETROGRADE PYELOGRAM AND INSERTION STENT URETERAL;  Surgeon: Denny Berger MD;  [facility]: [facility];  Service: Urology    US GUIDED THYROID BIOPSY  3/23/2021     Family History   Problem Relation Age of Onset    Cancer Mother         metastatic, breast primary    Breast cancer Mother     Heart disease Father     Lung disease Father     Thyroid disease unspecified Father         thyroidectomy    Thyroid disease unspecified Sister     Hyperthyroidism Sister         in remission         Meds/Allergies     Current Outpatient Medications:     albuterol (ProAir HFA) 90 mcg/act inhaler, Inhale 2 puffs every 4 (four) hours as needed for wheezing or shortness of breath, Disp: 8 5 g, Rfl: 0    aspirin (ECOTRIN LOW STRENGTH) 81 mg EC tablet, Take 81 mg by mouth daily, Disp: , Rfl:     atorvastatin (LIPITOR) 40 mg tablet, Take 1 tablet (40 mg total) by mouth daily, Disp: 90 tablet, Rfl: 1    digoxin (LANOXIN) 0 25 mg tablet, Take 1 tablet (250 mcg total) by mouth daily, Disp: 90 tablet, Rfl: 3    ergocalciferol (VITAMIN D2) 50,000 units, Take 1 capsule (50,000 Units total) by mouth once a week, Disp: 12 capsule, Rfl: 1    methimazole (TAPAZOLE) 10 mg tablet, 15 mg daily, Disp: 46 tablet, Rfl: 5    metoprolol tartrate (LOPRESSOR) 50 mg tablet, Take 1 tablet (50 mg total) by mouth 2 (two) times a day, Disp: 180 tablet, Rfl: 3    nystatin (MYCOSTATIN) cream, Apply topically 2 (two) times a day, Disp: 30 g, Rfl: 1    potassium chloride (K-DUR,KLOR-CON) 20 mEq tablet, Take 1 tablet (20 mEq total) by mouth 2 (two) times a day, Disp: 180 tablet, Rfl: 1    torsemide (DEMADEX) 20 mg tablet, Take 2 tablets (40 mg total) by mouth daily, Disp: 180 tablet, Rfl: 0    glycopyrrolate-formoterol (Bevespi Aerosphere) 9-4 8 MCG/ACT inhaler, Inhale 2 puffs 2 (two) times a day, Disp: 10 7 g, Rfl: 3    triamcinolone (KENALOG) 0 5 % cream, Apply topically 2 (two) times a day (Patient not taking: Reported on 8/25/2021), Disp: 30 g, Rfl: 1  Not on File    Vitals: Blood pressure 140/90, pulse 90, temperature 98 4 °F (36 9 °C), resp  rate 18, height 6' (1 829 m), weight (!) 168 kg (370 lb), SpO2 95 %  Body mass index is 50 18 kg/m²  Oxygen Therapy  SpO2: 95 %  Oxygen Therapy: None (Room air)      Physical Exam  Physical Exam  Vitals reviewed  Constitutional:       General: He is not in acute distress  Appearance: He is obese  He is not ill-appearing, toxic-appearing or diaphoretic  HENT:      Head: Normocephalic and atraumatic        Right Ear: External ear normal       Left Ear: External ear normal       Nose: Nose normal    Eyes: General: No scleral icterus  Right eye: No discharge  Left eye: No discharge  Extraocular Movements: Extraocular movements intact  Conjunctiva/sclera: Conjunctivae normal    Cardiovascular:      Rate and Rhythm: Normal rate and regular rhythm  Pulses: Normal pulses  Heart sounds: Normal heart sounds  No murmur heard  No friction rub  No gallop  Pulmonary:      Effort: Pulmonary effort is normal  No respiratory distress  Breath sounds: Normal breath sounds  No stridor  No wheezing, rhonchi or rales  Chest:      Chest wall: No tenderness  Abdominal:      General: Bowel sounds are normal  There is no distension  Palpations: Abdomen is soft  Tenderness: There is no abdominal tenderness  There is no guarding or rebound  Musculoskeletal:      Right lower leg: No edema  Left lower leg: No edema  Skin:     General: Skin is warm and dry  Neurological:      Mental Status: He is alert and oriented to person, place, and time  Labs: I have personally reviewed pertinent lab results  Lab Results   Component Value Date    WBC 10 8 04/22/2020    HGB 15 6 04/22/2020    HCT 46 3 04/22/2020    MCV 93 04/22/2020     04/22/2020     Lab Results   Component Value Date    GLUCOSE 113 11/30/2015    CALCIUM 9 0 06/16/2021     02/15/2017    K 4 0 06/16/2021    CO2 29 06/16/2021     06/16/2021    BUN 16 06/16/2021    CREATININE 0 84 06/16/2021     No results found for: IGE  Lab Results   Component Value Date    ALT 27 06/16/2021    AST 26 06/16/2021    ALKPHOS 111 06/16/2021    BILITOT 0 7 11/22/2016       Imaging and other studies: I have personally reviewed pertinent reports  and I have personally reviewed pertinent films in PACS      Chest x-ray 4/28/21:  Clear/normal lungs     CTA pulmonary embolism study 7/25/17:  Right upper lobe pulmonary embolism  RV/LV ratio 1 2    Right lower lobe consolidation    CT lung cancer screening 5/17/21: There is a new 4 mm left upper lobe juxtapleural nodule  There is no tracheal or endobronchial lesion      Pulmonary function testing 3/8/21:     FEV1/FVC Ratio: 70 %  FEV1: 2 22 L     56 % predicted  Forced Vital Capacity: 3 14 L    60 % predicted  After administration of bronchodilator:  Not administered due to COVID-19 for     Lung volumes by body plethysmography: Total Lung Capacity 77 % predicted Residual volume 108 % predicted     DLCO corrected for patients hemoglobin level: 87 % predicted     EKG, Pathology, and Other Studies: I have personally reviewed pertinent reports        Echocardiogram 11/6/20:  Ejection fraction 60% with no wall motion abnormalities     Nuclear stress test 11/6/20:  Normal study     Results of a split study done at [facility] in 2019:  The diagnostic portion demonstrated an AHI of 64 6 per hour  He spent 16 1% of this portion of the study with saturations less than 90%  During the therapeutic portion, sleep disordered breathing was adequately remediated with BiPAP at 16/12 cm H2O  Minimum oxygen saturation was 97%          Jesus Boyle, DO - PGY5  [facility]

## 2021-08-25 ENCOUNTER — OFFICE VISIT (OUTPATIENT)
Dept: PULMONOLOGY | Facility: CLINIC | Age: 61
End: 2021-08-25
Payer: MEDICARE

## 2021-08-25 VITALS
TEMPERATURE: 98.4 F | BODY MASS INDEX: 42.66 KG/M2 | OXYGEN SATURATION: 95 % | HEART RATE: 90 BPM | SYSTOLIC BLOOD PRESSURE: 140 MMHG | RESPIRATION RATE: 18 BRPM | HEIGHT: 72 IN | WEIGHT: 315 LBS | DIASTOLIC BLOOD PRESSURE: 90 MMHG

## 2021-08-25 DIAGNOSIS — Z72.0 TOBACCO ABUSE: ICD-10-CM

## 2021-08-25 DIAGNOSIS — R91.1 PULMONARY NODULE: ICD-10-CM

## 2021-08-25 DIAGNOSIS — J44.9 CHRONIC OBSTRUCTIVE PULMONARY DISEASE, UNSPECIFIED COPD TYPE (HCC): Primary | ICD-10-CM

## 2021-08-25 DIAGNOSIS — G47.33 OSA (OBSTRUCTIVE SLEEP APNEA): ICD-10-CM

## 2021-08-25 DIAGNOSIS — E66.01 MORBID OBESITY (HCC): Chronic | ICD-10-CM

## 2021-08-25 PROCEDURE — 99214 OFFICE O/P EST MOD 30 MIN: CPT | Performed by: INTERNAL MEDICINE

## 2021-08-25 RX ORDER — GLYCOPYRROLATE AND FORMOTEROL FUMARATE 9; 4.8 UG/1; UG/1
2 AEROSOL, METERED RESPIRATORY (INHALATION) 2 TIMES DAILY
Qty: 10.7 G | Refills: 3 | Status: SHIPPED | OUTPATIENT
Start: 2021-08-25 | End: 2021-08-28

## 2021-08-26 ENCOUNTER — TELEPHONE (OUTPATIENT)
Dept: PULMONOLOGY | Facility: CLINIC | Age: 61
End: 2021-08-26

## 2021-08-26 NOTE — TELEPHONE ENCOUNTER
Patient calling stating he was prescribed Bevespi and it costs $400  He would like to know if there is something cheaper   Please advise [phone]

## 2021-08-27 NOTE — TELEPHONE ENCOUNTER
I called pharmacy to get his insurance information  Was notified by the pharmacist the reason for the high cost is due to his deductible  Pharmacist also explained that alternatives such as Rexine Line will cost him $380  I called pt and notified him  Short of providing samples there is not anything we can do

## 2021-08-31 ENCOUNTER — TELEPHONE (OUTPATIENT)
Dept: PULMONOLOGY | Facility: CLINIC | Age: 61
End: 2021-08-31

## 2021-08-31 ENCOUNTER — CONSULT (OUTPATIENT)
Dept: SURGICAL ONCOLOGY | Facility: CLINIC | Age: 61
End: 2021-08-31
Payer: MEDICARE

## 2021-08-31 ENCOUNTER — TELEPHONE (OUTPATIENT)
Dept: CARDIOLOGY CLINIC | Facility: CLINIC | Age: 61
End: 2021-08-31

## 2021-08-31 VITALS
HEIGHT: 72 IN | TEMPERATURE: 97.8 F | RESPIRATION RATE: 19 BRPM | DIASTOLIC BLOOD PRESSURE: 90 MMHG | HEART RATE: 63 BPM | OXYGEN SATURATION: 99 % | SYSTOLIC BLOOD PRESSURE: 138 MMHG | WEIGHT: 315 LBS | BODY MASS INDEX: 42.66 KG/M2

## 2021-08-31 DIAGNOSIS — Z92.3 STATUS POST RADIOACTIVE IODINE THYROID ABLATION: ICD-10-CM

## 2021-08-31 DIAGNOSIS — E04.9 GOITER: ICD-10-CM

## 2021-08-31 DIAGNOSIS — E05.90 HYPERTHYROIDISM: Chronic | ICD-10-CM

## 2021-08-31 DIAGNOSIS — E04.1 RIGHT THYROID NODULE: ICD-10-CM

## 2021-08-31 DIAGNOSIS — E05.00 GRAVES DISEASE: Primary | ICD-10-CM

## 2021-08-31 PROCEDURE — 99204 OFFICE O/P NEW MOD 45 MIN: CPT | Performed by: SURGERY

## 2021-08-31 RX ORDER — LEVOTHYROXINE SODIUM 0.2 MG/1
200 TABLET ORAL DAILY
Qty: 30 TABLET | Refills: 3 | Status: SHIPPED | OUTPATIENT
Start: 2021-08-31 | End: 2022-01-21 | Stop reason: SDUPTHER

## 2021-08-31 RX ORDER — TRAMADOL HYDROCHLORIDE 50 MG/1
50 TABLET ORAL EVERY 6 HOURS PRN
Qty: 10 TABLET | Refills: 0 | Status: SHIPPED | OUTPATIENT
Start: 2021-08-31 | End: 2021-10-26 | Stop reason: ALTCHOICE

## 2021-08-31 NOTE — TELEPHONE ENCOUNTER
----- Message from Leighann Kaiser MD sent at 8/31/2021  2:44 PM EDT -----  Regarding: RE: Cardiac CLR  Doing just a letter is fine enough  Thank you    ----- Message -----  From: Galdino Zuleta MA  Sent: 8/31/2021   2:25 PM EDT  To: Leighann Kaiser MD, #  Subject: RE: Cardiac CLR                                  Good afternoon Dr Dyan Wray,     Pt needs Cardiac CLR RE: THYROIDECTOMY, total (N/A Neck) scheduled on 09/28  Do you want pt to come in for a visit or Cleared with a letter? Please advise           Magaly Lowry

## 2021-08-31 NOTE — PROGRESS NOTES
Surgical Oncology Follow Up       [address] [facility] [place]  [address]  [facility] [address] [phone]    Joshua Tim  1960  [id number]  [address] [facility] [address]  [facility] [id number] [phone]    No chief complaint on file  Assessment/Plan:    No problem-specific Assessment & Plan notes found for this encounter  Diagnoses and all orders for this visit:    Graves disease  -     Ambulatory referral to Surgical Oncology    Right thyroid nodule  -     Ambulatory referral to Surgical Oncology    Hyperthyroidism  -     Ambulatory referral to Surgical Oncology    Goiter  -     Ambulatory referral to Surgical Oncology    Status post radioactive iodine thyroid ablation  -     Ambulatory referral to Surgical Oncology        Advance Care Planning/Advance Directives:  Discussed disease status, cancer treatment plans and/or cancer treatment goals with the patient  Oncology History    No history exists  History of Present Illness: Patient is a 51-year-old man with a longstanding history of Graves disease  He states that he has been diagnosed and treated for this since at least 2010  This culminated in radioactive iodine therapy administered last year  However he has had persistent hyper thyroidism  He has therefore been referred for further evaluation and treatment  He is presently on methimazole as well as a beta blocker  He does have  History of AFib, and is on aspirin alone for this  Review of Systems   Constitutional: Positive for fatigue  Negative for diaphoresis  HENT: Negative  Negative for trouble swallowing  Eyes: Negative  Respiratory: Positive for shortness of breath  Cardiovascular: Negative  Gastrointestinal: Negative  Endocrine: Negative  Genitourinary: Negative  Musculoskeletal: Negative  Skin: Negative  Allergic/Immunologic: Negative  Neurological: Negative  Hematological: Negative  Psychiatric/Behavioral: Negative  Patient Active Problem List   Diagnosis    Hypertension    Hyperthyroidism    Chronic diastolic congestive heart failure (HCC)    History of pulmonary embolism    Dyslipidemia    Kidney stone    [facility]bid [facility]    Kidney stones    Swallowing dysfunction    Tobacco abuse    BMI 50 0-59 9, adult (HCC)    Graves disease    Goiter    Hyperglycemia    Right thyroid nodule    History of hyperthyroidism    Status post radioactive iodine thyroid ablation    FABIAN (obstructive sleep apnea)    Chronic obstructive pulmonary disease (HCC)    Elevated parathyroid hormone    Hypercalcemia    Vitamin D deficiency    Impaired fasting glucose    Pulmonary nodule     Past Medical History:   Diagnosis Date    Acute diastolic congestive heart failure ([facility] ) 7/3/2017    Atrial fibrillation (HCC)     Atrial fibrillation with rapid ventricular response ([facility] ) 3/31/2017    Bilateral edema of lower extremity 8/22/2016    BMI 45 0-49 9, adult ([facility] ) 2/17/2020    Encouraged lifestyle modification to incorporate moderate physiscal activity and DASH diet/low fat/low carb      CAD (coronary artery disease) 10/28/2016    Chest pain 8/22/2016    CPAP (continuous positive airway pressure) dependence     Dysphagia 9/29/2019    Homelessness 7/3/2017    Hyperlipidemia     Hypertension     Hypertensive heart disease with heart failure ([facility] ) 10/27/2020    Hyperthyroidism 8/22/2016    Kidney stone     Pneumothorax     Presence of IVC filter     Pulmonary embolism (HCC)     Pulmonary hypertension ([facility] ) 9/15/2016    Rash 10/28/2016    Sepsis ([facility] ) 8/14/2019    Sleep apnea     SOB (shortness of breath) 8/22/2016    Tachycardia 8/22/2016     Past Surgical History:   Procedure Laterality Date    EGD AND COLONOSCOPY N/A 7/28/2017    Procedure: EGD AND COLONOSCOPY;  Surgeon: Kavitha Evans MD;  [facility];  Service: Gastroenterology    FL RETROGRADE PYELOGRAM  8/13/2019    FL RETROGRADE PYELOGRAM  9/27/2019    IVC FILTER INSERTION      LITHOTRIPSY      NE CYSTO/URETERO W/LITHOTRIPSY &INDWELL STENT INSRT Right 8/13/2019    Procedure: CYSTOSCOPY URETEROSCOPY WITH LITHOTRIPSY HOLMIUM LASER, RETROGRADE PYELOGRAM AND INSERTION STENT URETERAL;  Surgeon: Suzan Zuñiga MD;  [facility];  Service: Urology    NE CYSTO/URETERO W/LITHOTRIPSY &INDWELL STENT INSRT Right 9/27/2019    Procedure: CYSTOSCOPY URETEROSCOPY WITH LITHOTRIPSY HOLMIUM LASER, RETROGRADE PYELOGRAM AND INSERTION STENT URETERAL;  Surgeon: Chuyita Alexander MD;  [facility];  Service: Urology    US GUIDED THYROID BIOPSY  3/23/2021     Family History   Problem Relation Age of Onset    Cancer Mother         metastatic, breast primary    Breast cancer Mother     Heart disease Father     Lung disease Father     Thyroid disease unspecified Father         thyroidectomy    Thyroid disease unspecified Sister     Hyperthyroidism Sister         in remission     Social History     Socioeconomic History    Marital status: Single     Spouse name: Not on file    Number of children: Not on file    Years of education: Not on file    Highest education level: Not on file   Occupational History     Comment: disability   Tobacco Use    Smoking status: Current Every Day Smoker     Packs/day: 3 00     Years: 43 00     Pack years: 129 00     Types: Cigarettes     Start date: 1978    Smokeless tobacco: Never Used    Tobacco comment: patient is currently still smoking a pack of cigarettes a day / 08/24/21   Vaping Use    Vaping Use: Never used   Substance and Sexual Activity    Alcohol use: No    Drug use: No    Sexual activity: Not on file   Other Topics Concern    Not on file   Social History Narrative    Drinks one cup of coffee a day      Social Determinants of Health Financial Resource Strain:     Difficulty of Paying Living Expenses:    Food Insecurity:     Worried About Running Out of Food in the Last Year:     [address] in the Last Year:    Transportation Needs:     Lack of Transportation (Medical):      Lack of Transportation (Non-Medical):    Physical Activity:     Days of Exercise per Week:     Minutes of Exercise per Session:    Stress:     Feeling of Stress :    Social Connections:     Frequency of Communication with Friends and Family:     Frequency of Social Gatherings with Friends and Family:     Attends Yazidism Services:     Active Member of Clubs or Organizations:     Attends Club or Organization Meetings:     Marital Status:    Intimate Partner Violence:     Fear of Current or Ex-Partner:     Emotionally Abused:     Physically Abused:     Sexually Abused:        Current Outpatient Medications:     albuterol (ProAir HFA) 90 mcg/act inhaler, Inhale 2 puffs every 4 (four) hours as needed for wheezing or shortness of breath, Disp: 8 5 g, Rfl: 0    aspirin (ECOTRIN LOW STRENGTH) 81 mg EC tablet, Take 81 mg by mouth daily, Disp: , Rfl:     atorvastatin (LIPITOR) 40 mg tablet, Take 1 tablet (40 mg total) by mouth daily, Disp: 90 tablet, Rfl: 1    digoxin (LANOXIN) 0 25 mg tablet, Take 1 tablet (250 mcg total) by mouth daily, Disp: 90 tablet, Rfl: 3    ergocalciferol (VITAMIN D2) 50,000 units, Take 1 capsule (50,000 Units total) by mouth once a week, Disp: 12 capsule, Rfl: 1    methimazole (TAPAZOLE) 10 mg tablet, 15 mg daily, Disp: 46 tablet, Rfl: 5    metoprolol tartrate (LOPRESSOR) 50 mg tablet, Take 1 tablet (50 mg total) by mouth 2 (two) times a day, Disp: 180 tablet, Rfl: 3    nystatin (MYCOSTATIN) cream, Apply topically 2 (two) times a day, Disp: 30 g, Rfl: 1    potassium chloride (K-DUR,KLOR-CON) 20 mEq tablet, Take 1 tablet (20 mEq total) by mouth 2 (two) times a day, Disp: 180 tablet, Rfl: 1    tiotropium (Spiriva Respimat) 2  5 MCG/ACT AERS inhaler, Inhale 2 puffs daily, Disp: 4 g, Rfl: 3    torsemide (DEMADEX) 20 mg tablet, Take 2 tablets (40 mg total) by mouth daily, Disp: 180 tablet, Rfl: 0    triamcinolone (KENALOG) 0 5 % cream, Apply topically 2 (two) times a day (Patient not taking: Reported on 8/25/2021), Disp: 30 g, Rfl: 1  No Known Allergies  Vitals:    08/31/21 0944   BP: 138/90   Pulse: 63   Resp: 19   Temp: 97 8 °F (36 6 °C)   SpO2: 99%       Physical Exam  Constitutional:       General: He is not in acute distress  Appearance: He is not ill-appearing or toxic-appearing  HENT:      Head: Normocephalic and atraumatic  Right Ear: External ear normal       Left Ear: External ear normal    Eyes:      General: No scleral icterus  Extraocular Movements: Extraocular movements intact  Conjunctiva/sclera: Conjunctivae normal       Pupils: Pupils are equal, round, and reactive to light  Cardiovascular:      Rate and Rhythm: Normal rate and regular rhythm  Pulses: Normal pulses  Heart sounds: Normal heart sounds  Pulmonary:      Breath sounds: Normal breath sounds  Abdominal:      General: Abdomen is flat  Palpations: Abdomen is soft  Musculoskeletal:         General: Normal range of motion  Cervical back: Normal range of motion and neck supple  No rigidity or tenderness  Lymphadenopathy:      Cervical: No cervical adenopathy  Skin:     General: Skin is warm and dry  Neurological:      General: No focal deficit present  Mental Status: He is alert and oriented to person, place, and time     Psychiatric:         Mood and Affect: Mood normal          Behavior: Behavior normal          Pathology:    [facility] [place] [facility]    Case Report   Non-gynecologic Cytology                          Case: [id number]                                   Authorizing Provider: John Jenkins MD          Collected:           03/23/2021 1505               Ordering Location:     [facility]     Received:            03/23/2021 1515                                      [facility]                                                             Pathologist:           Genevieve Conley MD                                                    Specimens:   A) - Thyroid, Right, Mid                                                                             B) - Thyroid, Right, Mid                                                                   Final Diagnosis   A B  Thyroid, Right Mid,  ( ThinPrep and smear preparations ):  Follicular neoplasm/Suspicious for follicular neoplasm ([place] Category IV)  - See note  Atypical follicular cells with nuclear enlargement, crowding and overlap  Macrophages and colloid present      Satisfactory for evaluation         Note:  (1) As reported in the [address] [place] [address] for Reporting Thyroid Cytopathology* this diagnostic category has demonstrated anywhere from 25-40% risk of malignancy being found in subsequent resections and/or FNA  This risk of malignancy is expected to change due to the usage of the surgical pathology diagnosis of 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)    The anticipated risk of malignancy secondary to NIFTP is 10-40%  The histologic follow-up of cases diagnosed as follicular neoplasm/suspicious for follicular neoplasm includes follicular adenoma, follicular carcinoma, and follicular variant of papillary thyroid carcinoma including the recently described indolent counterpart, NIFTP  The manual reports that the usual management following this diagnosis is genetic testing or lobectomy   Ultimately, clinical/imaging correlation for this patient is needed in arriving at the actual management plan      *The [place] System for Reporting Thyroid Cytopathology, Nasra Navarro ([employer]les[employer] ), 2018 (2nd ed )   Electronically signed by Genevieve Conley MD on 3/26/2021 at  2:36 PM Imaging    THYROID ULTRASOUND     INDICATION:    E05 90: Thyrotoxicosis, unspecified without thyrotoxic crisis or storm  E05 00: Thyrotoxicosis with diffuse goiter without thyrotoxic crisis or storm  E04 9: Nontoxic goiter, unspecified      COMPARISON:  7/18/2017     TECHNIQUE:   Ultrasound of the thyroid was performed with a high frequency linear transducer in transverse and sagittal planes including volumetric imaging sweeps as well as traditional still imaging technique      FINDINGS:  Thyroid parenchyma is diffusely heterogeneous in echotexture with focal nodule(s) as described below      Right lobe:  7 6 x 3 0 x 3 5 cm   37 8 mL  Left lobe:  8 1 x 2 3 x 2 6 cm   23 3 mL  Isthmus:  0 8 cm      Nodule #1  Image 4  RIGHT lower pole nodule measuring 1 0 x 0 8 x 1 1 cm  This nodule was not measured on the prior study, but in retrospect was present and unchanged based on review of the cine images  COMPOSITION:  2 points, solid or almost completely solid   ECHOGENICITY:  1 point, hyperechoic or isoechoic  SHAPE:  0 points, wider-than-tall  MARGIN: 0 points, smooth  ECHOGENIC FOCI:  0 points, none or large comet-tail artifacts  TI-RADS Classification: TR 3 (3 points), FNA if >2 5 cm  Follow if >1 5 cm      Nodule #2  Image 7  RIGHT midgland nodule measuring 1 4 x 1 3 x 1 6 cm  Not clearly seen on the prior study  Of note, the nodule looks most like a discrete nodule as opposed to heterogeneous tissue on sagittal cine clip  COMPOSITION:  2 points, solid or almost completely solid   ECHOGENICITY:  2 points, hypoechoic  SHAPE:  0 points, wider-than-tall  MARGIN: 0 points, ill-defined  ECHOGENIC FOCI:  0 points, none or large comet-tail artifacts  TI-RADS Classification: TR 4 (4-6 points), FNA if > 1 5 cm  Follow if > 1cm           IMPRESSION:     Right-sided thyroid nodules    The following meet current ACR criteria for recommending ultrasound guided biopsy:         The 1 4 x 1 3 x 1 6 cm right mid gland nodule  (Image number 7) (CRITERIA: TR 4, Moderately suspicious  FNA if > 1 5 cm               Reference: ACR Thyroid Imaging, Reporting and Data System (TI-RADS): White Paper of the [employer]  J AM [place] Radiol 9331;88:[id number]  (additional recommendations based on American Thyroid Association 2015 guidelines )           The study was marked in EPIC for significant notification         Workstation performed: [id number]       Labs:  CBC, Coags, BMP, Mg, Phos     Lab Results   Component Value Date    [id number] 0 015 (L) 07/13/2021    TSH <0 005 (L) 05/13/2020    [id number] 15 1 (H) 08/22/2016             I reviewed the above laboratory and imaging data  Discussion/Summary:  28-year-old man, Graves disease  He is amenable to total thyroidectomy  Rationale for this along with risks and benefits of surgery including infection, bleeding, recurrent nerve injury, hypocalcemia, need for possible additional surgery, discussed with him and his spouse  All questions answered and consents signed at this visit virtual thyroidectomy  Will obtain preoperative cervical node mapping per protocol

## 2021-08-31 NOTE — LETTER
August 31, 2021     MD Mookie Hicsk  [address] [address]  [address] [facility] [address]    Patient: Ania Couch   YOB: 1960   Date of Visit: 8/31/2021       Dear Dr Jason Mckeon:    Thank you for referring Robb Herrmann to me for evaluation  Below are my notes for this consultation  If you have questions, please do not hesitate to call me  I look forward to following your patient along with you  Sincerely,        Ryan Villa MD        CC: ASHLEY Gomez Daily, DO  MD Lexie Leal [facility], DO  MD Ryan Benson MD  8/31/2021 10:29 AM  Sign when Signing Visit               Surgical Oncology Follow Up       [address] [place] [address] SURGICAL ONCOLOGY [address] [address]  [phone]    Ania Couch  1960  [id number]  [address] [place] [address] SURGICAL ONCOLOGY [address] [place] [address]    No chief complaint on file  Assessment/Plan:    No problem-specific Assessment & Plan notes found for this encounter  Diagnoses and all orders for this visit:    Graves disease  -     Ambulatory referral to Surgical Oncology    Right thyroid nodule  -     Ambulatory referral to Surgical Oncology    Hyperthyroidism  -     Ambulatory referral to Surgical Oncology    Goiter  -     Ambulatory referral to Surgical Oncology    Status post radioactive iodine thyroid ablation  -     Ambulatory referral to Surgical Oncology        Advance Care Planning/Advance Directives:  Discussed disease status, cancer treatment plans and/or cancer treatment goals with the patient  Oncology History    No history exists  History of Present Illness: Patient is a 61-year-old man with a longstanding history of Graves disease  He states that he has been diagnosed and treated for this since at least 2010    This culminated in radioactive iodine therapy administered last year  However he has had persistent hyper thyroidism  He has therefore been referred for further evaluation and treatment  He is presently on methimazole as well as a beta blocker  He does have  History of AFib, and is on aspirin alone for this  Review of Systems   Constitutional: Positive for fatigue  Negative for diaphoresis  HENT: Negative  Negative for trouble swallowing  Eyes: Negative  Respiratory: Positive for shortness of breath  Cardiovascular: Negative  Gastrointestinal: Negative  Endocrine: Negative  Genitourinary: Negative  Musculoskeletal: Negative  Skin: Negative  Allergic/Immunologic: Negative  Neurological: Negative  Hematological: Negative  Psychiatric/Behavioral: Negative  Patient Active Problem List   Diagnosis    Hypertension    Hyperthyroidism    Chronic diastolic congestive heart failure (HCC)    History of pulmonary embolism    Dyslipidemia    Kidney stone    Morbid obesity ([facility] )    Kidney stones    Swallowing dysfunction    Tobacco abuse    BMI 50 0-59 9, adult ([facility])    Graves disease    Goiter    Hyperglycemia    Right thyroid nodule    History of hyperthyroidism    Status post radioactive iodine thyroid ablation    FABIAN (obstructive sleep apnea)    Chronic obstructive pulmonary disease (HCC)    Elevated parathyroid hormone    Hypercalcemia    Vitamin D deficiency    Impaired fasting glucose    Pulmonary nodule     Past Medical History:   Diagnosis Date    Acute diastolic congestive heart failure ([facility] ) 7/3/2017    Atrial fibrillation (HCC)     Atrial fibrillation with rapid ventricular response ([facility] ) 3/31/2017    Bilateral edema of lower extremity 8/22/2016    BMI 45 0-49 9, adult ([facility] ) 2/17/2020    Encouraged lifestyle modification to incorporate moderate physiscal activity and DASH diet/low fat/low carb      CAD (coronary artery disease) 10/28/2016    Chest pain 8/22/2016    CPAP (continuous positive airway pressure) dependence     Dysphagia 9/29/2019    Homelessness 7/3/2017    Hyperlipidemia     Hypertension     Hypertensive heart disease with heart failure ([facility] ) 10/27/2020    Hyperthyroidism 8/22/2016    Kidney stone     Pneumothorax     Presence of IVC filter     Pulmonary embolism (HCC)     Pul[facility] hypertension [facility] 9/15/2016    Rash 10/28/2016    Sepsis ([facility] ) 8/14/2019    Sleep apnea     SOB (shortness of breath) 8/22/2016    Tachycardia 8/22/2016     Past Surgical History:   Procedure Laterality Date    EGD AND COLONOSCOPY N/A 7/28/2017    Procedure: EGD AND COLONOSCOPY;  Surgeon: Kurtis Prado MD;  [facility];  Service: Gastroenterology    FL RETROGRADE PYELOGRAM  8/13/2019    FL RETROGRADE PYELOGRAM  9/27/2019    IVC FILTER INSERTION      LITHOTRIPSY      AL CYSTO/URETERO W/LITHOTRIPSY &INDWELL STENT INSRT Right 8/13/2019    Procedure: CYSTOSCOPY URETEROSCOPY WITH LITHOTRIPSY HOLMIUM LASER, RETROGRADE PYELOGRAM AND INSERTION STENT URETERAL;  Surgeon: Bradford Cheema MD;  [facility];  Service: Urology    AL CYSTO/URETERO W/LITHOTRIPSY &INDWELL STENT INSRT Right 9/27/2019    Procedure: CYSTOSCOPY URETEROSCOPY WITH LITHOTRIPSY HOLMIUM LASER, RETROGRADE PYELOGRAM AND INSERTION STENT URETERAL;  Surgeon: Annie Alvarez MD;  [facility];  Service: Urology    US GUIDED THYROID BIOPSY  3/23/2021     Family History   Problem Relation Age of Onset    Cancer Mother         metastatic, breast primary    Breast cancer Mother     Heart disease Father     Lung disease Father     Thyroid disease unspecified Father         thyroidectomy    Thyroid disease unspecified Sister     Hyperthyroidism Sister         in remission     Social History     Socioeconomic History    Marital status: Single     Spouse name: Not on file    Number of children: Not on file    Years of education: Not on file    Highest education level: Not on file   Occupational History     Comment: disability   Tobacco Use    Smoking status: Current Every Day Smoker     Packs/day: 3 00     Years: 43 00     Pack years: 129 00     Types: Cigarettes     Start date: 1978    Smokeless tobacco: Never Used    Tobacco comment: patient is currently still smoking a pack of cigarettes a day / 08/24/21   Vaping Use    Vaping Use: Never used   Substance and Sexual Activity    Alcohol use: No    Drug use: No    Sexual activity: Not on file   Other Topics Concern    Not on file   Social History Narrative    Drinks one cup of coffee a day      Social Determinants of Health     Financial Resource Strain:     Difficulty of Paying Living Expenses:    Food Insecurity:     Worried About Running Out of Food in the Last Year:     Ran Out of Food in the Last Year:    Transportation Needs:     Lack of Transportation (Medical):      Lack of Transportation (Non-Medical):    Physical Activity:     Days of Exercise per Week:     Minutes of Exercise per Session:    Stress:     Feeling of Stress :    Social Connections:     Frequency of Communication with Friends and Family:     Frequency of Social Gatherings with Friends and Family:     Attends [employer] Services:     Active Member of Clubs or Organizations:     Attends Club or Organization Meetings:     Marital Status:    Intimate Partner Violence:     Fear of Current or Ex-Partner:     Emotionally Abused:     Physically Abused:     Sexually Abused:        Current Outpatient Medications:     albuterol (ProAir HFA) 90 mcg/act inhaler, Inhale 2 puffs every 4 (four) hours as needed for wheezing or shortness of breath, Disp: 8 5 g, Rfl: 0    aspirin (ECOTRIN LOW STRENGTH) 81 mg EC tablet, Take 81 mg by mouth daily, Disp: , Rfl:     atorvastatin (LIPITOR) 40 mg tablet, Take 1 tablet (40 mg total) by mouth daily, Disp: 90 tablet, Rfl: 1    digoxin (LANOXIN) 0 25 mg tablet, Take 1 tablet (250 mcg total) by mouth daily, Disp: 90 tablet, Rfl: 3    ergocalciferol (VITAMIN D2) 50,000 units, Take 1 capsule (50,000 Units total) by mouth once a week, Disp: 12 capsule, Rfl: 1    methimazole (TAPAZOLE) 10 mg tablet, 15 mg daily, Disp: 46 tablet, Rfl: 5    metoprolol tartrate (LOPRESSOR) 50 mg tablet, Take 1 tablet (50 mg total) by mouth 2 (two) times a day, Disp: 180 tablet, Rfl: 3    nystatin (MYCOSTATIN) cream, Apply topically 2 (two) times a day, Disp: 30 g, Rfl: 1    potassium chloride (K-DUR,KLOR-CON) 20 mEq tablet, Take 1 tablet (20 mEq total) by mouth 2 (two) times a day, Disp: 180 tablet, Rfl: 1    tiotropium (Spiriva Respimat) 2 5 MCG/ACT AERS inhaler, Inhale 2 puffs daily, Disp: 4 g, Rfl: 3    torsemide (DEMADEX) 20 mg tablet, Take 2 tablets (40 mg total) by mouth daily, Disp: 180 tablet, Rfl: 0    triamcinolone (KENALOG) 0 5 % cream, Apply topically 2 (two) times a day (Patient not taking: Reported on 8/25/2021), Disp: 30 g, Rfl: 1  No Known Allergies  Vitals:    08/31/21 0944   BP: 138/90   Pulse: 63   Resp: 19   Temp: 97 8 °F (36 6 °C)   SpO2: 99%       Physical Exam  Constitutional:       General: He is not in acute distress  Appearance: He is not ill-appearing or toxic-appearing  HENT:      Head: Normocephalic and atraumatic  Right Ear: External ear normal       Left Ear: External ear normal    Eyes:      General: No scleral icterus  Extraocular Movements: Extraocular movements intact  Conjunctiva/sclera: Conjunctivae normal       Pupils: Pupils are equal, round, and reactive to light  Cardiovascular:      Rate and Rhythm: Normal rate and regular rhythm  Pulses: Normal pulses  Heart sounds: Normal heart sounds  Pulmonary:      Breath sounds: Normal breath sounds  Abdominal:      General: Abdomen is flat  Palpations: Abdomen is soft  Musculoskeletal:         General: Normal range of motion  Cervical back: Normal range of motion and neck supple  No rigidity or tenderness  Lymphadenopathy:      Cervical: No cervical adenopathy  Skin:     General: Skin is warm and dry  Neurological:      General: No focal deficit present  Mental Status: He is alert and oriented to person, place, and time  Psychiatric:         Mood and Affect: Mood normal          Behavior: Behavior normal          [place]ology:    [facility] [place] [facility]    Case Report   Non-gynecologic Cytology                          Case: [id number]                                   Authorizing Provider: Tri Clay MD          Collected:           03/23/2021 1505               Ordering Location:     [facility]     Received:            03/23/2021 1515                                      [facility]                                                             Pathologist:           Oksana Kebede MD                                                    Specimens:   A) - Thyroid, Right, Mid                                                                             B) - Thyroid, Right, Mid                                                                   Final Diagnosis   A B  Thyroid, Right Mid,  ( ThinPrep and smear preparations ):  Follicular neoplasm/Suspicious for follicular neoplasm ([place] Category IV)  - See note  Atypical follicular cells with nuclear enlargement, crowding and overlap  Macrophages and colloid present      Satisfactory for evaluation         Note:  (1) As reported in the [facility] for Reporting Thyroid Cytopathology* this diagnostic category has demonstrated anywhere from 25-40% risk of malignancy being found in subsequent resections and/or FNA  This risk of malignancy is expected to change due to the usage of the surgical pathology diagnosis of 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)    The anticipated risk of malignancy secondary to NIFTP is 10-40%     The histologic follow-up of cases diagnosed as follicular neoplasm/suspicious for follicular neoplasm includes follicular adenoma, follicular carcinoma, and follicular variant of papillary thyroid carcinoma including the recently described indolent counterpart, NIFTP  The manual reports that the usual management following this diagnosis is genetic testing or lobectomy  Ultimately, clinical/imaging correlation for this patient is needed in arriving at the actual management plan      *The [address] [place] [address] for Reporting Thyroid Cytopathology, Selvin Ríos ), 2018 (2nd ed )   Electronically signed by Micha Couch MD on 3/26/2021 at  2:36 PM       Imaging    THYROID ULTRASOUND     INDICATION:    E05 90: Thyrotoxicosis, unspecified without thyrotoxic crisis or storm  E05 00: Thyrotoxicosis with diffuse goiter without thyrotoxic crisis or storm  E04 9: Nontoxic goiter, unspecified      COMPARISON:  7/18/2017     TECHNIQUE:   Ultrasound of the thyroid was performed with a high frequency linear transducer in transverse and sagittal planes including volumetric imaging sweeps as well as traditional still imaging technique      FINDINGS:  Thyroid parenchyma is diffusely heterogeneous in echotexture with focal nodule(s) as described below      Right lobe:  7 6 x 3 0 x 3 5 cm   37 8 mL  Left lobe:  8 1 x 2 3 x 2 6 cm   23 3 mL  Isthmus:  0 8 cm      Nodule #1  Image 4  RIGHT lower pole nodule measuring 1 0 x 0 8 x 1 1 cm  This nodule was not measured on the prior study, but in retrospect was present and unchanged based on review of the cine images  COMPOSITION:  2 points, solid or almost completely solid   ECHOGENICITY:  1 point, hyperechoic or isoechoic  SHAPE:  0 points, wider-than-tall  MARGIN: 0 points, smooth  ECHOGENIC FOCI:  0 points, none or large comet-tail artifacts  TI-RADS Classification: TR 3 (3 points), FNA if >2 5 cm  Follow if >1 5 cm      Nodule #2  Image 7  RIGHT midgland nodule measuring 1 4 x 1 3 x 1 6 cm    Not clearly seen on the prior study  Of note, the nodule looks most like a discrete nodule as opposed to heterogeneous tissue on sagittal cine clip  COMPOSITION:  2 points, solid or almost completely solid   ECHOGENICITY:  2 points, hypoechoic  SHAPE:  0 points, wider-than-tall  MARGIN: 0 points, ill-defined  ECHOGENIC FOCI:  0 points, none or large comet-tail artifacts  TI-RADS Classification: TR 4 (4-6 points), FNA if > 1 5 cm  Follow if > 1cm           IMPRESSION:     Right-sided thyroid nodules  The following meet current ACR criteria for recommending ultrasound guided biopsy:         The 1 4 x 1 3 x 1 6 cm right mid gland nodule  (Image number 7) (CRITERIA: TR 4, Moderately suspicious  FNA if > 1 5 cm               Reference: ACR Thyroid Imaging, Reporting and Data System (TI-RADS): White Paper of the "[employer]  J AM [place] Radiol 5562;08:584-612  (additional recommendations based on American Thyroid Association 2015 guidelines )           The study was marked in EPIC for significant notification         Workstation performed: [id number]       Labs:  CBC, Coags, BMP, Mg, Phos     Lab Results   Component Value Date    [id number] 0 015 (L) 07/13/2021    TSH <0 005 (L) 05/13/2020    U0ECUKQ 15 1 (H) 08/22/2016             I reviewed the above laboratory and imaging data  Discussion/Summary:  51-year-old man, Graves disease  He is amenable to total thyroidectomy  Rationale for this along with risks and benefits of surgery including infection, bleeding, recurrent nerve injury, hypocalcemia, need for possible additional surgery, discussed with him and his spouse  All questions answered and consents signed at this visit virtual thyroidectomy  Will obtain preoperative cervical node mapping per protocol

## 2021-08-31 NOTE — TELEPHONE ENCOUNTER
----- Message from Marcelina Mehta sent at 8/31/2021  2:25 PM EDT -----  Regarding: RE: Cardiac CLR  Good afternoon Dr Shawn Tomlinson,     Pt needs Cardiac CLR RE: THYROIDECTOMY, total (N/A Neck) scheduled on 09/28  Do you want pt to come in for a visit or Cleared with a letter? Please advise           Janessa Santillan

## 2021-08-31 NOTE — TELEPHONE ENCOUNTER
Established patient needing pre-op clearance  Surgery: THYROIDECTOMY  Surgery date: 9/28/21  Last seen by us: 8/25/21  On oxygen: No  Kind of Sedation: General  Length of surgery: 190 minutes  Surgeon: Dr Bhakti Alejandro  Office contact: [phone]  Form/Office Note: Note  Fax: Would you like to clear patient via a phone call from last visit or would like us to schedule them with you or an AP?

## 2021-09-01 DIAGNOSIS — I50.32 CHRONIC DIASTOLIC CONGESTIVE HEART FAILURE (HCC): Chronic | ICD-10-CM

## 2021-09-03 ENCOUNTER — TELEPHONE (OUTPATIENT)
Dept: PULMONOLOGY | Facility: CLINIC | Age: 61
End: 2021-09-03

## 2021-09-03 RX ORDER — TORSEMIDE 20 MG/1
40 TABLET ORAL DAILY
Qty: 180 TABLET | Refills: 1 | Status: SHIPPED | OUTPATIENT
Start: 2021-09-03 | End: 2022-03-09

## 2021-09-03 NOTE — TELEPHONE ENCOUNTER
Pulmonary risk assessment for thyroidectomy:        Patient recently had visit at the pulmonary office reports that of August 2021  Since visit patient says that his respiratory status is largely unchanged  He is still using albuterol rescue inhaler on/off  He was unable to afford Anoro, Stiolto, breztri due to cost   I informed him that I prescribed Spiriva in hopes that it would will cheaper and something he can afford  Instructed him that along with the Spiriva he should use albuterol p r n  Instructed patient to use inhalers prior to surgery for pulmonary optimization  Given his COPD he may require extubation to BiPAP temporarily  Currently not in COPD exacerbation respiratory status is baseline        ARISCAT Score for Postoperative Pulmonary Complications:    Patient is a intermediate to high risk (13 3 to 42 1%) risk of in-hospital postop pulmonary complications (composite including respiratory failure, respiratory infection, pleural effusion, atelectasis, pneumothorax, bronchospasm, aspiration pneumonitis)    Camillo Done Postoperative Respiratory Failure Risk    Patient is at 2 6 % risk of mechanical ventilation for greater than 48 hours after surgery, or unplanned intubation less than or equal to 30 days of surgery

## 2021-09-03 NOTE — TELEPHONE ENCOUNTER
Performed preoperative pulmonary risk assessment over phone based on recent clinic visit  Note updated in chart    Thank you

## 2021-09-13 ENCOUNTER — HOSPITAL ENCOUNTER (OUTPATIENT)
Dept: ULTRASOUND IMAGING | Facility: HOSPITAL | Age: 61
Discharge: HOME | End: 2021-09-13
Attending: SURGERY
Payer: MEDICARE

## 2021-09-13 ENCOUNTER — APPOINTMENT (OUTPATIENT)
Dept: LAB | Facility: HOSPITAL | Age: 61
End: 2021-09-13
Attending: SURGERY
Payer: MEDICARE

## 2021-09-13 DIAGNOSIS — E05.00 GRAVES DISEASE: ICD-10-CM

## 2021-09-13 LAB
ALBUMIN SERPL BCP-MCNC: 3.4 G/DL (ref 3.5–5)
ALP SERPL-CCNC: 137 U/L (ref 46–116)
ALT SERPL W P-5'-P-CCNC: 29 U/L (ref 12–78)
ANION GAP SERPL CALCULATED.3IONS-SCNC: 6 MMOL/L (ref 4–13)
AST SERPL W P-5'-P-CCNC: 26 U/L (ref 5–45)
BASOPHILS # BLD AUTO: 0.1 THOUSANDS/ΜL (ref 0–0.1)
BASOPHILS NFR BLD AUTO: 1 % (ref 0–1)
BILIRUB SERPL-MCNC: 0.6 MG/DL (ref 0.2–1)
BUN SERPL-MCNC: 9 MG/DL (ref 5–25)
CALCIUM ALBUM COR SERPL-MCNC: 9.4 MG/DL (ref 8.3–10.1)
CALCIUM SERPL-MCNC: 8.9 MG/DL (ref 8.3–10.1)
CHLORIDE SERPL-SCNC: 96 MMOL/L (ref 100–108)
CO2 SERPL-SCNC: 33 MMOL/L (ref 21–32)
CREAT SERPL-MCNC: 1.05 MG/DL (ref 0.6–1.3)
EOSINOPHIL # BLD AUTO: 0.32 THOUSAND/ΜL (ref 0–0.61)
EOSINOPHIL NFR BLD AUTO: 2 % (ref 0–6)
ERYTHROCYTE [DISTWIDTH] IN BLOOD BY AUTOMATED COUNT: 13.8 % (ref 11.6–15.1)
GFR SERPL CREATININE-BSD FRML MDRD: 77 ML/MIN/1.73SQ M
GLUCOSE SERPL-MCNC: 243 MG/DL (ref 65–140)
HCT VFR BLD AUTO: 47.8 % (ref 36.5–49.3)
HGB BLD-MCNC: 16 G/DL (ref 12–17)
IMM GRANULOCYTES # BLD AUTO: 0.06 THOUSAND/UL (ref 0–0.2)
IMM GRANULOCYTES NFR BLD AUTO: 0 % (ref 0–2)
LYMPHOCYTES # BLD AUTO: 2.35 THOUSANDS/ΜL (ref 0.6–4.47)
LYMPHOCYTES NFR BLD AUTO: 17 % (ref 14–44)
MCH RBC QN AUTO: 32.5 PG (ref 26.8–34.3)
MCHC RBC AUTO-ENTMCNC: 33.5 G/DL (ref 31.4–37.4)
MCV RBC AUTO: 97 FL (ref 82–98)
MONOCYTES # BLD AUTO: 0.81 THOUSAND/ΜL (ref 0.17–1.22)
MONOCYTES NFR BLD AUTO: 6 % (ref 4–12)
NEUTROPHILS # BLD AUTO: 9.86 THOUSANDS/ΜL (ref 1.85–7.62)
NEUTS SEG NFR BLD AUTO: 74 % (ref 43–75)
NRBC BLD AUTO-RTO: 0 /100 WBCS
PLATELET # BLD AUTO: 295 THOUSANDS/UL (ref 149–390)
PMV BLD AUTO: 11.1 FL (ref 8.9–12.7)
POTASSIUM SERPL-SCNC: 3.5 MMOL/L (ref 3.5–5.3)
PROT SERPL-MCNC: 8.4 G/DL (ref 6.4–8.2)
RBC # BLD AUTO: 4.93 MILLION/UL (ref 3.88–5.62)
SODIUM SERPL-SCNC: 135 MMOL/L (ref 136–145)
WBC # BLD AUTO: 13.5 THOUSAND/UL (ref 4.31–10.16)

## 2021-09-13 PROCEDURE — 36415 COLL VENOUS BLD VENIPUNCTURE: CPT

## 2021-09-13 PROCEDURE — 85025 COMPLETE CBC W/AUTO DIFF WBC: CPT

## 2021-09-13 PROCEDURE — 80053 COMPREHEN METABOLIC PANEL: CPT

## 2021-09-13 PROCEDURE — 76536 US EXAM OF HEAD AND NECK: CPT

## 2021-09-20 ENCOUNTER — TELEPHONE (OUTPATIENT)
Dept: FAMILY MEDICINE CLINIC | Facility: HOSPITAL | Age: 61
End: 2021-09-20

## 2021-09-20 NOTE — TELEPHONE ENCOUNTER
Pt asking for an antibiotic for a sore in their mouth that started yesterday  Can we triage? Or should I just schedule an apt?

## 2021-09-20 NOTE — TELEPHONE ENCOUNTER
Pt reports he has a mouth ulcer   called because he is having  surgery  9/28  SLB   thyroid removed     please advise  thanks

## 2021-09-20 NOTE — TELEPHONE ENCOUNTER
He does not need an antibiotic for a mouth ulcer, but I will send a topical ointment to pharmacy if he would want that

## 2021-09-23 ENCOUNTER — ANESTHESIA EVENT (OUTPATIENT)
Dept: PERIOP | Facility: HOSPITAL | Age: 61
End: 2021-09-23
Payer: MEDICARE

## 2021-09-23 DIAGNOSIS — E83.52 HYPERCALCEMIA: ICD-10-CM

## 2021-09-23 RX ORDER — ERGOCALCIFEROL 1.25 MG/1
50000 CAPSULE ORAL WEEKLY
Qty: 12 CAPSULE | Refills: 1 | Status: SHIPPED | OUTPATIENT
Start: 2021-09-23

## 2021-09-27 NOTE — ANESTHESIA PREPROCEDURE EVALUATION
increased  Lab Results   Component Value Date    WBC 13 50 (H) 09/13/2021    HGB 16 0 09/13/2021    HCT 47 8 09/13/2021    MCV 97 09/13/2021     09/13/2021     Lab Results   Component Value Date    SODIUM 135 (L) 09/13/2021    K 3 5 09/13/2021    CL 96 (L) 09/13/2021    CO2 33 (H) 09/13/2021    BUN 9 09/13/2021    CREATININE 1 05 09/13/2021    GLUC 243 (H) 09/13/2021    CALCIUM 8 9 09/13/2021     Lab Results   Component Value Date    INR 1 44 (H) 08/14/2019    INR 2 06 (H) 03/16/2018    INR 4 04 (H) 09/17/2017    PROTIME 17 2 (H) 08/14/2019    PROTIME 23 1 (H) 03/16/2018    PROTIME 39 4 (H) 09/17/2017     Lab Results   Component Value Date    HGBA1C 5 7 (H) 02/09/2021            Physical Exam    Airway    Mallampati score: III  TM Distance: >3 FB  Neck ROM: full     Dental   No notable dental hx     Cardiovascular  Cardiovascular exam normal    Pulmonary  Pulmonary exam normal     Other Findings        Anesthesia Plan  ASA Score- 3     Anesthesia Type- general with ASA Monitors  Additional Monitors:   Airway Plan: ETT  Plan Factors-    Chart reviewed  EKG reviewed  Existing labs reviewed  Patient summary reviewed  Induction- intravenous  Postoperative Plan-     Informed Consent- Anesthetic plan and risks discussed with patient  I personally reviewed this patient with the CRNA  Discussed and agreed on the Anesthesia Plan with the CRNA  Aidan Novoa

## 2021-09-28 ENCOUNTER — HOSPITAL ENCOUNTER (OUTPATIENT)
Facility: HOSPITAL | Age: 61
Setting detail: OUTPATIENT SURGERY
Discharge: HOME | End: 2021-09-29
Attending: SURGERY | Admitting: SURGERY
Payer: MEDICARE

## 2021-09-28 ENCOUNTER — ANESTHESIA (OUTPATIENT)
Dept: PERIOP | Facility: HOSPITAL | Age: 61
End: 2021-09-28
Payer: MEDICARE

## 2021-09-28 DIAGNOSIS — E04.9 GOITER: Primary | ICD-10-CM

## 2021-09-28 DIAGNOSIS — E05.00 GRAVES DISEASE: ICD-10-CM

## 2021-09-28 LAB
ALBUMIN SERPL BCP-MCNC: 3.1 G/DL (ref 3.5–5)
CALCIUM SERPL-MCNC: 8.7 MG/DL (ref 8.3–10.1)
CALCIUM SERPL-MCNC: 8.8 MG/DL (ref 8.3–10.1)

## 2021-09-28 PROCEDURE — 88307 TISSUE EXAM BY PATHOLOGIST: CPT | Performed by: PATHOLOGY

## 2021-09-28 PROCEDURE — 60240 REMOVAL OF THYROID: CPT | Performed by: SURGERY

## 2021-09-28 PROCEDURE — U0005 INFEC AGEN DETEC AMPLI PROBE: HCPCS

## 2021-09-28 PROCEDURE — 82040 ASSAY OF SERUM ALBUMIN: CPT | Performed by: ORTHOPAEDIC SURGERY

## 2021-09-28 PROCEDURE — U0003 INFECTIOUS AGENT DETECTION BY NUCLEIC ACID (DNA OR RNA); SEVERE ACUTE RESPIRATORY SYNDROME CORONAVIRUS 2 (SARS-COV-2) (CORONAVIRUS DISEASE [COVID-19]), AMPLIFIED PROBE TECHNIQUE, MAKING USE OF HIGH THROUGHPUT TECHNOLOGIES AS DESCRIBED BY CMS-2020-01-R: HCPCS

## 2021-09-28 PROCEDURE — 82310 ASSAY OF CALCIUM: CPT | Performed by: ORTHOPAEDIC SURGERY

## 2021-09-28 RX ORDER — PROPOFOL 10 MG/ML
INJECTION, EMULSION INTRAVENOUS AS NEEDED
Status: DISCONTINUED | OUTPATIENT
Start: 2021-09-28 | End: 2021-09-28

## 2021-09-28 RX ORDER — FENTANYL CITRATE/PF 50 MCG/ML
25 SYRINGE (ML) INJECTION
Status: DISCONTINUED | OUTPATIENT
Start: 2021-09-28 | End: 2021-09-28 | Stop reason: HOSPADM

## 2021-09-28 RX ORDER — ONDANSETRON 2 MG/ML
4 INJECTION INTRAMUSCULAR; INTRAVENOUS ONCE AS NEEDED
Status: DISCONTINUED | OUTPATIENT
Start: 2021-09-28 | End: 2021-09-28 | Stop reason: HOSPADM

## 2021-09-28 RX ORDER — LEVOTHYROXINE SODIUM 0.1 MG/1
200 TABLET ORAL DAILY
Status: DISCONTINUED | OUTPATIENT
Start: 2021-09-29 | End: 2021-09-29 | Stop reason: HOSPADM

## 2021-09-28 RX ORDER — NEOSTIGMINE METHYLSULFATE 1 MG/ML
INJECTION INTRAVENOUS AS NEEDED
Status: DISCONTINUED | OUTPATIENT
Start: 2021-09-28 | End: 2021-09-28

## 2021-09-28 RX ORDER — MIDAZOLAM HYDROCHLORIDE 2 MG/2ML
INJECTION, SOLUTION INTRAMUSCULAR; INTRAVENOUS AS NEEDED
Status: DISCONTINUED | OUTPATIENT
Start: 2021-09-28 | End: 2021-09-28

## 2021-09-28 RX ORDER — SODIUM CHLORIDE, SODIUM LACTATE, POTASSIUM CHLORIDE, CALCIUM CHLORIDE 600; 310; 30; 20 MG/100ML; MG/100ML; MG/100ML; MG/100ML
INJECTION, SOLUTION INTRAVENOUS CONTINUOUS PRN
Status: DISCONTINUED | OUTPATIENT
Start: 2021-09-28 | End: 2021-09-28

## 2021-09-28 RX ORDER — SODIUM CHLORIDE 9 MG/ML
INJECTION, SOLUTION INTRAVENOUS CONTINUOUS PRN
Status: DISCONTINUED | OUTPATIENT
Start: 2021-09-28 | End: 2021-09-28

## 2021-09-28 RX ORDER — ATORVASTATIN CALCIUM 40 MG/1
40 TABLET, FILM COATED ORAL DAILY
Status: DISCONTINUED | OUTPATIENT
Start: 2021-09-29 | End: 2021-09-29 | Stop reason: HOSPADM

## 2021-09-28 RX ORDER — ROCURONIUM BROMIDE 10 MG/ML
INJECTION, SOLUTION INTRAVENOUS AS NEEDED
Status: DISCONTINUED | OUTPATIENT
Start: 2021-09-28 | End: 2021-09-28

## 2021-09-28 RX ORDER — DIGOXIN 250 MCG
250 TABLET ORAL DAILY
Status: DISCONTINUED | OUTPATIENT
Start: 2021-09-29 | End: 2021-09-29 | Stop reason: HOSPADM

## 2021-09-28 RX ORDER — OXYCODONE HYDROCHLORIDE 5 MG/1
5 TABLET ORAL EVERY 4 HOURS PRN
Status: DISCONTINUED | OUTPATIENT
Start: 2021-09-28 | End: 2021-09-29 | Stop reason: HOSPADM

## 2021-09-28 RX ORDER — HYDROMORPHONE HCL IN WATER/PF 6 MG/30 ML
0.2 PATIENT CONTROLLED ANALGESIA SYRINGE INTRAVENOUS
Status: DISCONTINUED | OUTPATIENT
Start: 2021-09-28 | End: 2021-09-28 | Stop reason: HOSPADM

## 2021-09-28 RX ORDER — ALBUTEROL SULFATE 90 UG/1
2 AEROSOL, METERED RESPIRATORY (INHALATION) EVERY 4 HOURS PRN
Status: DISCONTINUED | OUTPATIENT
Start: 2021-09-28 | End: 2021-09-29 | Stop reason: HOSPADM

## 2021-09-28 RX ORDER — ONDANSETRON 2 MG/ML
INJECTION INTRAMUSCULAR; INTRAVENOUS AS NEEDED
Status: DISCONTINUED | OUTPATIENT
Start: 2021-09-28 | End: 2021-09-28

## 2021-09-28 RX ORDER — SODIUM CHLORIDE, SODIUM LACTATE, POTASSIUM CHLORIDE, CALCIUM CHLORIDE 600; 310; 30; 20 MG/100ML; MG/100ML; MG/100ML; MG/100ML
125 INJECTION, SOLUTION INTRAVENOUS CONTINUOUS
Status: DISCONTINUED | OUTPATIENT
Start: 2021-09-28 | End: 2021-09-29

## 2021-09-28 RX ORDER — GLYCOPYRROLATE 0.2 MG/ML
INJECTION INTRAMUSCULAR; INTRAVENOUS AS NEEDED
Status: DISCONTINUED | OUTPATIENT
Start: 2021-09-28 | End: 2021-09-28

## 2021-09-28 RX ORDER — METOPROLOL TARTRATE 50 MG/1
50 TABLET, FILM COATED ORAL 2 TIMES DAILY
Status: DISCONTINUED | OUTPATIENT
Start: 2021-09-29 | End: 2021-09-29 | Stop reason: HOSPADM

## 2021-09-28 RX ORDER — HYDROMORPHONE HCL/PF 1 MG/ML
SYRINGE (ML) INJECTION AS NEEDED
Status: DISCONTINUED | OUTPATIENT
Start: 2021-09-28 | End: 2021-09-28

## 2021-09-28 RX ORDER — HYDROMORPHONE HCL/PF 1 MG/ML
0.5 SYRINGE (ML) INJECTION
Status: DISCONTINUED | OUTPATIENT
Start: 2021-09-28 | End: 2021-09-29 | Stop reason: HOSPADM

## 2021-09-28 RX ORDER — SUCCINYLCHOLINE/SOD CL,ISO/PF 100 MG/5ML
SYRINGE (ML) INTRAVENOUS AS NEEDED
Status: DISCONTINUED | OUTPATIENT
Start: 2021-09-28 | End: 2021-09-28

## 2021-09-28 RX ORDER — METOPROLOL TARTRATE 5 MG/5ML
INJECTION INTRAVENOUS AS NEEDED
Status: DISCONTINUED | OUTPATIENT
Start: 2021-09-28 | End: 2021-09-28

## 2021-09-28 RX ORDER — FENTANYL CITRATE 50 UG/ML
INJECTION, SOLUTION INTRAMUSCULAR; INTRAVENOUS AS NEEDED
Status: DISCONTINUED | OUTPATIENT
Start: 2021-09-28 | End: 2021-09-28

## 2021-09-28 RX ORDER — LIDOCAINE HYDROCHLORIDE 10 MG/ML
INJECTION, SOLUTION EPIDURAL; INFILTRATION; INTRACAUDAL; PERINEURAL AS NEEDED
Status: DISCONTINUED | OUTPATIENT
Start: 2021-09-28 | End: 2021-09-28

## 2021-09-28 RX ORDER — LIDOCAINE HYDROCHLORIDE 10 MG/ML
0.5 INJECTION, SOLUTION EPIDURAL; INFILTRATION; INTRACAUDAL; PERINEURAL ONCE AS NEEDED
Status: COMPLETED | OUTPATIENT
Start: 2021-09-28 | End: 2021-09-28

## 2021-09-28 RX ADMIN — SODIUM CHLORIDE, SODIUM LACTATE, POTASSIUM CHLORIDE, AND CALCIUM CHLORIDE: .6; .31; .03; .02 INJECTION, SOLUTION INTRAVENOUS at 19:05

## 2021-09-28 RX ADMIN — NEOSTIGMINE METHYLSULFATE 3 MG: 1 INJECTION INTRAVENOUS at 19:01

## 2021-09-28 RX ADMIN — FENTANYL CITRATE 50 MCG: 50 INJECTION INTRAMUSCULAR; INTRAVENOUS at 15:21

## 2021-09-28 RX ADMIN — ROCURONIUM BROMIDE 20 MG: 50 INJECTION, SOLUTION INTRAVENOUS at 16:12

## 2021-09-28 RX ADMIN — HYDROMORPHONE HYDROCHLORIDE 1 MG: 1 INJECTION, SOLUTION INTRAMUSCULAR; INTRAVENOUS; SUBCUTANEOUS at 16:20

## 2021-09-28 RX ADMIN — GLYCOPYRROLATE 0.4 MG: 0.2 INJECTION, SOLUTION INTRAMUSCULAR; INTRAVENOUS at 19:01

## 2021-09-28 RX ADMIN — ROCURONIUM BROMIDE 50 MG: 50 INJECTION, SOLUTION INTRAVENOUS at 15:39

## 2021-09-28 RX ADMIN — PROPOFOL 200 MG: 10 INJECTION, EMULSION INTRAVENOUS at 15:21

## 2021-09-28 RX ADMIN — SODIUM CHLORIDE: 0.9 INJECTION, SOLUTION INTRAVENOUS at 15:55

## 2021-09-28 RX ADMIN — MIDAZOLAM 2 MG: 1 INJECTION INTRAMUSCULAR; INTRAVENOUS at 15:11

## 2021-09-28 RX ADMIN — HYDROMORPHONE HYDROCHLORIDE 0.5 MG: 1 INJECTION, SOLUTION INTRAMUSCULAR; INTRAVENOUS; SUBCUTANEOUS at 18:41

## 2021-09-28 RX ADMIN — SODIUM CHLORIDE, SODIUM LACTATE, POTASSIUM CHLORIDE, AND CALCIUM CHLORIDE 125 ML/HR: .6; .31; .03; .02 INJECTION, SOLUTION INTRAVENOUS at 11:52

## 2021-09-28 RX ADMIN — LIDOCAINE HYDROCHLORIDE 0.2 ML: 10 INJECTION, SOLUTION EPIDURAL; INFILTRATION; INTRACAUDAL; PERINEURAL at 11:53

## 2021-09-28 RX ADMIN — ONDANSETRON 4 MG: 2 INJECTION INTRAMUSCULAR; INTRAVENOUS at 18:41

## 2021-09-28 RX ADMIN — Medication 200 MG: at 15:21

## 2021-09-28 RX ADMIN — ROCURONIUM BROMIDE 20 MG: 50 INJECTION, SOLUTION INTRAVENOUS at 16:45

## 2021-09-28 RX ADMIN — ROCURONIUM BROMIDE 10 MG: 50 INJECTION, SOLUTION INTRAVENOUS at 17:46

## 2021-09-28 RX ADMIN — FENTANYL CITRATE 50 MCG: 50 INJECTION INTRAMUSCULAR; INTRAVENOUS at 15:39

## 2021-09-28 RX ADMIN — METOPROLOL TARTRATE 1 MG: 1 INJECTION, SOLUTION INTRAVENOUS at 16:49

## 2021-09-28 RX ADMIN — SODIUM CHLORIDE, SODIUM LACTATE, POTASSIUM CHLORIDE, AND CALCIUM CHLORIDE: .6; .31; .03; .02 INJECTION, SOLUTION INTRAVENOUS at 15:11

## 2021-09-28 RX ADMIN — LIDOCAINE HYDROCHLORIDE 100 MG: 10 INJECTION, SOLUTION EPIDURAL; INFILTRATION; INTRACAUDAL; PERINEURAL at 15:21

## 2021-09-28 NOTE — ANESTHESIA POSTPROCEDURE EVALUATION
Post-Op Assessment Note    CV Status:  Stable  Pain Score: 0    Pain management: adequate     Mental Status:  Alert and awake   Hydration Status:  Euvolemic   PONV Controlled:  Controlled   Airway Patency:  Patent      Post Op Vitals Reviewed: Yes      Staff: Anesthesiologist, CRNA         No complications documented      BP   167/81   Temp  98   Pulse  81   Resp   16   SpO2   98

## 2021-09-29 VITALS
OXYGEN SATURATION: 93 % | SYSTOLIC BLOOD PRESSURE: 121 MMHG | DIASTOLIC BLOOD PRESSURE: 67 MMHG | HEIGHT: 72 IN | HEART RATE: 83 BPM | WEIGHT: 315 LBS | TEMPERATURE: 97.8 F | RESPIRATION RATE: 20 BRPM | BODY MASS INDEX: 42.66 KG/M2

## 2021-09-29 LAB
CALCIUM SERPL-MCNC: 8.2 MG/DL (ref 8.3–10.1)
PTH-INTACT SERPL-MCNC: 52.5 PG/ML (ref 18.4–80.1)
SARS-COV-2 RNA RESP QL NAA+PROBE: NEGATIVE

## 2021-09-29 PROCEDURE — 83970 ASSAY OF PARATHORMONE: CPT | Performed by: ORTHOPAEDIC SURGERY

## 2021-09-29 PROCEDURE — 82310 ASSAY OF CALCIUM: CPT | Performed by: ORTHOPAEDIC SURGERY

## 2021-09-29 PROCEDURE — NC001 PR NO CHARGE: Performed by: SURGERY

## 2021-09-29 PROCEDURE — 99024 POSTOP FOLLOW-UP VISIT: CPT | Performed by: SURGERY

## 2021-09-29 PROCEDURE — 94760 N-INVAS EAR/PLS OXIMETRY 1: CPT

## 2021-09-29 RX ORDER — CALCIUM CARBONATE 200(500)MG
2 TABLET,CHEWABLE ORAL 2 TIMES DAILY
Refills: 0 | COMMUNITY
Start: 2021-09-29 | End: 2021-10-28 | Stop reason: ALTCHOICE

## 2021-09-29 RX ADMIN — LEVOTHYROXINE SODIUM 200 MCG: 100 TABLET ORAL at 09:10

## 2021-09-29 RX ADMIN — METOPROLOL TARTRATE 50 MG: 50 TABLET, FILM COATED ORAL at 09:10

## 2021-09-29 RX ADMIN — ATORVASTATIN CALCIUM 40 MG: 40 TABLET, FILM COATED ORAL at 09:10

## 2021-09-29 RX ADMIN — SODIUM CHLORIDE, SODIUM LACTATE, POTASSIUM CHLORIDE, AND CALCIUM CHLORIDE 125 ML/HR: .6; .31; .03; .02 INJECTION, SOLUTION INTRAVENOUS at 05:31

## 2021-10-01 ENCOUNTER — TELEPHONE (OUTPATIENT)
Dept: ENDOCRINOLOGY | Facility: HOSPITAL | Age: 61
End: 2021-10-01

## 2021-10-01 DIAGNOSIS — E05.90 HYPERTHYROIDISM: Primary | ICD-10-CM

## 2021-10-04 ENCOUNTER — LAB (OUTPATIENT)
Dept: LAB | Facility: HOSPITAL | Age: 61
End: 2021-10-04
Payer: MEDICARE

## 2021-10-04 DIAGNOSIS — E04.1 RIGHT THYROID NODULE: ICD-10-CM

## 2021-10-04 DIAGNOSIS — R73.01 IMPAIRED FASTING GLUCOSE: ICD-10-CM

## 2021-10-04 DIAGNOSIS — E04.9 GOITER: ICD-10-CM

## 2021-10-04 DIAGNOSIS — E05.00 GRAVES DISEASE: ICD-10-CM

## 2021-10-04 DIAGNOSIS — E78.5 DYSLIPIDEMIA: Chronic | ICD-10-CM

## 2021-10-04 DIAGNOSIS — E05.90 HYPERTHYROIDISM: Chronic | ICD-10-CM

## 2021-10-04 DIAGNOSIS — Z92.3 STATUS POST RADIOACTIVE IODINE THYROID ABLATION: ICD-10-CM

## 2021-10-04 LAB
ALBUMIN SERPL BCP-MCNC: 3.2 G/DL (ref 3.5–5)
ALP SERPL-CCNC: 108 U/L (ref 46–116)
ALT SERPL W P-5'-P-CCNC: 30 U/L (ref 12–78)
ANION GAP SERPL CALCULATED.3IONS-SCNC: 3 MMOL/L (ref 4–13)
AST SERPL W P-5'-P-CCNC: 20 U/L (ref 5–45)
BILIRUB SERPL-MCNC: 0.59 MG/DL (ref 0.2–1)
BUN SERPL-MCNC: 13 MG/DL (ref 5–25)
CALCIUM ALBUM COR SERPL-MCNC: 10.2 MG/DL (ref 8.3–10.1)
CALCIUM SERPL-MCNC: 9.6 MG/DL (ref 8.3–10.1)
CHLORIDE SERPL-SCNC: 101 MMOL/L (ref 100–108)
CHOLEST SERPL-MCNC: 89 MG/DL (ref 50–200)
CO2 SERPL-SCNC: 30 MMOL/L (ref 21–32)
CREAT SERPL-MCNC: 0.99 MG/DL (ref 0.6–1.3)
EST. AVERAGE GLUCOSE BLD GHB EST-MCNC: 206 MG/DL
GFR SERPL CREATININE-BSD FRML MDRD: 83 ML/MIN/1.73SQ M
GLUCOSE P FAST SERPL-MCNC: 146 MG/DL (ref 65–99)
HBA1C MFR BLD: 8.8 %
HDLC SERPL-MCNC: 30 MG/DL
LDLC SERPL CALC-MCNC: 36 MG/DL (ref 0–100)
NONHDLC SERPL-MCNC: 59 MG/DL
PHOSPHATE SERPL-MCNC: 2.8 MG/DL (ref 2.7–4.5)
POTASSIUM SERPL-SCNC: 3.9 MMOL/L (ref 3.5–5.3)
PROT SERPL-MCNC: 8.3 G/DL (ref 6.4–8.2)
SODIUM SERPL-SCNC: 134 MMOL/L (ref 136–145)
T3FREE SERPL-MCNC: 1.99 PG/ML (ref 2.3–4.2)
T4 FREE SERPL-MCNC: 1.38 NG/DL (ref 0.76–1.46)
TRIGL SERPL-MCNC: 114 MG/DL
TSH SERPL DL<=0.05 MIU/L-ACNC: 1.18 UIU/ML (ref 0.36–3.74)

## 2021-10-04 PROCEDURE — 84100 ASSAY OF PHOSPHORUS: CPT

## 2021-10-04 PROCEDURE — 84439 ASSAY OF FREE THYROXINE: CPT

## 2021-10-04 PROCEDURE — 80053 COMPREHEN METABOLIC PANEL: CPT

## 2021-10-04 PROCEDURE — 36415 COLL VENOUS BLD VENIPUNCTURE: CPT

## 2021-10-04 PROCEDURE — 83036 HEMOGLOBIN GLYCOSYLATED A1C: CPT

## 2021-10-04 PROCEDURE — 84481 FREE ASSAY (FT-3): CPT

## 2021-10-04 PROCEDURE — 80061 LIPID PANEL: CPT

## 2021-10-04 PROCEDURE — 84443 ASSAY THYROID STIM HORMONE: CPT

## 2021-10-05 PROBLEM — E89.0 STATUS POST TOTAL THYROIDECTOMY: Status: ACTIVE | Noted: 2021-10-05

## 2021-10-12 ENCOUNTER — OFFICE VISIT (OUTPATIENT)
Dept: SURGICAL ONCOLOGY | Facility: CLINIC | Age: 61
End: 2021-10-12

## 2021-10-12 VITALS
SYSTOLIC BLOOD PRESSURE: 120 MMHG | OXYGEN SATURATION: 98 % | BODY MASS INDEX: 42.66 KG/M2 | HEART RATE: 78 BPM | DIASTOLIC BLOOD PRESSURE: 66 MMHG | WEIGHT: 315 LBS | HEIGHT: 72 IN | RESPIRATION RATE: 16 BRPM | TEMPERATURE: 97.8 F

## 2021-10-12 DIAGNOSIS — E89.0 STATUS POST TOTAL THYROIDECTOMY: Primary | ICD-10-CM

## 2021-10-12 PROCEDURE — 99024 POSTOP FOLLOW-UP VISIT: CPT | Performed by: SURGERY

## 2021-10-26 ENCOUNTER — OFFICE VISIT (OUTPATIENT)
Dept: FAMILY MEDICINE CLINIC | Facility: HOSPITAL | Age: 61
End: 2021-10-26
Payer: MEDICARE

## 2021-10-26 VITALS
BODY MASS INDEX: 42.66 KG/M2 | OXYGEN SATURATION: 96 % | HEART RATE: 84 BPM | WEIGHT: 315 LBS | DIASTOLIC BLOOD PRESSURE: 60 MMHG | SYSTOLIC BLOOD PRESSURE: 110 MMHG | HEIGHT: 72 IN | TEMPERATURE: 98.2 F

## 2021-10-26 DIAGNOSIS — Z23 ENCOUNTER FOR IMMUNIZATION: ICD-10-CM

## 2021-10-26 DIAGNOSIS — E04.1 RIGHT THYROID NODULE: ICD-10-CM

## 2021-10-26 DIAGNOSIS — E78.5 DYSLIPIDEMIA: Chronic | ICD-10-CM

## 2021-10-26 DIAGNOSIS — N30.00 ACUTE CYSTITIS WITHOUT HEMATURIA: ICD-10-CM

## 2021-10-26 DIAGNOSIS — E11.9 DIABETES MELLITUS, NEW ONSET (HCC): ICD-10-CM

## 2021-10-26 DIAGNOSIS — I48.91 AF (ATRIAL FIBRILLATION) (HCC): ICD-10-CM

## 2021-10-26 DIAGNOSIS — J44.9 CHRONIC OBSTRUCTIVE PULMONARY DISEASE, UNSPECIFIED COPD TYPE (HCC): ICD-10-CM

## 2021-10-26 DIAGNOSIS — I50.32 CHRONIC DIASTOLIC CONGESTIVE HEART FAILURE (HCC): Chronic | ICD-10-CM

## 2021-10-26 DIAGNOSIS — I10 PRIMARY HYPERTENSION: Primary | Chronic | ICD-10-CM

## 2021-10-26 PROBLEM — E04.9 GOITER: Status: RESOLVED | Noted: 2020-06-12 | Resolved: 2021-10-26

## 2021-10-26 PROBLEM — R73.01 IMPAIRED FASTING GLUCOSE: Status: RESOLVED | Noted: 2021-07-27 | Resolved: 2021-10-26

## 2021-10-26 PROCEDURE — G0008 ADMIN INFLUENZA VIRUS VAC: HCPCS | Performed by: NURSE PRACTITIONER

## 2021-10-26 PROCEDURE — 99214 OFFICE O/P EST MOD 30 MIN: CPT | Performed by: NURSE PRACTITIONER

## 2021-10-26 PROCEDURE — 90682 RIV4 VACC RECOMBINANT DNA IM: CPT | Performed by: NURSE PRACTITIONER

## 2021-10-26 RX ORDER — METOPROLOL TARTRATE 50 MG/1
50 TABLET, FILM COATED ORAL 2 TIMES DAILY
Qty: 180 TABLET | Refills: 3 | Status: SHIPPED | OUTPATIENT
Start: 2021-10-26

## 2021-10-26 RX ORDER — CIPROFLOXACIN 500 MG/1
500 TABLET, FILM COATED ORAL
COMMUNITY
Start: 2021-10-20 | End: 2021-10-28 | Stop reason: ALTCHOICE

## 2021-10-28 ENCOUNTER — OFFICE VISIT (OUTPATIENT)
Dept: ENDOCRINOLOGY | Facility: HOSPITAL | Age: 61
End: 2021-10-28
Payer: MEDICARE

## 2021-10-28 VITALS
BODY MASS INDEX: 42.66 KG/M2 | DIASTOLIC BLOOD PRESSURE: 80 MMHG | HEART RATE: 96 BPM | WEIGHT: 315 LBS | SYSTOLIC BLOOD PRESSURE: 126 MMHG | HEIGHT: 72 IN

## 2021-10-28 DIAGNOSIS — E04.9 GOITER: ICD-10-CM

## 2021-10-28 DIAGNOSIS — E11.65 TYPE 2 DIABETES MELLITUS WITH HYPERGLYCEMIA, WITHOUT LONG-TERM CURRENT USE OF INSULIN (HCC): ICD-10-CM

## 2021-10-28 DIAGNOSIS — E55.0 RICKETS, ACTIVE: ICD-10-CM

## 2021-10-28 DIAGNOSIS — E55.9 VITAMIN D DEFICIENCY: ICD-10-CM

## 2021-10-28 DIAGNOSIS — E89.0 POSTOPERATIVE HYPOTHYROIDISM: Primary | ICD-10-CM

## 2021-10-28 DIAGNOSIS — E05.00 GRAVES DISEASE: ICD-10-CM

## 2021-10-28 PROBLEM — E11.42 DIABETIC POLYNEUROPATHY ASSOCIATED WITH TYPE 2 DIABETES MELLITUS (HCC): Status: ACTIVE | Noted: 2021-10-28

## 2021-10-28 PROCEDURE — 99215 OFFICE O/P EST HI 40 MIN: CPT | Performed by: INTERNAL MEDICINE

## 2021-10-28 RX ORDER — METFORMIN HYDROCHLORIDE 500 MG/1
500 TABLET, EXTENDED RELEASE ORAL 2 TIMES DAILY WITH MEALS
Qty: 180 TABLET | Refills: 3 | Status: SHIPPED | OUTPATIENT
Start: 2021-10-28

## 2021-11-02 ENCOUNTER — OFFICE VISIT (OUTPATIENT)
Dept: DIABETES SERVICES | Facility: HOSPITAL | Age: 61
End: 2021-11-02
Payer: MEDICARE

## 2021-11-02 VITALS — HEIGHT: 72 IN | BODY MASS INDEX: 42.66 KG/M2 | WEIGHT: 315 LBS

## 2021-11-02 DIAGNOSIS — E11.65 TYPE 2 DIABETES MELLITUS WITH HYPERGLYCEMIA, WITHOUT LONG-TERM CURRENT USE OF INSULIN (HCC): ICD-10-CM

## 2021-11-02 PROCEDURE — G0108 DIAB MANAGE TRN  PER INDIV: HCPCS | Performed by: DIETITIAN, REGISTERED

## 2021-11-02 RX ORDER — BLOOD-GLUCOSE METER
EACH MISCELLANEOUS
COMMUNITY

## 2021-11-03 DIAGNOSIS — E11.65 TYPE 2 DIABETES MELLITUS WITH HYPERGLYCEMIA, WITHOUT LONG-TERM CURRENT USE OF INSULIN (HCC): Primary | ICD-10-CM

## 2021-11-03 DIAGNOSIS — I50.32 CHRONIC DIASTOLIC CONGESTIVE HEART FAILURE (HCC): Chronic | ICD-10-CM

## 2021-11-03 RX ORDER — LANCETS 30 GAUGE
EACH MISCELLANEOUS
Qty: 100 EACH | Refills: 6 | Status: SHIPPED | OUTPATIENT
Start: 2021-11-03

## 2021-11-03 RX ORDER — BLOOD SUGAR DIAGNOSTIC
STRIP MISCELLANEOUS
Qty: 100 STRIP | Refills: 6 | Status: SHIPPED | OUTPATIENT
Start: 2021-11-03

## 2021-11-03 RX ORDER — POTASSIUM CHLORIDE 20 MEQ/1
20 TABLET, EXTENDED RELEASE ORAL 2 TIMES DAILY
Qty: 180 TABLET | Refills: 1 | Status: SHIPPED | OUTPATIENT
Start: 2021-11-03 | End: 2022-03-14

## 2021-12-03 DIAGNOSIS — I48.91 ATRIAL FIBRILLATION WITH RAPID VENTRICULAR RESPONSE (HCC): Chronic | ICD-10-CM

## 2021-12-03 RX ORDER — DIGOXIN 250 MCG
250 TABLET ORAL DAILY
Qty: 90 TABLET | Refills: 0 | Status: SHIPPED | OUTPATIENT
Start: 2021-12-03 | End: 2022-01-27

## 2021-12-16 ENCOUNTER — LAB (OUTPATIENT)
Dept: LAB | Facility: HOSPITAL | Age: 61
End: 2021-12-16
Payer: MEDICARE

## 2021-12-16 DIAGNOSIS — E05.00 GRAVES DISEASE: ICD-10-CM

## 2021-12-16 DIAGNOSIS — N30.00 ACUTE CYSTITIS WITHOUT HEMATURIA: ICD-10-CM

## 2021-12-16 DIAGNOSIS — E89.0 POSTOPERATIVE HYPOTHYROIDISM: ICD-10-CM

## 2021-12-16 LAB
T4 FREE SERPL-MCNC: 1.22 NG/DL (ref 0.76–1.46)
TSH SERPL DL<=0.05 MIU/L-ACNC: 0.86 UIU/ML (ref 0.36–3.74)

## 2021-12-16 PROCEDURE — 84443 ASSAY THYROID STIM HORMONE: CPT

## 2021-12-16 PROCEDURE — 36415 COLL VENOUS BLD VENIPUNCTURE: CPT

## 2021-12-16 PROCEDURE — 84439 ASSAY OF FREE THYROXINE: CPT

## 2021-12-16 PROCEDURE — 87086 URINE CULTURE/COLONY COUNT: CPT

## 2021-12-17 LAB — BACTERIA UR CULT: NORMAL

## 2022-01-04 NOTE — PROGRESS NOTES
Pulmonary Follow Up Note   Doyle Gonsalves 63 y o  male MRN: [id number]  1/5/2022      Assessment/Plan:    1  Moderate COPD /dyspnea on exertion  - patient is a chronic smoker  Pulmonary function test borderline diagnostic of COPD  Likely altered secondary to body habitus and restrictive pattern  Patient has symptomatology consistent with COPD  - prescribed Spiriva; attempt to qualify patient for manufacture's discount (filled out form)  - continue albuterol p r n   - demonstrated inhaler use for Spiriva Respimat  - patient discontinued pulmonary rehab  - new 4 mm juxtapleural nodule in left upper lobe noted on CT lung cancer screening in May 2021     2  Obstructive sleep apnea -  severe  - established with sleep medicine and has follow-up  - encouraged compliance with BiPAP     3  Morbid obesity  - advised diet and exercise  - instructed patient to exercise 150 minutes per week and decrease caloric intake  - goal to lose 10% of body weight which is approximately 35 - 40 lb over 6 months to 1 year  - advised patient to keep a food diary      4   Tobacco abuse  - current user 1 pack per day  - counseled 5 minutes on cessation techniques and cessation options  - patient believes that this is more of a habit instead of addictive cravings  - advised patient to decrease intake to approximately 10-15 cigarettes daily prior to next visit  - patient will consider nicotine patch or Chantix if has significant difficulty with cessation; currently not interested due to cost  - will also consider referral to smoking cessation program at future visit  - obtain annual CT lung cancer screening;  May 2022     5  Pulmonary nodule  - new 4 mm juxtapleural nodule in left upper lobe noted on CT lung cancer screening in May 2021  - size below 6 mm and location makes it low risk but however patient's tobacco use history elevates risks for malignancy  - given the size and location will follow-up pulmonary nodule with annual lung cancer screening in May 2022    Return in about 3 months (around 4/5/2022) for Recheck  History of Present Illness   HPI:  Alex Olmedo is a 63 y o  male with morbid obesity, nicotine dependence, type 2 diabetes, hypothyroidism, COPD, severe obstructive sleep apnea presenting for follow-up  Patient states that overall he is having more better days then bad days  He states that he still having excessive sputum production  Denies wheezing  Patient states that he is compliant with his BiPAP at night and reports his sleep quality is adequate  He reports that he was unable to afford any of the previous inhalers prescribed due to cost   He states that he is currently using his albuterol inhaler approximately 2-3 times daily and he finds significant relief with albuterol inhaler  Denies fever, chills, headaches, lightheadedness, dizziness, visual disturbances, sore throat, chest pain, palpitations, abdominal pain, nausea, vomiting, or trouble urinating/ defecating  Reports baseline shortness of breath  Patient also states that he is due for his COVID-19 booster shot  Occupational History:  Previously worked with concrete, 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       Social History:  Patient smoking 1 pack per day for 43 years; at peak smoked 3 packs per day  [place] hot air at home       Malignancy History:  Unknown malignancy in the mother but reports that it was metastatic     Pets/animal exposure: none  Denies exposure to farm animals     Inhalers:  Spiriva and albuterol    Review of systems:  12 point review of systems was completed and was otherwise negative except as listed in HPI      Historical Information   Past Medical History:   Diagnosis Date    Acute diastolic congestive heart failure ([facility] ) 7/3/2017    Atrial fibrillation with rapid ventricular response ([facility] ) 3/31/2017    Bilateral edema of lower extremity 8/22/2016    BMI 45 0-49 9, adult ([facility] ) 2/17/2020    Encouraged lifestyle modification to incorporate moderate physiscal activity and DASH diet/low fat/low carb      CAD (coronary artery disease) 10/28/2016    Chest pain 8/22/2016    CPAP (continuous positive airway pressure) dependence     Dysphagia 9/29/2019    Goiter 6/12/2020    Homelessness 7/3/2017    Hyperlipidemia     Hypertension     Hypertensive heart disease with heart failure ([facility] ) 10/27/2020    Hyperthyroidism 8/22/2016    Impaired fasting glucose 7/27/2021    Kidney stone     Pneumothorax     Presence of IVC filter     Pulmonary embolism (HCC)     Pulmonary hypertension ([facility] ) 9/15/2016    Rash 10/28/2016    Sepsis ([facility] ) 8/14/2019    Sleep apnea     SOB (shortness of breath) 8/22/2016    Tachycardia 8/22/2016     Past Surgical History:   Procedure Laterality Date    COLONOSCOPY      EGD AND COLONOSCOPY N/A 7/28/2017    Procedure: EGD AND COLONOSCOPY;  Surgeon: Daly Le MD;  [facility]: [facility];  Service: Gastroenterology    FL RETROGRADE PYELOGRAM  8/13/2019    FL RETROGRADE PYELOGRAM  9/27/2019    IVC FILTER INSERTION      LITHOTRIPSY      VA CYSTO/URETERO W/LITHOTRIPSY &INDWELL STENT INSRT Right 8/13/2019    Procedure: CYSTOSCOPY URETEROSCOPY WITH LITHOTRIPSY HOLMIUM LASER, RETROGRADE PYELOGRAM AND INSERTION STENT URETERAL;  Surgeon: Elias Salazar MD;  [facility];  Service: Urology    VA CYSTO/URETERO W/LITHOTRIPSY &INDWELL STENT INSRT Right 9/27/2019    Procedure: CYSTOSCOPY URETEROSCOPY WITH LITHOTRIPSY HOLMIUM LASER, RETROGRADE PYELOGRAM AND INSERTION STENT URETERAL;  Surgeon: Ilya Croft MD;  [facility];  Service: Urology    THYROIDECTOMY N/A 9/28/2021    Procedure: THYROIDECTOMY, total;  Surgeon: Beck Fang MD;  [facility];  Service: Surgical Oncology    US GUIDED THYROID BIOPSY  3/23/2021     Family History   Problem Relation Age of Onset    Cancer Mother         metastatic, breast primary    Breast cancer Mother     Heart disease Father     Lung disease Father     Thyroid disease unspecified Father         thyroidectomy    Thyroid disease unspecified Sister     Hyperthyroidism Sister         in remission         Meds/Allergies     Current Outpatient Medications:     albuterol (ProAir HFA) 90 mcg/act inhaler, Inhale 2 puffs every 4 (four) hours as needed for wheezing or shortness of breath, Disp: 8 5 g, Rfl: 0    aspirin (ECOTRIN LOW STRENGTH) 81 mg EC tablet, Take 81 mg by mouth daily, Disp: , Rfl:     atorvastatin (LIPITOR) 40 mg tablet, Take 1 tablet (40 mg total) by mouth daily, Disp: 90 tablet, Rfl: 1    Blood Glucose Monitoring Suppl (OneTouch Verio) w/Device KIT, Use, Disp: , Rfl:     digoxin (LANOXIN) 0 25 mg tablet, Take 1 tablet (250 mcg total) by mouth daily, Disp: 90 tablet, Rfl: 0    ergocalciferol (VITAMIN D2) 50,000 units, Take 1 capsule (50,000 Units total) by mouth once a week, Disp: 12 capsule, Rfl: 1    Lancets (OneTouch Delica Plus [id number]) MISC, Use to test blood sugars 2 times a day , Disp: 100 each, Rfl: 6    levothyroxine 200 mcg tablet, Take 1 tablet (200 mcg total) by mouth daily, Disp: 30 tablet, Rfl: 3    metFORMIN (GLUCOPHAGE-XR) 500 mg 24 hr tablet, Take 1 tablet (500 mg total) by mouth 2 (two) times a day with meals, Disp: 180 tablet, Rfl: 3    metoprolol tartrate (LOPRESSOR) 50 mg tablet, Take 1 tablet (50 mg total) by mouth 2 (two) times a day, Disp: 180 tablet, Rfl: 3    OneTouch Verio test strip, Use as instructed twice a day, Disp: 100 strip, Rfl: 6    potassium chloride (K-DUR,KLOR-CON) 20 mEq tablet, Take 1 tablet (20 mEq total) by mouth 2 (two) times a day, Disp: 180 tablet, Rfl: 1    torsemide (DEMADEX) 20 mg tablet, Take 2 tablets (40 mg total) by mouth daily, Disp: 180 tablet, Rfl: 1    tiotropium (Spiriva Respimat) 2 5 MCG/ACT AERS inhaler, Inhale 2 puffs daily, Disp: 4 g, Rfl: 3  No Known Allergies    Vitals: Blood pressure 124/82, pulse 90, temperature 98 8 °F (37 1 °C), temperature source Tympanic, resp  rate 18, height 6' (1 829 m), weight (!) 165 kg (364 lb), SpO2 98 %  Body mass index is 49 37 kg/m²  Oxygen Therapy  SpO2: 98 %  Oxygen Therapy: None (Room air)      Physical Exam  Physical Exam  Vitals reviewed  Constitutional:       General: He is not in acute distress  Appearance: He is obese  He is not ill-appearing, toxic-appearing or diaphoretic  HENT:      Head: Normocephalic and atraumatic  Right Ear: External ear normal       Left Ear: External ear normal       Nose: Nose normal    Eyes:      General: No scleral icterus  Right eye: No discharge  Left eye: No discharge  Extraocular Movements: Extraocular movements intact  Conjunctiva/sclera: Conjunctivae normal    Cardiovascular:      Rate and Rhythm: Normal rate and regular rhythm  Pulses: Normal pulses  Heart sounds: Normal heart sounds  No murmur heard  No friction rub  No gallop  Pulmonary:      Effort: Pulmonary effort is normal  No respiratory distress  Breath sounds: Normal breath sounds  No stridor  No wheezing, rhonchi or rales  Chest:      Chest wall: No tenderness  Abdominal:      General: Bowel sounds are normal  There is no distension  Palpations: Abdomen is soft  Tenderness: There is no abdominal tenderness  There is no guarding or rebound  Musculoskeletal:      Right lower leg: No edema  Left lower leg: No edema  Skin:     General: Skin is warm and dry  Neurological:      Mental Status: He is oriented to person, place, and time  Labs: I have personally reviewed pertinent lab results    Lab Results   Component Value Date    WBC 13 50 (H) 09/13/2021    HGB 16 0 09/13/2021    HCT 47 8 09/13/2021    MCV 97 09/13/2021     09/13/2021     Lab Results   Component Value Date    GLUCOSE 113 11/30/2015    CALCIUM 9 6 10/04/2021     02/15/2017    K 3 9 10/04/2021    CO2 30 10/04/2021     10/04/2021    BUN 13 10/04/2021    CREATININE 0 99 10/04/2021     No results found for: IGE  Lab Results   Component Value Date    ALT 30 10/04/2021    AST 20 10/04/2021    ALKPHOS 108 10/04/2021    BILITOT 0 7 11/22/2016       Imaging and other studies: I have personally reviewed pertinent reports  and I have personally reviewed pertinent films in PACS      Chest x-ray 4/28/21:  Clear/normal lungs     CTA pulmonary embolism study 7/25/17:  Right upper lobe pulmonary embolism  RV/LV ratio 1 2  Right lower lobe consolidation     CT lung cancer screening 5/17/21:  There is a new 4 mm left upper lobe juxtapleural nodule  There is no tracheal or endobronchial lesion      Pulmonary function testing 3/8/21:     FEV1/FVC Ratio: 70 %  FEV1: 2 22 L     56 % predicted  Forced Vital Capacity: 3 14 L    60 % predicted  After administration of bronchodilator:  Not administered due to COVID-19 for     Lung volumes by body plethysmography: Total Lung Capacity 77 % predicted Residual volume 108 % predicted     DLCO corrected for patients hemoglobin level: 87 % predicted     EKG, Pathology, and Other Studies: I have personally reviewed pertinent reports        Echocardiogram 11/6/20:  Ejection fraction 60% with no wall motion abnormalities     Nuclear stress test 11/6/20:  Normal study     Results of a split study done at [facility] in 2019:  The diagnostic portion demonstrated an AHI of 64 6 per hour  He spent 16 1% of this portion of the study with saturations less than 90%  During the therapeutic portion, sleep disordered breathing was adequately remediated with BiPAP at 16/12 cm H2O  Minimum oxygen saturation was 97%          Alda Rodríguez, DO - PGY5  [facility]

## 2022-01-05 ENCOUNTER — OFFICE VISIT (OUTPATIENT)
Dept: PULMONOLOGY | Facility: CLINIC | Age: 62
End: 2022-01-05
Payer: MEDICARE

## 2022-01-05 VITALS
OXYGEN SATURATION: 98 % | HEART RATE: 90 BPM | SYSTOLIC BLOOD PRESSURE: 124 MMHG | DIASTOLIC BLOOD PRESSURE: 82 MMHG | WEIGHT: 315 LBS | RESPIRATION RATE: 18 BRPM | TEMPERATURE: 98.8 F | BODY MASS INDEX: 42.66 KG/M2 | HEIGHT: 72 IN

## 2022-01-05 DIAGNOSIS — J44.9 CHRONIC OBSTRUCTIVE PULMONARY DISEASE, UNSPECIFIED COPD TYPE (HCC): Primary | ICD-10-CM

## 2022-01-05 DIAGNOSIS — R91.1 PULMONARY NODULE: ICD-10-CM

## 2022-01-05 DIAGNOSIS — Z72.0 TOBACCO ABUSE: ICD-10-CM

## 2022-01-05 DIAGNOSIS — G47.33 OSA (OBSTRUCTIVE SLEEP APNEA): ICD-10-CM

## 2022-01-05 PROCEDURE — 99214 OFFICE O/P EST MOD 30 MIN: CPT | Performed by: INTERNAL MEDICINE

## 2022-01-05 PROCEDURE — 99406 BEHAV CHNG SMOKING 3-10 MIN: CPT | Performed by: INTERNAL MEDICINE

## 2022-01-05 RX ORDER — TIOTROPIUM BROMIDE INHALATION SPRAY 3.12 UG/1
2 SPRAY, METERED RESPIRATORY (INHALATION) DAILY
Qty: 4 G | Refills: 3 | Status: SHIPPED | OUTPATIENT
Start: 2022-01-05 | End: 2022-04-06

## 2022-01-05 RX ORDER — TIOTROPIUM BROMIDE 18 UG/1
18 CAPSULE ORAL; RESPIRATORY (INHALATION) DAILY
Qty: 90 CAPSULE | Refills: 0 | Status: CANCELLED | OUTPATIENT
Start: 2022-01-05 | End: 2022-04-05

## 2022-01-12 ENCOUNTER — APPOINTMENT (OUTPATIENT)
Dept: LAB | Facility: HOSPITAL | Age: 62
End: 2022-01-12
Attending: SURGERY
Payer: MEDICARE

## 2022-01-12 DIAGNOSIS — E89.0 POSTOPERATIVE HYPOTHYROIDISM: ICD-10-CM

## 2022-01-12 DIAGNOSIS — E89.0 STATUS POST TOTAL THYROIDECTOMY: ICD-10-CM

## 2022-01-12 DIAGNOSIS — E11.65 TYPE 2 DIABETES MELLITUS WITH HYPERGLYCEMIA, WITHOUT LONG-TERM CURRENT USE OF INSULIN (HCC): ICD-10-CM

## 2022-01-12 DIAGNOSIS — E55.0 RICKETS, ACTIVE: ICD-10-CM

## 2022-01-12 DIAGNOSIS — E05.00 GRAVES DISEASE: ICD-10-CM

## 2022-01-12 LAB
25(OH)D3 SERPL-MCNC: 91 NG/ML (ref 30–100)
ALBUMIN SERPL BCP-MCNC: 3.4 G/DL (ref 3.5–5)
ALP SERPL-CCNC: 100 U/L (ref 46–116)
ALT SERPL W P-5'-P-CCNC: 24 U/L (ref 12–78)
ANION GAP SERPL CALCULATED.3IONS-SCNC: 7 MMOL/L (ref 4–13)
AST SERPL W P-5'-P-CCNC: 20 U/L (ref 5–45)
BILIRUB SERPL-MCNC: 0.4 MG/DL (ref 0.2–1)
BUN SERPL-MCNC: 18 MG/DL (ref 5–25)
CALCIUM ALBUM COR SERPL-MCNC: 10.1 MG/DL (ref 8.3–10.1)
CALCIUM SERPL-MCNC: 9.6 MG/DL (ref 8.3–10.1)
CHLORIDE SERPL-SCNC: 101 MMOL/L (ref 100–108)
CO2 SERPL-SCNC: 30 MMOL/L (ref 21–32)
CREAT SERPL-MCNC: 0.87 MG/DL (ref 0.6–1.3)
CREAT UR-MCNC: 147 MG/DL
GFR SERPL CREATININE-BSD FRML MDRD: 94 ML/MIN/1.73SQ M
GLUCOSE P FAST SERPL-MCNC: 119 MG/DL (ref 65–99)
MICROALBUMIN UR-MCNC: 21.2 MG/L (ref 0–20)
MICROALBUMIN/CREAT 24H UR: 14 MG/G CREATININE (ref 0–30)
POTASSIUM SERPL-SCNC: 4.8 MMOL/L (ref 3.5–5.3)
PROT SERPL-MCNC: 8.5 G/DL (ref 6.4–8.2)
SODIUM SERPL-SCNC: 138 MMOL/L (ref 136–145)
T4 FREE SERPL-MCNC: 1.22 NG/DL (ref 0.76–1.46)
TSH SERPL DL<=0.05 MIU/L-ACNC: 1.29 UIU/ML (ref 0.36–3.74)

## 2022-01-12 PROCEDURE — 84443 ASSAY THYROID STIM HORMONE: CPT

## 2022-01-12 PROCEDURE — 84439 ASSAY OF FREE THYROXINE: CPT

## 2022-01-12 PROCEDURE — 82570 ASSAY OF URINE CREATININE: CPT

## 2022-01-12 PROCEDURE — 82306 VITAMIN D 25 HYDROXY: CPT

## 2022-01-12 PROCEDURE — 82043 UR ALBUMIN QUANTITATIVE: CPT

## 2022-01-12 PROCEDURE — 80053 COMPREHEN METABOLIC PANEL: CPT

## 2022-01-12 PROCEDURE — 83036 HEMOGLOBIN GLYCOSYLATED A1C: CPT

## 2022-01-12 PROCEDURE — 36415 COLL VENOUS BLD VENIPUNCTURE: CPT

## 2022-01-13 LAB
EST. AVERAGE GLUCOSE BLD GHB EST-MCNC: 134 MG/DL
HBA1C MFR BLD: 6.3 %

## 2022-01-21 DIAGNOSIS — E05.00 GRAVES DISEASE: ICD-10-CM

## 2022-01-21 DIAGNOSIS — I48.20 CHRONIC ATRIAL FIBRILLATION (HCC): ICD-10-CM

## 2022-01-21 DIAGNOSIS — I50.32 CHRONIC DIASTOLIC CONGESTIVE HEART FAILURE (HCC): ICD-10-CM

## 2022-01-21 RX ORDER — LEVOTHYROXINE SODIUM 0.2 MG/1
200 TABLET ORAL DAILY
Qty: 90 TABLET | Refills: 1 | Status: SHIPPED | OUTPATIENT
Start: 2022-01-21 | End: 2022-07-15

## 2022-01-21 RX ORDER — ATORVASTATIN CALCIUM 40 MG/1
40 TABLET, FILM COATED ORAL DAILY
Qty: 90 TABLET | Refills: 1 | Status: SHIPPED | OUTPATIENT
Start: 2022-01-21 | End: 2022-07-15

## 2022-01-27 ENCOUNTER — OFFICE VISIT (OUTPATIENT)
Dept: CARDIOLOGY CLINIC | Facility: CLINIC | Age: 62
End: 2022-01-27
Payer: MEDICARE

## 2022-01-27 VITALS
SYSTOLIC BLOOD PRESSURE: 120 MMHG | HEART RATE: 59 BPM | WEIGHT: 315 LBS | DIASTOLIC BLOOD PRESSURE: 68 MMHG | BODY MASS INDEX: 42.66 KG/M2 | HEIGHT: 72 IN

## 2022-01-27 DIAGNOSIS — I10 PRIMARY HYPERTENSION: Chronic | ICD-10-CM

## 2022-01-27 DIAGNOSIS — I48.19 PERSISTENT ATRIAL FIBRILLATION (HCC): Primary | ICD-10-CM

## 2022-01-27 DIAGNOSIS — I50.32 CHRONIC DIASTOLIC CONGESTIVE HEART FAILURE (HCC): Chronic | ICD-10-CM

## 2022-01-27 PROCEDURE — 93000 ELECTROCARDIOGRAM COMPLETE: CPT | Performed by: INTERNAL MEDICINE

## 2022-01-27 PROCEDURE — 99214 OFFICE O/P EST MOD 30 MIN: CPT | Performed by: INTERNAL MEDICINE

## 2022-01-27 NOTE — PATIENT INSTRUCTIONS
Low-Sodium Diet   AMBULATORY CARE:   A low-sodium diet  limits foods that are high in sodium (salt)  You will need to follow a low-sodium diet if you have high blood pressure, kidney disease, or heart failure  You may also need to follow this diet if you have a condition that is causing your body to retain (hold) extra fluid  You may need to limit the amount of sodium you eat in a day to 1,500 to 2,000 mg  Ask your healthcare provider how much sodium you can have each day  How to use food labels to choose foods that are low in sodium:  Read food labels to find the amount of sodium they contain  The amount of sodium is listed in milligrams (mg)  The % Daily Value (DV) column tells you how much of your daily needs are met by 1 serving of the food for each nutrient listed  Choose foods that have less than 5% of the DV of sodium  These foods are considered low in sodium  Foods that have 20% or more of the DV of sodium are considered high in sodium  Some food labels may also list any of the following terms that tell you about the sodium content in the food:  · Sodium-free:  Less than 5 mg in each serving    · Very low sodium:  35 mg of sodium or less in each serving    · Low sodium:  140 mg of sodium or less in each serving    · Reduced sodium: At least 25% less sodium in each serving than the regular type    · Light in sodium:  50% less sodium in each serving    · Unsalted or no added salt:  No extra salt is added during processing (the food may still contain sodium)       Foods to avoid:  Salty foods are high in sodium  You should avoid the following:  · Processed foods:      ? Mixes for cornbread, biscuits, cake, and pudding     ? Instant foods, such as potatoes, cereals, noodles, and rice     ? Packaged foods, such as bread stuffing, rice and pasta mixes, snack dip mixes, and macaroni and cheese     ? Canned foods, such as canned vegetables, soups, broths, sauces, and vegetable or tomato juice    ?  Snack foods, such as salted chips, popcorn, pretzels, pork rinds, salted crackers, and salted nuts    ? Frozen foods, such as dinners, entrees, vegetables with sauces, and breaded meats    ? Sauerkraut, pickled vegetables, and other foods prepared in brine    · Meats and cheeses:      ? Smoked or cured meat, such as corned beef, dunaway, ham, hot dogs, and sausage    ? Canned meats or spreads, such as potted meats, sardines, anchovies, and imitation seafood    ? Deli or lunch meats, such as bologna, ham, turkey, and roast beef    ? Processed cheese, such as American cheese and cheese spreads    · Condiments, sauces, and seasonings:      ? Salt (¼ teaspoon of salt contains 575 mg of sodium)    ? Seasonings made with salt, such as garlic salt, celery salt, onion salt, and seasoned salt    ? Regular soy sauce, barbecue sauce, teriyaki sauce, steak sauce, Worcestershire sauce, and most flavored vinegars    ? Canned gravy and mixes     ? Regular condiments, such as mustard, ketchup, and salad dressings    ? Pickles and olives    ? Meat tenderizers and monosodium glutamate (MSG)    Foods to include:  Read the food label to find the exact amount of sodium in each serving  · Bread and cereal:  Try to choose breads with less than 80 mg of sodium per serving  ? Bread, roll, valeriano, tortilla, or unsalted crackers  ? Ready-to-eat cereals with less than 5% DV of sodium (examples include shredded wheat and puffed rice)    ? Pasta    · Vegetables and fruits:      ? Unsalted fresh, frozen, or canned vegetables    ? Fresh, frozen, or canned fruits    ? Fruit juice    · Dairy:  One serving has about 150 mg of sodium  ? Milk, all types    ? Yogurt    ? Hard cheese, such as cheddar, Swiss, [employer], or mozzarella    · Meat and other protein foods:  Some raw meats may have added sodium  ? Plain meats, fish, and poultry     ? Eggs    · Other foods:      ? Homemade pudding    ? Unsalted nuts, popcorn, or pretzels    ?  Unsalted butter or margarine    Ways to decrease sodium:   · Add spices and herbs to foods instead of salt during cooking  Use salt-free seasonings to add flavor to foods  Examples include onion powder, garlic powder, basil, padilla powder, paprika, and parsley  Try lemon or lime juice or vinegar to give foods a tart flavor  Use hot peppers, pepper, or cayenne pepper to add a spicy flavor  · Do not keep a salt shaker at your kitchen table  This may help keep you from adding salt to food at the table  A teaspoon of salt has 2,300 mg of sodium  It may take time to get used to enjoying the natural flavor of food instead of adding salt  Talk to your healthcare provider before you use salt substitutes  Some salt substitutes have a high amount of potassium and need to be avoided if you have kidney disease  · Choose low-sodium foods at restaurants  Meals from restaurants are often high in sodium  Some restaurants have nutrition information on the menu that tells you the amount of sodium in their foods  If possible, ask for your food to be prepared with less, or no salt  · Shop for unsalted or low-sodium foods and snacks at the grocery store  Examples include unsalted or low-sodium broths, soups, and canned vegetables  Choose fresh or frozen vegetables instead  Choose unsalted nuts or seeds or fresh fruits or vegetables as snacks  Read food labels and choose salt-free, very low-sodium, or low-sodium foods  © Copyright [employer] 2021 Information is for End User's use only and may not be sold, redistributed or otherwise used for commercial purposes  All illustrations and images included in CareNotes® are the copyrighted property of [employer]  or [employer]   The above information is an  only  It is not intended as medical advice for individual conditions or treatments  Talk to your doctor, nurse or pharmacist before following any medical regimen to see if it is safe and effective for you

## 2022-01-27 NOTE — PROGRESS NOTES
Cardiology Follow Up    Reinier Nino  1960  [id number]  HEART & VASCULAR  [facility] [address]  [id number] [place] [address] Drive [address]    1  Persistent atrial fibrillation (HCC)  POCT ECG   2  Chronic diastolic congestive heart failure ([address] )     3  Primary hypertension         Discussion/Summary:    1  Persistent atrial fibrillation - Americo heart rates are controlled and even a bit on the low side since his thyroidectomy  I asked him to discontinue digoxin  He will stay on the same dose of metoprolol  He is not able to afford the new anticoagulants, and chooses not to go on Coumadin, therefore remains on aspirin for stroke prevention  I continue to recommend full-dose anticoagulation  2   Chronic diastolic CHF - He remains chronically volume overloaded, but this has been stable over the last few visits  He is compliant on torsemide and potassium supplementation  His blood work has remained stable  We went over following a low-sodium diet  He will be back to see us in 6 months  3   Hypertension - His blood pressure is under good control  No changes were made today  4   Obesity hypoventilation syndrome - He continues to follow with Pulmonary/Sleep Medicine for this and his history of COPD  Interval History:    Madina Fischer comes in for follow-up given his persistent atrial fibrillation and right-sided CHF/edema  Madina Fischer was in the hospital with acute pulmonary embolism a few years back when I met him  He was also found to be in atrial fibrillation and rates were rapid during his acute state  He carried this history of atrial fibrillation for many years  When he was admitted he was not on any medications, as he was homeless living in his car  With treating his PE and adjusting his heart rate medications, his AF became under good control  Echocardiogram demonstrated normal LV systolic function   There was a dilated RV and biatrial enlargement  There was also mild to moderate pulmonary hypertension  He was also very volume overloaded, and torsemide was started  In follow-up José Manuel Frias looked grossly volume overloaded and his weight was significantly up  He has significant lower extremity edema  He was not taking his torsemide as directed  He was homeless, usually staying in his car or shelter and because of the problems with having to use the bathroom he only was mamie his torsemide 3 times a week  However, now he lives at her residence and has been taking torsemide regularly  José Manuel Frias did changed insurances and had to see another cardiologist about 2 years ago  He was placed on digoxin, is atrial fibrillation is under good control  However he was not able to afford Xarelto and came off of this, and has just been taking aspirin  He chose not to go on warfarin  At our last visit José Manuel Frias was having issues with worsening shortness of breath and therefore we did updated cardiac testing  Echocardiogram showed no significant change with normal LV systolic function, just a mildly dilated RV and biatrial enlargement  We did an ischemic evaluation with stress nuclear study which was normal   He does have obstructive sleep apnea and is compliant on CPAP  He has been seen pulmonary medicine his well as he was also diagnosed with COPD  Since last visit [facility] it had a thyroidectomy for hyperthyroidism  Since then his heart rates have been lower and even a bit bradycardic  José Manuel Frias feels about the same as last visit  He does have shortness of breath with exertion but is comfortable at rest   No orthopnea or PND  He does hook with lower extremity edema, mostly controlled on torsemide  He denies chest pain or any symptoms of angina  He denies palpitations or any symptoms of his atrial fibrillation  No lightheadedness or syncope        Patient Active Problem List   Diagnosis    Hypertension    Dysrhythmias    Chronic diastolic congestive heart failure ([facility] )    History of pulmonary embolism    Dyslipidemia    Kidney stone    Morbid obesity ([facility] )    Kidney stones    Swallowing dysfunction    Tobacco abuse    BMI 50 0-59 9, adult (HCC)    Graves disease    Hyperglycemia    Right thyroid nodule    History of hyperthyroidism    Status post radioactive iodine thyroid ablation    FABIAN (obstructive sleep apnea)    Chronic obstructive pulmonary disease (HCC)    Elevated parathyroid hormone    Hypercalcemia    Vitamin D deficiency    Pulmonary nodule    Status post total thyroidectomy    Type 2 diabetes mellitus with hyperglycemia, without long-term current use of insulin (HCC)    Postoperative hypothyroidism    Diabetic polyneuropathy associated with type 2 diabetes mellitus ([place] [address] )     Past Medical History:   Diagnosis Date    Acute diastolic congestive heart failure ([place] [address] ) 7/3/2017    Atrial fibrillation with rapid ventricular response ([place] [address] ) 3/31/2017    Bilateral edema of lower extremity 8/22/2016    BMI 45 0-49 9, adult ([place] [address] ) 2/17/2020    Encouraged lifestyle modification to incorporate moderate physiscal activity and DASH diet/low fat/low carb      CAD (coronary artery disease) 10/28/2016    Chest pain 8/22/2016    CPAP (continuous positive airway pressure) dependence     Dysphagia 9/29/2019    Goiter 6/12/2020    Homelessness 7/3/2017    Hyperlipidemia     Hypertension     Hypertensive heart disease with heart failure ([place] [address] ) 10/27/2020    Hyperthyroidism 8/22/2016    Impaired fasting glucose 7/27/2021    Kidney stone     Pneumothorax     Presence of IVC filter     [facility]     Pulmonary hypertension ([place] [address] ) 9/15/2016    Rash 10/28/2016    Sepsis ([place] [address] ) 8/14/2019    Sleep apnea     SOB (shortness of breath) 8/22/2016    Tachycardia 8/22/2016     Social History     Socioeconomic History    Marital status: Single     Spouse name: Not on file    Number of children: Not on file    Years of education: Not on file    Highest education level: Not on file   Occupational History     Comment: disability   Tobacco Use    Smoking status: Current Every Day Smoker     Packs/day: 1 00     Years: 43 00     Pack years: 43 00     Types: Cigarettes     Start date: 1    Smokeless tobacco: Never Used    Tobacco comment: patient is currently still smoking a pack of cigarettes a day / 08/24/21   Vaping Use    Vaping Use: Never used   Substance and Sexual Activity    Alcohol use: No    Drug use: No    Sexual activity: Not Currently   Other Topics Concern    Not on file   Social History Narrative    Drinks one cup of coffee a day      Social Determinants of Health     Financial Resource Strain: Not on file   Food Insecurity: Not on file   Transportation Needs: Not on file   Physical Activity: Not on file   Stress: Not on file   Social Connections: Not on file   Intimate Partner Violence: Not on file   Housing Stability: Not on file      Family History   Problem Relation Age of Onset    Cancer Mother         metastatic, breast primary    Breast cancer Mother     Heart disease Father     Lung disease Father     Thyroid disease unspecified Father         thyroidectomy    Thyroid disease unspecified Sister     Hyperthyroidism Sister         in remission     Past Surgical History:   Procedure Laterality Date    COLONOSCOPY      EGD AND COLONOSCOPY N/A 7/28/2017    Procedure: EGD AND COLONOSCOPY;  Surgeon: Rhoda Kenyon MD;  Location:  MAIN OR;  Service: Gastroenterology    FL RETROGRADE PYELOGRAM  8/13/2019    FL RETROGRADE PYELOGRAM  9/27/2019    IVC FILTER INSERTION      LITHOTRIPSY      KS CYSTO/URETERO W/LITHOTRIPSY &INDWELL STENT INSRT Right 8/13/2019    Procedure: CYSTOSCOPY URETEROSCOPY WITH LITHOTRIPSY HOLMIUM LASER, RETROGRADE PYELOGRAM AND INSERTION STENT URETERAL;  Surgeon: Darrick Sales MD;  Location: [facility];  Service: Urology    KS CYSTO/URETERO W/LITHOTRIPSY &INDWELL STENT INSRT Right 9/27/2019    Procedure: CYSTOSCOPY URETEROSCOPY WITH LITHOTRIPSY HOLMIUM LASER, RETROGRADE PYELOGRAM AND INSERTION STENT URETERAL;  Surgeon: Pari Hanley MD;  Location: [facility];  Service: Urology    THYROIDECTOMY N/A 9/28/2021    Procedure: THYROIDECTOMY, total;  Surgeon: Oleg Cosme MD;  [facility];  Service: Surgical Oncology    US GUIDED THYROID BIOPSY  3/23/2021       Current Outpatient Medications:     albuterol (ProAir HFA) 90 mcg/act inhaler, Inhale 2 puffs every 4 (four) hours as needed for wheezing or shortness of breath, Disp: 8 5 g, Rfl: 0    aspirin (ECOTRIN LOW STRENGTH) 81 mg EC tablet, Take 81 mg by mouth daily, Disp: , Rfl:     atorvastatin (LIPITOR) 40 mg tablet, Take 1 tablet (40 mg total) by mouth daily, Disp: 90 tablet, Rfl: 1    Blood Glucose Monitoring Suppl (OneTouch Verio) w/Device KIT, Use, Disp: , Rfl:     ergocalciferol (VITAMIN D2) 50,000 units, Take 1 capsule (50,000 Units total) by mouth once a week, Disp: 12 capsule, Rfl: 1    Lancets (OneTouch Delica Plus [id number]) MISC, Use to test blood sugars 2 times a day , Disp: 100 each, Rfl: 6    levothyroxine 200 mcg tablet, Take 1 tablet (200 mcg total) by mouth daily, Disp: 90 tablet, Rfl: 1    metFORMIN (GLUCOPHAGE-XR) 500 mg 24 hr tablet, Take 1 tablet (500 mg total) by mouth 2 (two) times a day with meals, Disp: 180 tablet, Rfl: 3    metoprolol tartrate (LOPRESSOR) 50 mg tablet, Take 1 tablet (50 mg total) by mouth 2 (two) times a day, Disp: 180 tablet, Rfl: 3    OneTouch Verio test strip, Use as instructed twice a day, Disp: 100 strip, Rfl: 6    potassium chloride (K-DUR,KLOR-CON) 20 mEq tablet, Take 1 tablet (20 mEq total) by mouth 2 (two) times a day, Disp: 180 tablet, Rfl: 1    tiotropium (Spiriva Respimat) 2 5 MCG/ACT AERS inhaler, Inhale 2 puffs daily, Disp: 4 g, Rfl: 3    torsemide (DEMADEX) 20 mg tablet, Take 2 tablets (40 mg total) by mouth daily, Disp: 180 tablet, Rfl: 1  No Known Allergies    Labs:  Lab Results   Component Value Date     02/15/2017    K 4 8 01/12/2022    K 4 6 05/13/2020     01/12/2022     05/13/2020    CO2 30 01/12/2022    CO2 24 05/13/2020    BUN 18 01/12/2022    BUN 13 05/13/2020    CREATININE 0 87 01/12/2022    CREATININE 0 72 02/15/2017    GLUCOSE 113 11/30/2015    CALCIUM 9 6 01/12/2022    CALCIUM 9 6 08/31/2018     Imaging:  ECG obtained in the office demonstrated atrial fibrillation with a heart rate of 59 bpm     STRESS NUCLEAR (11/2020):  SUMMARY:  -  Stress results: There was no chest pain during stress  -  ECG conclusions: The stress ECG was negative for ischemia and normal  Arrhythmia during stress: Persistence of atrial fibrillation   -  Perfusion imaging: There were no perfusion defects   -  Gated SPECT: The calculated left ventricular ejection fraction was 63 %  Left ventricular ejection fraction was within normal limits by visual estimate  There was no left ventricular regional abnormality      IMPRESSIONS: Normal study after pharmacologic stress  Myocardial perfusion imaging was normal at rest and with stress  Left ventricular systolic function was normal       ECHO (11/2020):  LEFT VENTRICLE:  Systolic function was normal  Ejection fraction was estimated to be 60 %  There were no regional wall motion abnormalities  Wall thickness was mildly increased      RIGHT VENTRICLE:  The ventricle was mildly dilated  Systolic function was normal      LEFT ATRIUM:  The atrium was mildly dilated      RIGHT ATRIUM:  The atrium was mildly to moderately dilated      MITRAL VALVE:  There was mild annular calcification  There was trace regurgitation      TRICUSPID VALVE:  There was mild to moderate regurgitation  Pulmonary artery systolic pressure was mildly increased  Review of Systems:  Review of Systems   Constitutional: Positive for fatigue  HENT: Negative  Eyes: Negative  Respiratory: Positive for shortness of breath  Cardiovascular: Positive for leg swelling  Gastrointestinal: Negative  Musculoskeletal: Negative  Skin: Negative  Allergic/Immunologic: Negative  Neurological: Negative  Hematological: Negative  Psychiatric/Behavioral: Negative  All other systems reviewed and are negative  Vitals:    01/27/22 1013   BP: 120/68   BP Location: Left arm   Patient Position: Sitting   Cuff Size: Large   Pulse: 59   Weight: (!) 166 kg (365 lb)   Height: 6' (1 829 m)     Physical Exam:  Physical Exam  Vitals and nursing note reviewed  Constitutional:       Appearance: He is well-developed  HENT:      Head: Normocephalic and atraumatic  Eyes:      General: No scleral icterus  Right eye: No discharge  Left eye: No discharge  Pupils: Pupils are equal, round, and reactive to light  Neck:      Thyroid: No thyromegaly  Vascular: No JVD  Cardiovascular:      Rate and Rhythm: Normal rate  Rhythm irregularly irregular  Pulses: Decreased pulses  Heart sounds: Normal heart sounds, S1 normal and S2 normal  No murmur heard  No friction rub  No gallop  Pulmonary:      Effort: Pulmonary effort is normal  No respiratory distress  Breath sounds: Decreased breath sounds present  No wheezing or rales  Abdominal:      General: Bowel sounds are normal  There is no distension  Palpations: Abdomen is soft  Tenderness: There is no abdominal tenderness  Musculoskeletal:         General: No tenderness or deformity  Normal range of motion  Cervical back: Normal range of motion and neck supple  Skin:     General: Skin is warm and dry  Findings: No rash  Neurological:      Mental Status: He is alert and oriented to person, place, and time  Cranial Nerves: No cranial nerve deficit  Psychiatric:         Thought Content:  Thought content normal          Judgment: Judgment normal        Counseling / Coordination of Care  Total floor / unit time spent today 25 minutes  Greater than 50% of total time was spent with the patient and / or family counseling and / or coordination of care

## 2022-02-10 ENCOUNTER — OFFICE VISIT (OUTPATIENT)
Dept: ENDOCRINOLOGY | Facility: HOSPITAL | Age: 62
End: 2022-02-10
Payer: MEDICARE

## 2022-02-10 VITALS
DIASTOLIC BLOOD PRESSURE: 82 MMHG | BODY MASS INDEX: 42.66 KG/M2 | WEIGHT: 315 LBS | SYSTOLIC BLOOD PRESSURE: 120 MMHG | HEIGHT: 72 IN | HEART RATE: 84 BPM

## 2022-02-10 DIAGNOSIS — E55.9 VITAMIN D DEFICIENCY: ICD-10-CM

## 2022-02-10 DIAGNOSIS — E05.00 GRAVES DISEASE: ICD-10-CM

## 2022-02-10 DIAGNOSIS — E11.42 DIABETIC POLYNEUROPATHY ASSOCIATED WITH TYPE 2 DIABETES MELLITUS (HCC): ICD-10-CM

## 2022-02-10 DIAGNOSIS — E11.65 TYPE 2 DIABETES MELLITUS WITH HYPERGLYCEMIA, WITHOUT LONG-TERM CURRENT USE OF INSULIN (HCC): Primary | ICD-10-CM

## 2022-02-10 DIAGNOSIS — E78.5 DYSLIPIDEMIA: Chronic | ICD-10-CM

## 2022-02-10 DIAGNOSIS — E89.0 POSTOPERATIVE HYPOTHYROIDISM: ICD-10-CM

## 2022-02-10 DIAGNOSIS — I10 PRIMARY HYPERTENSION: Chronic | ICD-10-CM

## 2022-02-10 PROCEDURE — 99215 OFFICE O/P EST HI 40 MIN: CPT | Performed by: INTERNAL MEDICINE

## 2022-02-10 RX ORDER — LANOLIN ALCOHOL/MO/W.PET/CERES
1000 CREAM (GRAM) TOPICAL DAILY
COMMUNITY

## 2022-02-10 NOTE — PROGRESS NOTES
2/11/2022    Assessment/Plan      Diagnoses and all orders for this visit:    Type 2 diabetes mellitus with hyperglycemia, without long-term current use of insulin ([address] )  -     HEMOGLOBIN A1C W/ EAG ESTIMATION Lab Collect; Future  -     Comprehensive metabolic panel Lab Collect; Future  -     CBC and differential Lab Collect; Future  -     TSH, 3rd generation Lab Collect; Future  -     T4, free Lab Collect; Future  -     CBC and differential Lab Collect; Future  -     Lipid Panel with Direct LDL reflex Lab Collect; Future    Vitamin D deficiency  -     HEMOGLOBIN A1C W/ EAG ESTIMATION Lab Collect; Future  -     Comprehensive metabolic panel Lab Collect; Future  -     CBC and differential Lab Collect; Future  -     TSH, 3rd generation Lab Collect; Future  -     T4, free Lab Collect; Future  -     CBC and differential Lab Collect; Future  -     Lipid Panel with Direct LDL reflex Lab Collect; Future    Diabetic polyneuropathy associated with type 2 diabetes mellitus (HCC)  -     HEMOGLOBIN A1C W/ EAG ESTIMATION Lab Collect; Future  -     Comprehensive metabolic panel Lab Collect; Future  -     CBC and differential Lab Collect; Future  -     TSH, 3rd generation Lab Collect; Future  -     T4, free Lab Collect; Future  -     CBC and differential Lab Collect; Future  -     Lipid Panel with Direct LDL reflex Lab Collect; Future    Graves disease  -     HEMOGLOBIN A1C W/ EAG ESTIMATION Lab Collect; Future  -     Comprehensive metabolic panel Lab Collect; Future  -     CBC and differential Lab Collect; Future  -     TSH, 3rd generation Lab Collect; Future  -     T4, free Lab Collect; Future  -     CBC and differential Lab Collect; Future  -     Lipid Panel with Direct LDL reflex Lab Collect; Future    Postoperative hypothyroidism  -     HEMOGLOBIN A1C W/ EAG ESTIMATION Lab Collect; Future  -     Comprehensive metabolic panel Lab Collect; Future  -     CBC and differential Lab Collect;  Future  -     TSH, 3rd generation Lab Collect; Future  -     T4, free Lab Collect; Future  -     CBC and differential Lab Collect; Future  -     Lipid Panel with Direct LDL reflex Lab Collect; Future    Primary hypertension  -     HEMOGLOBIN A1C W/ EAG ESTIMATION Lab Collect; Future  -     Comprehensive metabolic panel Lab Collect; Future  -     CBC and differential Lab Collect; Future  -     TSH, 3rd generation Lab Collect; Future  -     T4, free Lab Collect; Future  -     CBC and differential Lab Collect; Future  -     Lipid Panel with Direct LDL reflex Lab Collect; Future    Dyslipidemia  -     HEMOGLOBIN A1C W/ EAG ESTIMATION Lab Collect; Future  -     Comprehensive metabolic panel Lab Collect; Future  -     CBC and differential Lab Collect; Future  -     TSH, 3rd generation Lab Collect; Future  -     T4, free Lab Collect; Future  -     CBC and differential Lab Collect; Future  -     Lipid Panel with Direct LDL reflex Lab Collect; Future    Other orders  -     vitamin B-12 (VITAMIN B-12) 1,000 mcg tablet; Take 1,000 mcg by mouth daily        Assessment/Plan:  1  Type 2 diabetes  Hemoglobin A1c is 6 3%  This does demonstrate excellent control of his diabetes and I commended him on the progress he has made  For now, he will continue same metformin 500 mg twice a day  He is going to continue to work on exercise and weight loss along with dietary changes  At this point, I did recommend he follow-up with Ophthalmology for annual diabetes exam       2  Diabetic neuropathy  He denies neuropathic symptoms  Diabetic foot exams are up-to-date  3  Hypothyroidism post thyroidectomy  Most recent thyroid function tests are normal   He is biochemically and clinically euthyroid  He will continue the same levothyroxine 200 mcg daily  4  History of Graves disease  He has no evidence of Graves ophthalmopathy  5  Hypertension  He is normotensive in the office today on his current dose of metoprolol and torsemide  6  Vitamin-D deficiency    He will continue same vitamin-D 28184 units once a week  5  Hyperlipidemia  He will continue same atorvastatin 40 mg daily  I will check a lipid panel with his next visit  I have asked him to follow up in 6 months with preceding hemoglobin A1c, CMP, CBC, TSH, free T4, and lipid panel  CC: Diabetes type 2, thyroid, blood pressure, lipid vitamin-D deficiency follow-up    History of Present Illness     HPI: Beka Casanova is a 63y o  year old male with type 2 diabetes with neuropathy for 6 months, hypothyroidism, hypertension, hyperlipidemia, vitamin-D deficiency for follow-up visit  He is on oral agents at home and takes metformin  mg twice a day  He admits to polyuria utilizing torsemide, and polydipsia  He denies nocturia, polyphagia, and blurry vision  He denies numbness or tingling of the feet  He denies chest pain but always has some shortness of breath without change  He denies nephropathy, retinopathy, heart attack, stroke and claudication but does admit to neuropathy and coronary artery disease  Hypoglycemic episodes: No never  The patient's last eye exam was 2 years ago  The patient's last foot exam was in October 2021 at endocrine office visit  Last A1C was   Lab Results   Component Value Date    HGBA1C 6 3 (H) 01/12/2022     Blood Sugar/Glucometer/Pump/CGM review: Not checking blood sugars  He has hypothyroidism post thyroidectomy for multinodular goiter and Graves disease with hyperthyroidism  He failed control of his hyperthyroidism which was diagnosed in 2008 with antithyroid medication  He was noted to have a right-sided thyroid nodule and very large goiter on ultrasound in June 2020  FNA biopsy demonstrated [place] class 4 lesion of that T rad 4 nodule with benign Afirma testing  This was done in March 2021  He underwent thyroidectomy in September 2021 due to inability to control his hyperthyroidism    He is now on thyroid hormone for replacement purposes and takes levothyroxine 200 mcg daily  He has some increase in fatigue over the last few months  He denies heat or cold intolerance, palpitation, tremors, weight changes, diarrhea or constipation, anxiety or depression  He denies insomnia  He has dry skin and brittle nails but no hair loss  He has vitamin-D deficiency and takes vitamin-D 51313 units once a week  He has hypertension and takes metoprolol 50 mg twice a day and torsemide 40 mg daily  He denies headache or stroke-like symptoms  He has hyperlipidemia and takes atorvastatin 40 mg daily  He denies chest pain but always has some shortness of breath unchanged  Review of Systems   Constitutional: Positive for fatigue  Negative for unexpected weight change  Some increase in fatigue over the last few months  HENT: Negative for trouble swallowing  Eyes: Negative for visual disturbance  Wears glasses  No diplopia  Respiratory: Positive for shortness of breath  Negative for chest tightness  SOB without change  Cardiovascular: Negative for chest pain and palpitations  Gastrointestinal: Negative for abdominal pain, constipation, diarrhea and nausea  Endocrine: Positive for polydipsia and polyuria  Negative for cold intolerance, heat intolerance and polyphagia  Polyuria with torsemide  Nocturia none  occasional thirst     Skin: Negative for wound  Has dry skin  Has brittle nails  No hair loss  Neurological: Negative for dizziness, tremors, weakness, light-headedness, numbness and headaches  Feet are cold  Psychiatric/Behavioral: Negative for dysphoric mood and sleep disturbance  The patient is not nervous/anxious  Uses CPAP         Historical Information   Past Medical History:   Diagnosis Date    Acute diastolic congestive heart failure ([email or web address] ) 7/3/2017    Atrial fibrillation with rapid ventricular response ([email or web address] ) 3/31/2017    Bilateral edema of lower extremity 8/22/2016  BMI 45 0-49 9, adult ([address] ) 2/17/2020    Encouraged lifestyle modification to incorporate moderate physiscal activity and DASH diet/low fat/low carb      CAD (coronary artery disease) 10/28/2016    Chest pain 8/22/2016    CPAP (continuous positive airway pressure) dependence     Dysphagia 9/29/2019    Goiter 6/12/2020    Homelessness 7/3/2017    Hyperlipidemia     Hypertension     [facility] with [facility] 10/27/2020    Hyperthyroidism 8/22/2016    Impaired fasting glucose 7/27/2021    Kidney stone     Pneumothorax     Presence of IVC filter     Pulmonary embolism (HCC)     Pulmonary hypertension ([address] ) 9/15/2016    Rash 10/28/2016    Sepsis [facility] 8/14/2019    Sleep apnea     SOB (shortness of breath) 8/22/2016    Tachycardia 8/22/2016     Past Surgical History:   Procedure Laterality Date    COLONOSCOPY      EGD AND COLONOSCOPY N/A 7/28/2017    Procedure: EGD AND COLONOSCOPY;  Surgeon: Juana Bourne MD;  Location: [facility];  Service: Gastroenterology    FL RETROGRADE PYELOGRAM  8/13/2019    FL RETROGRADE PYELOGRAM  9/27/2019    IVC FILTER INSERTION      LITHOTRIPSY      OR CYSTO/URETERO W/LITHOTRIPSY &INDWELL STENT INSRT Right 8/13/2019    Procedure: CYSTOSCOPY URETEROSCOPY WITH LITHOTRIPSY HOLMIUM LASER, RETROGRADE PYELOGRAM AND INSERTION STENT URETERAL;  Surgeon: Devyn Lou MD;  [facility];  Service: Urology    OR CYSTO/URETERO W/LITHOTRIPSY &INDWELL STENT INSRT Right 9/27/2019    Procedure: CYSTOSCOPY URETEROSCOPY WITH LITHOTRIPSY HOLMIUM LASER, RETROGRADE PYELOGRAM AND INSERTION STENT URETERAL;  Surgeon: Tea Trevizo MD;  [facility]: [facility];  Service: Urology    THYROIDECTOMY N/A 9/28/2021    Procedure: THYROIDECTOMY, total;  Surgeon: Ambar Magallon MD;  [facility];  Service: Surgical Oncology    US GUIDED THYROID BIOPSY  3/23/2021     Social History   Social History     Substance and Sexual Activity   Alcohol Use No Social History     Substance and Sexual Activity   Drug Use No     Social History     Tobacco Use   Smoking Status Current Every Day Smoker    Packs/day: 1 00    Years: 43 00    Pack years: 43 00    Types: Cigarettes    Start date: 1978   Smokeless Tobacco Never Used   Tobacco Comment    patient is currently still smoking a pack of cigarettes a day / 08/24/21     Family History:   Family History   Problem Relation Age of Onset    Cancer Mother         metastatic, breast primary    Breast cancer Mother     Heart disease Father     Lung disease Father     Thyroid disease unspecified Father         thyroidectomy    Thyroid disease unspecified Sister     Hyperthyroidism Sister         in remission       Meds/Allergies   Current Outpatient Medications   Medication Sig Dispense Refill    albuterol (ProAir HFA) 90 mcg/act inhaler Inhale 2 puffs every 4 (four) hours as needed for wheezing or shortness of breath 8 5 g 0    aspirin (ECOTRIN LOW STRENGTH) 81 mg EC tablet Take 81 mg by mouth daily      atorvastatin (LIPITOR) 40 mg tablet Take 1 tablet (40 mg total) by mouth daily 90 tablet 1    Blood Glucose Monitoring Suppl (OneTouch Verio) w/Device KIT Use      ergocalciferol (VITAMIN D2) 50,000 units Take 1 capsule (50,000 Units total) by mouth once a week 12 capsule 1    Lancets (OneTouch Delica Plus [id number]) MISC Use to test blood sugars 2 times a day   100 each 6    levothyroxine 200 mcg tablet Take 1 tablet (200 mcg total) by mouth daily 90 tablet 1    metFORMIN (GLUCOPHAGE-XR) 500 mg 24 hr tablet Take 1 tablet (500 mg total) by mouth 2 (two) times a day with meals 180 tablet 3    metoprolol tartrate (LOPRESSOR) 50 mg tablet Take 1 tablet (50 mg total) by mouth 2 (two) times a day 180 tablet 3    OneTouch Verio test strip Use as instructed twice a day 100 strip 6    potassium chloride (K-DUR,KLOR-CON) 20 mEq tablet Take 1 tablet (20 mEq total) by mouth 2 (two) times a day 180 tablet 1    tiotropium (Spiriva Respimat) 2 5 MCG/ACT AERS inhaler Inhale 2 puffs daily 4 g 3    torsemide (DEMADEX) 20 mg tablet Take 2 tablets (40 mg total) by mouth daily 180 tablet 1    vitamin B-12 (VITAMIN B-12) 1,000 mcg tablet Take 1,000 mcg by mouth daily       No current facility-administered medications for this visit  No Known Allergies    Objective   Vitals: Blood pressure 120/82, pulse 84, height 6' (1 829 m), weight (!) 163 kg (359 lb 3 2 oz)  Invasive Devices  Report    Drain            Ureteral Drain/Stent Right ureter 6 Fr  868 days                Physical Exam  Vitals reviewed  Constitutional:       Appearance: Normal appearance  He is well-developed  He is obese  HENT:      Head: Normocephalic and atraumatic  Eyes:      Extraocular Movements: Extraocular movements intact  Conjunctiva/sclera: Conjunctivae normal       Comments: No lid lag, stare, proptosis, or periorbital edema  Neck:      Thyroid: No thyromegaly  Vascular: No carotid bruit  Comments: Healed anterior neck scar  No palpable thyroid tissue  Cardiovascular:      Rate and Rhythm: Normal rate and regular rhythm  Heart sounds: Normal heart sounds  No murmur heard  Pulmonary:      Effort: Pulmonary effort is normal       Breath sounds: Normal breath sounds  No wheezing  Abdominal:      Palpations: Abdomen is soft  Musculoskeletal:         General: No deformity  Normal range of motion  Cervical back: Normal range of motion and neck supple  Right lower leg: No edema  Left lower leg: No edema  Comments: No tremor of the outstretched hands  Lymphadenopathy:      Cervical: No cervical adenopathy  Skin:     General: Skin is warm and dry  Findings: No erythema or rash  Neurological:      Mental Status: He is alert and oriented to person, place, and time  Deep Tendon Reflexes: Reflexes are normal and symmetric        Comments: Deep tendon reflexes normal          The history was obtained from the review of the chart and from the patient and friend  Lab Results:    Most recent Alc is  Lab Results   Component Value Date    HGBA1C 6 3 (H) 01/12/2022           Blood work done on 01/12/2022 shows a CMP with a glucose of 119 fasting but was otherwise normal      TSH is 1 295 with a free T4 1 22  Urine microalbumin to creatinine ratio was 14  Twenty-five hydroxy vitamin-D level is 91    Lab Results   Component Value Date    CREATININE 0 87 01/12/2022    CREATININE 0 99 10/04/2021    CREATININE 1 05 09/13/2021    BUN 18 01/12/2022     02/15/2017    K 4 8 01/12/2022     01/12/2022    CO2 30 01/12/2022     eGFR   Date Value Ref Range Status   01/12/2022 94 ml/min/1 73sq m Final       Lab Results   Component Value Date    HDL 30 (L) 10/04/2021    TRIG 114 10/04/2021    CHOLHDL 2 3 04/22/2020       Lab Results   Component Value Date    ALT 24 01/12/2022    AST 20 01/12/2022    ALKPHOS 100 01/12/2022    BILITOT 0 7 11/22/2016               Future Appointments   Date Time Provider [place]   4/6/2022  2:40 PM DO JOHNATHON Barclay [facility]   4/12/2022  9:00 AM Tacos Ward MD SURG ONC [facility]   4/26/2022  1:20 PM ASHLEY Yu [facility]  [facility]   8/12/2022 10:00 AM Henry Allen MD [facility]

## 2022-02-10 NOTE — PATIENT INSTRUCTIONS
hgba1c is 6 3%  this is excellent  Continue the same metformin 500 mg twice a day  work on diet dn exercise and weight loss  The thyroid is normal    Continue the same levothyroxine  You can now make an appointment for  eye doctor for diabetes eye exam    Follow up in 6 months with blood work

## 2022-03-09 DIAGNOSIS — I50.32 CHRONIC DIASTOLIC CONGESTIVE HEART FAILURE (HCC): Chronic | ICD-10-CM

## 2022-03-09 RX ORDER — TORSEMIDE 20 MG/1
TABLET ORAL
Qty: 180 TABLET | Refills: 1 | Status: SHIPPED | OUTPATIENT
Start: 2022-03-09

## 2022-03-14 DIAGNOSIS — I50.32 CHRONIC DIASTOLIC CONGESTIVE HEART FAILURE (HCC): Chronic | ICD-10-CM

## 2022-03-14 RX ORDER — POTASSIUM CHLORIDE 1500 MG/1
TABLET, EXTENDED RELEASE ORAL
Qty: 180 TABLET | Refills: 1 | Status: SHIPPED | OUTPATIENT
Start: 2022-03-14

## 2022-04-03 NOTE — PROGRESS NOTES
Pulmonary Follow Up Note   Stella Aldrich 63 y o  male MRN: [id number]  4/6/2022      Assessment/Plan:         1  Moderate COPD /dyspnea on exertion  - patient is a chronic smoker  Pulmonary function test borderline diagnostic of COPD  Likely altered secondary to body habitus and restrictive pattern  Patient has symptomatology consistent with COPD  - of  - continue albuterol p r n   - prescribed Spiriva hand inhaler  -provided samples of Spiriva Respimat  - patient discontinued pulmonary rehab  - 4 mm juxtapleural nodule in left upper lobe noted on CT lung cancer screening in May 2021     2  Obstructive sleep apnea -  severe  - established with sleep medicine and has follow-up  - encouraged compliance with BiPAP     3  Morbid obesity  - advised diet and exercise  - instructed patient to exercise 150 minutes per week and decrease caloric intake  - goal to lose 10% of body weight which is approximately 35 - 40 lb over 6 months to 1 year  - advised patient to keep a food diary      4   Tobacco abuse  - current user half pack per day  - counseled 5 minutes on cessation techniques and cessation options  - patient believes that this is more of a habit instead of addictive cravings  - advised patient to decrease intake to approximately 10-15 cigarettes daily prior to next visit  - patient will consider nicotine patch or Chantix if has significant difficulty with cessation; currently not interested due to cost  - will also consider referral to smoking cessation program at future visit  - CT annual lung cancer screening ordered     5  Pulmonary nodule  - new 4 mm juxtapleural nodule in left upper lobe noted on CT lung cancer screening in May 2021  - size below 6 mm and location makes it low risk but however patient's tobacco use history elevates risks for malignancy  - ordered annual CT lung cancer screening  - patient will be moving at the end of this month to [place]; advised patient to have imaging completed admission and follow-up with pulmonologist there    No follow-ups on file  History of Present Illness   HPI:  Cori Fontenot is a 63 y o  male with morbid obesity, nicotine dependence, type 2 diabetes, hypothyroidism, COPD, severe obstructive sleep apnea presenting for follow-up  Patient states that since starting Spiriva at last visit his symptoms have significantly improved  However due to cost he is unable to afford Spiriva Respimat  He has been using the samples every other day and still finding great relief  He is using albuterol rescue inhaler approximately once or twice per week  He is compliant with his BiPAP machine and has follow-up with his sleep physician  Patient reports that he is moving to [place] at the end of this month and request refills for his medications  Denies fever, chills, headaches, lightheadedness, dizziness, visual disturbances, sore throat, chest pain, palpitations, SOB, abdominal pain, nausea, vomiting, or trouble urinating/ defecating  Occupational History:  Previously worked with concrete, 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       Social History:  Patient smoking 1 pack per day for 43 years; at peak smoked 3 packs per day  Recently decreased smoking to half a pack per day  [place] hot air at home       Malignancy History:  Unknown malignancy in the mother but reports that it was metastatic     Pets/animal exposure: none  Denies exposure to farm animals     Inhalers:  Spiriva and albuterol    Review of systems:  12 point review of systems was completed and was otherwise negative except as listed in HPI      Historical Information   Past Medical History:   Diagnosis Date    Acute diastolic congestive heart failure ([facility] ) 7/3/2017    Atrial fibrillation with rapid ventricular response ([facility] ) 3/31/2017    Bilateral edema of lower extremity 8/22/2016    BMI 45 0-49 9, adult ([facility] ) 2/17/2020    Encouraged lifestyle modification to incorporate moderate physiscal activity and DASH diet/low fat/low carb      CAD (coronary artery disease) 10/28/2016    Chest pain 8/22/2016    CPAP (continuous positive airway pressure) dependence     Dysphagia 9/29/2019    Goiter 6/12/2020    Homelessness 7/3/2017    Hyperlipidemia     Hypertension     Hypertensive heart disease with heart failure [facility] ) 10/27/2020    Hyperthyroidism 8/22/2016    Impaired fasting glucose 7/27/2021    Kidney stone     Pneumothorax     Presence of IVC filter     Pulmonary embolism (HCC)     Pulmonary hypertension ([facility] ) 9/15/2016    Rash 10/28/2016    Sepsis ([facility] ) 8/14/2019    Sleep apnea     SOB (shortness of breath) 8/22/2016    Tachycardia 8/22/2016     Past Surgical History:   Procedure Laterality Date    COLONOSCOPY      EGD AND COLONOSCOPY N/A 7/28/2017    Procedure: EGD AND COLONOSCOPY;  Surgeon: Katie Fuentes MD;  [facility];  Service: Gastroenterology    FL RETROGRADE PYELOGRAM  8/13/2019    FL RETROGRADE PYELOGRAM  9/27/2019    IVC FILTER INSERTION      LITHOTRIPSY      [place] CYSTO/URETERO W/LITHOTRIPSY &INDWELL STENT INSRT Right 8/13/2019    Procedure: CYSTOSCOPY URETEROSCOPY WITH LITHOTRIPSY HOLMIUM LASER, RETROGRADE PYELOGRAM AND INSERTION STENT URETERAL;  Surgeon: Jared Carranza MD;  [facility];  Service: Urology    [place] CYSTO/URETERO W/LITHOTRIPSY &INDWELL STENT INSRT Right 9/27/2019    Procedure: CYSTOSCOPY URETEROSCOPY WITH LITHOTRIPSY HOLMIUM LASER, RETROGRADE PYELOGRAM AND INSERTION STENT URETERAL;  Surgeon: Fabiola Yip MD;  [facility];  Service: Urology    THYROIDECTOMY N/A 9/28/2021    Procedure: THYROIDECTOMY, total;  Surgeon: Milton Wyman MD;  [facility];  Service: Surgical Oncology    US GUIDED THYROID BIOPSY  3/23/2021     Family History   Problem Relation Age of Onset    Cancer Mother         metastatic, breast primary    Breast cancer Mother     Heart disease Father     Lung disease Father     Thyroid disease unspecified Father         thyroidectomy    Thyroid disease unspecified Sister     Hyperthyroidism Sister         in remission         Meds/Allergies     Current Outpatient Medications:     albuterol (ProAir HFA) 90 mcg/act inhaler, Inhale 2 puffs every 4 (four) hours as needed for wheezing or shortness of breath, Disp: 8 5 g, Rfl: 0    aspirin (ECOTRIN LOW STRENGTH) 81 mg EC tablet, Take 81 mg by mouth daily, Disp: , Rfl:     atorvastatin (LIPITOR) 40 mg tablet, Take 1 tablet (40 mg total) by mouth daily, Disp: 90 tablet, Rfl: 1    Blood Glucose Monitoring Suppl (OneTouch Verio) w/Device KIT, Use, Disp: , Rfl:     ergocalciferol (VITAMIN D2) 50,000 units, Take 1 capsule (50,000 Units total) by mouth once a week, Disp: 12 capsule, Rfl: 1    Klor-Con M20 20 MEQ tablet, TAKE 1 TABLET BY MOUTH TWICE A DAY, Disp: 180 tablet, Rfl: 1    Lancets (OneTouch Delica Plus MAFAMP30S) MISC, Use to test blood sugars 2 times a day , Disp: 100 each, Rfl: 6    levothyroxine 200 mcg tablet, Take 1 tablet (200 mcg total) by mouth daily, Disp: 90 tablet, Rfl: 1    meloxicam (MOBIC) 15 mg tablet, Take 1 tablet (15 mg total) by mouth daily, Disp: 30 tablet, Rfl: 0    metFORMIN (GLUCOPHAGE-XR) 500 mg 24 hr tablet, Take 1 tablet (500 mg total) by mouth 2 (two) times a day with meals, Disp: 180 tablet, Rfl: 3    metoprolol tartrate (LOPRESSOR) 50 mg tablet, Take 1 tablet (50 mg total) by mouth 2 (two) times a day, Disp: 180 tablet, Rfl: 3    OneTouch Verio test strip, Use as instructed twice a day, Disp: 100 strip, Rfl: 6    torsemide (DEMADEX) 20 mg tablet, TAKE 2 TABLETS BY MOUTH ONCE A DAY, Disp: 180 tablet, Rfl: 1    vitamin B-12 (VITAMIN B-12) 1,000 mcg tablet, Take 1,000 mcg by mouth daily, Disp: , Rfl:     tiotropium (Spiriva HandiHaler) 18 mcg inhalation capsule, Place 1 capsule (18 mcg total) into inhaler and inhale daily, Disp: 30 capsule, Rfl: 2  No Known Allergies    Vitals: Blood pressure 128/80, pulse 78, temperature 98 2 °F (36 8 °C), temperature source Tympanic, resp  rate 16, height 6' (1 829 m), weight (!) 165 kg (364 lb), SpO2 96 %  Body mass index is 49 37 kg/m²  Oxygen Therapy  SpO2: 96 %  Oxygen Therapy: None (Room air)      Physical Exam  Physical Exam  Vitals reviewed  Constitutional:       Appearance: He is obese  HENT:      Head: Normocephalic and atraumatic  Right Ear: External ear normal       Left Ear: External ear normal       Nose: Nose normal    Eyes:      General: No scleral icterus  Right eye: No discharge  Left eye: No discharge  Extraocular Movements: Extraocular movements intact  Conjunctiva/sclera: Conjunctivae normal    Cardiovascular:      Rate and Rhythm: Normal rate and regular rhythm  Pulmonary:      Effort: Pulmonary effort is normal  No respiratory distress  Breath sounds: Normal breath sounds  No stridor  No wheezing, rhonchi or rales  Chest:      Chest wall: No tenderness  Abdominal:      General: Bowel sounds are normal  There is no distension  Palpations: Abdomen is soft  Tenderness: There is no abdominal tenderness  There is no guarding or rebound  Musculoskeletal:      Right lower leg: No edema  Left lower leg: No edema  Skin:     General: Skin is warm and dry  Neurological:      Mental Status: He is oriented to person, place, and time  Labs: I have personally reviewed pertinent lab results    Lab Results   Component Value Date    WBC 13 50 (H) 09/13/2021    HGB 16 0 09/13/2021    HCT 47 8 09/13/2021    MCV 97 09/13/2021     09/13/2021     Lab Results   Component Value Date    GLUCOSE 113 11/30/2015    CALCIUM 9 6 01/12/2022     02/15/2017    K 4 8 01/12/2022    CO2 30 01/12/2022     01/12/2022    BUN 18 01/12/2022    CREATININE 0 87 01/12/2022     No results found for: IGE  Lab Results   Component Value Date    ALT 24 01/12/2022 AST 20 01/12/2022    ALKPHOS 100 01/12/2022    BILITOT 0 7 11/22/2016         Imaging and other studies: I have personally reviewed pertinent reports  and I have personally reviewed pertinent films in PACS      Chest x-ray 4/28/21:  Clear/normal lungs     CTA pulmonary embolism study 7/25/17:  Right upper lobe pulmonary embolism  RV/LV ratio 1 2  Right lower lobe consolidation     CT lung cancer screening 5/17/21:  There is a new 4 mm left upper lobe juxtapleural nodule  There is no tracheal or endobronchial lesion      Pulmonary function testing 3/8/21:     FEV1/FVC Ratio: 70 %  FEV1: 2 22 L     56 % predicted  Forced Vital Capacity: 3 14 L    60 % predicted  After administration of bronchodilator:  Not administered due to COVID-19 for     Lung volumes by body plethysmography: Total Lung Capacity 77 % predicted Residual volume 108 % predicted     DLCO corrected for patients hemoglobin level: 87 % predicted     EKG, Pathology, and Other Studies: I have personally reviewed pertinent reports        Echocardiogram 11/6/20:  Ejection fraction 60% with no wall motion abnormalities     Nuclear stress test 11/6/20:  Normal study     Results of a split study done at [facility] in 2019:  The diagnostic portion demonstrated an AHI of 64 6 per hour  He spent 16 1% of this portion of the study with saturations less than 90%  During the therapeutic portion, sleep disordered breathing was adequately remediated with BiPAP at 16/12 cm H2O  Minimum oxygen saturation was 97%      Jose Richards, DO - PGY5  [facility]

## 2022-04-05 ENCOUNTER — OFFICE VISIT (OUTPATIENT)
Dept: FAMILY MEDICINE CLINIC | Facility: HOSPITAL | Age: 62
End: 2022-04-05
Payer: MEDICARE

## 2022-04-05 VITALS
OXYGEN SATURATION: 100 % | DIASTOLIC BLOOD PRESSURE: 86 MMHG | HEIGHT: 72 IN | BODY MASS INDEX: 42.66 KG/M2 | WEIGHT: 315 LBS | TEMPERATURE: 97.1 F | HEART RATE: 77 BPM | SYSTOLIC BLOOD PRESSURE: 112 MMHG

## 2022-04-05 DIAGNOSIS — M25.562 ACUTE PAIN OF LEFT KNEE: Primary | ICD-10-CM

## 2022-04-05 PROCEDURE — 99213 OFFICE O/P EST LOW 20 MIN: CPT | Performed by: NURSE PRACTITIONER

## 2022-04-05 RX ORDER — MELOXICAM 15 MG/1
15 TABLET ORAL DAILY
Qty: 30 TABLET | Refills: 0 | Status: SHIPPED | OUTPATIENT
Start: 2022-04-05

## 2022-04-05 NOTE — PROGRESS NOTES
Assessment/Plan:    No problem-specific Assessment & Plan notes found for this encounter  Diagnoses and all orders for this visit:    Acute pain of left knee  Comments:  consult ortho and PT as ordered, use meloxicam as rx'd & continue warm compresses  Orders:  -     meloxicam (MOBIC) 15 mg tablet; Take 1 tablet (15 mg total) by mouth daily  -     Ambulatory Referral to Physical Therapy; Future  -     Ambulatory Referral to Orthopedic Surgery; Future          Subjective:      Patient ID: Moni Young is a 63 y o  male  Here for an acute visit with c/o left knee pain for 4 weeks  Denies injury or known cause  No meds taken for  Will be moving to [place] next week  The following portions of the patient's history were reviewed and updated as appropriate: allergies, current medications, past family history, past medical history, past social history, past surgical history and problem list     Review of Systems   Musculoskeletal: Positive for arthralgias (pain behind right knee x4 weeks, getting worse, now affecting walking, no pain at rest, applying heat w/o relief) and gait problem  Negative for joint swelling  Objective:      /86 (Patient Position: Sitting, Cuff Size: Large)   Pulse 77   Temp (!) 97 1 °F (36 2 °C) (Tympanic)   Ht 6' (1 829 m)   Wt (!) 165 kg (364 lb)   SpO2 100%   BMI 49 37 kg/m²          Physical Exam  Vitals reviewed  Constitutional:       General: He is not in acute distress  Appearance: Normal appearance  HENT:      Head: Normocephalic  Pulmonary:      Effort: Pulmonary effort is normal  No respiratory distress  Musculoskeletal:      Left knee: No swelling, deformity, effusion, erythema, ecchymosis or crepitus  Decreased range of motion  Tenderness (popliteal fossa) present  No LCL laxity or MCL laxity  Right lower leg: No edema  Left lower leg: No edema  Skin:     General: Skin is warm and dry     Neurological:      General: No focal deficit present  Mental Status: He is alert and oriented to person, place, and time     Psychiatric:         Mood and Affect: Mood normal          Behavior: Behavior normal

## 2022-04-06 ENCOUNTER — OFFICE VISIT (OUTPATIENT)
Dept: PULMONOLOGY | Facility: CLINIC | Age: 62
End: 2022-04-06
Payer: MEDICARE

## 2022-04-06 VITALS
BODY MASS INDEX: 42.66 KG/M2 | SYSTOLIC BLOOD PRESSURE: 128 MMHG | DIASTOLIC BLOOD PRESSURE: 80 MMHG | RESPIRATION RATE: 16 BRPM | HEIGHT: 72 IN | OXYGEN SATURATION: 96 % | WEIGHT: 315 LBS | HEART RATE: 78 BPM | TEMPERATURE: 98.2 F

## 2022-04-06 DIAGNOSIS — J44.9 CHRONIC OBSTRUCTIVE PULMONARY DISEASE, UNSPECIFIED COPD TYPE (HCC): ICD-10-CM

## 2022-04-06 DIAGNOSIS — J44.9 CHRONIC OBSTRUCTIVE PULMONARY DISEASE, UNSPECIFIED COPD TYPE (HCC): Primary | ICD-10-CM

## 2022-04-06 DIAGNOSIS — G47.33 OSA (OBSTRUCTIVE SLEEP APNEA): ICD-10-CM

## 2022-04-06 DIAGNOSIS — Z72.0 TOBACCO ABUSE: ICD-10-CM

## 2022-04-06 DIAGNOSIS — R91.1 PULMONARY NODULE: ICD-10-CM

## 2022-04-06 DIAGNOSIS — E66.01 MORBID OBESITY (HCC): Chronic | ICD-10-CM

## 2022-04-06 PROCEDURE — 99214 OFFICE O/P EST MOD 30 MIN: CPT | Performed by: INTERNAL MEDICINE

## 2022-04-06 RX ORDER — TIOTROPIUM BROMIDE 18 UG/1
18 CAPSULE ORAL; RESPIRATORY (INHALATION) DAILY
Qty: 30 CAPSULE | Refills: 2 | Status: SHIPPED | OUTPATIENT
Start: 2022-04-06 | End: 2022-04-08

## 2022-04-08 RX ORDER — UMECLIDINIUM 62.5 UG/1
1 AEROSOL, POWDER ORAL DAILY
Qty: 30 BLISTER | Refills: 0 | Status: SHIPPED | OUTPATIENT
Start: 2022-04-08

## 2022-04-12 ENCOUNTER — TELEPHONE (OUTPATIENT)
Dept: SURGICAL ONCOLOGY | Facility: CLINIC | Age: 62
End: 2022-04-12

## 2022-04-12 NOTE — TELEPHONE ENCOUNTER
Called the patient because he cancelled his appointment with Dr Andreas Chandra  I left a message with the hopeline for him to reschedule

## 2022-07-15 DIAGNOSIS — I48.20 CHRONIC ATRIAL FIBRILLATION (HCC): ICD-10-CM

## 2022-07-15 DIAGNOSIS — I50.32 CHRONIC DIASTOLIC CONGESTIVE HEART FAILURE (HCC): ICD-10-CM

## 2022-07-15 DIAGNOSIS — E05.00 GRAVES DISEASE: ICD-10-CM

## 2022-07-15 RX ORDER — ATORVASTATIN CALCIUM 40 MG/1
TABLET, FILM COATED ORAL
Qty: 90 TABLET | Refills: 1 | Status: SHIPPED | OUTPATIENT
Start: 2022-07-15

## 2022-07-15 RX ORDER — LEVOTHYROXINE SODIUM 0.2 MG/1
TABLET ORAL
Qty: 90 TABLET | Refills: 1 | Status: SHIPPED | OUTPATIENT
Start: 2022-07-15

## 2022-11-27 NOTE — ASSESSMENT & PLAN NOTE
BP low (and may be contributing to lightheadedness)  Advise he continue same metoprolol as rx'd by cardiology and schedule f/u w/Dr Kiarra Begum he is overdue for  [FreeTextEntry1] : The following plan was agreed upon in discussion with this patient. This plan addresses this patient's overweight / obesity as well as the following medical comorbidities of overweight / obesity:\par \par -increase mounjaro to 15 mg\par -counseled on diet and exercise\par -new cooking recipe resources provided\par -continue monthly follow ups\par \par Bariatric surgery history: none\par Overweight / obesity comorbidities: prediabetes hyperlipidemia\par Current anti-obesity medications: mounjaro\par Obesity medication side effects: none\par

## 2023-03-10 NOTE — ED NOTES
Pt ambulatory  Wheezing and rhonchorous cough  No dizziness with ambulation       Evelia Smith RN  03/17/19 0033 [facility]

## 2023-10-24 NOTE — ED PROVIDER NOTES
History  Chief Complaint   Patient presents with    Leg Swelling     pt states he noted a lump on the left lower leg (shin) 30 minutes ago  Pt denies any recent injuries or animal bites     64year old male presents for evaluation of swelling of the left lower leg which was 1st noticed approximately 30 minutes ago  Patient reports moderate dull pain at the site  He denies any recent illness  No fevers or chills  Patient reports history of cellulitis in the right lower extremity which has resolved  He takes Xarelto daily due to history of PEs and is compliant with therapy  Patient denies any injuries to the area  He states he was sitting watching TV when he 1st noticed the swelling  History provided by:  Patient  Medical Problem   Location:  Left lower leg  Quality:  Dull pain with swelling  Severity:  Moderate  Onset quality:  Sudden  Duration:  30 minutes  Timing:  Constant  Progression:  Worsening  Chronicity:  New  Relieved by:  None tried  Worsened by:  Palpation  Ineffective treatments:  None tried  Associated symptoms: no abdominal pain, no chest pain, no congestion, no cough, no diarrhea, no fatigue, no fever, no headaches, no myalgias, no nausea, no rhinorrhea, no shortness of breath, no sore throat and no vomiting    Risk factors:  Xarelto      Prior to Admission Medications   Prescriptions Last Dose Informant Patient Reported? Taking?    XARELTO 20 MG tablet   No No   Sig: TAKE 1 TABLET BY MOUTH ONCE DAILY WITH  BREAKFAST   atorvastatin (LIPITOR) 40 mg tablet  Self No No   Sig: Take 1 tablet (40 mg total) by mouth daily   digoxin (LANOXIN) 0 25 mg tablet   Yes No   Sig: Take 250 mcg by mouth daily   methimazole (TAPAZOLE) 10 mg tablet   No No   Sig: Take 1 tablet (10 mg total) by mouth 2 (two) times a day   metoprolol tartrate (LOPRESSOR) 50 mg tablet   No No   Sig: TAKE 1 TABLET BY MOUTH TWICE DAILY   nicotine (NICODERM CQ) 7 mg/24hr TD 24 hr patch   No No   Sig: Place 1 patch on the skin Patient was in for a nurse visit today.  Few days ago she tested positive at home for COVID.  She started taking her 's Molnupiravir after she was exposed, not under our instruction, she did this on her own.  We sent in a prescription for her when she notified us however she indicated some concern about cost and coverage and wanted us to send to send this in under her  name but we told her that was illegal and insurance fraud.  Apparently she has not picked up the prescription under her name to continue    However today she called and informed us that her throat was very sore and she wanted to be checked for strep.  She states she rechecked herself for COVID at home and it was negative.  Here she is negative for strep and was positive for COVID.  She was informed she has COVID.      I examined her she does have erythematous throat with small amount of exudate.  No airway compromise, no stridor, no difficulty breathing.  Her lungs were clear without wheezing or crackles and she is breathing comfortably on room air.      She was informed that she has COVID.  She was instructed on appropriate isolation measures.  Because she has a very severe sore throat and difficulty swallowing I agree to send in a Medrol Dosepak but we did discuss some risk and benefit with this.  I do recommend she take the antiviral.  I sent in the Molnupiravir for her rather than the Paxlovid because she had already started this again not under my advisement when she started taking her 's pills.  I felt it would be wise to continue the same antiviral and not safe to switch as we do not have date on that    Will send in a Medrol Dosepak and magic mouthwash to help her.  She should monitor symptoms closely, stressed the importance of adequate fluid intake.  Should she develop any severe symptoms, unable to swallow, or difficulty breathing she should go to the emergency room.     daily   potassium chloride (K-DUR,KLOR-CON) 20 mEq tablet  Self Yes No   Sig: Take 20 mEq by mouth 2 (two) times a day   torsemide (DEMADEX) 20 mg tablet  Self Yes No   Sig: Take 1 tablet by mouth daily      Facility-Administered Medications: None       Past Medical History:   Diagnosis Date    Acute diastolic congestive heart failure (HCC) 7/3/2017    Atrial fibrillation (HCC)     Bilateral edema of lower extremity 8/22/2016    CAD (coronary artery disease) 10/28/2016    Cardiac disease     Chest pain 8/22/2016    CPAP (continuous positive airway pressure) dependence     Hyperlipidemia     Hypertension     Hyperthyroidism 8/22/2016    Kidney stone     Kidney stone     Pneumothorax     Presence of IVC filter     Pulmonary embolism (HCC)     Rash 10/28/2016    Sleep apnea     SOB (shortness of breath) 8/22/2016    Tachycardia 8/22/2016       Past Surgical History:   Procedure Laterality Date    EGD AND COLONOSCOPY N/A 7/28/2017    Procedure: EGD AND COLONOSCOPY;  Surgeon: Antwan Monroe MD;  Location: [facility];  Service: Gastroenterology    FL RETROGRADE PYELOGRAM  8/13/2019    FL RETROGRADE PYELOGRAM  9/27/2019    LITHOTRIPSY      UT CYSTO/URETERO W/LITHOTRIPSY &INDWELL STENT INSRT Right 8/13/2019    Procedure: CYSTOSCOPY URETEROSCOPY WITH LITHOTRIPSY HOLMIUM LASER, RETROGRADE PYELOGRAM AND INSERTION STENT URETERAL;  Surgeon: Caroline Chow MD;  Location: [facility];  Service: Urology    UT CYSTO/URETERO W/LITHOTRIPSY &INDWELL STENT INSRT Right 9/27/2019    Procedure: CYSTOSCOPY URETEROSCOPY WITH LITHOTRIPSY HOLMIUM LASER, RETROGRADE PYELOGRAM AND INSERTION STENT URETERAL;  Surgeon: Tommy Cheema MD;  Location: [facility];  Service: Urology       Family History   Problem Relation Age of Onset    Cancer Mother     Heart disease Father      I have reviewed and agree with the history as documented      Social History     Tobacco Use    Smoking status: Current Every Day Smoker Packs/day: 1 00     Types: Cigarettes    Smokeless tobacco: Never Used    Tobacco comment: Pt states he smokes when he can afford to do so    Substance Use Topics    Alcohol use: No    Drug use: No        Review of Systems   Constitutional: Negative for appetite change, diaphoresis, fatigue and fever  HENT: Negative for congestion, rhinorrhea and sore throat  Respiratory: Negative for cough, chest tightness and shortness of breath  Cardiovascular: Negative for chest pain, palpitations and leg swelling  Gastrointestinal: Negative for abdominal pain, constipation, diarrhea, nausea and vomiting  Genitourinary: Negative for difficulty urinating, dysuria, frequency and hematuria  Musculoskeletal: Negative for myalgias, neck pain and neck stiffness  Skin: Negative for pallor  Neurological: Negative for syncope, weakness and headaches  All other systems reviewed and are negative  Physical Exam  Physical Exam   Constitutional: He appears well-developed and well-nourished  Non-toxic appearance  No distress  HENT:   Head: Normocephalic and atraumatic  Eyes: Pupils are equal, round, and reactive to light  EOM are normal    Neck: Normal range of motion  No tracheal deviation present  No thyromegaly present  Cardiovascular: Normal heart sounds and intact distal pulses  An irregularly irregular rhythm present  Tachycardia present  Pulmonary/Chest: Effort normal and breath sounds normal    Abdominal: Soft  Bowel sounds are normal  He exhibits no distension  There is no tenderness  Lymphadenopathy:     He has no cervical adenopathy  Skin: Skin is warm and dry  He is not diaphoretic  Approximately 7 cm area of fluctuance with no overlying skin changes of the anterior left lower leg   Nursing note and vitals reviewed        Vital Signs  ED Triage Vitals [12/05/19 2237]   Temperature Pulse Respirations Blood Pressure SpO2   97 7 °F (36 5 °C) (!) 147 20 147/79 99 %      Temp Source Heart Rate Source Patient Position - Orthostatic VS BP Location FiO2 (%)   Temporal Monitor Lying Right arm --      Pain Score       2           Vitals:    12/05/19 2237 12/05/19 2300   BP: 147/79    Pulse: (!) 147 105   Patient Position - Orthostatic VS: Lying          Visual Acuity      ED Medications  Medications   lidocaine-epinephrine (XYLOCAINE/EPINEPHRINE) 1 %-1:100,000 injection 10 mL (10 mL Infiltration Given 12/5/19 5833)       Diagnostic Studies  Results Reviewed     None                 No orders to display              Procedures  ECG 12 Lead Documentation Only  Date/Time: 12/5/2019 10:44 PM  Performed by: Mi Askew MD  Authorized by: Mi Askew MD     Indications / Diagnosis:  Tachycardia  Patient location:  ED  Previous ECG:     Previous ECG:  Compared to current    Comparison ECG info:  09/29/2019 AFib    Similarity:  No change  Interpretation:     Interpretation: abnormal    Rate:     ECG rate:  97    ECG rate assessment: normal    Rhythm:     Rhythm: atrial fibrillation    Ectopy:     Ectopy: none    QRS:     QRS axis:  Normal    QRS intervals:  Normal  Conduction:     Conduction: normal    ST segments:     ST segments:  Normal  T waves:     T waves: normal    Incision and drain  Date/Time: 12/5/2019 11:34 PM  Performed by: Mi Askew MD  Authorized by: Mi Askew MD     Patient location:  ED  Consent:     Consent obtained:  Verbal    Consent given by:  Patient    Risks discussed:  Bleeding, incomplete drainage, infection and pain    Alternatives discussed:  Observation and no treatment  Universal protocol:     Patient identity confirmed:  Verbally with patient and arm band  Location:     Type:  Fluid collection    Size:  7 cm    Location:  Lower extremity    Lower extremity location:  L leg  Pre-procedure details:     Skin preparation:  Chloraprep  Anesthesia (see MAR for exact dosages):      Anesthesia method:  Local infiltration    Local anesthetic:  Lidocaine 1% WITH epi  Procedure details:     Needle aspiration: yes      Needle size:  18 G    Approach:  Puncture    Incision depth:  Subcutaneous    Drainage:  Bloody  Post-procedure details:     Patient tolerance of procedure: Tolerated well, no immediate complications  Comments:      5 mL blood aspirated from site  Further attempts at I&D abandoned  Pressure dressing applied  ED Course                               MDM  Number of Diagnoses or Management Options  Traumatic hematoma of left lower leg, initial encounter: new and requires workup  Diagnosis management comments: 64year old male present with lump on the left lower leg first noticed 30 minutes ago  Mild dull pain at the site  No overlying skin changes  Bedside US shows an approximately 7 cm mixed density collection with no signs of cobblestoning to suggest cellulitis  Patient initially denied any injury to the leg  Aspiration attempt made at bedside which drained blood consistent with large hematoma  Pressure dressing applied  Patient then recalled striking the leg on the tire of a truck 1 hour ago  Wound recheck in 3 days  Discussed return precautions with patient  Patient Progress  Patient progress: stable        Disposition  Final diagnoses:   Traumatic hematoma of left lower leg, initial encounter     Time reflects when diagnosis was documented in both MDM as applicable and the Disposition within this note     Time User Action Codes Description Comment    12/5/2019 11:19 PM Vika MONROE Add [[id number] [id number]] Traumatic hematoma of left lower leg, initial encounter       ED Disposition     ED Disposition Condition Date/Time Comment    Discharge Stable Thu Dec 5, 2019 11:19 PM Zelda Mireles discharge to home/self care  Follow-up Information     Follow up With Specialties Details Why Contact Info Additional Information    Yovani Cancer, DO Family Medicine Schedule an appointment as soon as possible for a visit in 3 days For wound re-check [address]  [facility] [place] [address]  [facility] Emergency Medicine Go to  If symptoms worsen, redness or red streaks, fever >101 F, numbness or severe pain [address]  [address], [address], [place], [place], [address]          Discharge Medication List as of 12/5/2019 11:20 PM      CONTINUE these medications which have NOT CHANGED    Details   atorvastatin (LIPITOR) 40 mg tablet Take 1 tablet (40 mg total) by mouth daily, Starting Fri 8/17/2018, Normal      digoxin (LANOXIN) 0 25 mg tablet Take 250 mcg by mouth daily, Historical Med      methimazole (TAPAZOLE) 10 mg tablet Take 1 tablet (10 mg total) by mouth 2 (two) times a day, Starting Sat 8/17/2019, Print      metoprolol tartrate (LOPRESSOR) 50 mg tablet TAKE 1 TABLET BY MOUTH TWICE DAILY, Normal      nicotine (NICODERM CQ) 7 mg/24hr TD 24 hr patch Place 1 patch on the skin daily, Starting Tue 10/1/2019, Normal      potassium chloride (K-DUR,KLOR-CON) 20 mEq tablet Take 20 mEq by mouth 2 (two) times a day, Historical Med      torsemide (DEMADEX) 20 mg tablet Take 1 tablet by mouth daily, Historical Med      XARELTO 20 MG tablet TAKE 1 TABLET BY MOUTH ONCE DAILY WITH  BREAKFAST, Normal           No discharge procedures on file      ED Provider  Electronically Signed by           Aleta Lei MD  12/06/19 9117

## (undated) DEVICE — SCD SEQUENTIAL COMPRESSION COMFORT SLEEVE MEDIUM KNEE LENGTH: Brand: KENDALL SCD

## (undated) DEVICE — GUIDEWIRE STRGHT TIP 0.035 IN  SOLO PLUS

## (undated) DEVICE — SYRINGE 10ML LL

## (undated) DEVICE — TUBING SUCTION 5MM X 12 FT

## (undated) DEVICE — GAUZE SPONGES,16 PLY: Brand: CURITY

## (undated) DEVICE — 3M™ TEGADERM™ TRANSPARENT FILM DRESSING FRAME STYLE, 1624W, 2-3/8 IN X 2-3/4 IN (6 CM X 7 CM), 100/CT 4CT/CASE: Brand: 3M™ TEGADERM™

## (undated) DEVICE — BIPOLAR CORD DISP

## (undated) DEVICE — CYSTOSCOPY PACK: Brand: CONVERTORS

## (undated) DEVICE — SUT VICRYL 3-0 18 IN J110T

## (undated) DEVICE — GLOVE SRG BIOGEL 8

## (undated) DEVICE — SPONGE STICK WITH PVP-I: Brand: KENDALL

## (undated) DEVICE — CATH URETERAL 5FR X 70 CM FLEX TIP POLYUR BARD

## (undated) DEVICE — 3000CC GUARDIAN II: Brand: GUARDIAN

## (undated) DEVICE — ELECTRODE BLADE MOD E-Z CLEAN 4IN -0014AM

## (undated) DEVICE — SURGICEL SNOW 1 X 2 IN

## (undated) DEVICE — CYSTO TUBING TUR Y IRRIGATION

## (undated) DEVICE — 3M™ STERI-STRIP™ COMPOUND BENZOIN TINCTURE 40 BAGS/CARTON 4 CARTONS/CASE C1544: Brand: 3M™ STERI-STRIP™

## (undated) DEVICE — HARMONIC 1100 SHEARS, 20CM SHAFT LENGTH: Brand: HARMONIC

## (undated) DEVICE — SUT SILK 2-0 SH 30 IN K833H

## (undated) DEVICE — BASKET STONE RTRVL ZERO TIP 2.4FR

## (undated) DEVICE — SKIN MARKER DUAL TIP WITH RULER CAP, FLEXIBLE RULER AND LABELS: Brand: DEVON

## (undated) DEVICE — SUT MONOCRYL 5-0 P-3 18 IN Y493G

## (undated) DEVICE — PLUMEPEN PRO 10FT

## (undated) DEVICE — INTENDED FOR TISSUE SEPARATION, AND OTHER PROCEDURES THAT REQUIRE A SHARP SURGICAL BLADE TO PUNCTURE OR CUT.: Brand: BARD-PARKER ® CARBON RIB-BACK BLADES

## (undated) DEVICE — SURGICAL GOWN, XL SMARTSLEEVE: Brand: CONVERTORS

## (undated) DEVICE — 1200CC GUARDIAN II: Brand: GUARDIAN

## (undated) DEVICE — SUT VICRYL 4-0 PS-2 27 IN J426H

## (undated) DEVICE — GLOVE SRG BIOGEL ECLIPSE 7.5

## (undated) DEVICE — DRAPE SURGIKIT SADDLE BAG

## (undated) DEVICE — GUIDEWIRE ANGLE TIP 0.038 IN SOLO PLUS

## (undated) DEVICE — GLOVE SRG BIOGEL ECLIPSE 7

## (undated) DEVICE — CATH URET .038 10FR 50CM DUAL LUMEN

## (undated) DEVICE — SUT SILK 2-0 18 IN A185H

## (undated) DEVICE — SUT SILK 3-0 18 IN A184H

## (undated) DEVICE — SINGLE-USE BIOPSY FORCEPS: Brand: RADIAL JAW 4

## (undated) DEVICE — SURGICEL 4 X 8

## (undated) DEVICE — LASER FIBER HOLMIUM 272MICRON

## (undated) DEVICE — GLOVE INDICATOR PI UNDERGLOVE SZ 7.5 BLUE

## (undated) DEVICE — SYRINGE 50ML LL

## (undated) DEVICE — CATH FOLEY 12FR 5ML 2 WAY UNCOATED SILICONE

## (undated) DEVICE — PACK TUR

## (undated) DEVICE — 3M™ STERI-STRIP™ REINFORCED ADHESIVE SKIN CLOSURES, R1547, 1/2 IN X 4 IN (12 MM X 100 MM), 6 STRIPS/ENVELOPE: Brand: 3M™ STERI-STRIP™

## (undated) DEVICE — GLOVE SRG BIOGEL 7

## (undated) DEVICE — SYRINGE 30ML LL

## (undated) DEVICE — SUT VICRYL 3-0 SH 27 IN J416H

## (undated) DEVICE — STANDARD SURGICAL GOWN, L: Brand: CONVERTORS

## (undated) DEVICE — GLOVE INDICATOR PI UNDERGLOVE SZ 7 BLUE

## (undated) DEVICE — PACK UNIVERSAL NECK

## (undated) DEVICE — LIGACLIP MCA MULTIPLE CLIP APPLIERS, 20 SMALL CLIPS: Brand: LIGACLIP

## (undated) DEVICE — MINOR PROCEDURE DRAPE: Brand: CONVERTORS

## (undated) DEVICE — UROCATCH BAG